# Patient Record
Sex: FEMALE | Race: WHITE | URBAN - METROPOLITAN AREA
[De-identification: names, ages, dates, MRNs, and addresses within clinical notes are randomized per-mention and may not be internally consistent; named-entity substitution may affect disease eponyms.]

---

## 2020-05-22 ENCOUNTER — EMERGENCY (EMERGENCY)
Facility: HOSPITAL | Age: 75
LOS: 1 days | Discharge: HOME | End: 2020-05-22
Attending: INTERNAL MEDICINE | Admitting: INTERNAL MEDICINE
Payer: COMMERCIAL

## 2020-05-22 VITALS
DIASTOLIC BLOOD PRESSURE: 77 MMHG | SYSTOLIC BLOOD PRESSURE: 157 MMHG | HEART RATE: 63 BPM | RESPIRATION RATE: 16 BRPM | OXYGEN SATURATION: 95 %

## 2020-05-22 VITALS
OXYGEN SATURATION: 93 % | SYSTOLIC BLOOD PRESSURE: 98 MMHG | TEMPERATURE: 98 F | HEART RATE: 91 BPM | RESPIRATION RATE: 17 BRPM | DIASTOLIC BLOOD PRESSURE: 72 MMHG

## 2020-05-22 DIAGNOSIS — R41.82 ALTERED MENTAL STATUS, UNSPECIFIED: ICD-10-CM

## 2020-05-22 LAB
ALBUMIN SERPL ELPH-MCNC: 3 G/DL — LOW (ref 3.3–5)
ALP SERPL-CCNC: 75 U/L — SIGNIFICANT CHANGE UP (ref 40–120)
ALT FLD-CCNC: 18 U/L — SIGNIFICANT CHANGE UP (ref 10–45)
ANION GAP SERPL CALC-SCNC: 9 MMOL/L — SIGNIFICANT CHANGE UP (ref 5–17)
APPEARANCE UR: CLEAR — SIGNIFICANT CHANGE UP
AST SERPL-CCNC: 29 U/L — SIGNIFICANT CHANGE UP (ref 10–40)
BACTERIA # UR AUTO: NEGATIVE /HPF — SIGNIFICANT CHANGE UP
BASOPHILS # BLD AUTO: 0.03 K/UL — SIGNIFICANT CHANGE UP (ref 0–0.2)
BASOPHILS NFR BLD AUTO: 0.4 % — SIGNIFICANT CHANGE UP (ref 0–2)
BILIRUB SERPL-MCNC: 0.5 MG/DL — SIGNIFICANT CHANGE UP (ref 0.2–1.2)
BILIRUB UR-MCNC: NEGATIVE — SIGNIFICANT CHANGE UP
BUN SERPL-MCNC: 23 MG/DL — SIGNIFICANT CHANGE UP (ref 7–23)
CALCIUM SERPL-MCNC: 8.9 MG/DL — SIGNIFICANT CHANGE UP (ref 8.4–10.5)
CHLORIDE SERPL-SCNC: 101 MMOL/L — SIGNIFICANT CHANGE UP (ref 96–108)
CO2 SERPL-SCNC: 30 MMOL/L — SIGNIFICANT CHANGE UP (ref 22–31)
COLOR SPEC: YELLOW — SIGNIFICANT CHANGE UP
CREAT SERPL-MCNC: 1.3 MG/DL — SIGNIFICANT CHANGE UP (ref 0.5–1.3)
DIFF PNL FLD: NEGATIVE — SIGNIFICANT CHANGE UP
EOSINOPHIL # BLD AUTO: 0.13 K/UL — SIGNIFICANT CHANGE UP (ref 0–0.5)
EOSINOPHIL NFR BLD AUTO: 1.7 % — SIGNIFICANT CHANGE UP (ref 0–6)
EPI CELLS # UR: SIGNIFICANT CHANGE UP
GLUCOSE BLDC GLUCOMTR-MCNC: 109 MG/DL — HIGH (ref 70–99)
GLUCOSE SERPL-MCNC: 104 MG/DL — HIGH (ref 70–99)
GLUCOSE UR QL: NEGATIVE — SIGNIFICANT CHANGE UP
HCT VFR BLD CALC: 39.5 % — SIGNIFICANT CHANGE UP (ref 34.5–45)
HGB BLD-MCNC: 13.2 G/DL — SIGNIFICANT CHANGE UP (ref 11.5–15.5)
HYALINE CASTS # UR AUTO: ABNORMAL (ref 0–7)
IMM GRANULOCYTES NFR BLD AUTO: 0.7 % — SIGNIFICANT CHANGE UP (ref 0–1.5)
KETONES UR-MCNC: NEGATIVE — SIGNIFICANT CHANGE UP
LEUKOCYTE ESTERASE UR-ACNC: NEGATIVE — SIGNIFICANT CHANGE UP
LYMPHOCYTES # BLD AUTO: 0.89 K/UL — LOW (ref 1–3.3)
LYMPHOCYTES # BLD AUTO: 11.8 % — LOW (ref 13–44)
MCHC RBC-ENTMCNC: 30.8 PG — SIGNIFICANT CHANGE UP (ref 27–34)
MCHC RBC-ENTMCNC: 33.4 GM/DL — SIGNIFICANT CHANGE UP (ref 32–36)
MCV RBC AUTO: 92.3 FL — SIGNIFICANT CHANGE UP (ref 80–100)
MONOCYTES # BLD AUTO: 0.55 K/UL — SIGNIFICANT CHANGE UP (ref 0–0.9)
MONOCYTES NFR BLD AUTO: 7.3 % — SIGNIFICANT CHANGE UP (ref 2–14)
NEUTROPHILS # BLD AUTO: 5.91 K/UL — SIGNIFICANT CHANGE UP (ref 1.8–7.4)
NEUTROPHILS NFR BLD AUTO: 78.1 % — HIGH (ref 43–77)
NITRITE UR-MCNC: NEGATIVE — SIGNIFICANT CHANGE UP
NRBC # BLD: 0 /100 WBCS — SIGNIFICANT CHANGE UP (ref 0–0)
PH UR: 6.5 — SIGNIFICANT CHANGE UP (ref 5–8)
PLATELET # BLD AUTO: 213 K/UL — SIGNIFICANT CHANGE UP (ref 150–400)
POTASSIUM SERPL-MCNC: 3.7 MMOL/L — SIGNIFICANT CHANGE UP (ref 3.5–5.3)
POTASSIUM SERPL-SCNC: 3.7 MMOL/L — SIGNIFICANT CHANGE UP (ref 3.5–5.3)
PROT SERPL-MCNC: 6.9 G/DL — SIGNIFICANT CHANGE UP (ref 6–8.3)
PROT UR-MCNC: 15
RBC # BLD: 4.28 M/UL — SIGNIFICANT CHANGE UP (ref 3.8–5.2)
RBC # FLD: 13 % — SIGNIFICANT CHANGE UP (ref 10.3–14.5)
RBC CASTS # UR COMP ASSIST: NEGATIVE /HPF — SIGNIFICANT CHANGE UP (ref 0–4)
SODIUM SERPL-SCNC: 140 MMOL/L — SIGNIFICANT CHANGE UP (ref 135–145)
SP GR SPEC: 1.01 — SIGNIFICANT CHANGE UP (ref 1.01–1.02)
TROPONIN I SERPL-MCNC: <.017 NG/ML — LOW (ref 0.02–0.06)
UROBILINOGEN FLD QL: NEGATIVE — SIGNIFICANT CHANGE UP
WBC # BLD: 7.56 K/UL — SIGNIFICANT CHANGE UP (ref 3.8–10.5)
WBC # FLD AUTO: 7.56 K/UL — SIGNIFICANT CHANGE UP (ref 3.8–10.5)
WBC UR QL: NEGATIVE /HPF — SIGNIFICANT CHANGE UP (ref 0–5)

## 2020-05-22 PROCEDURE — 36415 COLL VENOUS BLD VENIPUNCTURE: CPT

## 2020-05-22 PROCEDURE — 93005 ELECTROCARDIOGRAM TRACING: CPT

## 2020-05-22 PROCEDURE — 93010 ELECTROCARDIOGRAM REPORT: CPT

## 2020-05-22 PROCEDURE — 70450 CT HEAD/BRAIN W/O DYE: CPT | Mod: 26

## 2020-05-22 PROCEDURE — 73502 X-RAY EXAM HIP UNI 2-3 VIEWS: CPT | Mod: 26,LT

## 2020-05-22 PROCEDURE — 70450 CT HEAD/BRAIN W/O DYE: CPT

## 2020-05-22 PROCEDURE — 85027 COMPLETE CBC AUTOMATED: CPT

## 2020-05-22 PROCEDURE — 81001 URINALYSIS AUTO W/SCOPE: CPT

## 2020-05-22 PROCEDURE — 73502 X-RAY EXAM HIP UNI 2-3 VIEWS: CPT

## 2020-05-22 PROCEDURE — 99284 EMERGENCY DEPT VISIT MOD MDM: CPT | Mod: 25

## 2020-05-22 PROCEDURE — 80053 COMPREHEN METABOLIC PANEL: CPT

## 2020-05-22 PROCEDURE — 71045 X-RAY EXAM CHEST 1 VIEW: CPT | Mod: 26

## 2020-05-22 PROCEDURE — 87086 URINE CULTURE/COLONY COUNT: CPT

## 2020-05-22 PROCEDURE — 51701 INSERT BLADDER CATHETER: CPT

## 2020-05-22 PROCEDURE — 84484 ASSAY OF TROPONIN QUANT: CPT

## 2020-05-22 PROCEDURE — 71045 X-RAY EXAM CHEST 1 VIEW: CPT

## 2020-05-22 PROCEDURE — 99285 EMERGENCY DEPT VISIT HI MDM: CPT

## 2020-05-22 PROCEDURE — 82962 GLUCOSE BLOOD TEST: CPT

## 2020-05-22 RX ORDER — SODIUM CHLORIDE 9 MG/ML
1000 INJECTION INTRAMUSCULAR; INTRAVENOUS; SUBCUTANEOUS ONCE
Refills: 0 | Status: COMPLETED | OUTPATIENT
Start: 2020-05-22 | End: 2020-05-22

## 2020-05-22 RX ADMIN — SODIUM CHLORIDE 2000 MILLILITER(S): 9 INJECTION INTRAMUSCULAR; INTRAVENOUS; SUBCUTANEOUS at 12:30

## 2020-05-22 NOTE — ED PROVIDER NOTE - PATIENT PORTAL LINK FT
You can access the FollowMyHealth Patient Portal offered by [facility] by registering at the following website: [email or web address]. By joining [employer]’s FollowMyHealth portal, you will also be able to view your health information using other applications (apps) compatible with our system.

## 2020-05-22 NOTE — ED PROVIDER NOTE - ATTENDING CONTRIBUTION TO CARE
74 yo F with PMHx HTN, HLD, dementia, hypothyroid presents to ED sent in from assisted living for AMS.  Per EMS pt was found outside smoking and per caretakers at facility pt has not been acting like herself since last night.  Pt's daughter Nicole called and stated that pt had COVID, recovered and received her second negative test yesterday, prompting the facility to move her back to her old room last night, daughter reports pt didn't sleep last night and may have been confused because of the new environment.  Will check labs, UA, urine culture, CXR, CT head and reassess.  Work up negative, sx may be 2/2 to starting bacoflen and change in environment, will d/c back to nursing facility as pt had negative COVID test yesterday  Dr. Freeman:  I have reviewed and discussed with the PA/ resident the case specifics, including the history, physical assessment, evaluation, conclusion, laboratory results, and medical plan. I agree with the contents, and conclusions. I have personally examined, and interviewed the patient.

## 2020-05-22 NOTE — ED PROVIDER NOTE - PMH
Dementia without behavioral disturbance, unspecified dementia type    Hyperlipidemia, unspecified hyperlipidemia type    Hypertension, unspecified type    Hypothyroidism, unspecified type

## 2020-05-22 NOTE — ED PROVIDER NOTE - OBJECTIVE STATEMENT
74 yo F with PMHx HTN, HLD, dementia, hypothyroid presents to ED sent in from assisted living for AMS.  Per EMS pt was found outside smoking and per caretakers at facility pt has not been acting like herself since last night.  Pt's daughter Nicole called and stated that pt had COVID, recovered and received her second negative test yesterday, prompting the facility to move her back to her old room last night, daughter reports pt didn't sleep last night and may have been confused because of the new environment. 74 yo F with PMHx HTN, HLD, dementia, hypothyroid presents to ED sent in from assisted living for AMS.  Per EMS pt was found outside smoking and per caretakers at facility pt has not been acting like herself since last night.  Pt's daughter Nicole called and stated that pt had COVID, recovered and received her second negative test yesterday, prompting the facility to move her back to her old room last night, daughter reports pt didn't sleep last night and may have been confused because of the new environment.  Per notes from facility pt was also started on baclofen yesterday.

## 2020-05-22 NOTE — ED PROVIDER NOTE - NSDCPRINTRESULTS_ED_ALL_ED
[facility]        NAME: Dinah Casper is a 50 y o  female  : 1972    MRN: [id number]  DATE: 2019  TIME: 6:57 PM    Assessment and Plan   Allergic conjunctivitis of left eye [H10 12]  1  Allergic conjunctivitis of left eye  ketotifen (ZADITOR) 0 025 % ophthalmic solution   2  Allergic reaction, initial encounter  predniSONE 10 mg tablet         Patient Instructions     Prednisone and Zaditor drops as prescribed  Claritin once per day and pepsid twice per day  Follow up with eye specialist within 24 hours   [facility] ([phone]  Follow up with PCP in 3-5 days  Proceed to  ER if symptoms worsen  Chief Complaint     Chief Complaint   Patient presents with    Allergic Reaction     touched dog 1 hr ago and eyes are swollen and have blisters on corneas         History of Present Illness       She has undergone allergy testing in the past  She has an epi pen but hasn't used it in years  Denies difficulty breathing or swallowing  PMH: angioedema  Allergic Reaction   This is a new problem  The current episode started today  The problem occurs constantly  The problem is unchanged  The problem is moderate  Associated with: dog  The time of exposure was just prior to onset  Associated symptoms include eye itching and eye redness  Pertinent negatives include no abdominal pain, chest pain, rash, stridor, trouble swallowing or wheezing  Swelling is present on the eyes  Treatments tried: eye drops and claritin  Review of Systems   Review of Systems   Constitutional: Negative for chills and fever  HENT: Negative for trouble swallowing  Eyes: Positive for discharge (lacrimation), redness and itching  Negative for photophobia, pain and visual disturbance  Respiratory: Negative for shortness of breath, wheezing and stridor  Cardiovascular: Negative for chest pain  Gastrointestinal: Negative for abdominal pain     Musculoskeletal: Negative for arthralgias  Skin: Negative for rash  Current Medications       Current Outpatient Medications:     amLODIPine (NORVASC) 10 mg tablet, Take 10 mg by mouth daily, Disp: , Rfl:     atenolol (TENORMIN) 25 mg tablet, TAKE 1 TABLET (25 MG TOTAL) BY MOUTH DAILY   CANCEL CLONIDINE, Disp: , Rfl: 0    buPROPion (WELLBUTRIN XL) 150 mg 24 hr tablet, Take 150 mg by mouth, Disp: , Rfl:     cloNIDine (CATAPRES) 0 2 mg tablet, Take 0 2 mg by mouth daily at bedtime, Disp: , Rfl:     Fesoterodine Fumarate ER 4 MG TB24, Take 1 tablet (4 mg total) by mouth daily at bedtime, Disp: 30 tablet, Rfl: 1    gabapentin (NEURONTIN) 600 MG tablet, TAKE 1 Tablet BY MOUTH TWICE DAILY FOR PAIN AND SLEEP, Disp: , Rfl: 0    hydroxychloroquine (PLAQUENIL) 200 mg tablet, Take 400 mg by mouth daily, Disp: , Rfl: 5    hydrOXYzine HCL (ATARAX) 25 mg tablet, Take 25 mg by mouth every 6 (six) hours as needed for itching, Disp: , Rfl:     lovastatin (MEVACOR) 10 MG tablet, Take 10 mg by mouth, Disp: , Rfl:     meloxicam (MOBIC) 7 5 mg tablet, Take 7 5 mg by mouth, Disp: , Rfl:     metFORMIN (GLUCOPHAGE-XR) 500 mg 24 hr tablet, Take 1 tablet by mouth, Disp: , Rfl:     montelukast (SINGULAIR) 10 mg tablet, Take 10 mg by mouth daily at bedtime, Disp: , Rfl:     omeprazole (PriLOSEC) 10 mg delayed release capsule, Take 20 mg by mouth, Disp: , Rfl:     pramipexole (MIRAPEX) 1 mg tablet, Take 0 25 mg by mouth 3 (three) times a day, Disp: , Rfl:     pramipexole (MIRAPEX) 1 mg tablet, Take 1 mg by mouth, Disp: , Rfl:     SUMAtriptan (IMITREX) 100 mg tablet, Take 100 mg by mouth once as needed for migraine, Disp: , Rfl:     topiramate (TOPAMAX) 50 MG tablet, Take 50 mg by mouth, Disp: , Rfl:     venlafaxine (EFFEXOR-XR) 75 mg 24 hr capsule, Take 75 mg by mouth, Disp: , Rfl:     ketotifen (ZADITOR) 0 025 % ophthalmic solution, Administer 1 drop into the left eye 2 (two) times a day, Disp: 5 mL, Rfl: 0    predniSONE 10 mg tablet, Take 6 pills day one, 5 pills day 2, 4 pills day 3, 3 pills day 4, 2 pills day 5, and 1 pill day 6 , Disp: 21 tablet, Rfl: 0    Current Allergies     Allergies as of 11/14/2019 - Reviewed 11/14/2019   Allergen Reaction Noted    Dog epithelium Swelling 11/14/2019    Ibuprofen Swelling 03/15/2018    Lisinopril Swelling 03/15/2018    Red dye Swelling 03/15/2018    Latex Rash 03/15/2018            The following portions of the patient's history were reviewed and updated as appropriate: allergies, current medications, past family history, past medical history, past social history, past surgical history and problem list      Past Medical History:   Diagnosis Date    Angioedema     Anxiety     Arthritis     Diverticulitis     Hypertension     Migraine     Overactive bladder     Restless leg        Past Surgical History:   Procedure Laterality Date    ADENOIDECTOMY      HYSTERECTOMY      TONSILLECTOMY         Family History   Problem Relation Age of Onset    Diabetes Mother     Hypertension Mother     Heart disease Father     Hypertension Father          Medications have been verified  Objective   /78   Pulse 85   Temp 98 °F (36 7 °C) (Tympanic)   Resp 18   Ht 5' 2" (1 575 m)   Wt 101 kg (223 lb)   SpO2 97%   BMI 40 79 kg/m²        Physical Exam     Physical Exam   Constitutional: She appears well-developed and well-nourished  No distress  HENT:   Mouth/Throat: Oropharynx is clear and moist    Eyes: Pupils are equal, round, and reactive to light  EOM are normal  Right eye exhibits no discharge  Left eye exhibits discharge (lacrimation)  L eye: lateral scleral edema  Cardiovascular: Normal rate, regular rhythm and normal heart sounds  Exam reveals no gallop and no friction rub  No murmur heard  Pulmonary/Chest: Effort normal and breath sounds normal  No respiratory distress  She has no wheezes  She has no rales  She exhibits no tenderness     Lymphadenopathy:     She has no cervical adenopathy  Neurological: She is alert  Skin: Skin is warm  Rash noted  Psychiatric: She has a normal mood and affect   Her behavior is normal  Judgment and thought content normal  Patient requests all Lab and Radiology Results on their Discharge Instructions

## 2020-05-22 NOTE — ED PROVIDER NOTE - CLINICAL SUMMARY MEDICAL DECISION MAKING FREE TEXT BOX
74 yo F with PMHx HTN, HLD, dementia, hypothyroid presents to ED sent in from assisted living for AMS.  Per EMS pt was found outside smoking and per caretakers at facility pt has not been acting like herself since last night.  Pt's daughter Nicole called and stated that pt had COVID, recovered and received her second negative test yesterday, prompting the facility to move her back to her old room last night, daughter reports pt didn't sleep last night and may have been confused because of the new environment.  Will check labs, UA, urine culture, CXR, CT head and reassess 74 yo F with PMHx HTN, HLD, dementia, hypothyroid presents to ED sent in from assisted living for AMS.  Per EMS pt was found outside smoking and per caretakers at facility pt has not been acting like herself since last night.  Pt's daughter Nicole called and stated that pt had COVID, recovered and received her second negative test yesterday, prompting the facility to move her back to her old room last night, daughter reports pt didn't sleep last night and may have been confused because of the new environment.  Will check labs, UA, urine culture, CXR, CT head and reassess.  Work up negative, sx may be 2/2 to starting bacoflen and change in environment, will d/c back to nursing facility as pt had negative COVID test yesterday

## 2020-05-22 NOTE — ED ADULT NURSE NOTE - NSIMPLEMENTINTERV_GEN_ALL_ED
Implemented All Fall Risk Interventions:  [place] to call system. Call bell, personal items and telephone within reach. Instruct patient to call for assistance. Room bathroom lighting operational. Non-slip footwear when patient is off stretcher. Physically safe environment: no spills, clutter or unnecessary equipment. Stretcher in lowest position, wheels locked, appropriate side rails in place. Provide visual cue, wrist band, yellow gown, etc. Monitor gait and stability. Monitor for mental status changes and reorient to person, place, and time. Review medications for side effects contributing to fall risk. Reinforce activity limits and safety measures with patient and family.

## 2020-05-22 NOTE — ED ADULT NURSE NOTE - OBJECTIVE STATEMENT
As per staff she was found outside with altered mental status. Upon arrival, pt awake alert, oriented to person and place, knows , hospital and president. Moving all extremities, no evidence of weakness or altered speech.

## 2020-05-23 LAB
CULTURE RESULTS: NO GROWTH — SIGNIFICANT CHANGE UP
SPECIMEN SOURCE: SIGNIFICANT CHANGE UP

## 2020-06-03 ENCOUNTER — EMERGENCY (EMERGENCY)
Facility: HOSPITAL | Age: 75
LOS: 1 days | Discharge: HOME | End: 2020-06-03
Attending: EMERGENCY MEDICINE | Admitting: EMERGENCY MEDICINE
Payer: MEDICARE

## 2020-06-03 VITALS
RESPIRATION RATE: 16 BRPM | DIASTOLIC BLOOD PRESSURE: 85 MMHG | OXYGEN SATURATION: 98 % | HEART RATE: 76 BPM | SYSTOLIC BLOOD PRESSURE: 156 MMHG

## 2020-06-03 VITALS
OXYGEN SATURATION: 97 % | TEMPERATURE: 98 F | RESPIRATION RATE: 18 BRPM | DIASTOLIC BLOOD PRESSURE: 96 MMHG | WEIGHT: 179.9 LBS | SYSTOLIC BLOOD PRESSURE: 173 MMHG | HEIGHT: 67 IN | HEART RATE: 83 BPM

## 2020-06-03 PROBLEM — F03.90 UNSPECIFIED DEMENTIA WITHOUT BEHAVIORAL DISTURBANCE: Chronic | Status: ACTIVE | Noted: 2020-05-22

## 2020-06-03 LAB
ALBUMIN SERPL ELPH-MCNC: 3.3 G/DL — SIGNIFICANT CHANGE UP (ref 3.3–5)
ALP SERPL-CCNC: 115 U/L — SIGNIFICANT CHANGE UP (ref 40–120)
ALT FLD-CCNC: 25 U/L — SIGNIFICANT CHANGE UP (ref 12–78)
ANION GAP SERPL CALC-SCNC: 9 MMOL/L — SIGNIFICANT CHANGE UP (ref 5–17)
APPEARANCE UR: ABNORMAL
AST SERPL-CCNC: 29 U/L — SIGNIFICANT CHANGE UP (ref 15–37)
BASOPHILS # BLD AUTO: 0.04 K/UL — SIGNIFICANT CHANGE UP (ref 0–0.2)
BASOPHILS NFR BLD AUTO: 0.5 % — SIGNIFICANT CHANGE UP (ref 0–2)
BILIRUB SERPL-MCNC: 0.4 MG/DL — SIGNIFICANT CHANGE UP (ref 0.2–1.2)
BILIRUB UR-MCNC: NEGATIVE — SIGNIFICANT CHANGE UP
BUN SERPL-MCNC: 17 MG/DL — SIGNIFICANT CHANGE UP (ref 7–23)
CALCIUM SERPL-MCNC: 9.2 MG/DL — SIGNIFICANT CHANGE UP (ref 8.5–10.1)
CHLORIDE SERPL-SCNC: 102 MMOL/L — SIGNIFICANT CHANGE UP (ref 96–108)
CO2 SERPL-SCNC: 27 MMOL/L — SIGNIFICANT CHANGE UP (ref 22–31)
COLOR SPEC: YELLOW — SIGNIFICANT CHANGE UP
CREAT SERPL-MCNC: 1.1 MG/DL — SIGNIFICANT CHANGE UP (ref 0.5–1.3)
DIFF PNL FLD: ABNORMAL
EOSINOPHIL # BLD AUTO: 0.08 K/UL — SIGNIFICANT CHANGE UP (ref 0–0.5)
EOSINOPHIL NFR BLD AUTO: 1.1 % — SIGNIFICANT CHANGE UP (ref 0–6)
GLUCOSE SERPL-MCNC: 94 MG/DL — SIGNIFICANT CHANGE UP (ref 70–99)
GLUCOSE UR QL: NEGATIVE — SIGNIFICANT CHANGE UP
HCT VFR BLD CALC: 43 % — SIGNIFICANT CHANGE UP (ref 34.5–45)
HGB BLD-MCNC: 14.3 G/DL — SIGNIFICANT CHANGE UP (ref 11.5–15.5)
IMM GRANULOCYTES NFR BLD AUTO: 0.5 % — SIGNIFICANT CHANGE UP (ref 0–1.5)
KETONES UR-MCNC: ABNORMAL
LACTATE SERPL-SCNC: 1.1 MMOL/L — SIGNIFICANT CHANGE UP (ref 0.7–2)
LEUKOCYTE ESTERASE UR-ACNC: ABNORMAL
LIDOCAIN IGE QN: 75 U/L — SIGNIFICANT CHANGE UP (ref 73–393)
LYMPHOCYTES # BLD AUTO: 0.91 K/UL — LOW (ref 1–3.3)
LYMPHOCYTES # BLD AUTO: 12 % — LOW (ref 13–44)
MCHC RBC-ENTMCNC: 30.4 PG — SIGNIFICANT CHANGE UP (ref 27–34)
MCHC RBC-ENTMCNC: 33.3 GM/DL — SIGNIFICANT CHANGE UP (ref 32–36)
MCV RBC AUTO: 91.3 FL — SIGNIFICANT CHANGE UP (ref 80–100)
MONOCYTES # BLD AUTO: 0.51 K/UL — SIGNIFICANT CHANGE UP (ref 0–0.9)
MONOCYTES NFR BLD AUTO: 6.7 % — SIGNIFICANT CHANGE UP (ref 2–14)
NEUTROPHILS # BLD AUTO: 6 K/UL — SIGNIFICANT CHANGE UP (ref 1.8–7.4)
NEUTROPHILS NFR BLD AUTO: 79.2 % — HIGH (ref 43–77)
NITRITE UR-MCNC: NEGATIVE — SIGNIFICANT CHANGE UP
NRBC # BLD: 0 /100 WBCS — SIGNIFICANT CHANGE UP (ref 0–0)
PH UR: 7 — SIGNIFICANT CHANGE UP (ref 5–8)
PLATELET # BLD AUTO: 296 K/UL — SIGNIFICANT CHANGE UP (ref 150–400)
POTASSIUM SERPL-MCNC: 3.9 MMOL/L — SIGNIFICANT CHANGE UP (ref 3.5–5.3)
POTASSIUM SERPL-SCNC: 3.9 MMOL/L — SIGNIFICANT CHANGE UP (ref 3.5–5.3)
PROT SERPL-MCNC: 7.7 G/DL — SIGNIFICANT CHANGE UP (ref 6–8.3)
PROT UR-MCNC: 15
RBC # BLD: 4.71 M/UL — SIGNIFICANT CHANGE UP (ref 3.8–5.2)
RBC # FLD: 13 % — SIGNIFICANT CHANGE UP (ref 10.3–14.5)
SARS-COV-2 RNA SPEC QL NAA+PROBE: SIGNIFICANT CHANGE UP
SODIUM SERPL-SCNC: 138 MMOL/L — SIGNIFICANT CHANGE UP (ref 135–145)
SP GR SPEC: 1.01 — SIGNIFICANT CHANGE UP (ref 1.01–1.02)
UROBILINOGEN FLD QL: NEGATIVE — SIGNIFICANT CHANGE UP
WBC # BLD: 7.58 K/UL — SIGNIFICANT CHANGE UP (ref 3.8–10.5)
WBC # FLD AUTO: 7.58 K/UL — SIGNIFICANT CHANGE UP (ref 3.8–10.5)

## 2020-06-03 PROCEDURE — 71250 CT THORAX DX C-: CPT | Mod: 26

## 2020-06-03 PROCEDURE — 96361 HYDRATE IV INFUSION ADD-ON: CPT

## 2020-06-03 PROCEDURE — 36415 COLL VENOUS BLD VENIPUNCTURE: CPT

## 2020-06-03 PROCEDURE — 99284 EMERGENCY DEPT VISIT MOD MDM: CPT | Mod: 25

## 2020-06-03 PROCEDURE — 74176 CT ABD & PELVIS W/O CONTRAST: CPT | Mod: 26

## 2020-06-03 PROCEDURE — 80053 COMPREHEN METABOLIC PANEL: CPT

## 2020-06-03 PROCEDURE — 93010 ELECTROCARDIOGRAM REPORT: CPT

## 2020-06-03 PROCEDURE — 74176 CT ABD & PELVIS W/O CONTRAST: CPT

## 2020-06-03 PROCEDURE — 83690 ASSAY OF LIPASE: CPT

## 2020-06-03 PROCEDURE — 93005 ELECTROCARDIOGRAM TRACING: CPT

## 2020-06-03 PROCEDURE — 85027 COMPLETE CBC AUTOMATED: CPT

## 2020-06-03 PROCEDURE — 81001 URINALYSIS AUTO W/SCOPE: CPT

## 2020-06-03 PROCEDURE — 87635 SARS-COV-2 COVID-19 AMP PRB: CPT

## 2020-06-03 PROCEDURE — 96365 THER/PROPH/DIAG IV INF INIT: CPT

## 2020-06-03 PROCEDURE — 71250 CT THORAX DX C-: CPT

## 2020-06-03 PROCEDURE — 83605 ASSAY OF LACTIC ACID: CPT

## 2020-06-03 PROCEDURE — 99284 EMERGENCY DEPT VISIT MOD MDM: CPT

## 2020-06-03 RX ORDER — CEFUROXIME AXETIL 250 MG
1 TABLET ORAL
Qty: 20 | Refills: 0
Start: 2020-06-03 | End: 2020-06-12

## 2020-06-03 RX ORDER — HALOPERIDOL DECANOATE 100 MG/ML
0.5 INJECTION INTRAMUSCULAR ONCE
Refills: 0 | Status: COMPLETED | OUTPATIENT
Start: 2020-06-03 | End: 2020-06-03

## 2020-06-03 RX ORDER — CEFTRIAXONE 500 MG/1
1000 INJECTION, POWDER, FOR SOLUTION INTRAMUSCULAR; INTRAVENOUS ONCE
Refills: 0 | Status: COMPLETED | OUTPATIENT
Start: 2020-06-03 | End: 2020-06-03

## 2020-06-03 RX ORDER — SODIUM CHLORIDE 9 MG/ML
1000 INJECTION INTRAMUSCULAR; INTRAVENOUS; SUBCUTANEOUS ONCE
Refills: 0 | Status: COMPLETED | OUTPATIENT
Start: 2020-06-03 | End: 2020-06-03

## 2020-06-03 RX ORDER — DOCUSATE SODIUM 100 MG
1 CAPSULE ORAL
Qty: 14 | Refills: 0
Start: 2020-06-03 | End: 2020-06-09

## 2020-06-03 RX ADMIN — SODIUM CHLORIDE 1000 MILLILITER(S): 9 INJECTION INTRAMUSCULAR; INTRAVENOUS; SUBCUTANEOUS at 13:50

## 2020-06-03 RX ADMIN — CEFTRIAXONE 100 MILLIGRAM(S): 500 INJECTION, POWDER, FOR SOLUTION INTRAMUSCULAR; INTRAVENOUS at 16:10

## 2020-06-03 RX ADMIN — CEFTRIAXONE 1000 MILLIGRAM(S): 500 INJECTION, POWDER, FOR SOLUTION INTRAMUSCULAR; INTRAVENOUS at 16:40

## 2020-06-03 RX ADMIN — HALOPERIDOL DECANOATE 0.5 MILLIGRAM(S): 100 INJECTION INTRAMUSCULAR at 15:28

## 2020-06-03 RX ADMIN — SODIUM CHLORIDE 1000 MILLILITER(S): 9 INJECTION INTRAMUSCULAR; INTRAVENOUS; SUBCUTANEOUS at 12:50

## 2020-06-03 NOTE — ED PROVIDER NOTE - ATTENDING CONTRIBUTION TO CARE
76 y/o F sent in from [facility] for increased abd pain and confusion.  pt with imaging consistent with gallstones but no cholecystitis.  labs, US, pain control CT head.

## 2020-06-03 NOTE — ED ADULT NURSE NOTE - CHPI ED NUR SYMPTOMS NEG
no fever/no nausea/no vomiting/no diarrhea/no blood in stool/no burning urination/no dysuria/no chills/no hematuria

## 2020-06-03 NOTE — ED PROVIDER NOTE - PROGRESS NOTE DETAILS
patient ambulated in ED, ua + uti, rx ceftin sent to pharmacy spoke with patients nurse at [facility], R[place]ll,  case discussed, results discussed, +uti, rx ceftin sent to pharmacy, age indeterminate fx's thoracic and lumbar spine, fecal matter, recommended colace, rx sent to pharmacy ,  copy of results with discharge papers spoke with patients nurse at [facility], Rachell,  case discussed, results discussed, +uti, rx ceftin sent to pharmacy, age indeterminate fx's thoracic and lumbar spine, constipation, recommended colace, rx sent to pharmacy ,  abdominal aortic anuerysm, copy of results with discharge papers, patient to follow up with PMD at [facility]

## 2020-06-03 NOTE — ED ADULT NURSE NOTE - OBJECTIVE STATEMENT
75 year old female brought in by EMS from [facility]. Patient presents with nonhospital DNR. Patient with advanced dementia, oriented to self only at baseline. Patient unable to provide a history or report specific complaints. As per EMS and staff paperwork, reason for transfer to the hospital is "increased pain and confusion". Out patient imaging including abdominal x-ray, abdominal ultrasound, and L spine x-ray shows slight wedge compression at t7 due to osteoporosis, distended gallbladder with gallstones, no evidence of cholecystitis. Patient denies adbominal pain, nausea/vomiting. Patient denies change to bowel or bladder pattern. Patient denies urinary symptoms. Mild, infrequent, nonproductive cough noted but patient denies chest pain, shortness of breath, palpitations. Patients admit to being an everyday smoker. Patient denies recent illness or fever/chills - none documented in assisted living paperwork. Imaging completed 6/2/2020 but unknown start of symptoms.

## 2020-06-03 NOTE — ED ADULT NURSE NOTE - PMH
Carotid stenosis    Degenerative joint disease    Dementia    Hyperlipidemia    Hypertension    Hypothyroid    Osteoarthritis

## 2020-06-03 NOTE — ED PROVIDER NOTE - CLINICAL SUMMARY MEDICAL DECISION MAKING FREE TEXT BOX
hx dementia, sent to ED from the [facility], hx of dementia, will obtain labs, imaging, ekg

## 2020-06-03 NOTE — ED ADULT NURSE REASSESSMENT NOTE - NS ED NURSE REASSESS COMMENT FT1
Patient ambulatory to the restroom with a steady gait and standby assist. ED ARLETH howe. Urine sample collected and sent to the lab. Pending results and plan of care. Safety maintained. Patient placed in stretcher in hallway to improve safety.

## 2020-06-03 NOTE — ED PROVIDER NOTE - PROVIDER TOKENS
FREE:[LAST:[Sylvia Ford MD],PHONE:[(   )    -],FAX:[(   )    -],FOLLOWUP:[1-3 Days],ESTABLISHEDPATIENT:[T]]

## 2020-06-03 NOTE — ED PROVIDER NOTE - OBJECTIVE STATEMENT
75 y female sent to ED from the [place] , assisted living, as per facility note, patient has increased pain and confusion,  had gallbladder us 75 y female sent to ED from the [place] , [facility] living, as per facility note, patient has increased pain and confusion,  had gallbladder us with xrays, us showed "+ gallstones, no cholecystitis",  xrays "compression fx t7 indeterminate age, possible L2 compression fx, indeterminate age. 75 y female sent to ED from the [place] [facility] living, as per facility note, patient has increased pain and confusion,  had gallbladder us with xrays, us showed "+ gallstones, no cholecystitis",  xrays "compression fx t7 indeterminate age, possible L2 compression fx, indeterminate age.    PMH: Carotid stenosis    Degenerative joint disease    Dementia    Hyperlipidemia    Hypertension    Hypothyroid    Osteoarthritis

## 2020-06-03 NOTE — ED PROVIDER NOTE - PATIENT PORTAL LINK FT
You can access the FollowMyHealth Patient Portal offered by [facility] by registering at the following website: [email or web address]. By joining [employer]’s FollowMyHealth portal, you will also be able to view your health information using other applications (apps) compatible with our system.

## 2020-07-23 PROBLEM — I10 ESSENTIAL (PRIMARY) HYPERTENSION: Chronic | Status: ACTIVE | Noted: 2020-06-03

## 2020-07-23 PROBLEM — E03.9 HYPOTHYROIDISM, UNSPECIFIED: Chronic | Status: ACTIVE | Noted: 2020-06-03

## 2020-07-23 PROBLEM — M19.90 UNSPECIFIED OSTEOARTHRITIS, UNSPECIFIED SITE: Chronic | Status: ACTIVE | Noted: 2020-06-03

## 2020-07-23 PROBLEM — I65.29 OCCLUSION AND STENOSIS OF UNSPECIFIED CAROTID ARTERY: Chronic | Status: ACTIVE | Noted: 2020-06-03

## 2020-07-23 PROBLEM — E78.5 HYPERLIPIDEMIA, UNSPECIFIED: Chronic | Status: ACTIVE | Noted: 2020-06-03

## 2020-08-05 PROBLEM — Z00.00 ENCOUNTER FOR PREVENTIVE HEALTH EXAMINATION: Status: ACTIVE | Noted: 2020-08-05

## 2020-08-07 ENCOUNTER — OUTPATIENT (OUTPATIENT)
Dept: OUTPATIENT SERVICES | Facility: HOSPITAL | Age: 75
LOS: 1 days | End: 2020-08-07
Payer: MEDICARE

## 2020-08-07 ENCOUNTER — APPOINTMENT (OUTPATIENT)
Dept: MRI IMAGING | Facility: CLINIC | Age: 75
End: 2020-08-07
Payer: MEDICARE

## 2020-08-07 DIAGNOSIS — Z00.8 ENCOUNTER FOR OTHER GENERAL EXAMINATION: ICD-10-CM

## 2020-08-07 PROCEDURE — 70551 MRI BRAIN STEM W/O DYE: CPT

## 2020-08-07 PROCEDURE — 70551 MRI BRAIN STEM W/O DYE: CPT | Mod: 26

## 2021-02-06 ENCOUNTER — EMERGENCY (EMERGENCY)
Facility: HOSPITAL | Age: 76
LOS: 0 days | Discharge: HOME | End: 2021-02-06
Attending: EMERGENCY MEDICINE
Payer: MEDICARE

## 2021-02-06 VITALS
DIASTOLIC BLOOD PRESSURE: 82 MMHG | HEIGHT: 67 IN | TEMPERATURE: 98 F | SYSTOLIC BLOOD PRESSURE: 177 MMHG | OXYGEN SATURATION: 96 % | HEART RATE: 58 BPM | RESPIRATION RATE: 18 BRPM | WEIGHT: 139.99 LBS

## 2021-02-06 VITALS
DIASTOLIC BLOOD PRESSURE: 66 MMHG | HEART RATE: 72 BPM | RESPIRATION RATE: 17 BRPM | OXYGEN SATURATION: 99 % | SYSTOLIC BLOOD PRESSURE: 140 MMHG | TEMPERATURE: 98 F

## 2021-02-06 DIAGNOSIS — M25.551 PAIN IN RIGHT HIP: ICD-10-CM

## 2021-02-06 DIAGNOSIS — I10 ESSENTIAL (PRIMARY) HYPERTENSION: ICD-10-CM

## 2021-02-06 DIAGNOSIS — Z79.82 LONG TERM (CURRENT) USE OF ASPIRIN: ICD-10-CM

## 2021-02-06 DIAGNOSIS — I65.29 OCCLUSION AND STENOSIS OF UNSPECIFIED CAROTID ARTERY: ICD-10-CM
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
## 2021-02-06 DIAGNOSIS — F03.90 UNSPECIFIED DEMENTIA WITHOUT BEHAVIORAL DISTURBANCE: ICD-10-CM

## 2021-02-06 DIAGNOSIS — M19.90 UNSPECIFIED OSTEOARTHRITIS, UNSPECIFIED SITE: ICD-10-CM

## 2021-02-06 DIAGNOSIS — S76.011A STRAIN OF MUSCLE, FASCIA AND TENDON OF RIGHT HIP, INITIAL ENCOUNTER: ICD-10-CM

## 2021-02-06 DIAGNOSIS — E03.9 HYPOTHYROIDISM, UNSPECIFIED: ICD-10-CM

## 2021-02-06 DIAGNOSIS — E78.5 HYPERLIPIDEMIA, UNSPECIFIED: ICD-10-CM
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
## 2021-02-06 PROCEDURE — 72192 CT PELVIS W/O DYE: CPT

## 2021-02-06 PROCEDURE — 99284 EMERGENCY DEPT VISIT MOD MDM: CPT | Mod: 25

## 2021-02-06 PROCEDURE — 99283 EMERGENCY DEPT VISIT LOW MDM: CPT

## 2021-02-06 PROCEDURE — 72192 CT PELVIS W/O DYE: CPT | Mod: 26

## 2021-02-06 PROCEDURE — 76376 3D RENDER W/INTRP POSTPROCES: CPT

## 2021-02-06 PROCEDURE — 76376 3D RENDER W/INTRP POSTPROCES: CPT | Mod: 26

## 2021-02-06 NOTE — ED PROVIDER NOTE - PATIENT PORTAL LINK FT
You can access the FollowMyHealth Patient Portal offered by [facility] by registering at the following website: [email or web address]. By joining [employer]’s FollowMyHealth portal, you will also be able to view your health information using other applications (apps) compatible with our system.

## 2021-02-06 NOTE — ED PROVIDER NOTE - PMH
Carotid stenosis    Degenerative joint disease    Dementia    Dementia without behavioral disturbance, unspecified dementia type    Hyperlipidemia    Hyperlipidemia, unspecified hyperlipidemia type    Hypertension    Hypertension, unspecified type    Hypothyroid    Hypothyroidism, unspecified type    Osteoarthritis

## 2021-02-06 NOTE — ED PROVIDER NOTE - OBJECTIVE STATEMENT
75 y/o female with a PMHx of carotid stenosis, degenerative joint disease, dementia, HLD, HTN, hypothyroid, osteoarthritis,  presents to the ED sent from assisted living c/o right hip pain. Upon ED arrival, pt states hip pain is gone. Pt has not other complaints. Pt already asking for transportation back to facility.

## 2021-02-06 NOTE — ED ADULT NURSE NOTE - NSIMPLEMENTINTERV_GEN_ALL_ED
Implemented All Fall with Harm Risk Interventions:  [place] to call system. Call bell, personal items and telephone within reach. Instruct patient to call for assistance. Room bathroom lighting operational. Non-slip footwear when patient is off stretcher. Physically safe environment: no spills, clutter or unnecessary equipment. Stretcher in lowest position, wheels locked, appropriate side rails in place. Provide visual cue, wrist band, yellow gown, etc. Monitor gait and stability. Monitor for mental status changes and reorient to person, place, and time. Review medications for side effects contributing to fall risk. Reinforce activity limits and safety measures with patient and family. Provide visual clues: red socks.

## 2021-02-06 NOTE — ED PROVIDER NOTE - NS_ ATTENDINGSCRIBEDETAILS _ED_A_ED_FT
I, Elian Sprague MD,  performed the initial face to face bedside interview with this patient regarding history of present illness, review of symptoms and relevant past medical, social and family history.  I completed an independent physical examination.    The history, relevant review of systems, past medical and surgical history, medical decision making, and physical examination was documented by the scribe in my presence and I attest to the accuracy of the documentation.

## 2021-02-06 NOTE — ED ADULT NURSE NOTE - OBJECTIVE STATEMENT
Pt BIBA from [facility] hx dementia currently A/Ox1 baseline presents with hip pain.  EMS states was told right hip pain but pt stating "my hips don't hurt".  EMS states facility reporting pt was in bed, screaming in pain over right hip.  No falls reported.  Pt currently with no deformity.  VSS

## 2021-02-06 NOTE — ED ADULT NURSE NOTE - NS ED NURSE DC INFO COMPLEXITY
Simple: Patient demonstrates quick and easy understanding/Complex: Multiple Rx/Tx. Pt has difficulty understanding. Requires additional help/Verbalized Understanding

## 2021-02-06 NOTE — ED PROVIDER NOTE - MUSCULOSKELETAL, MLM
Spine appears normal, range of motion is not limited, no muscle or joint tenderness. No pain when log rolling either leg. No obvious deformities. No TTP neurovascularly intact. No pain to back.

## 2021-02-06 NOTE — ED ADULT TRIAGE NOTE - CHIEF COMPLAINT QUOTE
Pt confused at baseline, as per EMS "she was sent for hip pain, she did not fall, she was found in her bed and was screaming her right hip hurt". staff at assisted living reports no recent falls. ems reports "when we asked if it was her right hip she said yes, we noted swelling to left hip and then patient then stated it was the left hip that was painful as well".

## 2021-04-10 ENCOUNTER — EMERGENCY (EMERGENCY)
Facility: HOSPITAL | Age: 76
LOS: 0 days | Discharge: HOME | End: 2021-04-10
Attending: EMERGENCY MEDICINE
Payer: MEDICARE

## 2021-04-10 VITALS
WEIGHT: 115.08 LBS | HEIGHT: 67 IN | TEMPERATURE: 98 F | RESPIRATION RATE: 16 BRPM | HEART RATE: 62 BPM | OXYGEN SATURATION: 98 % | DIASTOLIC BLOOD PRESSURE: 53 MMHG | SYSTOLIC BLOOD PRESSURE: 153 MMHG

## 2021-04-10 VITALS
DIASTOLIC BLOOD PRESSURE: 61 MMHG | RESPIRATION RATE: 18 BRPM | TEMPERATURE: 99 F | SYSTOLIC BLOOD PRESSURE: 145 MMHG | HEART RATE: 67 BPM | OXYGEN SATURATION: 97 %

## 2021-04-10 DIAGNOSIS — N93.9 ABNORMAL UTERINE AND VAGINAL BLEEDING, UNSPECIFIED: ICD-10-CM

## 2021-04-10 DIAGNOSIS — E03.9 HYPOTHYROIDISM, UNSPECIFIED: ICD-10-CM

## 2021-04-10 DIAGNOSIS — M46.1 SACROILIITIS, NOT ELSEWHERE CLASSIFIED: ICD-10-CM

## 2021-04-10 DIAGNOSIS — F03.90 UNSPECIFIED DEMENTIA WITHOUT BEHAVIORAL DISTURBANCE: ICD-10-CM

## 2021-04-10 DIAGNOSIS — M19.90 UNSPECIFIED OSTEOARTHRITIS, UNSPECIFIED SITE: ICD-10-CM

## 2021-04-10 DIAGNOSIS — N81.4 UTEROVAGINAL PROLAPSE, UNSPECIFIED: ICD-10-CM

## 2021-04-10 DIAGNOSIS — I65.29 OCCLUSION AND STENOSIS OF UNSPECIFIED CAROTID ARTERY: ICD-10-CM

## 2021-04-10 DIAGNOSIS — I10 ESSENTIAL (PRIMARY) HYPERTENSION: ICD-10-CM

## 2021-04-10 DIAGNOSIS — E78.5 HYPERLIPIDEMIA, UNSPECIFIED: ICD-10-CM

## 2021-04-10 DIAGNOSIS — Z79.82 LONG TERM (CURRENT) USE OF ASPIRIN: ICD-10-CM

## 2021-04-10 PROCEDURE — 99283 EMERGENCY DEPT VISIT LOW MDM: CPT

## 2021-04-10 PROCEDURE — 99284 EMERGENCY DEPT VISIT MOD MDM: CPT

## 2021-04-10 NOTE — ED PROVIDER NOTE - GENITOURINARY, MLM
No prolapse of uterus or cervix at this time, +mild bleeding from vaginal introitus with staining of undergarment, no obvious active bleeding

## 2021-04-10 NOTE — ED PROVIDER NOTE - NSFOLLOWUPINSTRUCTIONS_ED_ALL_ED_FT
Follow up with your gynecologist this week  Keep undergarments clean and dry, change frequently  Can use A&D ointment for any irritation.  Any worsening symptoms, can return to ER for further care and evaluation.      Uterine Prolapse    AMBULATORY CARE:    A uterine prolapse is a condition that causes your uterus to slip down into your vagina. Prolapse can happen if the tissues and muscles supporting your uterus become weak or damaged.             Common signs and symptoms:   •Pelvic pressure or heaviness      •A soft bulge or lump in your vagina that may bulge through your vaginal opening      •Trouble urinating or having a bowel movement      •Pain in your lower back, pelvis, or vagina      •Pain during sex      Seek care immediately if:   •You have bleeding from your vagina that does not stop.      •You have a mass coming out of your vagina that you cannot push back in.      •You are unable to urinate or have a bowel movement.      •You have severe abdominal pain.      Call your doctor or gynecologist if:   •You are leaking urine or bowel movement.      •You have a fever.      •You have foul-smelling fluid coming from your vagina.      •You see blood coming from your vagina that is not from your monthly period.      •You have questions or concerns about your condition or care.      Treatment: The goal of treatment is to correct the prolapse and relieve your signs and symptoms. Treatment may include any of the following:   •Estrogen may help strengthen the pelvic muscles and keep the prolapse from getting worse. This may be taken as a pill, applied as a cream, or inserted into your vagina.       •A vaginal device, such as a pessary or sphere, may be used to hold your uterus in place and support your muscles. These devices may help decrease pressure on other pelvic organs, or help you do Kegel exercises. If your gynecologist fits you for a pessary, you will need to remove and clean it regularly. You will be taught when and how to clean the pessary.      •Surgery may be needed to fix the prolapse. You may have surgery to tighten the muscles and tissues that hold your uterus in place. Your healthcare provider may use a mesh patch or a tissue graft to support or hold your uterus in place. Hysterectomy is surgery used to remove your uterus. Surgery to close your vagina may be used to hold your uterus in place.      Manage your symptoms:   •Sit with your legs elevated. This can help relieve pain or discomfort. Put pillows or blankets under your ankles to elevate your entire legs.      •Do Kegel exercises. These exercises strengthen the muscles that hold your uterus in place. They also tighten the muscles you use when you urinate or have a bowel movement. Tighten muscles in your pelvis (muscles you use to stop urinating). Hold the muscles tight for 5 seconds, then relax for 5 seconds. Gradually work up to holding the muscles contracted for 10 seconds. Do at least 3 sets of 10 repetitions a day.      •Do not strain. Do not lift heavy objects, stand for long periods of time, or strain to have a bowel movement. Prevent constipation by drinking plenty of liquids and eating foods high in fiber. Ask how much liquid to drink every day. High-fiber foods include fresh fruits, vegetables, and whole grains.      •Maintain a healthy weight. Ask your healthcare provider for a healthy weight for you. Ask him or her to help you create a weight loss plan if you are overweight. He or she can also help you create an exercise program. Exercise helps your bowels work better and decreases pressure inside your colon.  Walking for Exercise           Follow up with your doctor or gynecologist as directed: You may need to return regularly to have your pessary checked. You may also need to see your gynecologist for possible surgery. Write down your questions so you remember to ask them during your visits.

## 2021-04-10 NOTE — ED ADULT NURSE NOTE - OBJECTIVE STATEMENT
Pt presents to the ED with vaginal bleeding and evaluation for uterine prolapse. Pt denies any pain/fever/chills. Denies history of prolapse.

## 2021-04-10 NOTE — ED PROVIDER NOTE - CLINICAL SUMMARY MEDICAL DECISION MAKING FREE TEXT BOX
+ hx of prolapse with mild vaginal bleeding at this time. Has a GYN which daughter can take her to this week, will d/c.

## 2021-04-10 NOTE — ED PROVIDER NOTE - PMH
Carotid stenosis    Degenerative joint disease    Dementia without behavioral disturbance, unspecified dementia type    Hyperlipidemia    Hypertension    Hypothyroid    Osteoarthritis

## 2021-04-10 NOTE — ED PROVIDER NOTE - OBJECTIVE STATEMENT
75 y/o F with PMHx of dementia, HTN, HLD, carotid stenosis, hypothyroid, DJD, and OA presents to the ED BIBEMS from [facility] for eval of uterine prolapse. Notes some +vaginal bleeding beginning this AM. No pain or fever. NKDA. PCP: Dr. Moses Canchola. Pt is poor historian 2/2 baseline dementia.

## 2021-04-10 NOTE — ED PROVIDER NOTE - PATIENT PORTAL LINK FT
You can access the FollowMyHealth Patient Portal offered by [facility] by registering at the following website: [email or web address]. By joining [employer]’s FollowMyHealth portal, you will also be able to view your health information using other applications (apps) compatible with our system.

## 2021-04-10 NOTE — ED ADULT NURSE NOTE - CHIEF COMPLAINT QUOTE
76y female presents to ED with prolapsed uterus from [facility]. Pt denies any hx of uterine cancer. Pt denies pain. As per staff, prolapse was noticed today.

## 2021-04-10 NOTE — ED PROVIDER NOTE - PROGRESS NOTE DETAILS
discussed case with daughter.  no need for ED workup at this time.  daughter states pt has a GYN that she has recently seen.  she is ok with taking her back to assited living for outpt workup.

## 2021-06-24 ENCOUNTER — EMERGENCY (EMERGENCY)
Facility: HOSPITAL | Age: 76
LOS: 0 days | Discharge: HOME | End: 2021-06-25
Attending: EMERGENCY MEDICINE
Payer: MEDICARE

## 2021-06-24 VITALS
SYSTOLIC BLOOD PRESSURE: 146 MMHG | DIASTOLIC BLOOD PRESSURE: 61 MMHG | TEMPERATURE: 97 F | OXYGEN SATURATION: 94 % | HEIGHT: 67 IN | HEART RATE: 64 BPM | RESPIRATION RATE: 17 BRPM | WEIGHT: 160.06 LBS

## 2021-06-24 DIAGNOSIS — S01.01XA LACERATION WITHOUT FOREIGN BODY OF SCALP, INITIAL ENCOUNTER: ICD-10-CM

## 2021-06-24 DIAGNOSIS — I10 ESSENTIAL (PRIMARY) HYPERTENSION: ICD-10-CM

## 2021-06-24 DIAGNOSIS — M19.90 UNSPECIFIED OSTEOARTHRITIS, UNSPECIFIED SITE: ICD-10-CM

## 2021-06-24 DIAGNOSIS — E03.9 HYPOTHYROIDISM, UNSPECIFIED: ICD-10-CM
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
## 2021-06-24 DIAGNOSIS — F03.90 UNSPECIFIED DEMENTIA WITHOUT BEHAVIORAL DISTURBANCE: ICD-10-CM

## 2021-06-24 DIAGNOSIS — S09.90XA UNSPECIFIED INJURY OF HEAD, INITIAL ENCOUNTER: ICD-10-CM

## 2021-06-24 DIAGNOSIS — E78.5 HYPERLIPIDEMIA, UNSPECIFIED: ICD-10-CM

## 2021-06-24 PROCEDURE — 12001 RPR S/N/AX/GEN/TRNK 2.5CM/<: CPT

## 2021-06-24 PROCEDURE — 99285 EMERGENCY DEPT VISIT HI MDM: CPT | Mod: 25

## 2021-06-24 PROCEDURE — G1004: CPT

## 2021-06-24 PROCEDURE — 72170 X-RAY EXAM OF PELVIS: CPT | Mod: 26

## 2021-06-24 PROCEDURE — 99284 EMERGENCY DEPT VISIT MOD MDM: CPT | Mod: 25

## 2021-06-24 PROCEDURE — 70450 CT HEAD/BRAIN W/O DYE: CPT | Mod: 26,ME

## 2021-06-24 PROCEDURE — 12002 RPR S/N/AX/GEN/TRNK2.6-7.5CM: CPT

## 2021-06-24 PROCEDURE — 72125 CT NECK SPINE W/O DYE: CPT

## 2021-06-24 PROCEDURE — 72125 CT NECK SPINE W/O DYE: CPT | Mod: 26,ME

## 2021-06-24 PROCEDURE — 70450 CT HEAD/BRAIN W/O DYE: CPT

## 2021-06-24 PROCEDURE — 72170 X-RAY EXAM OF PELVIS: CPT

## 2021-06-24 NOTE — ED PROVIDER NOTE - SKIN, MLM
Skin normal color for race, warm, dry and intact. No evidence of rash. scalp laceration, no other obvious external signs of trauma.

## 2021-06-24 NOTE — ED PROVIDER NOTE - NSFOLLOWUPINSTRUCTIONS_ED_ALL_ED_FT
Tylenol 325 mg 2 tabs every 6 hours as needed for headache, aches & pains.  Hold aspirin & Plavix X 2 days then resume.  Fall precautions.  Apply Bacitracin ointment daily to stapled laceration.  Staples removal in 7 days: may use staple remover provided with pt.      Closed Head Injury    A closed head injury is an injury to your head that may or may not involve a traumatic brain injury (TBI). Symptoms of TBI can be short or long lasting and include headache, dizziness, interference with memory or speech, fatigue, confusion, changes in sleep, mood changes, nausea, depression/anxiety, and dulling of senses. Make sure to obtain proper rest which includes getting plenty of sleep, avoiding excessive visual stimulation, and avoiding activities that may cause physical or mental stress. Avoid any situation where there is potential for another head injury, including sports.    SEEK IMMEDIATE MEDICAL CARE IF YOU HAVE ANY OF THE FOLLOWING SYMPTOMS: unusual drowsiness, vomiting, severe dizziness, seizures, lightheadedness, muscular weakness, different pupil sizes, visual changes, or clear or bloody discharge from your ears or nose.          Staple Care    WHAT YOU NEED TO KNOW:    Staples are often used to close a wound. Your staples may be placed for 3 to 14 days, depending on the location of your wound.    DISCHARGE INSTRUCTIONS:    Care for your wound:   •Clean: ?You may be able to shower in 24 hours. Do not soak your wound under water.      ?Gently wash your wound with soap and warm water daily. Lightly pat it dry. Do not cover your wound unless your healthcare provider tells you to.       ?You may also need to clean your wound with a mixture of hydrogen peroxide and water. Ask how to do this.      ?Do not apply ointment or cream to the wound unless your healthcare provider tells you to.      •Elevate: ?Rest any arm or leg that has a wound on pillows above the level of your heart. Do this as often as possible for 2 days. This will help decrease swelling and pain, and help you heal faster.          •Minimize scarring: ?Avoid sunshine on your wound to reduce scarring.             Follow up with your healthcare provider as directed: You may need to return for a wound checkup 3 days after your staples are placed. Ask when you should return to get your staples removed.    Staple removal:   •A medical staple remover will be used to take out your staples. Your healthcare provider will slide the tool under each staple, squeeze the handle, and gently pull the staple out.       •Medical tape will be placed on your wound once your staples are removed. This will help keep your wound closed. The medical tape will fall off on its own after several days.       Contact your healthcare provider if:   •You have redness, pain, swelling, and pus draining from your wound.      •Your pain medicine does not relieve your pain.      •You have a fever of 101°F (38.5°C) or higher.      •You have an odor coming from your wound.      •You have questions or concerns about your condition or care.      Return to the emergency department if:   •Your wound reopens.      •You have red streaks in your skin that spread out from your wound.      •You have severe pain or vomiting.          Fall Prevention in the Home, Adult  Falls can cause injuries. They can happen to people of all ages. There are many things you can do to make your home safe and to help prevent falls. Ask for help when making these changes, if needed.  What actions can I take to prevent falls?  General Instructions     Use good lighting in all rooms. Replace any light bulbs that burn out.Turn on the lights when you go into a dark area. Use night-lights.Keep items that you use often in easy-to-reach places. Lower the shelves around your home if necessary.Set up your furniture so you have a clear path. Avoid moving your furniture around.Do not have throw rugs and other things on the floor that can make you trip.Avoid walking on wet floors.If any of your floors are uneven, fix them.Add color or contrast paint or tape to clearly estephania and help you see:  Any grab bars or handrails.First and last steps of stairways.Where the edge of each step is.If you use a stepladder:  Make sure that it is fully opened. Do not climb a closed stepladder.Make sure that both sides of the stepladder are locked into place.Ask someone to hold the stepladder for you while you use it.If there are any pets around you, be aware of where they are.What can I do in the bathroom?         Keep the floor dry. Clean up any water that spills onto the floor as soon as it happens.Remove soap buildup in the tub or shower regularly.Use non-skid mats or decals on the floor of the tub or shower.Attach bath mats securely with double-sided, non-slip rug tape.If you need to sit down in the shower, use a plastic, non-slip stool.Install grab bars by the toilet and in the tub and shower. Do not use towel bars as grab bars.What can I do in the bedroom?     Make sure that you have a light by your bed that is easy to reach.Do not use any sheets or blankets that are too big for your bed. They should not hang down onto the floor.Have a firm chair that has side arms. You can use this for support while you get dressed.What can I do in the kitchen?     Clean up any spills right away.If you need to reach something above you, use a strong step stool that has a grab bar.Keep electrical cords out of the way.Do not use floor polish or wax that makes floors slippery. If you must use wax, use non-skid floor wax.What can I do with my stairs?     Do not leave any items on the stairs.Make sure that you have a light switch at the top of the stairs and the bottom of the stairs. If you do not have them, ask someone to add them for you.Make sure that there are handrails on both sides of the stairs, and use them. Fix handrails that are broken or loose. Make sure that handrails are as long as the stairways.Install non-slip stair treads on all stairs in your home.Avoid having throw rugs at the top or bottom of the stairs. If you do have throw rugs, attach them to the floor with carpet tape.Choose a carpet that does not hide the edge of the steps on the stairway.Check any carpeting to make sure that it is firmly attached to the stairs. Fix any carpet that is loose or worn.What can I do on the outside of my home?     Use bright outdoor lighting.Regularly fix the edges of walkways and driveways and fix any cracks.Remove anything that might make you trip as you walk through a door, such as a raised step or threshold.Trim any bushes or trees on the path to your home.Regularly check to see if handrails are loose or broken. Make sure that both sides of any steps have handrails.Install guardrails along the edges of any raised decks and porches.Clear walking paths of anything that might make someone trip, such as tools or rocks.Have any leaves, snow, or ice cleared regularly.Use sand or salt on walking paths during winter.Clean up any spills in your garage right away. This includes grease or oil spills.What other actions can I take?     Wear shoes that:  Have a low heel. Do not wear high heels.Have rubber bottoms.Are comfortable and fit you well.Are closed at the toe. Do not wear open-toe sandals.Use tools that help you move around (mobility aids) if they are needed. These include:  Canes.Walkers.Scooters.Crutches.Review your medicines with your doctor. Some medicines can make you feel dizzy. This can increase your chance of falling.Ask your doctor what other things you can do to help prevent falls.  Where to find more information  Centers for Disease Control and Prevention, [place]: https://cdc.govNational [facility]: https://tx8ajdt.joanne.nih.govContact a doctor if:  You are afraid of falling at home.You feel weak, drowsy, or dizzy at home.You fall at home.Summary  There are many simple things that you can do to make your home safe and to help prevent falls.Ways to make your home safe include removing tripping hazards and installing grab bars in the bathroom.Ask for help when making these changes in your home.This information is not intended to replace advice given to you by your health care provider. Make sure you discuss any questions you have with your health care provider.        Contusion    A contusion is a deep bruise. Contusions are the result of a blunt injury to tissues and muscle fibers under the skin. The skin overlying the contusion may turn blue, purple, or yellow. Symptoms also include pain and swelling in the injured area.    SEEK IMMEDIATE MEDICAL CARE IF YOU HAVE ANY OF THE FOLLOWING SYMPTOMS: severe pain, numbness, tingling, pain, weakness, or skin color/temperature change in any part of your body distal to the injury.

## 2021-06-24 NOTE — ED PROVIDER NOTE - MUSCULOSKELETAL, MLM
Spine appears normal, range of motion is not limited, no muscle or joint tenderness. Neck supple w/o pain, nontender. Back/pelvis/chest wall nontender and stable. MAEx4, no gross eformity, swelling tenderness, R knee no effusion, no focal tenderness + stable.

## 2021-06-24 NOTE — ED PROVIDER NOTE - SECONDARY DIAGNOSIS.
Dementia without behavioral disturbance, unspecified dementia type Scalp laceration, initial encounter

## 2021-06-24 NOTE — ED ADULT TRIAGE NOTE - CHIEF COMPLAINT QUOTE
Patient with baseline dementia had a unwitnessed fall in the bathroom hit back of head.  +lac.  Acting at baseline.  On daily ASA. Render The Number Of Extractions: Yes Anesthesia Volume In Cc: 0 Post-Care Instructions: I reviewed with the patient in detail post-care instructions. Patient is to wear sunprotection, and avoid picking at any of the treated lesions. Pt may apply Vaseline to crusted or scabbing areas. Consent: The patient's consent was obtained including but not limited to risks of crusting, scabbing, blistering, scarring, darker or lighter pigmentary change, recurrence, incomplete removal and infection. Detail Level: Detailed

## 2021-06-24 NOTE — ED PROVIDER NOTE - ENMT, MLM
Airway patent, Nasal mucosa clear. Mouth with normal mucosa. Throat has no vesicles, no oropharyngeal exudates and uvula is midline. Oropharynx clear, MM minimally dry. Occipital scalp laceration w/ dried blood. Negative battles. Multiple teeth absent.

## 2021-06-24 NOTE — ED ADULT NURSE NOTE - OBJECTIVE STATEMENT
Patient with baseline dementia had a unwitnessed fall in the bathroom hit back of head.  +lac.  Acting at baseline.  On daily ASA.

## 2021-06-24 NOTE — ED PROVIDER NOTE - CLINICAL SUMMARY MEDICAL DECISION MAKING FREE TEXT BOX
75 y/o female with a PMHx of dementia, HTN, HLD, hypothyroid, carotid stenosis, DJD, on baby ASA presents to the ED BIBA from [facility] s/p unwitnessed fall in the bathroom +head strike w/ scalp laceration. Pt is acting at baseline.  + Scalp lac.  Plan: neuro Alert initiated: CT head/c-spine, pelvis XR, lac repair w/ staples, ambulation trial if radioimaging negative.  Anticipate D/C back to [facility] if studies negative for acute trauma.

## 2021-06-24 NOTE — ED PROVIDER NOTE - NEUROLOGICAL, MLM
Alert and oriented to self, no focal deficits, no motor or sensory deficits, CN 2-12 intact, nml speech consistent w/ dementia.

## 2021-06-24 NOTE — ED PROCEDURE NOTE - NS_EDPROVIDERDISPOUSERTYPE_ED_A_ED
Per patient he was standing in the kitchen last night and he began to experience pain. He states that he thinks something might be going on with the screw and he would like to see Dr. Joshi today for an xray. Informed him writer discussed his concern with Dr. Joshi. He recommended patient stay off his foot and to have a CT scan and follow up. Pt provided with phone number to central scheduling, he will call to schedule CT and call our office to schedule follow up appointment.    Attending Attestation (For Attendings USE Only)...

## 2021-06-24 NOTE — ED PROVIDER NOTE - PATIENT PORTAL LINK FT
You can access the FollowMyHealth Patient Portal offered by [facility] by registering at the following website: [email or web address]. By joining [employer]’s FollowMyHealth portal, you will also be able to view your health information using other applications (apps) compatible with our system.

## 2021-06-24 NOTE — ED PROVIDER NOTE - PMH
Encounter addended by: Natasha Alcala [facility] on: 9/9/2020 2:08 PM   Actions taken: Order list changed Carotid stenosis    Degenerative joint disease    Dementia without behavioral disturbance, unspecified dementia type    Hyperlipidemia    Hypertension    Hypothyroid    Osteoarthritis

## 2021-06-24 NOTE — ED PROVIDER NOTE - PROGRESS NOTE DETAILS
JUAN London MD:  Scalp lac repair by staples completed.  CTs & pelvis XR negative for acute pathology.  Informed by ED RN that pt passed ambulation trial.  Pt stable for D/C back to A/L facility.

## 2021-06-24 NOTE — ED ADULT NURSE NOTE - NSIMPLEMENTINTERV_GEN_ALL_ED
Implemented All Fall with Harm Risk Interventions:  [place] to call system. Call bell, personal items and telephone within reach. Instruct patient to call for assistance. Room bathroom lighting operational. Non-slip footwear when patient is off stretcher. Physically safe environment: no spills, clutter or unnecessary equipment. Stretcher in lowest position, wheels locked, appropriate side rails in place. Provide visual cue, wrist band, yellow gown, etc. Monitor gait and stability. Monitor for mental status changes and reorient to person, place, and time. Review medications for side effects contributing to fall risk. Reinforce activity limits and safety measures with patient and family. Provide visual clues: red socks.

## 2021-06-24 NOTE — ED PROVIDER NOTE - OBJECTIVE STATEMENT
75 y/o female with a PMHx of dementia, HTN, HLD, hypothyroid, carotid stenosis, DJD, on baby ASA presents to the ED BIBA from [facility] s/p unwitnessed fall in the bathroom +head strike w/ scalp laceration. Pt is acting at baseline. complete hx not obtainable due to hx dementia, pt does not know why she is here. Pt w/o any specific complaints. Sent by Dr. Canchola. Forms form facility state pt is DNR, but no MOLST form present.

## 2021-06-24 NOTE — ED PROVIDER NOTE - CARE PLAN
Principal Discharge DX:	Closed head injury, initial encounter  Secondary Diagnosis:	Scalp laceration, initial encounter  Secondary Diagnosis:	Dementia without behavioral disturbance, unspecified dementia type

## 2021-06-25 VITALS
RESPIRATION RATE: 16 BRPM | DIASTOLIC BLOOD PRESSURE: 69 MMHG | TEMPERATURE: 97 F | HEART RATE: 60 BPM | OXYGEN SATURATION: 97 % | SYSTOLIC BLOOD PRESSURE: 135 MMHG

## 2021-10-27 NOTE — ED PROVIDER NOTE - CPE EDP [facility] NORM
Internal Medicine Hospitalist             Daily Progress  Note   Subjective:     Chief Complaint   Patient presents with    Leg Swelling     bilateral    Gait Problem     unable to ambulate     Mr. Bishop Bynum feels only mild improvement    Objective:    /76   Pulse 76   Temp 98.1 °F (36.7 °C) (Oral)   Resp 22   Ht 6' 1\" (1.854 m)   Wt (!) 360 lb 7.2 oz (163.5 kg)   SpO2 96%   BMI 47.56 kg/m²      Intake/Output Summary (Last 24 hours) at 10/27/2021 1329  Last data filed at 10/27/2021 1216  Gross per 24 hour   Intake --   Output 2677 ml   Net -2677 ml      Physical Exam:  Heart: , normal S1 and S2 in all 4 auscultatory areas. No rubs  Murmurs or gallops heard. Lungs: Mostly clear to auscultation, decreased breath sounds at bases. No wheezes appreciated no crackles heard. Abdomen: Soft,  distended. Bowel sounds not appreciated. No obvious liver or spleen enlargement. Non tender, no rebound noted. Difficult exam due to morbid obesity  Extremities: Non tender, nonpitting swelling noted, move all 4. Lateral lymphedema. Has erythema. CNS: Grossly intact.      Labs:  CBC with Differential:    Lab Results   Component Value Date    WBC 9.4 10/27/2021    RBC 4.43 10/27/2021    HGB 12.6 10/27/2021    HCT 39.3 10/27/2021     10/27/2021    MCV 88.7 10/27/2021    MCH 28.4 10/27/2021    MCHC 32.1 10/27/2021    RDW 15.6 10/27/2021    SEGSPCT 77.0 10/27/2021    LYMPHOPCT 11.6 10/27/2021    MONOPCT 9.8 10/27/2021    BASOPCT 0.3 10/27/2021    MONOSABS 0.9 10/27/2021    LYMPHSABS 1.1 10/27/2021    EOSABS 0.1 10/27/2021    BASOSABS 0.0 10/27/2021    DIFFTYPE AUTOMATED DIFFERENTIAL 10/27/2021     CMP:    Lab Results   Component Value Date     10/27/2021    K 3.6 10/27/2021     10/27/2021    CO2 24 10/27/2021    BUN 29 10/27/2021    CREATININE 1.1 10/27/2021    GFRAA >60 10/27/2021    AGRATIO 1.3 11/25/2020    LABGLOM >60 10/27/2021    GLUCOSE 248 10/27/2021    PROT 5.9 cardiac cath 10/08/2018    New onset atrial fibrillation ([facility] [address] 09/25/2018    Type 2 diabetes mellitus with hyperglycemia, with long-term current use of insulin ([facility] [address] 09/25/2018    Class 3 severe obesity due to excess calories with body mass index (BMI) of 45.0 to 49.9 in adult [facility]) 09/25/2018    MARINO on CPAP 10/14/2013       Plan:     Problems being addressed this admission:   Acute on chronic HFpEF  Atrial fibrillation on DOAC  Chronic lymphedema  MARINO  DM 2  Morbid obesity    Acute on chronic heart failure  Patient has been seen by cardiology to continue diuretics for now. Switch to Lasix drip    Atrial fibrillation: Xarelto 20 mg once a day and metoprolol    chronic lymphedema unchanged from before. Has had ultrasound done both legs no evidence of DVT noted. Started on antibiotics by pulmonology. obstructive sleep apnea. We will have him on AutoPap at night    Diabetes mellitus type 2  -Blood glucose elevated today. Does not appear that he got his Lantus  -Endocrinology on board. Consultants:  Cardiology    General Orders:  Repeat basic labs again in am.  I have explained to the patient and discussed with him/her the treatment plan. The above chart was generated partly using Dragon dictation system, it may contain dictation errors given the limitations of this technology.      Vanessa Augustin MD 1:29 PM 10/27/21 normal...

## 2022-04-09 ENCOUNTER — INPATIENT (INPATIENT)
Facility: HOSPITAL | Age: 77
LOS: 10 days | Discharge: OTHER INSTITUTION | DRG: 480 | End: 2022-04-20
Attending: HOSPITALIST | Admitting: HOSPITALIST
Payer: MEDICARE

## 2022-04-09 VITALS
TEMPERATURE: 98 F | OXYGEN SATURATION: 95 % | SYSTOLIC BLOOD PRESSURE: 189 MMHG | RESPIRATION RATE: 20 BRPM | DIASTOLIC BLOOD PRESSURE: 77 MMHG | HEART RATE: 59 BPM | HEIGHT: 67 IN | WEIGHT: 160.28 LBS

## 2022-04-09 LAB
ALBUMIN SERPL ELPH-MCNC: 3.3 G/DL — SIGNIFICANT CHANGE UP (ref 3.3–5)
ALP SERPL-CCNC: 86 U/L — SIGNIFICANT CHANGE UP (ref 30–120)
ALT FLD-CCNC: 42 U/L DA — SIGNIFICANT CHANGE UP (ref 10–60)
ANION GAP SERPL CALC-SCNC: 6 MMOL/L — SIGNIFICANT CHANGE UP (ref 5–17)
APTT BLD: 37 SEC — HIGH (ref 27.5–35.5)
AST SERPL-CCNC: 42 U/L — HIGH (ref 10–40)
BASOPHILS # BLD AUTO: 0.04 K/UL — SIGNIFICANT CHANGE UP (ref 0–0.2)
BASOPHILS NFR BLD AUTO: 0.9 % — SIGNIFICANT CHANGE UP (ref 0–2)
BILIRUB SERPL-MCNC: 0.5 MG/DL — SIGNIFICANT CHANGE UP (ref 0.2–1.2)
BUN SERPL-MCNC: 27 MG/DL — HIGH (ref 7–23)
CALCIUM SERPL-MCNC: 8.2 MG/DL — LOW (ref 8.4–10.5)
CHLORIDE SERPL-SCNC: 100 MMOL/L — SIGNIFICANT CHANGE UP (ref 96–108)
CO2 SERPL-SCNC: 32 MMOL/L — HIGH (ref 22–31)
CREAT SERPL-MCNC: 0.97 MG/DL — SIGNIFICANT CHANGE UP (ref 0.5–1.3)
EGFR: 59 ML/MIN/1.73M2 — LOW
EOSINOPHIL # BLD AUTO: 0.17 K/UL — SIGNIFICANT CHANGE UP (ref 0–0.5)
EOSINOPHIL NFR BLD AUTO: 3.6 % — SIGNIFICANT CHANGE UP (ref 0–6)
GLUCOSE SERPL-MCNC: 75 MG/DL — SIGNIFICANT CHANGE UP (ref 70–99)
HCT VFR BLD CALC: 37 % — SIGNIFICANT CHANGE UP (ref 34.5–45)
HGB BLD-MCNC: 12.1 G/DL — SIGNIFICANT CHANGE UP (ref 11.5–15.5)
IMM GRANULOCYTES NFR BLD AUTO: 1.3 % — SIGNIFICANT CHANGE UP (ref 0–1.5)
INR BLD: 1.07 RATIO — SIGNIFICANT CHANGE UP (ref 0.88–1.16)
LYMPHOCYTES # BLD AUTO: 1.35 K/UL — SIGNIFICANT CHANGE UP (ref 1–3.3)
LYMPHOCYTES # BLD AUTO: 28.8 % — SIGNIFICANT CHANGE UP (ref 13–44)
MCHC RBC-ENTMCNC: 31.7 PG — SIGNIFICANT CHANGE UP (ref 27–34)
MCHC RBC-ENTMCNC: 32.7 GM/DL — SIGNIFICANT CHANGE UP (ref 32–36)
MCV RBC AUTO: 96.9 FL — SIGNIFICANT CHANGE UP (ref 80–100)
MONOCYTES # BLD AUTO: 0.41 K/UL — SIGNIFICANT CHANGE UP (ref 0–0.9)
MONOCYTES NFR BLD AUTO: 8.7 % — SIGNIFICANT CHANGE UP (ref 2–14)
NEUTROPHILS # BLD AUTO: 2.66 K/UL — SIGNIFICANT CHANGE UP (ref 1.8–7.4)
NEUTROPHILS NFR BLD AUTO: 56.7 % — SIGNIFICANT CHANGE UP (ref 43–77)
NRBC # BLD: 0 /100 WBCS — SIGNIFICANT CHANGE UP (ref 0–0)
PLATELET # BLD AUTO: 174 K/UL — SIGNIFICANT CHANGE UP (ref 150–400)
POTASSIUM SERPL-MCNC: 3.9 MMOL/L — SIGNIFICANT CHANGE UP (ref 3.5–5.3)
POTASSIUM SERPL-SCNC: 3.9 MMOL/L — SIGNIFICANT CHANGE UP (ref 3.5–5.3)
PROT SERPL-MCNC: 7.4 G/DL — SIGNIFICANT CHANGE UP (ref 6–8.3)
PROTHROM AB SERPL-ACNC: 12.3 SEC — SIGNIFICANT CHANGE UP (ref 10.5–13.4)
RBC # BLD: 3.82 M/UL — SIGNIFICANT CHANGE UP (ref 3.8–5.2)
RBC # FLD: 13.9 % — SIGNIFICANT CHANGE UP (ref 10.3–14.5)
SARS-COV-2 RNA SPEC QL NAA+PROBE: SIGNIFICANT CHANGE UP
SODIUM SERPL-SCNC: 138 MMOL/L — SIGNIFICANT CHANGE UP (ref 135–145)
TROPONIN I, HIGH SENSITIVITY RESULT: 10 NG/L — SIGNIFICANT CHANGE UP
WBC # BLD: 4.69 K/UL — SIGNIFICANT CHANGE UP (ref 3.8–10.5)
WBC # FLD AUTO: 4.69 K/UL — SIGNIFICANT CHANGE UP (ref 3.8–10.5)

## 2022-04-09 PROCEDURE — 73502 X-RAY EXAM HIP UNI 2-3 VIEWS: CPT | Mod: 26,RT

## 2022-04-09 PROCEDURE — 72125 CT NECK SPINE W/O DYE: CPT | Mod: 26,MG

## 2022-04-09 PROCEDURE — 70450 CT HEAD/BRAIN W/O DYE: CPT | Mod: 26,MG

## 2022-04-09 PROCEDURE — G1004: CPT

## 2022-04-09 PROCEDURE — 71045 X-RAY EXAM CHEST 1 VIEW: CPT | Mod: 26

## 2022-04-09 PROCEDURE — 73552 X-RAY EXAM OF FEMUR 2/>: CPT | Mod: 26,RT

## 2022-04-09 PROCEDURE — 99285 EMERGENCY DEPT VISIT HI MDM: CPT

## 2022-04-09 PROCEDURE — 93010 ELECTROCARDIOGRAM REPORT: CPT

## 2022-04-09 RX ORDER — MEMANTINE HYDROCHLORIDE 10 MG/1
1 TABLET ORAL
Qty: 0 | Refills: 0 | DISCHARGE

## 2022-04-09 RX ORDER — BACLOFEN 100 %
0 POWDER (GRAM) MISCELLANEOUS
Qty: 0 | Refills: 0 | DISCHARGE

## 2022-04-09 RX ORDER — BUPROPION HYDROCHLORIDE 150 MG/1
1 TABLET, EXTENDED RELEASE ORAL
Qty: 0 | Refills: 0 | DISCHARGE

## 2022-04-09 RX ORDER — LOSARTAN/HYDROCHLOROTHIAZIDE 100MG-25MG
1 TABLET ORAL
Qty: 0 | Refills: 0 | DISCHARGE

## 2022-04-09 RX ORDER — ATORVASTATIN CALCIUM 80 MG/1
1 TABLET, FILM COATED ORAL
Qty: 0 | Refills: 0 | DISCHARGE

## 2022-04-09 RX ORDER — ACETAMINOPHEN 500 MG
2 TABLET ORAL
Qty: 0 | Refills: 0 | DISCHARGE

## 2022-04-09 RX ORDER — OMEGA-3 ACID ETHYL ESTERS 1 G
1 CAPSULE ORAL
Qty: 0 | Refills: 0 | DISCHARGE

## 2022-04-09 RX ORDER — MORPHINE SULFATE 50 MG/1
2 CAPSULE, EXTENDED RELEASE ORAL ONCE
Refills: 0 | Status: DISCONTINUED | OUTPATIENT
Start: 2022-04-09 | End: 2022-04-10

## 2022-04-09 RX ORDER — ZINC SULFATE TAB 220 MG (50 MG ZINC EQUIVALENT) 220 (50 ZN) MG
0 TAB ORAL
Qty: 0 | Refills: 0 | DISCHARGE

## 2022-04-09 RX ORDER — OMEPRAZOLE 10 MG/1
1 CAPSULE, DELAYED RELEASE ORAL
Qty: 0 | Refills: 0 | DISCHARGE

## 2022-04-09 RX ORDER — LEVOTHYROXINE SODIUM 125 MCG
1 TABLET ORAL
Qty: 0 | Refills: 0 | DISCHARGE

## 2022-04-09 RX ORDER — AMLODIPINE BESYLATE 2.5 MG/1
1 TABLET ORAL
Qty: 0 | Refills: 0 | DISCHARGE

## 2022-04-09 RX ORDER — LOSARTAN/HYDROCHLOROTHIAZIDE 100MG-25MG
0 TABLET ORAL
Qty: 0 | Refills: 0 | DISCHARGE

## 2022-04-09 RX ORDER — FOLIC ACID 0.8 MG
1 TABLET ORAL
Qty: 0 | Refills: 0 | DISCHARGE

## 2022-04-09 RX ORDER — ASPIRIN/CALCIUM CARB/MAGNESIUM 324 MG
1 TABLET ORAL
Qty: 0 | Refills: 0 | DISCHARGE

## 2022-04-09 NOTE — ED ADULT NURSE NOTE - NURSING ED SKIN COLOR
Scribe Attestation (For Scribes USE Only)... Scribe Attestation (For Scribes USE Only).../Attending Attestation (For Attendings USE Only)... normal for race

## 2022-04-09 NOTE — ED PROVIDER NOTE - OBJECTIVE STATEMENT
77 F hx dementia brought from [facility] due to fall. Unwitnessed by heard by staff who responded immediately, pt was awake. On ASA.

## 2022-04-09 NOTE — ED PROVIDER NOTE - NSICDXPASTMEDICALHX_GEN_ALL_CORE_FT
PAST MEDICAL HISTORY:  Carotid stenosis     Degenerative joint disease     Dementia without behavioral disturbance, unspecified dementia type     Hyperlipidemia     Hypertension     Hypothyroid     Osteoarthritis

## 2022-04-09 NOTE — ED PROVIDER NOTE - CLINICAL SUMMARY MEDICAL DECISION MAKING FREE TEXT BOX
Patient s/p fall with clinical exam c/w hip fx. Will get labs and xray. Patient will likely require admission for treatment of fracture

## 2022-04-09 NOTE — ED ADULT NURSE NOTE - OBJECTIVE STATEMENT
Pt brought to the ED by ambulance s/p unwitnessed fall from standing height according to EMS. Pt was found immediately after hearing the fall and denies pt had LOC. Pt noticed to have shortened and rotated right leg upon arrival, pain with palpation to the right leg. No other injury noted or palpated. Pt is awake and conversant, confused to time and situation.

## 2022-04-09 NOTE — ED PROVIDER NOTE - ATTENDING CONTRIBUTION TO CARE
Wil Vance MD: I have personally performed a face to face diagnostic evaluation on this patient.  I have reviewed the PA note and agree with the history, exam, and plan of care, except as noted.  History and Exam by me shows same findings as documented

## 2022-04-09 NOTE — ED PROVIDER NOTE - NEUROLOGICAL, MLM
alert, oriented to self, knows she is in hospital, unable to give further information, follows commands

## 2022-04-09 NOTE — ED PROVIDER NOTE - NS ED ATTENDING STATEMENT MOD
This was a shared visit with the MIESHA. I reviewed and verified the documentation and independently performed the documented:

## 2022-04-10 ENCOUNTER — TRANSCRIPTION ENCOUNTER (OUTPATIENT)
Age: 77
End: 2022-04-10

## 2022-04-10 DIAGNOSIS — S72.001A FRACTURE OF UNSPECIFIED PART OF NECK OF RIGHT FEMUR, INITIAL ENCOUNTER FOR CLOSED FRACTURE: ICD-10-CM

## 2022-04-10 LAB
ALBUMIN SERPL ELPH-MCNC: 3.4 G/DL — SIGNIFICANT CHANGE UP (ref 3.3–5)
ALP SERPL-CCNC: 76 U/L — SIGNIFICANT CHANGE UP (ref 30–120)
ALT FLD-CCNC: 33 U/L DA — SIGNIFICANT CHANGE UP (ref 10–60)
ANION GAP SERPL CALC-SCNC: 10 MMOL/L — SIGNIFICANT CHANGE UP (ref 5–17)
ANION GAP SERPL CALC-SCNC: 8 MMOL/L — SIGNIFICANT CHANGE UP (ref 5–17)
APPEARANCE UR: CLEAR — SIGNIFICANT CHANGE UP
AST SERPL-CCNC: 35 U/L — SIGNIFICANT CHANGE UP (ref 10–40)
BACTERIA # UR AUTO: ABNORMAL
BASE EXCESS BLDV CALC-SCNC: 13.5 MMOL/L — HIGH (ref -2–3)
BASOPHILS # BLD AUTO: 0 K/UL — SIGNIFICANT CHANGE UP (ref 0–0.2)
BASOPHILS NFR BLD AUTO: 0 % — SIGNIFICANT CHANGE UP (ref 0–2)
BILIRUB SERPL-MCNC: 0.6 MG/DL — SIGNIFICANT CHANGE UP (ref 0.2–1.2)
BILIRUB UR-MCNC: NEGATIVE — SIGNIFICANT CHANGE UP
BLD GP AB SCN SERPL QL: SIGNIFICANT CHANGE UP
BUN SERPL-MCNC: 18 MG/DL — SIGNIFICANT CHANGE UP (ref 7–23)
BUN SERPL-MCNC: 22 MG/DL — SIGNIFICANT CHANGE UP (ref 7–23)
CALCIUM SERPL-MCNC: 8.3 MG/DL — LOW (ref 8.4–10.5)
CALCIUM SERPL-MCNC: 8.3 MG/DL — LOW (ref 8.4–10.5)
CHLORIDE SERPL-SCNC: 97 MMOL/L — SIGNIFICANT CHANGE UP (ref 96–108)
CHLORIDE SERPL-SCNC: 99 MMOL/L — SIGNIFICANT CHANGE UP (ref 96–108)
CO2 SERPL-SCNC: 29 MMOL/L — SIGNIFICANT CHANGE UP (ref 22–31)
CO2 SERPL-SCNC: 29 MMOL/L — SIGNIFICANT CHANGE UP (ref 22–31)
COLOR SPEC: YELLOW — SIGNIFICANT CHANGE UP
CREAT SERPL-MCNC: 0.92 MG/DL — SIGNIFICANT CHANGE UP (ref 0.5–1.3)
CREAT SERPL-MCNC: 0.92 MG/DL — SIGNIFICANT CHANGE UP (ref 0.5–1.3)
D DIMER BLD IA.RAPID-MCNC: SIGNIFICANT CHANGE UP NG/ML DDU
DIFF PNL FLD: ABNORMAL
EGFR: 63 ML/MIN/1.73M2 — SIGNIFICANT CHANGE UP
EGFR: 63 ML/MIN/1.73M2 — SIGNIFICANT CHANGE UP
EOSINOPHIL # BLD AUTO: 0 K/UL — SIGNIFICANT CHANGE UP (ref 0–0.5)
EOSINOPHIL NFR BLD AUTO: 0 % — SIGNIFICANT CHANGE UP (ref 0–6)
EPI CELLS # UR: ABNORMAL
GAS PNL BLDV: SIGNIFICANT CHANGE UP
GLUCOSE SERPL-MCNC: 114 MG/DL — HIGH (ref 70–99)
GLUCOSE SERPL-MCNC: 118 MG/DL — HIGH (ref 70–99)
GLUCOSE UR QL: NEGATIVE MG/DL — SIGNIFICANT CHANGE UP
HCO3 BLDV-SCNC: 37 MMOL/L — HIGH (ref 22–29)
HCT VFR BLD CALC: 35.7 % — SIGNIFICANT CHANGE UP (ref 34.5–45)
HGB BLD-MCNC: 11.9 G/DL — SIGNIFICANT CHANGE UP (ref 11.5–15.5)
KETONES UR-MCNC: NEGATIVE — SIGNIFICANT CHANGE UP
LEUKOCYTE ESTERASE UR-ACNC: ABNORMAL
LYMPHOCYTES # BLD AUTO: 0.39 K/UL — LOW (ref 1–3.3)
LYMPHOCYTES # BLD AUTO: 4 % — LOW (ref 13–44)
MAGNESIUM SERPL-MCNC: 2 MG/DL — SIGNIFICANT CHANGE UP (ref 1.6–2.6)
MCHC RBC-ENTMCNC: 31.5 PG — SIGNIFICANT CHANGE UP (ref 27–34)
MCHC RBC-ENTMCNC: 33.3 GM/DL — SIGNIFICANT CHANGE UP (ref 32–36)
MCV RBC AUTO: 94.4 FL — SIGNIFICANT CHANGE UP (ref 80–100)
MONOCYTES # BLD AUTO: 0.58 K/UL — SIGNIFICANT CHANGE UP (ref 0–0.9)
MONOCYTES NFR BLD AUTO: 6 % — SIGNIFICANT CHANGE UP (ref 2–14)
NEUTROPHILS # BLD AUTO: 8.7 K/UL — HIGH (ref 1.8–7.4)
NEUTROPHILS NFR BLD AUTO: 84 % — HIGH (ref 43–77)
NITRITE UR-MCNC: NEGATIVE — SIGNIFICANT CHANGE UP
NRBC # BLD: SIGNIFICANT CHANGE UP /100 WBCS (ref 0–0)
PCO2 BLDV: 47 MMHG — HIGH (ref 39–42)
PH BLDV: 7.5 — HIGH (ref 7.32–7.43)
PH UR: 8 — SIGNIFICANT CHANGE UP (ref 5–8)
PHOSPHATE SERPL-MCNC: 3 MG/DL — SIGNIFICANT CHANGE UP (ref 2.5–4.5)
PLATELET # BLD AUTO: 185 K/UL — SIGNIFICANT CHANGE UP (ref 150–400)
PO2 BLDV: 77 MMHG — HIGH (ref 25–45)
POTASSIUM SERPL-MCNC: 3.2 MMOL/L — LOW (ref 3.5–5.3)
POTASSIUM SERPL-MCNC: 4 MMOL/L — SIGNIFICANT CHANGE UP (ref 3.5–5.3)
POTASSIUM SERPL-SCNC: 3.2 MMOL/L — LOW (ref 3.5–5.3)
POTASSIUM SERPL-SCNC: 4 MMOL/L — SIGNIFICANT CHANGE UP (ref 3.5–5.3)
PROT SERPL-MCNC: 7.2 G/DL — SIGNIFICANT CHANGE UP (ref 6–8.3)
PROT UR-MCNC: 30 MG/DL
RBC # BLD: 3.78 M/UL — LOW (ref 3.8–5.2)
RBC # FLD: 13.7 % — SIGNIFICANT CHANGE UP (ref 10.3–14.5)
RBC CASTS # UR COMP ASSIST: SIGNIFICANT CHANGE UP /HPF (ref 0–4)
SAO2 % BLDV: 97 % — HIGH (ref 67–88)
SODIUM SERPL-SCNC: 134 MMOL/L — LOW (ref 135–145)
SODIUM SERPL-SCNC: 138 MMOL/L — SIGNIFICANT CHANGE UP (ref 135–145)
SP GR SPEC: 1.01 — SIGNIFICANT CHANGE UP (ref 1.01–1.02)
TSH SERPL-MCNC: 71 UIU/ML — HIGH (ref 0.27–4.2)
UROBILINOGEN FLD QL: NEGATIVE MG/DL — SIGNIFICANT CHANGE UP
WBC # BLD: 9.67 K/UL — SIGNIFICANT CHANGE UP (ref 3.8–10.5)
WBC # FLD AUTO: 9.67 K/UL — SIGNIFICANT CHANGE UP (ref 3.8–10.5)
WBC UR QL: SIGNIFICANT CHANGE UP

## 2022-04-10 PROCEDURE — 93306 TTE W/DOPPLER COMPLETE: CPT | Mod: 26

## 2022-04-10 PROCEDURE — 99223 1ST HOSP IP/OBS HIGH 75: CPT

## 2022-04-10 PROCEDURE — 12345: CPT | Mod: NC

## 2022-04-10 PROCEDURE — 71275 CT ANGIOGRAPHY CHEST: CPT | Mod: 26

## 2022-04-10 RX ORDER — FOLIC ACID 0.8 MG
1 TABLET ORAL DAILY
Refills: 0 | Status: DISCONTINUED | OUTPATIENT
Start: 2022-04-11 | End: 2022-04-20

## 2022-04-10 RX ORDER — IPRATROPIUM/ALBUTEROL SULFATE 18-103MCG
3 AEROSOL WITH ADAPTER (GRAM) INHALATION EVERY 12 HOURS
Refills: 0 | Status: DISCONTINUED | OUTPATIENT
Start: 2022-04-10 | End: 2022-04-20

## 2022-04-10 RX ORDER — SODIUM CHLORIDE 9 MG/ML
1000 INJECTION, SOLUTION INTRAVENOUS
Refills: 0 | Status: DISCONTINUED | OUTPATIENT
Start: 2022-04-10 | End: 2022-04-11

## 2022-04-10 RX ORDER — MORPHINE SULFATE 50 MG/1
1 CAPSULE, EXTENDED RELEASE ORAL EVERY 6 HOURS
Refills: 0 | Status: DISCONTINUED | OUTPATIENT
Start: 2022-04-10 | End: 2022-04-12

## 2022-04-10 RX ORDER — ATORVASTATIN CALCIUM 80 MG/1
20 TABLET, FILM COATED ORAL AT BEDTIME
Refills: 0 | Status: DISCONTINUED | OUTPATIENT
Start: 2022-04-11 | End: 2022-04-11

## 2022-04-10 RX ORDER — MIRTAZAPINE 45 MG/1
15 TABLET, ORALLY DISINTEGRATING ORAL AT BEDTIME
Refills: 0 | Status: DISCONTINUED | OUTPATIENT
Start: 2022-04-11 | End: 2022-04-11

## 2022-04-10 RX ORDER — ACETAMINOPHEN 500 MG
1000 TABLET ORAL ONCE
Refills: 0 | Status: COMPLETED | OUTPATIENT
Start: 2022-04-10 | End: 2022-04-10

## 2022-04-10 RX ORDER — LEVOTHYROXINE SODIUM 125 MCG
31.25 TABLET ORAL ONCE
Refills: 0 | Status: COMPLETED | OUTPATIENT
Start: 2022-04-10 | End: 2022-04-10

## 2022-04-10 RX ORDER — ALBUTEROL 90 UG/1
2.5 AEROSOL, METERED ORAL
Refills: 0 | Status: DISCONTINUED | OUTPATIENT
Start: 2022-04-10 | End: 2022-04-20

## 2022-04-10 RX ORDER — CHOLECALCIFEROL (VITAMIN D3) 125 MCG
1000 CAPSULE ORAL
Refills: 0 | Status: DISCONTINUED | OUTPATIENT
Start: 2022-04-11 | End: 2022-04-20

## 2022-04-10 RX ORDER — MEMANTINE HYDROCHLORIDE 10 MG/1
10 TABLET ORAL
Refills: 0 | Status: DISCONTINUED | OUTPATIENT
Start: 2022-04-11 | End: 2022-04-20

## 2022-04-10 RX ORDER — PREGABALIN 225 MG/1
500 CAPSULE ORAL DAILY
Refills: 0 | Status: DISCONTINUED | OUTPATIENT
Start: 2022-04-11 | End: 2022-04-20

## 2022-04-10 RX ORDER — SODIUM CHLORIDE 9 MG/ML
1000 INJECTION, SOLUTION INTRAVENOUS
Refills: 0 | Status: DISCONTINUED | OUTPATIENT
Start: 2022-04-10 | End: 2022-04-10

## 2022-04-10 RX ORDER — ONDANSETRON 8 MG/1
4 TABLET, FILM COATED ORAL EVERY 8 HOURS
Refills: 0 | Status: DISCONTINUED | OUTPATIENT
Start: 2022-04-10 | End: 2022-04-11

## 2022-04-10 RX ORDER — LEVOTHYROXINE SODIUM 125 MCG
94 TABLET ORAL
Refills: 0 | Status: DISCONTINUED | OUTPATIENT
Start: 2022-04-10 | End: 2022-04-12

## 2022-04-10 RX ORDER — LANOLIN ALCOHOL/MO/W.PET/CERES
3 CREAM (GRAM) TOPICAL AT BEDTIME
Refills: 0 | Status: DISCONTINUED | OUTPATIENT
Start: 2022-04-10 | End: 2022-04-11

## 2022-04-10 RX ORDER — HEPARIN SODIUM 5000 [USP'U]/ML
5000 INJECTION INTRAVENOUS; SUBCUTANEOUS ONCE
Refills: 0 | Status: COMPLETED | OUTPATIENT
Start: 2022-04-10 | End: 2022-04-10

## 2022-04-10 RX ORDER — MAGNESIUM HYDROXIDE 400 MG/1
30 TABLET, CHEWABLE ORAL DAILY
Refills: 0 | Status: DISCONTINUED | OUTPATIENT
Start: 2022-04-11 | End: 2022-04-20

## 2022-04-10 RX ORDER — ACETAMINOPHEN 500 MG
650 TABLET ORAL EVERY 6 HOURS
Refills: 0 | Status: DISCONTINUED | OUTPATIENT
Start: 2022-04-10 | End: 2022-04-12

## 2022-04-10 RX ORDER — MORPHINE SULFATE 50 MG/1
2 CAPSULE, EXTENDED RELEASE ORAL EVERY 6 HOURS
Refills: 0 | Status: DISCONTINUED | OUTPATIENT
Start: 2022-04-10 | End: 2022-04-12

## 2022-04-10 RX ORDER — LEVOTHYROXINE SODIUM 125 MCG
62.5 TABLET ORAL
Refills: 0 | Status: DISCONTINUED | OUTPATIENT
Start: 2022-04-10 | End: 2022-04-10

## 2022-04-10 RX ORDER — POTASSIUM CHLORIDE 20 MEQ
10 PACKET (EA) ORAL
Refills: 0 | Status: COMPLETED | OUTPATIENT
Start: 2022-04-10 | End: 2022-04-10

## 2022-04-10 RX ORDER — HEPARIN SODIUM 5000 [USP'U]/ML
5000 INJECTION INTRAVENOUS; SUBCUTANEOUS EVERY 8 HOURS
Refills: 0 | Status: COMPLETED | OUTPATIENT
Start: 2022-04-10 | End: 2022-04-10

## 2022-04-10 RX ORDER — MORPHINE SULFATE 50 MG/1
2 CAPSULE, EXTENDED RELEASE ORAL ONCE
Refills: 0 | Status: DISCONTINUED | OUTPATIENT
Start: 2022-04-10 | End: 2022-04-10

## 2022-04-10 RX ADMIN — HEPARIN SODIUM 5000 UNIT(S): 5000 INJECTION INTRAVENOUS; SUBCUTANEOUS at 15:14

## 2022-04-10 RX ADMIN — Medication 1000 MILLIGRAM(S): at 12:54

## 2022-04-10 RX ADMIN — Medication 100 MILLIEQUIVALENT(S): at 11:25

## 2022-04-10 RX ADMIN — Medication 100 MILLIEQUIVALENT(S): at 12:54

## 2022-04-10 RX ADMIN — Medication 40 MILLIGRAM(S): at 15:08

## 2022-04-10 RX ADMIN — MORPHINE SULFATE 2 MILLIGRAM(S): 50 CAPSULE, EXTENDED RELEASE ORAL at 15:44

## 2022-04-10 RX ADMIN — SODIUM CHLORIDE 75 MILLILITER(S): 9 INJECTION, SOLUTION INTRAVENOUS at 01:20

## 2022-04-10 RX ADMIN — MORPHINE SULFATE 2 MILLIGRAM(S): 50 CAPSULE, EXTENDED RELEASE ORAL at 21:58

## 2022-04-10 RX ADMIN — MORPHINE SULFATE 2 MILLIGRAM(S): 50 CAPSULE, EXTENDED RELEASE ORAL at 01:37

## 2022-04-10 RX ADMIN — MORPHINE SULFATE 2 MILLIGRAM(S): 50 CAPSULE, EXTENDED RELEASE ORAL at 21:14

## 2022-04-10 RX ADMIN — SODIUM CHLORIDE 75 MILLILITER(S): 9 INJECTION, SOLUTION INTRAVENOUS at 00:05

## 2022-04-10 RX ADMIN — HEPARIN SODIUM 5000 UNIT(S): 5000 INJECTION INTRAVENOUS; SUBCUTANEOUS at 21:33

## 2022-04-10 RX ADMIN — Medication 62.5 MICROGRAM(S): at 11:23

## 2022-04-10 RX ADMIN — SODIUM CHLORIDE 60 MILLILITER(S): 9 INJECTION, SOLUTION INTRAVENOUS at 15:15

## 2022-04-10 RX ADMIN — Medication 400 MILLIGRAM(S): at 12:28

## 2022-04-10 RX ADMIN — Medication 3 MILLIGRAM(S): at 01:34

## 2022-04-10 RX ADMIN — MORPHINE SULFATE 2 MILLIGRAM(S): 50 CAPSULE, EXTENDED RELEASE ORAL at 00:05

## 2022-04-10 RX ADMIN — Medication 31.25 MICROGRAM(S): at 17:32

## 2022-04-10 RX ADMIN — MORPHINE SULFATE 2 MILLIGRAM(S): 50 CAPSULE, EXTENDED RELEASE ORAL at 00:20

## 2022-04-10 RX ADMIN — HEPARIN SODIUM 5000 UNIT(S): 5000 INJECTION INTRAVENOUS; SUBCUTANEOUS at 01:20

## 2022-04-10 RX ADMIN — MORPHINE SULFATE 2 MILLIGRAM(S): 50 CAPSULE, EXTENDED RELEASE ORAL at 15:14

## 2022-04-10 RX ADMIN — Medication 40 MILLIGRAM(S): at 21:32

## 2022-04-10 RX ADMIN — Medication 100 MILLIEQUIVALENT(S): at 10:12

## 2022-04-10 RX ADMIN — Medication 3 MILLILITER(S): at 21:24

## 2022-04-10 RX ADMIN — MORPHINE SULFATE 2 MILLIGRAM(S): 50 CAPSULE, EXTENDED RELEASE ORAL at 01:20

## 2022-04-10 NOTE — CONSULT NOTE ADULT - ASSESSMENT
78y/o seen at [facility] telemetry. From the [facility]  History from chart  History dementia, HTN, high cholesterol, thyroid disease, carotid stenosis, OA    Admitted for unwitnessed fall and fractured right femur  Patient found to be hypothermic (presently on hyperthemia blanket)  Potassium-3.2  Troponin-10  Mag-2.0  No documented, significant cardiac arrhythmia    Impression  Right femoral fracture after unwitnessed fall    Plan:  - K-supps and follow labs  - Thyroid function tests  - Echocardiogram ordered  - D-Dimer ordered  - Head CAT scan-normal  - Continue Amlodipine-5mg OD and titrate as needed                  Hyzaar-100/25mg OD                  Atorvastatin-20mg HS  - ASA on hold  - Lovenox and stop at least 6 hours before any surgery 78y/o seen at [facility] telemetry. From the [facility]  History from chart  History dementia, HTN, high cholesterol, thyroid disease, carotid stenosis, OA    Admitted for unwitnessed fall and fractured right femur  Patient found to be hypothermic (presently on hyperthemia blanket)  Potassium-3.2  Troponin-10  Mag-2.0  No documented, significant cardiac arrhythmia    Impression  Right femoral fracture after unwitnessed fall    Plan:  - K-supps and follow labs  - Thyroid function tests  - Echocardiogram ordered  - D-Dimer ordered  - Head CAT scan-normal  - Continue Amlodipine-5mg OD and titrate as needed                  Hyzaar-100/25mg OD                  Atorvastatin-20mg HS  - ASA on hold  - Lovenox and stop at least 6 hours before any surgery    Addendum:  4/10/22 10:07 am Unofficially LV EF at least 50%, no severe valve issues. Full report pending

## 2022-04-10 NOTE — CONSULT NOTE ADULT - SUBJECTIVE AND OBJECTIVE BOX
HPI:  78YO F resident of  [facility], DJD, HTN/HLD, carotid stenosis, hypothyroidism who presented to the hospital sp fall and right hip pain. CT hip showed a right displaced femoral neck fracture.  DDimer elevated. WBC wnl with bandemia. Pt was hypothermic. Pt unable to provide history. No n/v/d CP SOB.    Infectious Disease consult was called to evaluate pt.      Past Medical & Surgical Hx:  PAST MEDICAL & SURGICAL HISTORY:  Dementia without behavioral disturbance, unspecified dementia type  Hypothyroid  Osteoarthritis  Carotid stenosis  Degenerative joint disease  Hypertension  Hyperlipidemia      Social History--  EtOH: denies  Tobacco: denies   Drug Use: denies    FAMILY HISTORY:  Noncontributory    Allergies  No Known Allergies    Intolerances  NONE      Home Medications:  amLODIPine 5 mg oral tablet: tab(s) orally once a day (2022 21:57)  aspirin 81 mg oral delayed release tablet: 1 tab(s) orally once a day (:57)  atorvastatin 20 mg oral tablet: 1 tab(s) orally once a day (:57)  folic acid 1 mg oral tablet: 1 tab(s) orally once a day (2022 21:57)  hydrochlorothiazide-losartan: 100-2 (10 Apr 2022 00:01)  levothyroxine 125 mcg (0.125 mg) oral tablet: 1 tab(s) orally once a day (10 Apr 2022 00:01)  Melatonin 3 mg oral tablet: orally once a day (at bedtime) 2 TABS (10 Apr 2022 00:01)  memantine 28 mg oral capsule, extended release: 1 cap(s) orally once a day (:57)  mirtazapine 15 mg oral tablet: 1 tab(s) orally once a day (at bedtime) (10 Apr 2022 00:01)  Multiple Vitamins oral tablet: 1 tab(s) orally once a day (:57)  Omega-3 1000 mg oral capsule: 2 cap(s) orally once a day (:57)  Vitamin B-12: 500mcg (10 Apr 2022 00:01)  Vitamin D3 1000 intl units (25 mcg) oral tablet: orally 2 times a day (:57)      Current Inpatient Medications :    ANTIBIOTICS:       OTHER RELEVANT MEDICATIONS :  acetaminophen     Tablet .. 650 milliGRAM(s) Oral every 6 hours PRN  ALBUTerol    0.083% 2.5 milliGRAM(s) Nebulizer every 2 hours PRN  albuterol/ipratropium for Nebulization 3 milliLiter(s) Nebulizer every 12 hours  aluminum hydroxide/magnesium hydroxide/simethicone Suspension 30 milliLiter(s) Oral every 4 hours PRN  heparin   Injectable 5000 Unit(s) SubCutaneous every 8 hours  lactated ringers. 1000 milliLiter(s) IV Continuous <Continuous>  levothyroxine Injectable 94 MICROGram(s) IV Push <User Schedule>  levothyroxine Injectable 31.25 MICROGram(s) IV Push once  melatonin 3 milliGRAM(s) Oral at bedtime PRN  methylPREDNISolone sodium succinate Injectable 40 milliGRAM(s) IV Push every 8 hours  morphine  - Injectable 1 milliGRAM(s) IV Push every 6 hours PRN  morphine  - Injectable 2 milliGRAM(s) IV Push every 6 hours PRN  ondansetron Injectable 4 milliGRAM(s) IV Push every 8 hours PRN      ROS:  Unable to obtain due to : Dementia      I&O's Detail    2022 07:01  -  10 Apr 2022 07:00  --------------------------------------------------------  IN:    Lactated Ringers: 525 mL  Total IN: 525 mL    OUT:    Blood Loss (mL): 300 mL    Voided (mL): 600 mL  Total OUT: 900 mL    Total NET: -375 mL      10 Apr 2022 07:01  -  10 Apr 2022 17:10  --------------------------------------------------------  IN:  Total IN: 0 mL    OUT:    Voided (mL): 900 mL  Total OUT: 900 mL    Total NET: -900 mL          Physical Exam:  Vital Signs Last 24 Hrs  T(C): 37.3 (10 Apr 2022 14:11), Max: 37.3 (10 Apr 2022 14:11)  T(F): 99.2 (10 Apr 2022 14:11), Max: 99.2 (10 Apr 2022 14:11)  HR: 85 (10 Apr 2022 14:11) (54 - 90)  BP: 155/74 (10 Apr 2022 14:11) (155/74 - 189/77)  BP(mean): 116 (10 Apr 2022 05:43) (116 - 116)  RR: 18 (10 Apr 2022 14:11) (16 - 24)  SpO2: 98% (10 Apr 2022 14:11) (85% - 98%)  Height (cm): 170.2 (04-10 @ 00:54)  Weight (kg): 73 (04-10 @ 00:54)  BMI (kg/m2): 25.2 (04-10 @ 00:54)  BSA (m2): 1.84 (04-10 @ 00:54)    General:  no acute distress  Neck: supple, trachea midline  Lungs: Decreased, no wheeze/rhonchi  Cardiovascular: regular rate and rhythm, S1 S2  Abdomen: soft, nontender, ND, bowel sounds normal  Neurological: confused  Skin: no rash  Extremities: no edema    Labs:                         11.9   9.67  )-----------( 185      ( 10 Apr 2022 07:25 )             35.7   04-10    134<L>  |  97  |  18  ----------------------------<  114<H>  4.0   |  29  |  0.92    Ca    8.3<L>      10 Apr 2022 14:27  Phos  3.0     04-10  Mg     2.0     04-10    TPro  7.2  /  Alb  3.4  /  TBili  0.6  /  DBili  x   /  AST  35  /  ALT  33  /  AlkPhos  76  04-10      Urinalysis Basic - ( 10 Apr 2022 12:15 )    Color: Yellow / Appearance: Clear / S.010 / pH: x  Gluc: x / Ketone: Negative  / Bili: Negative / Urobili: Negative mg/dL   Blood: x / Protein: 30 mg/dL / Nitrite: Negative   Leuk Esterase: Trace / RBC: 0-2 /HPF / WBC 3-5   Sq Epi: x / Non Sq Epi: Moderate / Bacteria: Few      RECENT CULTURES:          RADIOLOGY & ADDITIONAL STUDIES:    ACC: [id number] EXAM:  CT ANGIO CHEST PULM ART WAWIC                          *** ADDENDUM***    Addendum:    The sternum is angled anteriorly inferiorly, secondary to prior sternal   fracture. This, however, is new since 6/3/2020.    --- End of Report ---    *** END OF ADDENDUM***      PROCEDURE DATE:  04/10/2022          INTERPRETATION:  CTA CHEST    INDICATION: Elevated d-dimer.. Evaluate for pulmonary embolus.    TECHNIQUE: Enhanced helical images were obtained of the chest. Coronal   and sagittal images were reconstructed.  Images were obtained after the   uneventful administration of 90 cc of nonionic intravenous contrast   (Omnipaque 350). 10 cc of nonionic intravenous contrast (Omnipaque 350)   was discarded. Maximum intensity projection images were generated.    COMPARISON: Radiograph chest 2022. CT chest 6/3/2020.    FINDINGS:    Pulmonary Artery:  There is no main, central, lobar, or proximal   segmental pulmonary embolus. The mid to distal segmental and subsegmental   divisions are not well evaluated secondary to respiratory motion artifact.    Tubes/Lines: None.    Lungs, airways and pleura: Emphysema. Right upper lobe, left upper lobe   and bilateral lower lobe linear opacities are likely due to atelectasis.   No pleural effusion. No pneumothorax. The airways are unremarkable   allowing for respiratory motion.    Mediastinum: The thyroid gland is not well evaluated. There are no   enlarged chest lymph nodes. Hiatal hernia.    The atria are likely enlarged. Renal heart is normal in size. Mitral   annular calcification. Coronary artery calcifications. No pericardial   effusion.    The ascending aorta is tortuous and ectatic. The descending thoracic   aorta measures 3.4 cm at the main pulmonary artery. And 3.8 x3.5 cm,   just proximal to the diaphragmatic hiatus. Atheromatous disease of the   aorta.    Upper Abdomen: Cholelithiasis. The 1.4 x 1.6 cm right adrenal nodule and   a 2.7 x 2.0 cm left adrenal nodule that likely represent adenomas, are   unchanged.    Bones And Soft Tissues: Degenerative change of the spine. Compression   deformities of the T4-T6, T8-T9, and L1-L2 vertebral bodies.  The soft   tissues are unremarkable.      IMPRESSION:    1.  No pulmonary embolus, however, the mid to distal segmental and   subsegmental divisions are not well evaluated secondary to respiratory   motion.  2.  Compression deformities of the thoracic and lumbar spine.        Assessment :   78YO F resident of  [facility] [place], DJD, HTN/HLD, carotid stenosis, hypothyroidism admitted sp fall and right hip pain found to have right displaced femoral neck fracture.    WBC wnl with bandemia. Pt was hypothermic. UA neg CXR neg   DDimer elevated. CTA chest neg for PE and pneumonia  Doubt infectious process  TSH elevated    Plan :   Defer antibiotics  Trend temps and cbc  Fu cultures  Asp precautions    D/w Dr Stone      Continue with present regiment .  Approptiate use of antibiotics and adverse effects reviewed.    > 45 minutes spent in direct patient care reviewing  the notes, lab data/ imaging , discussion with multidisciplinary team. All questions were addressed and answered to the best of my capacity .    Thank you for allowing me to participate in the care of your patient .      Radha Pryor MD  Infectious Disease  [phone]

## 2022-04-10 NOTE — CHART NOTE - NSCHARTNOTEFT_GEN_A_CORE
CT Angio:    1.  No pulmonary embolus, however, the mid to distal segmental and   subsegmental divisions are not well evaluated secondary to respiratory   motion.  2.  Compression deformities of the thoracic and lumbar spine.    The sternum is angled anteriorly inferiorly, secondary to prior sternal   fracture. This, however, is new since 6/3/2020.    Also noted to have TSH of 71. Discussed with ortho, anesthesia, pulm and pharmacy. Will adjust synthroid to 75% home dose IV for now as may also be from non-compliance. Consulted endocrine Dr. Perlman.   Continue to monitor on tele overnight. Will reassess for potential surgery tomorrow  Ordered DVT ppx Heparin and diet and will hold past midnight

## 2022-04-10 NOTE — CONSULT NOTE ADULT - ASSESSMENT
77F with dementia, DJD, HTN/HLD, carotid stenosis, hypothyroidism who presents from [facility] after a fall.  77F with dementia, DJD, HTN/HLD, carotid stenosis, hypothyroidism who presents from [facility] after a fall.     fall  hip fx  OP  OA  dementia  Hiatal hernia  Emphysema - ex smoker - known COPD -   HTN  HLD  Thyroid disease    fall prec - assist with needs - ADL -   Ortho eval in progress - marisol op eval and optimization in progress - plan for ORIF  Hypoxemia - eval in progress - will check D dimer now - if D dimer is elevated and out of range - consideration for CTA chest  Emphysema - COPD - DUONEB BID - Albuterol PRN - sob and or wheezing, Solumedrol IV - 3 doses - VBG -   Card eval in progress - planned for TTE  monitor VS and HD and Sat  o2 supplementation as needed - keep sat > 88 pct  old records reviewed  monitor for needs  eventual GOC discussion

## 2022-04-10 NOTE — H&P ADULT - ASSESSMENT
A/P:    77F with dementia, DJD, HTN/HLD, carotid stenosis, hypothyroidism who presents from [facility] after a fall.  Found to have right femoral neck fracture.      Right femoral neck fracture  - admitted to medicine  - regarding pre-operative examination -> would prefer to have a TTE prior to surgery given murmur on exam.  Pending no concerning abnormalities, patient would be a intermediate risk patient for an intermediate surgery. Can proceed to surgery pending TTE results  - pain control as needed  - NPO p MN  - IV hydration    Desats - may be 2/2 splinting  - continue to monitor     Hypothyroidism  - cont with synthroid (125mcg when able to take PO or 62.5 mcg for IV)    HTN/HLD  - hold losartan for now  - hold amlodipine as well  - hold ASA  - hold atorvastatin    Dementia  - does not have namenda XR -> can switch to namenda 10mg BID -> will hold AM dose and start tomorrow night  - can give mirtazipine tonight    Preventive measures  - heparin x1 for tonight  - SCD only     A/P:    77F with dementia, DJD, HTN/HLD, carotid stenosis, hypothyroidism who presents from [facility] after a fall.  Found to have right femoral neck fracture.      Right femoral neck fracture  - admitted to medicine  - ortho aware  - regarding pre-operative examination -> would prefer to have a TTE and cardiology consult prior to surgery given murmur and bradycardia on exam.  Patient does admit to ambulating without any assistance and is able to go up and down stairs but cannot recall if she does any strenuous activities.   - pain control as needed  - NPO p MN  - IV hydration    Desats - occurred after she came back from CT  - unclear cause, but will continue to monitor    Bradycardia    - monitor on tele  - repeat EKG when further bradycardic to assess for any worsening heart blocks    Hypothyroidism  - cont with synthroid (125mcg when able to take PO or 62.5 mcg for IV)    HTN/HLD  - hold losartan for now  - hold amlodipine as well  - hold ASA  - hold atorvastatin    Dementia  - does not have namenda XR -> can switch to namenda 10mg BID -> will hold AM dose and start tomorrow night  - can give mirtazipine tonight    Preventive measures  - heparin x1 for tonight  - SCD only

## 2022-04-10 NOTE — H&P ADULT - NSHPPHYSICALEXAM_GEN_ALL_CORE
PHYSICAL EXAM:  Vital Signs Last 24 Hrs  T(C): 36.9 (09 Apr 2022 21:33), Max: 36.9 (09 Apr 2022 21:33)  T(F): 98.4 (09 Apr 2022 21:33), Max: 98.4 (09 Apr 2022 21:33)  HR: 58 (09 Apr 2022 23:13) (57 - 59)  BP: 162/79 (09 Apr 2022 22:55) (162/79 - 189/77)  BP(mean): --  RR: 18 (09 Apr 2022 23:13) (16 - 20)  SpO2: 94% (09 Apr 2022 23:13) (85% - 95%)    GENERAL:     elderly appearing female in NAD  HEAD:     atraumatic  EYES:     EOMI, conjunctiva and sclera clear  ENMT:     poor dentition  RESPIRATORY:     clear to auscultation bilaterally, no rales or rhonchi or wheezing or rubs  CARDIOVASCULAR:     bradycardic, soft ii/vi holosystolic murmur LUSB  GASTROINTESTINAL:     soft, nontender, nondistended, no hepatosplenomegaly palpated, bowel sounds present  EXTREMITIES:     no clubbing or cyanosis or edema  MUSCULOSKELETAL:     right leg shortened and externally rotated  NERVOUS SYSTEM:    able to wiggle toes, no sensory deficits noted  PSYCH:     awake and conversant, AOx2 (name and place only)

## 2022-04-10 NOTE — PROGRESS NOTE ADULT - SUBJECTIVE AND OBJECTIVE BOX
Post Op     OPAL ARBOLEDA      77y        Female                                                                                                                 T(C): 36.9 (04-10-22 @ 09:14), Max: 36.9 (04-09-22 @ 21:33)  HR: 90 (04-10-22 @ 09:14) (54 - 90)  BP: 166/81 (04-10-22 @ 09:14) (162/79 - 189/77)  RR: 20 (04-10-22 @ 09:14) (16 - 24)  SpO2: 97% (04-10-22 @ 09:14) (85% - 97%)  Wt(kg): --    S/P   fracture right hip    Patient denies shortness of breath, chest pain, dyspnea, No complaints  Pain is 3 /10    Physical Exam    Extremity: Bilaterally:  No holmon                                           No Cord                                          PAS on                                          Neurovascular intact                                          Motor intact EHL/FHL rt leg internal rotated and shortened                                          Sensation intact                                          Pulses intact DP/PT                                         Calves Soft                                                                 Capillary refill with 5 seconds                          11.9   9.67  )-----------( 185      ( 10 Apr 2022 07:25 )             35.7       04-10    138  |  99  |  22  ----------------------------<  118<H>  3.2<L>   |  29  |  0.92    Ca    8.3<L>      10 Apr 2022 07:25  Phos  3.0     04-10  Mg     2.0     04-10    TPro  7.2  /  Alb  3.4  /  TBili  0.6  /  DBili  x   /  AST  35  /  ALT  33  /  AlkPhos  76  04-10      A/P  -- S/P right  hio fracture     -  Medicine To Follow   - DVT prophylaxis PAS  - PT & OT   - Analagesia  - Incentive Spirometry  - Discharge Planning  - Safety Precautions  -  CBC , BMP daily    pending medical and cardiology clearance  discussed with Dr. Schumacher and Ganesh Alonso

## 2022-04-10 NOTE — CONSULT NOTE ADULT - SUBJECTIVE AND OBJECTIVE BOX
Date/Time Patient Seen:  		  Referring MD:   Data Reviewed	       Patient is a 77y old  Female who presents with a chief complaint of fall -> right femoral fracture (10 Apr 2022 00:07)      Subjective/HPI  vs noted  labs reviewed  ct imaging reviewed  H and P reviewed  old records reviewed  s/p fall  hip fx  marisol op eval and optimization  noted to have hypoxemia      77F with dementia, DJD, HTN/HLD, carotid stenosis, hypothyroidism who presents from [facility] after a fall.  Per report, staff heard her fall but the patient was awake when they arrived in her room.  Patient was brought here for further evaluation.  In the ED, patient cannot recall events and does not know why she is here.  She does admit to some pain the right hip.  She did say that she has been feeling well prior to the injury.  The CT of her hip showed a right displaced femoral neck fracture.  Dr. Barajas (ortho) was made aware.  Patient currently has no pain while lying in the stretcher but does have pain when moved.  Of note, patient had episodes of desats that required some O2 supplementation to keep sats >90%.  Patient also noted to be bradycardic as well.         PAST MEDICAL & SURGICAL HISTORY:  Hypertension, unspecified type    Hyperlipidemia, unspecified hyperlipidemia type    Dementia without behavioral disturbance, unspecified dementia type    Hypothyroidism, unspecified type    Hypothyroid    Osteoarthritis    Carotid stenosis    Degenerative joint disease    Hypertension    Hyperlipidemia    Dementia    No significant past surgical history    PAST SURGICAL HISTORY:  No significant past surgical history.     Social History:  Social History (marital status, living situation, occupation, tobacco use, alcohol and drug use, and sexual history): - unable to obtain  - patient lives at [facility]     Tobacco Screening:  · Core Measure Site	No  · Has the patient used tobacco in the past 30 days?	Unable to assess due to patient's cognitive impairment    Risk Assessment:    Present on Admission:  Deep Venous Thrombosis	no  Pulmonary Embolus	no     Heart Failure:  Does this patient have a history of or has been diagnosed with heart failure? unknown.        Medication list         MEDICATIONS  (STANDING):  lactated ringers. 1000 milliLiter(s) (75 mL/Hr) IV Continuous <Continuous>  levothyroxine Injectable 62.5 MICROGram(s) IV Push <User Schedule>    MEDICATIONS  (PRN):  acetaminophen     Tablet .. 650 milliGRAM(s) Oral every 6 hours PRN Temp greater or equal to 38C (100.4F), Mild Pain (1 - 3)  aluminum hydroxide/magnesium hydroxide/simethicone Suspension 30 milliLiter(s) Oral every 4 hours PRN Dyspepsia  melatonin 3 milliGRAM(s) Oral at bedtime PRN Insomnia  ondansetron Injectable 4 milliGRAM(s) IV Push every 8 hours PRN Nausea and/or Vomiting         Vitals log        ICU Vital Signs Last 24 Hrs  T(C): 36.7 (10 Apr 2022 05:43), Max: 36.9 (09 Apr 2022 21:33)  T(F): 98 (10 Apr 2022 05:43), Max: 98.4 (09 Apr 2022 21:33)  HR: 87 (10 Apr 2022 05:43) (54 - 87)  BP: 167/90 (10 Apr 2022 05:43) (162/79 - 189/77)  BP(mean): 116 (10 Apr 2022 05:43) (116 - 116)  ABP: --  ABP(mean): --  RR: 22 (10 Apr 2022 05:43) (16 - 24)  SpO2: 90% (10 Apr 2022 05:43) (85% - 95%)           Input and Output:  I&O's Detail    09 Apr 2022 07:01  -  10 Apr 2022 07:00  --------------------------------------------------------  IN:    Lactated Ringers: 450 mL  Total IN: 450 mL    OUT:    Blood Loss (mL): 300 mL  Total OUT: 300 mL    Total NET: 150 mL          Lab Data                        12.1   4.69  )-----------( 174      ( 09 Apr 2022 22:04 )             37.0     04-09    138  |  100  |  27<H>  ----------------------------<  75  3.9   |  32<H>  |  0.97    Ca    8.2<L>      09 Apr 2022 22:04    TPro  7.4  /  Alb  3.3  /  TBili  0.5  /  DBili  x   /  AST  42<H>  /  ALT  42  /  AlkPhos  86  04-09            Review of Systems	  fall  pain  poor recall      Objective     Physical Examination        Pertinent Lab findings & Imaging      Orr:  NO   Adequate UO     I&O's Detail    09 Apr 2022 07:01  -  10 Apr 2022 07:00  --------------------------------------------------------  IN:    Lactated Ringers: 450 mL  Total IN: 450 mL    OUT:    Blood Loss (mL): 300 mL  Total OUT: 300 mL    Total NET: 150 mL               Discussed with:     Cultures:	        Radiology      ACC: [id number] EXAM:  CT CERVICAL SPINE                        ACC: [id number] EXAM:  CT BRAIN                          PROCEDURE DATE:  04/09/2022          INTERPRETATION:  CT HEAD WITHOUT CONTRAST  CT CERVICAL SPINE WITHOUT CONTRAST    INDICATION: 77 years old. Female. unwitnessed fall, hx dementia, on asa.    COMPARISON: CT head and CT cervical spine 6/24/2021.    TECHNIQUE:  1. Noncontrast axial CT head was obtained from the skull base to vertex.   Multiplanar reformations were made.  2. CT scan of the cervical spine was performed without intravenous   contrast. Multiplanar reformations were made.    FINDINGS:    CT HEAD:  No acute intracranial hemorrhage, mass effect or midline shift.  No CT evidence of acute large territory vascular infarct.  The ventricles and cortical sulci are prominent reflecting parenchymal   volume loss.  Patchy hypodensities in the periventricular and subcortical white matter   are nonspecific, but likely sequela of small vessel ischemic disease.  Intracranial atherosclerotic calcifications are present.    Moderate mucosal thickening of the left sphenoid sinus. Mild mucosal   thickening of the visualized left maxillary sinus. The mastoid air cells   are well aerated.  No displaced calvarial fracture.    CT CERVICAL SPINE:  No prevertebral soft tissue swelling.  Grade 1 anterolisthesis C4-C5 and C7-T1.  Vertebral body heights are maintained.  No evidence of cervical spine fracture.    Multilevel degenerative changes resulting in varying degrees of bony   encroachment on the cervical spinal canal and neural foramina, worst at   C5-C6 and C6-C7.    IMPRESSION:  CT head:  No acute intracranial hemorrhage or mass effect.    CT cervical spine:  No CT evidence of cervical spine fracture.    --- End of Report ---            MICHEL LEAVITT MD; Attending Radiologist  This document has been electronically signed. Apr 9 2022 11:47PM        EXAM:  CT CHEST                          EXAM:  CT ABDOMEN AND PELVIS                            PROCEDURE DATE:  06/03/2020          INTERPRETATION:  CLINICAL INDICATION: 75 years  Female with abdominal pain.     COMPARISON: None.    PROCEDURE:   CT of the Chest, Abdomen and Pelvis was performed without intravenous contrast.   Intravenous contrast: None.  Oral contrast: None.  Sagittal and coronal reformats were performed.    LIMITATIONS: Evaluation of the solid organs and GI tract is limited without oral and IV contrast.    FINDINGS:  CHEST:   LUNGS AND LARGE AIRWAYS: Patent central airways. Mild bilateral upper lobe centrilobular emphysema. Right upper lobe linear scarring on (2:22). No focal consolidation.  PLEURA: No pleural effusion.  VESSELS: Atherosclerotic aorta. No thoracic aortic aneurysm.  HEART: Heart size is normal. No pericardial effusion.  MEDIASTINUM AND CHEPE: No lymphadenopathy. Small sliding hiatus hernia with distal esophageal intraluminal polypoid focus (2:62).  CHEST WALL AND LOWER NECK: Within normal limits.    ABDOMEN AND PELVIS:  LIVER: Within normal limits.  BILE DUCTS: Normal caliber.  GALLBLADDER: 2 cm gallstone.  SPLEEN: Within normal limits.  PANCREAS: Within normal limits.  ADRENALS: 2 cm low-density right and left adrenal nodules.  KIDNEYS/URETERS: Within normal limits.    BLADDER: Within normal limits.  REPRODUCTIVE ORGANS: Retroflexed uterus.    BOWEL: No bowel obstruction. Moderate retained fecal matter throughout the colon. The stomach is decompressed and cannot be assessed. Appendix is normal.  PERITONEUM: No ascites.  VESSELS: Atherosclerotic aorta. 3 cm infrarenal abdominal aortic aneurysm. 1.8 cm left common iliac artery aneurysm. 2.2 cm right common iliac artery aneurysm.  RETROPERITONEUM/LYMPH NODES: No lymphadenopathy.    ABDOMINAL WALL: Within normal limits.  BONES: Moderate T5, T6, T7 and L3 compression fractures, age-indeterminate. Lumbarization of the first sacral segment with mild S1 compression deformity. No retropulsion into the spinal canal. Diffuse osteoporosis.    IMPRESSION:     T5, T6, T7 and L3 moderate age-indeterminate compression fractures. No retropulsion. Osteoporosis.    Moderate constipation.    3 cm infrarenal abdominal aortic aneurysm. 1.8 cm left common iliac artery aneurysm. 2.2 cm right common iliac artery aneurysm.    Small hiatus hernia with polypoid focus in the distal esophagus. Correlate with endoscopy.    Emphysema. Mild right upper lobe scarring.    Bilateral adrenal nodules likely adenomata.                    RADHA CARBAJAL M.D., ATTENDING RADIOLOGIST  This document has been electronically signed. Daniel  3 2020  2:06PM                         Date/Time Patient Seen:  		  Referring MD:   Data Reviewed	       Patient is a 77y old  Female who presents with a chief complaint of fall -> right femoral fracture (10 Apr 2022 00:07)      Subjective/HPI  vs noted  labs reviewed  ct imaging reviewed  H and P reviewed  old records reviewed  s/p fall  hip fx  marisol op eval and optimization  noted to have hypoxemia      77F with dementia, DJD, HTN/HLD, carotid stenosis, hypothyroidism who presents from [facility] after a fall.  Per report, staff heard her fall but the patient was awake when they arrived in her room.  Patient was brought here for further evaluation.  In the ED, patient cannot recall events and does not know why she is here.  She does admit to some pain the right hip.  She did say that she has been feeling well prior to the injury.  The CT of her hip showed a right displaced femoral neck fracture.  Dr. Barajas (ortho) was made aware.  Patient currently has no pain while lying in the stretcher but does have pain when moved.  Of note, patient had episodes of desats that required some O2 supplementation to keep sats >90%.  Patient also noted to be bradycardic as well.         PAST MEDICAL & SURGICAL HISTORY:  Hypertension, unspecified type    Hyperlipidemia, unspecified hyperlipidemia type    Dementia without behavioral disturbance, unspecified dementia type    Hypothyroidism, unspecified type    Hypothyroid    Osteoarthritis    Carotid stenosis    Degenerative joint disease    Hypertension    Hyperlipidemia    Dementia    No significant past surgical history    PAST SURGICAL HISTORY:  No significant past surgical history.     Social History:  Social History (marital status, living situation, occupation, tobacco use, alcohol and drug use, and sexual history): - unable to obtain  - patient lives at [facility]     Tobacco Screening:  · Core Measure Site	No  · Has the patient used tobacco in the past 30 days?	Unable to assess due to patient's cognitive impairment    Risk Assessment:    Present on Admission:  Deep Venous Thrombosis	no  Pulmonary Embolus	no     Heart Failure:  Does this patient have a history of or has been diagnosed with heart failure? unknown.        Medication list         MEDICATIONS  (STANDING):  lactated ringers. 1000 milliLiter(s) (75 mL/Hr) IV Continuous <Continuous>  levothyroxine Injectable 62.5 MICROGram(s) IV Push <User Schedule>    MEDICATIONS  (PRN):  acetaminophen     Tablet .. 650 milliGRAM(s) Oral every 6 hours PRN Temp greater or equal to 38C (100.4F), Mild Pain (1 - 3)  aluminum hydroxide/magnesium hydroxide/simethicone Suspension 30 milliLiter(s) Oral every 4 hours PRN Dyspepsia  melatonin 3 milliGRAM(s) Oral at bedtime PRN Insomnia  ondansetron Injectable 4 milliGRAM(s) IV Push every 8 hours PRN Nausea and/or Vomiting         Vitals log        ICU Vital Signs Last 24 Hrs  T(C): 36.7 (10 Apr 2022 05:43), Max: 36.9 (09 Apr 2022 21:33)  T(F): 98 (10 Apr 2022 05:43), Max: 98.4 (09 Apr 2022 21:33)  HR: 87 (10 Apr 2022 05:43) (54 - 87)  BP: 167/90 (10 Apr 2022 05:43) (162/79 - 189/77)  BP(mean): 116 (10 Apr 2022 05:43) (116 - 116)  ABP: --  ABP(mean): --  RR: 22 (10 Apr 2022 05:43) (16 - 24)  SpO2: 90% (10 Apr 2022 05:43) (85% - 95%)           Input and Output:  I&O's Detail    09 Apr 2022 07:01  -  10 Apr 2022 07:00  --------------------------------------------------------  IN:    Lactated Ringers: 450 mL  Total IN: 450 mL    OUT:    Blood Loss (mL): 300 mL  Total OUT: 300 mL    Total NET: 150 mL          Lab Data                        12.1   4.69  )-----------( 174      ( 09 Apr 2022 22:04 )             37.0     04-09    138  |  100  |  27<H>  ----------------------------<  75  3.9   |  32<H>  |  0.97    Ca    8.2<L>      09 Apr 2022 22:04    TPro  7.4  /  Alb  3.3  /  TBili  0.5  /  DBili  x   /  AST  42<H>  /  ALT  42  /  AlkPhos  86  04-09            Review of Systems	  fall  pain  poor recall      Objective     Physical Examination    poor dentition  heart s1s2  lung dec BS  abd soft  head nc  verbal  confused      Pertinent Lab findings & Imaging      Orr:  NO   Adequate UO     I&O's Detail    09 Apr 2022 07:01  -  10 Apr 2022 07:00  --------------------------------------------------------  IN:    Lactated Ringers: 450 mL  Total IN: 450 mL    OUT:    Blood Loss (mL): 300 mL  Total OUT: 300 mL    Total NET: 150 mL               Discussed with:     Cultures:	        Radiology      ACC: [id number] EXAM:  CT CERVICAL SPINE                        ACC: [id number] EXAM:  CT BRAIN                          PROCEDURE DATE:  04/09/2022          INTERPRETATION:  CT HEAD WITHOUT CONTRAST  CT CERVICAL SPINE WITHOUT CONTRAST    INDICATION: 77 years old. Female. unwitnessed fall, hx dementia, on asa.    COMPARISON: CT head and CT cervical spine 6/24/2021.    TECHNIQUE:  1. Noncontrast axial CT head was obtained from the skull base to vertex.   Multiplanar reformations were made.  2. CT scan of the cervical spine was performed without intravenous   contrast. Multiplanar reformations were made.    FINDINGS:    CT HEAD:  No acute intracranial hemorrhage, mass effect or midline shift.  No CT evidence of acute large territory vascular infarct.  The ventricles and cortical sulci are prominent reflecting parenchymal   volume loss.  Patchy hypodensities in the periventricular and subcortical white matter   are nonspecific, but likely sequela of small vessel ischemic disease.  Intracranial atherosclerotic calcifications are present.    Moderate mucosal thickening of the left sphenoid sinus. Mild mucosal   thickening of the visualized left maxillary sinus. The mastoid air cells   are well aerated.  No displaced calvarial fracture.    CT CERVICAL SPINE:  No prevertebral soft tissue swelling.  Grade 1 anterolisthesis C4-C5 and C7-T1.  Vertebral body heights are maintained.  No evidence of cervical spine fracture.    Multilevel degenerative changes resulting in varying degrees of bony   encroachment on the cervical spinal canal and neural foramina, worst at   C5-C6 and C6-C7.    IMPRESSION:  CT head:  No acute intracranial hemorrhage or mass effect.    CT cervical spine:  No CT evidence of cervical spine fracture.    --- End of Report ---            MICHEL LEAVITT MD; Attending Radiologist  This document has been electronically signed. Apr 9 2022 11:47PM        EXAM:  CT CHEST                          EXAM:  CT ABDOMEN AND PELVIS                            PROCEDURE DATE:  06/03/2020          INTERPRETATION:  CLINICAL INDICATION: 75 years  Female with abdominal pain.     COMPARISON: None.    PROCEDURE:   CT of the Chest, Abdomen and Pelvis was performed without intravenous contrast.   Intravenous contrast: None.  Oral contrast: None.  Sagittal and coronal reformats were performed.    LIMITATIONS: Evaluation of the solid organs and GI tract is limited without oral and IV contrast.    FINDINGS:  CHEST:   LUNGS AND LARGE AIRWAYS: Patent central airways. Mild bilateral upper lobe centrilobular emphysema. Right upper lobe linear scarring on (2:22). No focal consolidation.  PLEURA: No pleural effusion.  VESSELS: Atherosclerotic aorta. No thoracic aortic aneurysm.  HEART: Heart size is normal. No pericardial effusion.  MEDIASTINUM AND CHEPE: No lymphadenopathy. Small sliding hiatus hernia with distal esophageal intraluminal polypoid focus (2:62).  CHEST WALL AND LOWER NECK: Within normal limits.    ABDOMEN AND PELVIS:  LIVER: Within normal limits.  BILE DUCTS: Normal caliber.  GALLBLADDER: 2 cm gallstone.  SPLEEN: Within normal limits.  PANCREAS: Within normal limits.  ADRENALS: 2 cm low-density right and left adrenal nodules.  KIDNEYS/URETERS: Within normal limits.    BLADDER: Within normal limits.  REPRODUCTIVE ORGANS: Retroflexed uterus.    BOWEL: No bowel obstruction. Moderate retained fecal matter throughout the colon. The stomach is decompressed and cannot be assessed. Appendix is normal.  PERITONEUM: No ascites.  VESSELS: Atherosclerotic aorta. 3 cm infrarenal abdominal aortic aneurysm. 1.8 cm left common iliac artery aneurysm. 2.2 cm right common iliac artery aneurysm.  RETROPERITONEUM/LYMPH NODES: No lymphadenopathy.    ABDOMINAL WALL: Within normal limits.  BONES: Moderate T5, T6, T7 and L3 compression fractures, age-indeterminate. Lumbarization of the first sacral segment with mild S1 compression deformity. No retropulsion into the spinal canal. Diffuse osteoporosis.    IMPRESSION:     T5, T6, T7 and L3 moderate age-indeterminate compression fractures. No retropulsion. Osteoporosis.    Moderate constipation.    3 cm infrarenal abdominal aortic aneurysm. 1.8 cm left common iliac artery aneurysm. 2.2 cm right common iliac artery aneurysm.    Small hiatus hernia with polypoid focus in the distal esophagus. Correlate with endoscopy.    Emphysema. Mild right upper lobe scarring.    Bilateral adrenal nodules likely adenomata.                    RADHA CARBAJAL M.D., ATTENDING RADIOLOGIST  This document has been electronically signed. Daniel  3 2020  2:06PM

## 2022-04-10 NOTE — CHART NOTE - NSCHARTNOTEFT_GEN_A_CORE
full consult to follow  x-rays viewed remotely.  R hip basicervical fx  Recommend ORIF  medical admission note appreciated, cardiology eval and TTE prior to surg.

## 2022-04-10 NOTE — PATIENT PROFILE ADULT - OVER THE PAST TWO WEEKS, HAVE YOU FELT LITTLE INTEREST OR PLEASURE IN DOING THINGS?
Health Maintenance Due   Topic Date Due   • Shingles Vaccine (2 of 3) 12/18/2007   • Medicare Wellness 65+  10/16/2019       Patient is due for topics as listed above but is not proceeding with Immunization(s) Shingles and MWV (Medicare Wellness Visit) at this time.  Appt scheduled to perform MWV (Medicare Wellness Visit)      Unaddressed Risk Adjusted HCC Categories and Diagnoses  HCC 12 - Breast, Prostate, Other Cancers & Tumors   Unaddressed Dx:Prostate Cancer (Cms/Hcc)  HCC 18 - Diabetes with Chronic Complications   Unaddressed Dx:Other Specified Diabetes Mellitus With Other Circulatory Complications (Cms/Hcc)  HCC 85 - Congestive Heart Failure   Unaddressed Dx:Other Secondary Pulmonary Hypertension (Cms/[facility])   - Chronic Obstructive Pulmonary Disease   Unaddressed Dx:Copd, Moderate (Cms/Hcc)       unable to determine, patient confuse

## 2022-04-10 NOTE — PROGRESS NOTE ADULT - ASSESSMENT
A/P:    77F with dementia, DJD, HTN/HLD, carotid stenosis, hypothyroidism who presents from [facility] after a fall.  Found to have right femoral neck fracture.      Right femoral neck fracture  - regarding pre-operative examination -> would prefer to have a TTE and cardiology consult prior to surgery given murmur and bradycardia on exam.  Patient does admit to ambulating without any assistance and is able to go up and down stairs but cannot recall if she does any strenuous activities.   - pain control as needed  - NPO p MN  - IV hydration while NPO  - Had extensive discussion with cardio and ortho, will need to have TTE and D-Dimer prior to procedure. Tentatively planed for surgery this afternoon pending workup.     Desats - occurred after she came back from CT  - unclear cause, but will continue to monitor  - Pulm input appreciated, follow up D-dimer. May need CTA pending results.    Bradycardia    - monitor on tele  - repeat EKG when further bradycardic to assess for any worsening heart blocks  - cardio following     Hypothyroidism  - cont with synthroid (125mcg when able to take PO or 62.5 mcg for IV)    HTN/HLD  - hold losartan for now  - hold amlodipine as well  - hold ASA  - hold atorvastatin    Dementia  - does not have namenda XR -> can switch to Namenda 10mg BID  - can give mirtazapine    Preventive measures  - heparin if not for surgery today  - SCD only in interim      A/P:    77F with dementia, DJD, HTN/HLD, carotid stenosis, hypothyroidism who presents from [facility] after a fall.  Found to have right femoral neck fracture.      Right femoral neck fracture  - regarding pre-operative examination -> would prefer to have a TTE and cardiology consult prior to surgery given murmur and bradycardia on exam.  Patient does admit to ambulating without any assistance and is able to go up and down stairs but cannot recall if she does any strenuous activities.   - pain control as needed  - NPO p MN  - IV hydration while NPO  - Had extensive discussion with cardio and ortho, will need to have TTE and D-Dimer prior to procedure. Tentatively planed for surgery this afternoon pending workup.     Desats - occurred after she came back from CT  - unclear cause, but will continue to monitor  - Pulm input appreciated, follow up D-dimer. May need CTA pending results.    Hypothermia  - Unclear etiology   - Surveillance blood and urine cultures ordered  - F/u TSH  - ID input appreciated  Bradycardia    - monitor on tele  - repeat EKG when further bradycardic to assess for any worsening heart blocks  - cardio following     Hypothyroidism  - cont with synthroid (125mcg when able to take PO or 62.5 mcg for IV)    HTN/HLD  - hold losartan for now  - hold amlodipine as well  - hold ASA  - hold atorvastatin    Dementia  - does not have namenda XR -> can switch to Namenda 10mg BID  - can give mirtazapine    Preventive measures  - heparin if not for surgery today  - SCD only in interim

## 2022-04-10 NOTE — H&P ADULT - HISTORY OF PRESENT ILLNESS
***Patient with dementia and cannot recall recent events.  Therefore collateral information obtained via charts and notes.***    77F with dementia, DJD, HTN/HLD, carotid stenosis, hypothyroidism who presents from [facility] after a fall.  Per report, staff heard her fall but the patient was awake when they arrived in her room.  Patient was brought here for further evaluation.  In the ED, patient cannot recall events and does not know why she is here.  She does admit to some pain the right hip.  She did say that she has been feeling well prior to the injury.  The CT of her hip showed a right displaced femoral neck fracture.  Dr. Barajas (ortho) was made aware.  Patient currently has no pain while lying in the stretcher but does have pain when moved.  Of note, patient had episodes of desats that required some O2 supplementation to keep sats >90%.  Patient also noted to be bradycardic as well.

## 2022-04-10 NOTE — PROGRESS NOTE ADULT - SUBJECTIVE AND OBJECTIVE BOX
Patient is a 77y old  Female who presents with a chief complaint of fall -> right femoral fracture (10 Apr 2022 07:18)      INTERVAL HPI/OVERNIGHT EVENTS: Patient seen and examined at bedside. No overnight events.      MEDICATIONS  (STANDING):  albuterol/ipratropium for Nebulization 3 milliLiter(s) Nebulizer every 12 hours  lactated ringers. 1000 milliLiter(s) (75 mL/Hr) IV Continuous <Continuous>  levothyroxine Injectable 62.5 MICROGram(s) IV Push <User Schedule>  methylPREDNISolone sodium succinate Injectable 40 milliGRAM(s) IV Push every 8 hours  potassium chloride  10 mEq/100 mL IVPB 10 milliEquivalent(s) IV Intermittent every 1 hour    MEDICATIONS  (PRN):  acetaminophen     Tablet .. 650 milliGRAM(s) Oral every 6 hours PRN Temp greater or equal to 38C (100.4F), Mild Pain (1 - 3)  ALBUTerol    0.083% 2.5 milliGRAM(s) Nebulizer every 2 hours PRN Shortness of Breath and/or Wheezing  aluminum hydroxide/magnesium hydroxide/simethicone Suspension 30 milliLiter(s) Oral every 4 hours PRN Dyspepsia  melatonin 3 milliGRAM(s) Oral at bedtime PRN Insomnia  ondansetron Injectable 4 milliGRAM(s) IV Push every 8 hours PRN Nausea and/or Vomiting      Allergies    No Known Allergies    Intolerances        REVIEW OF SYSTEMS:  Unable to obtain meaningful ROS due to mental status      Vital Signs Last 24 Hrs  T(C): 36.7 (10 Apr 2022 05:43), Max: 36.9 (09 Apr 2022 21:33)  T(F): 98 (10 Apr 2022 05:43), Max: 98.4 (09 Apr 2022 21:33)  HR: 87 (10 Apr 2022 05:43) (54 - 87)  BP: 167/90 (10 Apr 2022 05:43) (162/79 - 189/77)  BP(mean): 116 (10 Apr 2022 05:43) (116 - 116)  RR: 22 (10 Apr 2022 05:43) (16 - 24)  SpO2: 90% (10 Apr 2022 05:43) (85% - 95%)    PHYSICAL EXAM  GENERAL: elderly appearing female in NAD  HEAD: atraumatic, normocephalic   EYES: EOMI, conjunctiva and sclera clear  ENMT: poor dentition  RESPIRATORY:  diminished bs at bases, poor inspiratory effort   CARDIOVASCULAR:  bradycardic, soft ii/vi holosystolic murmur LUSB  GASTROINTESTINAL:  soft, nontender, nondistended, bowel sounds present  EXTREMITIES: no clubbing or cyanosis or edema  MUSCULOSKELETAL:  right leg shortened and externally rotated, ROM limited due to pain  NERVOUS SYSTEM:  sensation intact   PSYCH:  awake and conversant, AOx2 (name and place only)    LABS:                        11.9   9.67  )-----------( 185      ( 10 Apr 2022 07:25 )             35.7     CBC Full  -  ( 10 Apr 2022 07:25 )  WBC Count : 9.67 K/uL  Hemoglobin : 11.9 g/dL  Hematocrit : 35.7 %  Platelet Count - Automated : 185 K/uL  Mean Cell Volume : 94.4 fl  Mean Cell Hemoglobin : 31.5 pg  Mean Cell Hemoglobin Concentration : 33.3 gm/dL  Auto Neutrophil # : 8.70 K/uL  Auto Lymphocyte # : 0.39 K/uL  Auto Monocyte # : 0.58 K/uL  Auto Eosinophil # : 0.00 K/uL  Auto Basophil # : 0.00 K/uL  Auto Neutrophil % : 84.0 %  Auto Lymphocyte % : 4.0 %  Auto Monocyte % : 6.0 %  Auto Eosinophil % : 0.0 %  Auto Basophil % : 0.0 %    10 Apr 2022 07:25    138    |  99     |  22     ----------------------------<  118    3.2     |  29     |  0.92     Ca    8.3        10 Apr 2022 07:25    TPro  7.2    /  Alb  3.4    /  TBili  0.6    /  DBili  x      /  AST  35     /  ALT  33     /  AlkPhos  76     10 Apr 2022 07:25    PT/INR - ( 09 Apr 2022 22:04 )   PT: 12.3 sec;   INR: 1.07 ratio         PTT - ( 09 Apr 2022 22:04 )  PTT:37.0 sec    CAPILLARY BLOOD GLUCOSE              RADIOLOGY & ADDITIONAL TESTS: < from: CT Head No Cont (04.09.22 @ 22:49) >  ACC: [id number] EXAM:  CT CERVICAL SPINE                        ACC: [id number] EXAM:  CT BRAIN                          PROCEDURE DATE:  04/09/2022          INTERPRETATION:  CT HEAD WITHOUT CONTRAST  CT CERVICAL SPINE WITHOUT CONTRAST    INDICATION: 77 years old. Female. unwitnessed fall, hx dementia, on asa.    COMPARISON: CT head and CT cervical spine 6/24/2021.    TECHNIQUE:  1. Noncontrast axial CT head was obtained from the skull base to vertex.   Multiplanar reformations were made.  2. CT scan of the cervical spine was performed without intravenous   contrast. Multiplanar reformations were made.    FINDINGS:    CT HEAD:  No acute intracranial hemorrhage, mass effect or midline shift.  No CT evidence of acute large territory vascular infarct.  The ventricles and cortical sulci are prominent reflecting parenchymal   volume loss.  Patchy hypodensities in the periventricular and subcortical white matter   are nonspecific, but likely sequela of small vessel ischemic disease.  Intracranialatherosclerotic calcifications are present.    Moderate mucosal thickening of the left sphenoid sinus. Mild mucosal   thickening of the visualized left maxillary sinus. The mastoid air cells   are well aerated.  No displaced calvarial fracture.    CTCERVICAL SPINE:  No prevertebral soft tissue swelling.  Grade 1 anterolisthesis C4-C5 and C7-T1.  Vertebral body heights are maintained.  No evidence of cervical spine fracture.    Multilevel degenerative changes resulting in varying degrees of bony   encroachment on the cervical spinal canal and neural foramina, worst at   C5-C6 and C6-C7.    IMPRESSION:  CT head:  No acute intracranial hemorrhage or mass effect.    CT cervical spine:  No CT evidence of cervical spine fracture.    --- End of Report ---            MICHEL LEAVITT MD; Attending Radiologist  This document has been electronically signed. Apr 9 2022 11:47PM    < end of copied text >      Consultant(s) Notes Reviewed:  [x] YES  [ ] NO    Care Discussed with [x] Consultants  [x] Patient  [ ] Family  [ ]      [ x] Other; RN  DVT ppx

## 2022-04-10 NOTE — PATIENT PROFILE ADULT - FALL HARM RISK - HARM RISK INTERVENTIONS
Assistance with ambulation/Assistance OOB with selected safe patient handling equipment/Communicate Risk of Fall with Harm to all staff/Discuss with provider need for PT consult/Monitor for mental status changes/Monitor gait and stability/Move patient closer to nurses' station/Reinforce activity limits and safety measures with patient and family/Reorient to person, place and time as needed/Tailored Fall Risk Interventions/Toileting schedule using arm’s reach rule for commode and bathroom/Use of alarms - bed, chair and/or voice tab/Visual Cue: Yellow wristband and red socks/Bed in lowest position, wheels locked, appropriate side rails in place/Call bell, personal items and telephone in reach/Instruct patient to call for assistance before getting out of bed or chair/Non-slip footwear when patient is out of bed/[place] to call system/Physically safe environment - no spills, clutter or unnecessary equipment/Purposeful Proactive Rounding/Room/bathroom lighting operational, light cord in reach

## 2022-04-10 NOTE — CONSULT NOTE ADULT - SUBJECTIVE AND OBJECTIVE BOX
CARDIOLOGY CONSULT NOTE    Patient is a 77y Female with a known history of :    HPI:  ***Patient with dementia and cannot recall recent events.  Therefore collateral information obtained via charts and notes.***    77F with dementia, DJD, HTN/HLD, carotid stenosis, hypothyroidism who presents from [facility] after a fall.  Per report, staff heard her fall but the patient was awake when they arrived in her room.  Patient was brought here for further evaluation.  In the ED, patient cannot recall events and does not know why she is here.  She does admit to some pain the right hip.  She did say that she has been feeling well prior to the injury.  The CT of her hip showed a right displaced femoral neck fracture.  Dr. Barajas (ortho) was made aware.  Patient currently has no pain while lying in the stretcher but does have pain when moved.  Of note, patient had episodes of desats that required some O2 supplementation to keep sats >90%.  Patient also noted to be bradycardic as well.       (10 Apr 2022 00:07)      REVIEW OF SYSTEMS:    CONSTITUTIONAL: No fever, weight loss, or fatigue  EYES: No eye pain, visual disturbances, or discharge  ENMT:  No difficulty hearing, tinnitus, vertigo; No sinus or throat pain  NECK: No pain or stiffness  BREASTS: No pain, masses, or nipple discharge  RESPIRATORY: No cough, wheezing, chills or hemoptysis; No shortness of breath  CARDIOVASCULAR: No chest pain, palpitations, dizziness, or leg swelling  GASTROINTESTINAL: No abdominal or epigastric pain. No nausea, vomiting, or hematemesis; No diarrhea or constipation. No melena or hematochezia.  GENITOURINARY: No dysuria, frequency, hematuria, or incontinence  NEUROLOGICAL: No headaches, memory loss, loss of strength, numbness, or tremors  SKIN: No itching, burning, rashes, or lesions   LYMPH NODES: No enlarged glands  ENDOCRINE: No heat or cold intolerance; No hair loss  MUSCULOSKELETAL: No joint pain or swelling; No muscle, back, or extremity pain  PSYCHIATRIC: No depression, anxiety, mood swings, or difficulty sleeping  HEME/LYMPH: No easy bruising, or bleeding gums  ALLERGY AND IMMUNOLOGIC: No hives or eczema    MEDICATIONS  (STANDING):  albuterol/ipratropium for Nebulization 3 milliLiter(s) Nebulizer every 12 hours  lactated ringers. 1000 milliLiter(s) (75 mL/Hr) IV Continuous <Continuous>  levothyroxine Injectable 62.5 MICROGram(s) IV Push <User Schedule>  methylPREDNISolone sodium succinate Injectable 40 milliGRAM(s) IV Push every 8 hours  potassium chloride  10 mEq/100 mL IVPB 10 milliEquivalent(s) IV Intermittent every 1 hour    MEDICATIONS  (PRN):  acetaminophen     Tablet .. 650 milliGRAM(s) Oral every 6 hours PRN Temp greater or equal to 38C (100.4F), Mild Pain (1 - 3)  ALBUTerol    0.083% 2.5 milliGRAM(s) Nebulizer every 2 hours PRN Shortness of Breath and/or Wheezing  aluminum hydroxide/magnesium hydroxide/simethicone Suspension 30 milliLiter(s) Oral every 4 hours PRN Dyspepsia  melatonin 3 milliGRAM(s) Oral at bedtime PRN Insomnia  ondansetron Injectable 4 milliGRAM(s) IV Push every 8 hours PRN Nausea and/or Vomiting      ALLERGIES: No Known Allergies      FAMILY HISTORY:      Social History:  Alochol:   Smoking:   Drug Use:   Marital Status:     I&O's Detail    09 Apr 2022 07:01  -  10 Apr 2022 07:00  --------------------------------------------------------  IN:    Lactated Ringers: 525 mL  Total IN: 525 mL    OUT:    Blood Loss (mL): 300 mL    Voided (mL): 600 mL  Total OUT: 900 mL    Total NET: -375 mL          PHYSICAL EXAMINATION:  -----------------------------  T(C): 36.7 (04-10-22 @ 05:43), Max: 36.9 (04-09-22 @ 21:33)  HR: 87 (04-10-22 @ 05:43) (54 - 87)  BP: 167/90 (04-10-22 @ 05:43) (162/79 - 189/77)  RR: 22 (04-10-22 @ 05:43) (16 - 24)  SpO2: 90% (04-10-22 @ 05:43) (85% - 95%)  Wt(kg): --    04-09 @ 07:01  -  04-10 @ 07:00  --------------------------------------------------------  IN:    Lactated Ringers: 525 mL  Total IN: 525 mL    OUT:    Blood Loss (mL): 300 mL    Voided (mL): 600 mL  Total OUT: 900 mL    Total NET: -375 mL        Height (cm): 170.2 (04-10 @ 00:54)  Weight (kg): 73 (04-10 @ 00:54)  BMI (kg/m2): 25.2 (04-10 @ 00:54)  BSA (m2): 1.84 (04-10 @ 00:54)    Constitutional: well developed, normal appearance, well groomed, well nourished, no deformities and no acute distress.   Eyes: the conjunctiva exhibited no abnormalities and the eyelids demonstrated no xanthelasmas.   HEENT: normal oral mucosa, no oral pallor and no oral cyanosis.   Neck: normal jugular venous A waves present, normal jugular venous V waves present and no jugular venous garcia A waves.   Pulmonary: no respiratory distress, normal respiratory rhythm and effort, no accessory muscle use and lungs were clear to auscultation bilaterally. Anteriorly  Cardiovascular: heart rate and rhythm were normal, normal S1 and S2 and no murmur, gallop, rub, heave or thrill are present.   Musculoskeletal: the gait could not be assessed.   Extremities: no clubbing of the fingernails, no localized cyanosis, no petechial hemorrhages and no ischemic changes.   Skin: normal skin color and pigmentation, no rash, no venous stasis, no skin lesions, no skin ulcer and no xanthoma was observed.   Psychiatric: oriented to person, place, and time, the affect was normal, the mood was normal and not feeling anxious.     LABS:   --------  04-10    138  |  99  |  22  ----------------------------<  118<H>  3.2<L>   |  29  |  0.92    Ca    8.3<L>      10 Apr 2022 07:25    TPro  7.2  /  Alb  3.4  /  TBili  0.6  /  DBili  x   /  AST  35  /  ALT  33  /  AlkPhos  76  04-10                         11.9   9.67  )-----------( 185      ( 10 Apr 2022 07:25 )             35.7     PT/INR - ( 09 Apr 2022 22:04 )   PT: 12.3 sec;   INR: 1.07 ratio         PTT - ( 09 Apr 2022 22:04 )  PTT:37.0 sec              RADIOLOGY:  -----------------        ECG: Sinus bradycardia-55/minute, first degree A-V block, no acute changes

## 2022-04-10 NOTE — H&P ADULT - NSHPLABSRESULTS_GEN_ALL_CORE
LABS:                        12.1   4.69  )-----------( 174      ( 09 Apr 2022 22:04 )             37.0     138    |  100    |  27<H>  ----------------------------<  75       09 Apr 2022 22:04  3.9     |  32<H>  |  0.97     Ca 8.2<L>         09 Apr 2022 22:04    TPro  7.4    /  Alb  3.3    /  TBili  0.5    /  DBili  x      /  AST  42<H>  /  ALT  42     /  AlkPhos  86     09 Apr 2022 22:04    PT/INR - ( 09 Apr 2022 22:04 )   PT: 12.3 ;   INR: 1.07          PTT - ( 09 Apr 2022 22:04 )  PTT:37.0<H>    Troponin trend:  Troponin I, High Sensitivity Result: 10.0 ng/L (04-09-22 @ 22:04)    EKG:  sinus bradycardia (HR 55) with 1st degree AV block (220ms), diffuse flattened TWs  Radiology:  CXR - grossly clear (prelim read by me)  Hip xray - displaced right femoral neck fracture    < from: CT Cervical Spine No Cont (04.09.22 @ 22:50) >      IMPRESSION:  CT head:  No acute intracranial hemorrhage or mass effect.    CT cervical spine:  No CT evidence of cervical spine fracture.    < end of copied text >

## 2022-04-11 ENCOUNTER — TRANSCRIPTION ENCOUNTER (OUTPATIENT)
Age: 77
End: 2022-04-11

## 2022-04-11 DIAGNOSIS — E03.9 HYPOTHYROIDISM, UNSPECIFIED: ICD-10-CM

## 2022-04-11 LAB
ALBUMIN SERPL ELPH-MCNC: 3 G/DL — LOW (ref 3.3–5)
ALP SERPL-CCNC: 71 U/L — SIGNIFICANT CHANGE UP (ref 30–120)
ALT FLD-CCNC: 32 U/L DA — SIGNIFICANT CHANGE UP (ref 10–60)
ANION GAP SERPL CALC-SCNC: 7 MMOL/L — SIGNIFICANT CHANGE UP (ref 5–17)
AST SERPL-CCNC: 25 U/L — SIGNIFICANT CHANGE UP (ref 10–40)
BASOPHILS # BLD AUTO: 0.02 K/UL — SIGNIFICANT CHANGE UP (ref 0–0.2)
BASOPHILS NFR BLD AUTO: 0.2 % — SIGNIFICANT CHANGE UP (ref 0–2)
BILIRUB SERPL-MCNC: 0.5 MG/DL — SIGNIFICANT CHANGE UP (ref 0.2–1.2)
BUN SERPL-MCNC: 20 MG/DL — SIGNIFICANT CHANGE UP (ref 7–23)
CALCIUM SERPL-MCNC: 8.4 MG/DL — SIGNIFICANT CHANGE UP (ref 8.4–10.5)
CHLORIDE SERPL-SCNC: 99 MMOL/L — SIGNIFICANT CHANGE UP (ref 96–108)
CO2 SERPL-SCNC: 31 MMOL/L — SIGNIFICANT CHANGE UP (ref 22–31)
CREAT SERPL-MCNC: 1.01 MG/DL — SIGNIFICANT CHANGE UP (ref 0.5–1.3)
CULTURE RESULTS: NO GROWTH — SIGNIFICANT CHANGE UP
EGFR: 57 ML/MIN/1.73M2 — LOW
EOSINOPHIL # BLD AUTO: 0 K/UL — SIGNIFICANT CHANGE UP (ref 0–0.5)
EOSINOPHIL NFR BLD AUTO: 0 % — SIGNIFICANT CHANGE UP (ref 0–6)
GLUCOSE SERPL-MCNC: 108 MG/DL — HIGH (ref 70–99)
HCT VFR BLD CALC: 33.9 % — LOW (ref 34.5–45)
HGB BLD-MCNC: 11.3 G/DL — LOW (ref 11.5–15.5)
IMM GRANULOCYTES NFR BLD AUTO: 0.6 % — SIGNIFICANT CHANGE UP (ref 0–1.5)
LYMPHOCYTES # BLD AUTO: 0.48 K/UL — LOW (ref 1–3.3)
LYMPHOCYTES # BLD AUTO: 4.5 % — LOW (ref 13–44)
MAGNESIUM SERPL-MCNC: 2 MG/DL — SIGNIFICANT CHANGE UP (ref 1.6–2.6)
MCHC RBC-ENTMCNC: 31.9 PG — SIGNIFICANT CHANGE UP (ref 27–34)
MCHC RBC-ENTMCNC: 33.3 GM/DL — SIGNIFICANT CHANGE UP (ref 32–36)
MCV RBC AUTO: 95.8 FL — SIGNIFICANT CHANGE UP (ref 80–100)
MONOCYTES # BLD AUTO: 0.27 K/UL — SIGNIFICANT CHANGE UP (ref 0–0.9)
MONOCYTES NFR BLD AUTO: 2.5 % — SIGNIFICANT CHANGE UP (ref 2–14)
NEUTROPHILS # BLD AUTO: 9.77 K/UL — HIGH (ref 1.8–7.4)
NEUTROPHILS NFR BLD AUTO: 92.2 % — HIGH (ref 43–77)
NRBC # BLD: 0 /100 WBCS — SIGNIFICANT CHANGE UP (ref 0–0)
PHOSPHATE SERPL-MCNC: 3.9 MG/DL — SIGNIFICANT CHANGE UP (ref 2.5–4.5)
PLATELET # BLD AUTO: 183 K/UL — SIGNIFICANT CHANGE UP (ref 150–400)
POTASSIUM SERPL-MCNC: 4.2 MMOL/L — SIGNIFICANT CHANGE UP (ref 3.5–5.3)
POTASSIUM SERPL-SCNC: 4.2 MMOL/L — SIGNIFICANT CHANGE UP (ref 3.5–5.3)
PROT SERPL-MCNC: 6.6 G/DL — SIGNIFICANT CHANGE UP (ref 6–8.3)
RBC # BLD: 3.54 M/UL — LOW (ref 3.8–5.2)
RBC # FLD: 13.6 % — SIGNIFICANT CHANGE UP (ref 10.3–14.5)
SODIUM SERPL-SCNC: 137 MMOL/L — SIGNIFICANT CHANGE UP (ref 135–145)
SPECIMEN SOURCE: SIGNIFICANT CHANGE UP
T3FREE SERPL-MCNC: 1.1 PG/ML — LOW (ref 1.8–4.6)
T4 FREE SERPL-MCNC: 0.8 NG/DL — LOW (ref 0.9–1.8)
WBC # BLD: 10.6 K/UL — HIGH (ref 3.8–10.5)
WBC # FLD AUTO: 10.6 K/UL — HIGH (ref 3.8–10.5)

## 2022-04-11 PROCEDURE — 99221 1ST HOSP IP/OBS SF/LOW 40: CPT

## 2022-04-11 PROCEDURE — 93970 EXTREMITY STUDY: CPT | Mod: 26

## 2022-04-11 PROCEDURE — 99233 SBSQ HOSP IP/OBS HIGH 50: CPT

## 2022-04-11 DEVICE — SCREW LOK FT T2 5X40MM VIT: Type: IMPLANTABLE DEVICE | Status: FUNCTIONAL

## 2022-04-11 DEVICE — NAIL FEM GAMMA3 LNG 125 DEG 11X180MM: Type: IMPLANTABLE DEVICE | Status: FUNCTIONAL

## 2022-04-11 DEVICE — KWIRE 3.2X450MM: Type: IMPLANTABLE DEVICE | Status: FUNCTIONAL

## 2022-04-11 DEVICE — SCREW LAG TI GAMMA3 10.5X90MM: Type: IMPLANTABLE DEVICE | Status: FUNCTIONAL

## 2022-04-11 RX ORDER — ONDANSETRON 8 MG/1
4 TABLET, FILM COATED ORAL ONCE
Refills: 0 | Status: DISCONTINUED | OUTPATIENT
Start: 2022-04-11 | End: 2022-04-11

## 2022-04-11 RX ORDER — AMLODIPINE BESYLATE 2.5 MG/1
5 TABLET ORAL DAILY
Refills: 0 | Status: DISCONTINUED | OUTPATIENT
Start: 2022-04-13 | End: 2022-04-13

## 2022-04-11 RX ORDER — ATORVASTATIN CALCIUM 80 MG/1
20 TABLET, FILM COATED ORAL AT BEDTIME
Refills: 0 | Status: DISCONTINUED | OUTPATIENT
Start: 2022-04-11 | End: 2022-04-20

## 2022-04-11 RX ORDER — SODIUM CHLORIDE 9 MG/ML
1000 INJECTION, SOLUTION INTRAVENOUS
Refills: 0 | Status: DISCONTINUED | OUTPATIENT
Start: 2022-04-11 | End: 2022-04-12

## 2022-04-11 RX ORDER — OLANZAPINE 15 MG/1
2.5 TABLET, FILM COATED ORAL EVERY 12 HOURS
Refills: 0 | Status: DISCONTINUED | OUTPATIENT
Start: 2022-04-11 | End: 2022-04-12

## 2022-04-11 RX ORDER — LANOLIN ALCOHOL/MO/W.PET/CERES
3 CREAM (GRAM) TOPICAL AT BEDTIME
Refills: 0 | Status: DISCONTINUED | OUTPATIENT
Start: 2022-04-11 | End: 2022-04-20

## 2022-04-11 RX ORDER — CEFAZOLIN SODIUM 1 G
2000 VIAL (EA) INJECTION ONCE
Refills: 0 | Status: COMPLETED | OUTPATIENT
Start: 2022-04-11 | End: 2022-04-11

## 2022-04-11 RX ORDER — CHLORHEXIDINE GLUCONATE 213 G/1000ML
1 SOLUTION TOPICAL ONCE
Refills: 0 | Status: COMPLETED | OUTPATIENT
Start: 2022-04-11 | End: 2022-04-11

## 2022-04-11 RX ORDER — ASPIRIN/CALCIUM CARB/MAGNESIUM 324 MG
81 TABLET ORAL DAILY
Refills: 0 | Status: DISCONTINUED | OUTPATIENT
Start: 2022-04-12 | End: 2022-04-13

## 2022-04-11 RX ORDER — MIRTAZAPINE 45 MG/1
15 TABLET, ORALLY DISINTEGRATING ORAL AT BEDTIME
Refills: 0 | Status: DISCONTINUED | OUTPATIENT
Start: 2022-04-11 | End: 2022-04-20

## 2022-04-11 RX ORDER — ENOXAPARIN SODIUM 100 MG/ML
40 INJECTION SUBCUTANEOUS EVERY 24 HOURS
Refills: 0 | Status: DISCONTINUED | OUTPATIENT
Start: 2022-04-12 | End: 2022-04-12

## 2022-04-11 RX ORDER — HYDROMORPHONE HYDROCHLORIDE 2 MG/ML
0.25 INJECTION INTRAMUSCULAR; INTRAVENOUS; SUBCUTANEOUS
Refills: 0 | Status: DISCONTINUED | OUTPATIENT
Start: 2022-04-11 | End: 2022-04-11

## 2022-04-11 RX ORDER — SODIUM CHLORIDE 9 MG/ML
1000 INJECTION, SOLUTION INTRAVENOUS
Refills: 0 | Status: DISCONTINUED | OUTPATIENT
Start: 2022-04-11 | End: 2022-04-11

## 2022-04-11 RX ADMIN — OLANZAPINE 2.5 MILLIGRAM(S): 15 TABLET, FILM COATED ORAL at 12:43

## 2022-04-11 RX ADMIN — SODIUM CHLORIDE 60 MILLILITER(S): 9 INJECTION, SOLUTION INTRAVENOUS at 05:31

## 2022-04-11 RX ADMIN — ATORVASTATIN CALCIUM 20 MILLIGRAM(S): 80 TABLET, FILM COATED ORAL at 21:35

## 2022-04-11 RX ADMIN — MIRTAZAPINE 15 MILLIGRAM(S): 45 TABLET, ORALLY DISINTEGRATING ORAL at 21:35

## 2022-04-11 RX ADMIN — Medication 3 MILLILITER(S): at 07:06

## 2022-04-11 RX ADMIN — Medication 40 MILLIGRAM(S): at 05:31

## 2022-04-11 RX ADMIN — HYDROMORPHONE HYDROCHLORIDE 0.25 MILLIGRAM(S): 2 INJECTION INTRAMUSCULAR; INTRAVENOUS; SUBCUTANEOUS at 19:28

## 2022-04-11 RX ADMIN — HYDROMORPHONE HYDROCHLORIDE 0.25 MILLIGRAM(S): 2 INJECTION INTRAMUSCULAR; INTRAVENOUS; SUBCUTANEOUS at 19:50

## 2022-04-11 RX ADMIN — Medication 3 MILLIGRAM(S): at 21:35

## 2022-04-11 RX ADMIN — MORPHINE SULFATE 2 MILLIGRAM(S): 50 CAPSULE, EXTENDED RELEASE ORAL at 10:20

## 2022-04-11 RX ADMIN — Medication 94 MICROGRAM(S): at 10:00

## 2022-04-11 RX ADMIN — CHLORHEXIDINE GLUCONATE 1 APPLICATION(S): 213 SOLUTION TOPICAL at 12:20

## 2022-04-11 RX ADMIN — SODIUM CHLORIDE 75 MILLILITER(S): 9 INJECTION, SOLUTION INTRAVENOUS at 20:20

## 2022-04-11 RX ADMIN — MORPHINE SULFATE 2 MILLIGRAM(S): 50 CAPSULE, EXTENDED RELEASE ORAL at 10:35

## 2022-04-11 NOTE — CONSULT NOTE ADULT - SUBJECTIVE AND OBJECTIVE BOX
Patient is a 77y old  Female who presents with a chief complaint of fall -> right femoral fracture (11 Apr 2022 09:09)      Reason For Consult: hypothyroidism    HPI:  ***Patient with dementia and cannot recall recent events.  Therefore collateral information obtained via charts and notes.***    77F with dementia, DJD, HTN/HLD, carotid stenosis, hypothyroidism who presents from [facility] after a fall.  Per report, staff heard her fall but the patient was awake when they arrived in her room.  Patient was brought here for further evaluation.  In the ED, patient cannot recall events and does not know why she is here.  She does admit to some pain the right hip.  She did say that she has been feeling well prior to the injury.  The CT of her hip showed a right displaced femoral neck fracture.  Dr. Barajas (ortho) was made aware.  Patient currently has no pain while lying in the stretcher but does have pain when moved.  Of note, patient had episodes of desats that required some O2 supplementation to keep sats >90%.  Patient also noted to be bradycardic as well.       (10 Apr 2022 00:07)      PAST MEDICAL & SURGICAL HISTORY:  Dementia without behavioral disturbance, unspecified dementia type    Hypothyroid    Osteoarthritis    Carotid stenosis    Degenerative joint disease    Hypertension    Hyperlipidemia    No significant past surgical history        FAMILY HISTORY:        Social History:    MEDICATIONS  (STANDING):  albuterol/ipratropium for Nebulization 3 milliLiter(s) Nebulizer every 12 hours  atorvastatin 20 milliGRAM(s) Oral at bedtime  chlorhexidine 2% Cloths 1 Application(s) Topical once  cholecalciferol 1000 Unit(s) Oral two times a day  cyanocobalamin 500 MICROGram(s) Oral daily  folic acid 1 milliGRAM(s) Oral daily  lactated ringers. 1000 milliLiter(s) (60 mL/Hr) IV Continuous <Continuous>  levothyroxine Injectable 94 MICROGram(s) IV Push <User Schedule>  memantine 10 milliGRAM(s) Oral two times a day  mirtazapine 15 milliGRAM(s) Oral at bedtime  multivitamin 1 Tablet(s) Oral daily    MEDICATIONS  (PRN):  acetaminophen     Tablet .. 650 milliGRAM(s) Oral every 6 hours PRN Temp greater or equal to 38C (100.4F), Mild Pain (1 - 3)  ALBUTerol    0.083% 2.5 milliGRAM(s) Nebulizer every 2 hours PRN Shortness of Breath and/or Wheezing  aluminum hydroxide/magnesium hydroxide/simethicone Suspension 30 milliLiter(s) Oral every 4 hours PRN Dyspepsia  melatonin 3 milliGRAM(s) Oral at bedtime PRN Insomnia  morphine  - Injectable 1 milliGRAM(s) IV Push every 6 hours PRN Moderate Pain (4 - 6)  morphine  - Injectable 2 milliGRAM(s) IV Push every 6 hours PRN Severe Pain (7 - 10)  ondansetron Injectable 4 milliGRAM(s) IV Push every 8 hours PRN Nausea and/or Vomiting        T(C): 36.7 (04-11-22 @ 05:00), Max: 37.3 (04-10-22 @ 14:11)  HR: 60 (04-11-22 @ 07:08) (56 - 85)  BP: 155/81 (04-11-22 @ 05:00) (115/60 - 165/95)  RR: 18 (04-11-22 @ 05:00) (18 - 20)  SpO2: 96% (04-11-22 @ 07:08) (94% - 98%)  Wt(kg): --    PHYSICAL EXAM:  GENERAL: NAD, well-groomed, well-developed  HEAD:  Atraumatic, Normocephalic  NECK: Supple, No JVD, Normal thyroid  CHEST/LUNG: Clear to percussion bilaterally; No rales, rhonchi, wheezing, or rubs  HEART: Regular rate and rhythm; No murmurs, rubs, or gallops  ABDOMEN: Soft, Nontender, Nondistended; Bowel sounds present  EXTREMITIES:  2+ Peripheral Pulses, No clubbing, cyanosis, or edema  SKIN: No rashes or lesions    CAPILLARY BLOOD GLUCOSE                                11.3   10.60 )-----------( 183      ( 11 Apr 2022 07:32 )             33.9       CMP:  04-11 @ 07:32  SGPT 32  Albumin 3.0   Alk Phos 71   Anion Gap 7   SGOT 25   Total Bili 0.5   BUN 20   Calcium Total 8.4   CO2 31   Chloride 99   Creatinine 1.01   eGFR if AA --   eGFR if non AA --   Glucose 108   Potassium 4.2   Protein 6.6   Sodium 137      Thyroid Function Tests:  04-10 @ 11:56 TSH 71.00 FreeT4 -- T3 -- Anti TPO -- Anti Thyroglobulin Ab -- TSI --      Diabetes Tests:       Radiology:

## 2022-04-11 NOTE — PROGRESS NOTE ADULT - SUBJECTIVE AND OBJECTIVE BOX
Chief Complaint: Fall    Interval Events: No events overnight.    Review of Systems:  General: No fevers, chills, weight gain  Skin: No rashes, color changes  Cardiovascular: No chest pain, orthopnea  Respiratory: No shortness of breath, cough  Gastrointestinal: No nausea, abdominal pain  Genitourinary: No incontinence, pain with urination  Musculoskeletal: No pain, swelling, decreased range of motion  Neurological: No headache, weakness  Psychiatric: No depression, anxiety  Endocrine: No weight gain, increased thirst  All other systems are comprehensively negative.    Physical Exam:  Vitals:        Vital Signs Last 24 Hrs  T(C): 36.7 (11 Apr 2022 05:00), Max: 37.3 (10 Apr 2022 14:11)  T(F): 98 (11 Apr 2022 05:00), Max: 99.2 (10 Apr 2022 14:11)  HR: 60 (11 Apr 2022 07:08) (56 - 85)  BP: 155/81 (11 Apr 2022 05:00) (115/60 - 165/95)  BP(mean): --  RR: 18 (11 Apr 2022 05:00) (18 - 20)  SpO2: 96% (11 Apr 2022 07:08) (94% - 98%)  General: NAD  HEENT: MMM  Neck: No JVD, no carotid bruit  Lungs: CTAB  CV: RRR, nl S1/S2, 2/6 systolic murmur  Abdomen: S/NT/ND, +BS  Extremities: No LE edema, no cyanosis  Neuro: AAOx1  Skin: No rash    Labs:                        11.3   10.60 )-----------( 183      ( 11 Apr 2022 07:32 )             33.9     04-11    137  |  99  |  20  ----------------------------<  108<H>  4.2   |  31  |  1.01    Ca    8.4      11 Apr 2022 07:32  Phos  3.9     04-11  Mg     2.0     04-11    TPro  6.6  /  Alb  3.0<L>  /  TBili  0.5  /  DBili  x   /  AST  25  /  ALT  32  /  AlkPhos  71  04-11        PT/INR - ( 09 Apr 2022 22:04 )   PT: 12.3 sec;   INR: 1.07 ratio         PTT - ( 09 Apr 2022 22:04 )  PTT:37.0 sec    Telemetry: Sinus rhythm

## 2022-04-11 NOTE — BH CONSULTATION LIAISON ASSESSMENT NOTE - NSBHCHARTREVIEWLAB_PSY_A_CORE FT
11.3   10.60 )-----------( 183      ( 11 Apr 2022 07:32 )             33.9   04-11    137  |  99  |  20  ----------------------------<  108<H>  4.2   |  31  |  1.01    Ca    8.4      11 Apr 2022 07:32  Phos  3.9     04-11  Mg     2.0     04-11    TPro  6.6  /  Alb  3.0<L>  /  TBili  0.5  /  DBili  x   /  AST  25  /  ALT  32  /  AlkPhos  71  04-11

## 2022-04-11 NOTE — PROGRESS NOTE ADULT - ASSESSMENT
The patient is a 77 year old female with a history of HTN, HL, hypothyroidism, carotid stenosis, dementia who presents with a fall, found to have a hip fracture.    Plan:  - Echo with normal LV systolic function, no significant valve issues  - Continue amlodipine 5 mg daily  - Resume losartan post-operatively  - Continue atorvastatin 20 mg daily  - Resume aspirin post-operatively  - Hypothermia - possibly due to significant hypothyroidism  - The patient is optimized from a cardiac perspective to proceed for the planned surgery

## 2022-04-11 NOTE — PROGRESS NOTE ADULT - SUBJECTIVE AND OBJECTIVE BOX
Date/Time Patient Seen:  		  Referring MD:   Data Reviewed	       Patient is a 77y old  Female who presents with a chief complaint of fall -> right femoral fracture (10 Apr 2022 12:09)      Subjective/HPI     PAST MEDICAL & SURGICAL HISTORY:  Hypertension, unspecified type    Hyperlipidemia, unspecified hyperlipidemia type    Dementia without behavioral disturbance, unspecified dementia type    Hypothyroidism, unspecified type    Hypothyroid    Osteoarthritis    Carotid stenosis    Degenerative joint disease    Hypertension    Hyperlipidemia    Dementia    No significant past surgical history          Medication list         MEDICATIONS  (STANDING):  albuterol/ipratropium for Nebulization 3 milliLiter(s) Nebulizer every 12 hours  atorvastatin 20 milliGRAM(s) Oral at bedtime  cholecalciferol 1000 Unit(s) Oral two times a day  cyanocobalamin 500 MICROGram(s) Oral daily  folic acid 1 milliGRAM(s) Oral daily  lactated ringers. 1000 milliLiter(s) (60 mL/Hr) IV Continuous <Continuous>  levothyroxine Injectable 94 MICROGram(s) IV Push <User Schedule>  memantine 10 milliGRAM(s) Oral two times a day  mirtazapine 15 milliGRAM(s) Oral at bedtime  multivitamin 1 Tablet(s) Oral daily    MEDICATIONS  (PRN):  acetaminophen     Tablet .. 650 milliGRAM(s) Oral every 6 hours PRN Temp greater or equal to 38C (100.4F), Mild Pain (1 - 3)  ALBUTerol    0.083% 2.5 milliGRAM(s) Nebulizer every 2 hours PRN Shortness of Breath and/or Wheezing  aluminum hydroxide/magnesium hydroxide/simethicone Suspension 30 milliLiter(s) Oral every 4 hours PRN Dyspepsia  melatonin 3 milliGRAM(s) Oral at bedtime PRN Insomnia  morphine  - Injectable 1 milliGRAM(s) IV Push every 6 hours PRN Moderate Pain (4 - 6)  morphine  - Injectable 2 milliGRAM(s) IV Push every 6 hours PRN Severe Pain (7 - 10)  ondansetron Injectable 4 milliGRAM(s) IV Push every 8 hours PRN Nausea and/or Vomiting         Vitals log        ICU Vital Signs Last 24 Hrs  T(C): 36.7 (11 Apr 2022 05:00), Max: 37.3 (10 Apr 2022 14:11)  T(F): 98 (11 Apr 2022 05:00), Max: 99.2 (10 Apr 2022 14:11)  HR: 58 (11 Apr 2022 05:00) (56 - 90)  BP: 155/81 (11 Apr 2022 05:00) (115/60 - 166/81)  BP(mean): --  ABP: --  ABP(mean): --  RR: 18 (11 Apr 2022 05:00) (18 - 20)  SpO2: 97% (11 Apr 2022 05:00) (94% - 98%)           Input and Output:  I&O's Detail    09 Apr 2022 07:01  -  10 Apr 2022 07:00  --------------------------------------------------------  IN:    Lactated Ringers: 525 mL  Total IN: 525 mL    OUT:    Blood Loss (mL): 300 mL    Voided (mL): 600 mL  Total OUT: 900 mL    Total NET: -375 mL      10 Apr 2022 07:01  -  11 Apr 2022 06:03  --------------------------------------------------------  IN:  Total IN: 0 mL    OUT:    Voided (mL): 900 mL  Total OUT: 900 mL    Total NET: -900 mL          Lab Data                        11.9   9.67  )-----------( 185      ( 10 Apr 2022 07:25 )             35.7     04-10    134<L>  |  97  |  18  ----------------------------<  114<H>  4.0   |  29  |  0.92    Ca    8.3<L>      10 Apr 2022 14:27  Phos  3.0     04-10  Mg     2.0     04-10    TPro  7.2  /  Alb  3.4  /  TBili  0.6  /  DBili  x   /  AST  35  /  ALT  33  /  AlkPhos  76  04-10            Review of Systems	      Objective     Physical Examination    heart s1s2  lung dec BS  abd soft  head nc      Pertinent Lab findings & Imaging      Dominga:  NO   Adequate UO     I&O's Detail    09 Apr 2022 07:01  -  10 Apr 2022 07:00  --------------------------------------------------------  IN:    Lactated Ringers: 525 mL  Total IN: 525 mL    OUT:    Blood Loss (mL): 300 mL    Voided (mL): 600 mL  Total OUT: 900 mL    Total NET: -375 mL      10 Apr 2022 07:01  -  11 Apr 2022 06:03  --------------------------------------------------------  IN:  Total IN: 0 mL    OUT:    Voided (mL): 900 mL  Total OUT: 900 mL    Total NET: -900 mL               Discussed with:     Cultures:	        Radiology

## 2022-04-11 NOTE — PROGRESS NOTE ADULT - SUBJECTIVE AND OBJECTIVE BOX
Patient is a 77y old  Female who presents with a chief complaint of fall -> right femoral fracture (2022 09:41)      INTERVAL HPI/OVERNIGHT EVENTS: Patient seen and examined at bedside. No overnight events. Agitated this morning and confused.       MEDICATIONS  (STANDING):  albuterol/ipratropium for Nebulization 3 milliLiter(s) Nebulizer every 12 hours  atorvastatin 20 milliGRAM(s) Oral at bedtime  chlorhexidine 2% Cloths 1 Application(s) Topical once  cholecalciferol 1000 Unit(s) Oral two times a day  cyanocobalamin 500 MICROGram(s) Oral daily  folic acid 1 milliGRAM(s) Oral daily  lactated ringers. 1000 milliLiter(s) (60 mL/Hr) IV Continuous <Continuous>  levothyroxine Injectable 94 MICROGram(s) IV Push <User Schedule>  memantine 10 milliGRAM(s) Oral two times a day  mirtazapine 15 milliGRAM(s) Oral at bedtime  multivitamin 1 Tablet(s) Oral daily    MEDICATIONS  (PRN):  acetaminophen     Tablet .. 650 milliGRAM(s) Oral every 6 hours PRN Temp greater or equal to 38C (100.4F), Mild Pain (1 - 3)  ALBUTerol    0.083% 2.5 milliGRAM(s) Nebulizer every 2 hours PRN Shortness of Breath and/or Wheezing  aluminum hydroxide/magnesium hydroxide/simethicone Suspension 30 milliLiter(s) Oral every 4 hours PRN Dyspepsia  melatonin 3 milliGRAM(s) Oral at bedtime PRN Insomnia  morphine  - Injectable 1 milliGRAM(s) IV Push every 6 hours PRN Moderate Pain (4 - 6)  morphine  - Injectable 2 milliGRAM(s) IV Push every 6 hours PRN Severe Pain (7 - 10)  OLANZapine Injectable 2.5 milliGRAM(s) IntraMuscular every 12 hours PRN Agitation  ondansetron Injectable 4 milliGRAM(s) IV Push every 8 hours PRN Nausea and/or Vomiting      Allergies    No Known Allergies    Intolerances        REVIEW OF SYSTEMS:  Unable to obtain meaningful ROS due to mental status      Vital Signs Last 24 Hrs  T(C): 36.8 (2022 10:50), Max: 37.3 (10 Apr 2022 14:11)  T(F): 98.3 (2022 10:50), Max: 99.2 (10 Apr 2022 14:11)  HR: 79 (2022 10:50) (56 - 85)  BP: 150/72 (2022 10:50) (115/60 - 165/95)  BP(mean): --  RR: 18 (2022 10:50) (18 - 20)  SpO2: 97% (2022 10:50) (94% - 98%)    PHYSICAL EXAM  GENERAL: elderly appearing female in NAD  HEAD: atraumatic, normocephalic   EYES: EOMI, conjunctiva and sclera clear  ENMT: poor dentition  RESPIRATORY:  diminished bs at bases, poor inspiratory effort   CARDIOVASCULAR:  soft ii/vi holosystolic murmur LUSB  GASTROINTESTINAL:  soft, nontender, nondistended, bowel sounds present  EXTREMITIES: no clubbing or cyanosis or edema  MUSCULOSKELETAL:  right leg shortened and externally rotated, ROM limited due to pain  NERVOUS SYSTEM:  sensation intact   PSYCH:  confused and agitated       LABS:                        11.3   10.60 )-----------( 183      ( 2022 07:32 )             33.9     CBC Full  -  ( 2022 07:32 )  WBC Count : 10.60 K/uL  Hemoglobin : 11.3 g/dL  Hematocrit : 33.9 %  Platelet Count - Automated : 183 K/uL  Mean Cell Volume : 95.8 fl  Mean Cell Hemoglobin : 31.9 pg  Mean Cell Hemoglobin Concentration : 33.3 gm/dL  Auto Neutrophil # : 9.77 K/uL  Auto Lymphocyte # : 0.48 K/uL  Auto Monocyte # : 0.27 K/uL  Auto Eosinophil # : 0.00 K/uL  Auto Basophil # : 0.02 K/uL  Auto Neutrophil % : 92.2 %  Auto Lymphocyte % : 4.5 %  Auto Monocyte % : 2.5 %  Auto Eosinophil % : 0.0 %  Auto Basophil % : 0.2 %    2022 07:32    137    |  99     |  20     ----------------------------<  108    4.2     |  31     |  1.01     Ca    8.4        2022 07:32  Phos  3.9       2022 07:32  Mg     2.0       2022 07:32    TPro  6.6    /  Alb  3.0    /  TBili  0.5    /  DBili  x      /  AST  25     /  ALT  32     /  AlkPhos  71     2022 07:32    PT/INR - ( 2022 22:04 )   PT: 12.3 sec;   INR: 1.07 ratio         PTT - ( 2022 22:04 )  PTT:37.0 sec  Urinalysis Basic - ( 10 Apr 2022 12:15 )    Color: Yellow / Appearance: Clear / S.010 / pH: x  Gluc: x / Ketone: Negative  / Bili: Negative / Urobili: Negative mg/dL   Blood: x / Protein: 30 mg/dL / Nitrite: Negative   Leuk Esterase: Trace / RBC: 0-2 /HPF / WBC 3-5   Sq Epi: x / Non Sq Epi: Moderate / Bacteria: Few      CAPILLARY BLOOD GLUCOSE              RADIOLOGY & ADDITIONAL TESTS: < from: CT Angio Chest PE Protocol w/ IV Cont (04.10.22 @ 12:12) >    ACC: [id number] EXAM:  CT ANGIO CHEST PULM ART [facility]                          *** ADDENDUM***    Addendum:    The sternum is angled anteriorly inferiorly, secondary to prior sternal   fracture. This, however, is new since 6/3/2020.    --- End of Report ---    *** END OF ADDENDUM***      PROCEDURE DATE:  04/10/2022          INTERPRETATION:  CTA CHEST    INDICATION: Elevated d-dimer.. Evaluate for pulmonary embolus.    TECHNIQUE: Enhanced helical images were obtained of the chest. Coronal   and sagittal images were reconstructed.  Images were obtained after the   uneventful administration of 90 cc of nonionic intravenous contrast   (Omnipaque 350). 10 cc of nonionic intravenous contrast (Omnipaque 350)   was discarded. Maximum intensity projection images were generated.    COMPARISON: Radiograph chest 2022. CT chest 6/3/2020.    FINDINGS:    Pulmonary Artery:  There is no main, central, lobar, or proximal   segmental pulmonary embolus. The mid to distal segmental and subsegmental   divisions are not well evaluated secondary to respiratory motion artifact.    Tubes/Lines: None.    Lungs, airways and pleura: Emphysema. Right upper lobe, left upper lobe   and bilateral lower lobe linear opacities are likely due to atelectasis.   No pleural effusion. No pneumothorax. The airways are unremarkable   allowing for respiratory motion.    Mediastinum: The thyroid gland is not well evaluated. There are no   enlarged chest lymph nodes. Hiatal hernia.    The atria are likely enlarged. Renal heart is normal in size. Mitral   annular calcification. Coronary artery calcifications. No pericardial   effusion.    The ascending aorta is tortuous and ectatic. The descending thoracic   aorta measures 3.4 cm at the main pulmonary artery. And 3.8 x3.5 cm,   just proximal to the diaphragmatic hiatus. Atheromatous disease of the   aorta.    Upper Abdomen: Cholelithiasis. The 1.4 x 1.6 cm right adrenal nodule and   a 2.7 x 2.0 cm left adrenal nodule that likely represent adenomas, are   unchanged.    Bones And Soft Tissues: Degenerative change of the spine. Compression   deformities of the T4-T6, T8-T9, and L1-L2 vertebral bodies.  The soft   tissues are unremarkable.      IMPRESSION:    1.  No pulmonary embolus, however, the mid to distal segmental and   subsegmental divisions are not well evaluated secondary to respiratory   motion.  2.  Compression deformities of the thoracic and lumbar spine.    --- End of Report ---    ***Please see the addendum at the top of this report. It may contain   additional important information or changes.****        MELYSSA PORTER MD; Attending Radiologist  This document has been electronically signed. Apr 10 2022  1:14PM  Addend:MELYSSA PORTER MD; Attending Radiologist  This addendum was electronically signed on: Apr 10 2022  1:28PM.    < end of copied text >      Consultant(s) Notes Reviewed:  [x] YES  [ ] NO    Care Discussed with [x] Consultants  [x] Patient  [ ] Family  [ ]      [ x] Other; RN  DVT ppx

## 2022-04-11 NOTE — PRE-OP CHECKLIST - SELECT TESTS ORDERED
BMP/CBC/PT/PTT/INR/Type and Screen/Urinalysis/COVID-19
BMP/CBC/CMP/PT/PTT/INR/Type and Cross/COVID-19

## 2022-04-11 NOTE — BH CONSULTATION LIAISON ASSESSMENT NOTE - CURRENT MEDICATION
MEDICATIONS  (STANDING):  albuterol/ipratropium for Nebulization 3 milliLiter(s) Nebulizer every 12 hours  atorvastatin 20 milliGRAM(s) Oral at bedtime  chlorhexidine 2% Cloths 1 Application(s) Topical once  cholecalciferol 1000 Unit(s) Oral two times a day  cyanocobalamin 500 MICROGram(s) Oral daily  folic acid 1 milliGRAM(s) Oral daily  lactated ringers. 1000 milliLiter(s) (60 mL/Hr) IV Continuous <Continuous>  levothyroxine Injectable 94 MICROGram(s) IV Push <User Schedule>  memantine 10 milliGRAM(s) Oral two times a day  mirtazapine 15 milliGRAM(s) Oral at bedtime  multivitamin 1 Tablet(s) Oral daily    MEDICATIONS  (PRN):  acetaminophen     Tablet .. 650 milliGRAM(s) Oral every 6 hours PRN Temp greater or equal to 38C (100.4F), Mild Pain (1 - 3)  ALBUTerol    0.083% 2.5 milliGRAM(s) Nebulizer every 2 hours PRN Shortness of Breath and/or Wheezing  aluminum hydroxide/magnesium hydroxide/simethicone Suspension 30 milliLiter(s) Oral every 4 hours PRN Dyspepsia  melatonin 3 milliGRAM(s) Oral at bedtime PRN Insomnia  morphine  - Injectable 1 milliGRAM(s) IV Push every 6 hours PRN Moderate Pain (4 - 6)  morphine  - Injectable 2 milliGRAM(s) IV Push every 6 hours PRN Severe Pain (7 - 10)  OLANZapine Injectable 2.5 milliGRAM(s) IntraMuscular every 12 hours PRN Agitation

## 2022-04-11 NOTE — BH CONSULTATION LIAISON ASSESSMENT NOTE - HPI (INCLUDE ILLNESS QUALITY, SEVERITY, DURATION, TIMING, CONTEXT, MODIFYING FACTORS, ASSOCIATED SIGNS AND SYMPTOMS)
Patient seen, evaluated and chart reviewed. Patient is a 77 WWF, with no prior psychiatric hospitalizations, with history of dementia, DJD, HTN/HLD, carotid stenosis, hypothyroidism who presents from [facility] after a fall.  Per report, staff heard her fall but the patient was awake when they arrived in her room.  Patient was brought here for further evaluation.  In the ED, patient cannot recall events and does not know why she is here.  She does admit to some pain the right hip.  She did say that she has been feeling well prior to the injury.  The CT of her hip showed a right displaced femoral neck fracture.  Dr. Barajas (ortho) was made aware.  Patient currently has no pain while lying in the stretcher but does have pain when moved.  Of note, patient had episodes of desats that required some O2 supplementation to keep sats >90%.  Patient also noted to be bradycardic as well. Patient is currently confused and agitated.

## 2022-04-11 NOTE — PROGRESS NOTE ADULT - SUBJECTIVE AND OBJECTIVE BOX
Post Op     OPAL ARBOLEDA      77y        Female                                                                                                                 T(C): 36.7 (04-11-22 @ 05:00), Max: 37.3 (04-10-22 @ 14:11)  HR: 60 (04-11-22 @ 07:08) (56 - 90)  BP: 155/81 (04-11-22 @ 05:00) (115/60 - 166/81)  RR: 18 (04-11-22 @ 05:00) (18 - 20)  SpO2: 96% (04-11-22 @ 07:08) (94% - 98%)  Wt(kg): --    S/P   right hip fracture pre op open reduction internal fixation right hip    Patient denies shortness of breath, chest pain, dyspnea, No complaints  Pain is 3 /10    Physical Exam    Extremity: Bilaterally:  No holmon                                           No Cord                                          PAS on                                          Neurovascular intact                                          Motor intact EHL/FHL                                          Sensation intact                                          Pulses intact DP/PT                                         Calves Soft                                         Dressing Clean / Dry / Intact                                         Capillary refill with 5 seconds                          11.3   10.60 )-----------( 183      ( 11 Apr 2022 07:32 )             33.9       04-11    137  |  99  |  20  ----------------------------<  108<H>  4.2   |  31  |  1.01    Ca    8.4      11 Apr 2022 07:32  Phos  3.9     04-11  Mg     2.0     04-11    TPro  6.6  /  Alb  3.0<L>  /  TBili  0.5  /  DBili  x   /  AST  25  /  ALT  32  /  AlkPhos  71  04-11      A/P  -- S/P open reduction internal fixation pre op right hip    -  Medicine To Follow awaiting medical and cardiac clearance , ID note reviewed  - DVT prophylaxis PAS  - PT & OT   - Analagesia  - Incentive Spirometry  - Discharge Planning  - Safety Precautions  -  CBC , BMP daily

## 2022-04-11 NOTE — PROGRESS NOTE ADULT - SUBJECTIVE AND OBJECTIVE BOX
OPAL ARBOLEDA is a 77yFemale , patient examined and chart reviewed.     INTERVAL HPI/ OVERNIGHT EVENTS:   Confused. Afebrile.  Hyperthermia resolved.    PAST MEDICAL & SURGICAL HISTORY:  Dementia without behavioral disturbance, unspecified dementia type  Hypothyroid  Osteoarthritis  Carotid stenosis  Degenerative joint disease  Hypertension  Hyperlipidemia        For details regarding the patient's social history, family history, and other miscellaneous elements, please refer the initial infectious diseases consultation and/or the admitting history and physical examination for this admission.    ROS:  Unable to obtain due to : Dementia      Current inpatient medications :    ANTIBIOTICS/RELEVANT:      acetaminophen     Tablet .. 650 milliGRAM(s) Oral every 6 hours PRN  ALBUTerol    0.083% 2.5 milliGRAM(s) Nebulizer every 2 hours PRN  albuterol/ipratropium for Nebulization 3 milliLiter(s) Nebulizer every 12 hours  aluminum hydroxide/magnesium hydroxide/simethicone Suspension 30 milliLiter(s) Oral every 4 hours PRN  atorvastatin 20 milliGRAM(s) Oral at bedtime  chlorhexidine 2% Cloths 1 Application(s) Topical once  cholecalciferol 1000 Unit(s) Oral two times a day  cyanocobalamin 500 MICROGram(s) Oral daily  folic acid 1 milliGRAM(s) Oral daily  lactated ringers. 1000 milliLiter(s) IV Continuous <Continuous>  levothyroxine Injectable 94 MICROGram(s) IV Push <User Schedule>  melatonin 3 milliGRAM(s) Oral at bedtime PRN  memantine 10 milliGRAM(s) Oral two times a day  mirtazapine 15 milliGRAM(s) Oral at bedtime  morphine  - Injectable 1 milliGRAM(s) IV Push every 6 hours PRN  morphine  - Injectable 2 milliGRAM(s) IV Push every 6 hours PRN  multivitamin 1 Tablet(s) Oral daily  OLANZapine Injectable 2.5 milliGRAM(s) IntraMuscular every 12 hours PRN      Objective:    04-10 @ :  -   @ 07:00  --------------------------------------------------------  IN: 480 mL / OUT: 1150 mL / NET: -670 mL      T(C): 36.8 (22 @ 10:50), Max: 37.3 (04-10-22 @ 14:11)  HR: 79 (22 @ 10:50) (56 - 85)  BP: 150/72 (22 @ 10:50) (115/60 - 165/95)  RR: 18 (22 @ 10:50) (18 - 20)  SpO2: 97% (22 @ 10:50) (94% - 98%)      Physical Exam:  General:  no acute distress confused  Neck: supple, trachea midline  Lungs: Decreased, no wheeze/rhonchi  Cardiovascular: regular rate and rhythm, S1 S2  Abdomen: soft, nontender,  bowel sounds normal  Neurological: Dementia confused  Skin: no rash  Extremities: no edema        LABS:                          11.3   10.60 )-----------( 183      ( 2022 07:32 )             33.9       04-11    137  |  99  |  20  ----------------------------<  108<H>  4.2   |  31  |  1.01    Ca    8.4      2022 07:32  Phos  3.9     04-  Mg     2.0     -11    TPro  6.6  /  Alb  3.0<L>  /  TBili  0.5  /  DBili  x   /  AST  25  /  ALT  32  /  AlkPhos  71  04-11      PT/INR - ( 2022 22:04 )   PT: 12.3 sec;   INR: 1.07 ratio         PTT - ( 2022 22:04 )  PTT:37.0 sec  Urinalysis Basic - ( 10 Apr 2022 12:15 )    Color: Yellow / Appearance: Clear / S.010 / pH: x  Gluc: x / Ketone: Negative  / Bili: Negative / Urobili: Negative mg/dL   Blood: x / Protein: 30 mg/dL / Nitrite: Negative   Leuk Esterase: Trace / RBC: 0-2 /HPF / WBC 3-5   Sq Epi: x / Non Sq Epi: Moderate / Bacteria: Few      MICROBIOLOGY:      RADIOLOGY & ADDITIONAL STUDIES:    ACC: [id number] EXAM:  CT ANGIO CHEST PULM ART [facility]                          *** ADDENDUM***    Addendum:    The sternum is angled anteriorly inferiorly, secondary to prior sternal   fracture. This, however, is new since 6/3/2020.    --- End of Report ---    *** END OF ADDENDUM***      PROCEDURE DATE:  04/10/2022          INTERPRETATION:  CTA CHEST    INDICATION: Elevated d-dimer.. Evaluate for pulmonary embolus.    TECHNIQUE: Enhanced helical images were obtained of the chest. Coronal   and sagittal images were reconstructed.  Images were obtained after the   uneventful administration of 90 cc of nonionic intravenous contrast   (Omnipaque 350). 10 cc of nonionic intravenous contrast (Omnipaque 350)   was discarded. Maximum intensity projection images were generated.    COMPARISON: Radiograph chest 2022. CT chest 6/3/2020.    FINDINGS:    Pulmonary Artery:  There is no main, central, lobar, or proximal   segmental pulmonary embolus. The mid to distal segmental and subsegmental   divisions are not well evaluated secondary to respiratory motion artifact.    Tubes/Lines: None.    Lungs, airways and pleura: Emphysema. Right upper lobe, left upper lobe   and bilateral lower lobe linear opacities are likely due to atelectasis.   No pleural effusion. No pneumothorax. The airways are unremarkable   allowing for respiratory motion.    Mediastinum: The thyroid gland is not well evaluated. There are no   enlarged chest lymph nodes. Hiatal hernia.    The atria are likely enlarged. Renal heart is normal in size. Mitral   annular calcification. Coronary artery calcifications. No pericardial   effusion.    The ascending aorta is tortuous and ectatic. The descending thoracic   aorta measures 3.4 cm at the main pulmonary artery. And 3.8 x3.5 cm,   just proximal to the diaphragmatic hiatus. Atheromatous disease of the   aorta.    Upper Abdomen: Cholelithiasis. The 1.4 x 1.6 cm right adrenal nodule and   a 2.7 x 2.0 cm left adrenal nodule that likely represent adenomas, are   unchanged.    Bones And Soft Tissues: Degenerative change of the spine. Compression   deformities of the T4-T6, T8-T9, and L1-L2 vertebral bodies.  The soft   tissues are unremarkable.      IMPRESSION:    1.  No pulmonary embolus, however, the mid to distal segmental and   subsegmental divisions are not well evaluated secondary to respiratory   motion.  2.  Compression deformities of the thoracic and lumbar spine.        Assessment :   76YO F resident of  [facility], DJD, HTN/HLD, carotid stenosis, hypothyroidism admitted sp fall and right hip pain found to have right displaced femoral neck fracture.    Sp hypothermic. UA neg CXR neg   DDimer elevated. CTA chest neg for PE and pneumonia  Doubt infectious process  TSH elevated    Plan :   Defer antibiotics  Trend temps and cbc  Fu cultures  Asp precautions  No objections from ID standpoint to proceed with surgery    D/w Dr Stone    Continue with present regiment.  Appropriate use of antibiotics and adverse effects reviewed.      > 35 minutes were spent in direct patient care reviewing notes, medications ,labs data/ imaging , discussion with multidisciplinary team.    Thank you for allowing me to participate in care of your patient .    Radha Pryor MD  Infectious Disease  [phone]

## 2022-04-11 NOTE — BH CONSULTATION LIAISON ASSESSMENT NOTE - NSBHCHARTREVIEWVS_PSY_A_CORE FT
Vital Signs Last 24 Hrs  T(C): 36.8 (11 Apr 2022 10:50), Max: 37.3 (10 Apr 2022 14:11)  T(F): 98.3 (11 Apr 2022 10:50), Max: 99.2 (10 Apr 2022 14:11)  HR: 79 (11 Apr 2022 10:50) (56 - 85)  BP: 150/72 (11 Apr 2022 10:50) (115/60 - 165/95)  BP(mean): --  RR: 18 (11 Apr 2022 10:50) (18 - 20)  SpO2: 97% (11 Apr 2022 10:50) (94% - 98%)

## 2022-04-11 NOTE — PROGRESS NOTE ADULT - ASSESSMENT
A/P:    77F with dementia, DJD, HTN/HLD, carotid stenosis, hypothyroidism who presents from [facility] after a fall.  Found to have right femoral neck fracture.      Right femoral neck fracture  - pain control as needed  - NPO p MN  - IV hydration while NPO  - Cardio, pulm and endo note reviewed. TSH elevated but unclear if due to non-compliance. Patient is medically optimized at intermediate-high risk for moderate risk procedure with routine hemodynamic monitoring  - Use caution with BZD's per endocrine in setting of elevated TSH    Desats - occurred after she came back from CT  - unclear cause, but will continue to monitor  - Pulm input appreciated, D-dimerr elevated but CTA neg for PE    Hypothermia and bradycardia  - Likely in setting of hypothyroidism   - Surveillance blood and urine cultures ordered  - ID, cardio and endocrine input appreciated    Hypothyroidism  - cont with synthroid  - Endocrine input appreciated     HTN/HLD  - Continue amlodipine 5 mg daily  - Resume losartan post-operatively  - Continue atorvastatin 20 mg daily  - Resume aspirin post-operatively    Dementia  - does not have namenda XR -> can switch to Namenda 10mg BID  - can give mirtazapine  - Seen by psych and neuro, acute aggression and agitation in setting of hospital acquired delerium    Preventive measures  - SCD only in interim, post op orders per ortho

## 2022-04-11 NOTE — PROGRESS NOTE ADULT - ASSESSMENT
77F with dementia, DJD, HTN/HLD, carotid stenosis, hypothyroidism who presents from [facility] after a fall.     fall  hip fx  OP  OA  dementia  Hiatal hernia  Emphysema - ex smoker - known COPD -   HTN  HLD  Thyroid disease    TSH noted - thyroid disease optimization in progress  CT chest - neg for PE -   Ortho eval in progress - OR time as per Ortho  no objection from Pulm for OR    fall prec - assist with needs - ADL -   Ortho eval in progress - marisol op eval and optimization in progress - plan for ORIF  Hypoxemia - 02 support - keep sat > 88 pct  Emphysema - COPD - DUONEB BID - Albuterol PRN - sob and or wheezing, Solumedrol IV - 3 doses - VBG -   Card eval in progress - planned for TTE  monitor VS and HD and Sat  o2 supplementation as needed - keep sat > 88 pct  old records reviewed  monitor for needs  eventual GOC discussion

## 2022-04-12 ENCOUNTER — TRANSCRIPTION ENCOUNTER (OUTPATIENT)
Age: 77
End: 2022-04-12

## 2022-04-12 LAB
ANION GAP SERPL CALC-SCNC: 8 MMOL/L — SIGNIFICANT CHANGE UP (ref 5–17)
ANION GAP SERPL CALC-SCNC: 8 MMOL/L — SIGNIFICANT CHANGE UP (ref 5–17)
BASOPHILS # BLD AUTO: 0 K/UL — SIGNIFICANT CHANGE UP (ref 0–0.2)
BASOPHILS NFR BLD AUTO: 0 % — SIGNIFICANT CHANGE UP (ref 0–2)
BUN SERPL-MCNC: 36 MG/DL — HIGH (ref 7–23)
BUN SERPL-MCNC: 38 MG/DL — HIGH (ref 7–23)
CALCIUM SERPL-MCNC: 8.1 MG/DL — LOW (ref 8.4–10.5)
CALCIUM SERPL-MCNC: 8.5 MG/DL — SIGNIFICANT CHANGE UP (ref 8.4–10.5)
CHLORIDE SERPL-SCNC: 98 MMOL/L — SIGNIFICANT CHANGE UP (ref 96–108)
CHLORIDE SERPL-SCNC: 99 MMOL/L — SIGNIFICANT CHANGE UP (ref 96–108)
CO2 SERPL-SCNC: 28 MMOL/L — SIGNIFICANT CHANGE UP (ref 22–31)
CO2 SERPL-SCNC: 30 MMOL/L — SIGNIFICANT CHANGE UP (ref 22–31)
CREAT SERPL-MCNC: 1.55 MG/DL — HIGH (ref 0.5–1.3)
CREAT SERPL-MCNC: 1.61 MG/DL — HIGH (ref 0.5–1.3)
EGFR: 32 ML/MIN/1.73M2 — LOW
EGFR: 34 ML/MIN/1.73M2 — LOW
EOSINOPHIL # BLD AUTO: 0 K/UL — SIGNIFICANT CHANGE UP (ref 0–0.5)
EOSINOPHIL NFR BLD AUTO: 0 % — SIGNIFICANT CHANGE UP (ref 0–6)
GLUCOSE SERPL-MCNC: 120 MG/DL — HIGH (ref 70–99)
GLUCOSE SERPL-MCNC: 99 MG/DL — SIGNIFICANT CHANGE UP (ref 70–99)
HCT VFR BLD CALC: 28.9 % — LOW (ref 34.5–45)
HGB BLD-MCNC: 9.5 G/DL — LOW (ref 11.5–15.5)
IMM GRANULOCYTES NFR BLD AUTO: 1.5 % — SIGNIFICANT CHANGE UP (ref 0–1.5)
LYMPHOCYTES # BLD AUTO: 0.77 K/UL — LOW (ref 1–3.3)
LYMPHOCYTES # BLD AUTO: 5.7 % — LOW (ref 13–44)
MCHC RBC-ENTMCNC: 31.8 PG — SIGNIFICANT CHANGE UP (ref 27–34)
MCHC RBC-ENTMCNC: 32.9 GM/DL — SIGNIFICANT CHANGE UP (ref 32–36)
MCV RBC AUTO: 96.7 FL — SIGNIFICANT CHANGE UP (ref 80–100)
MONOCYTES # BLD AUTO: 0.87 K/UL — SIGNIFICANT CHANGE UP (ref 0–0.9)
MONOCYTES NFR BLD AUTO: 6.4 % — SIGNIFICANT CHANGE UP (ref 2–14)
NEUTROPHILS # BLD AUTO: 11.72 K/UL — HIGH (ref 1.8–7.4)
NEUTROPHILS NFR BLD AUTO: 86.4 % — HIGH (ref 43–77)
NRBC # BLD: 0 /100 WBCS — SIGNIFICANT CHANGE UP (ref 0–0)
PLATELET # BLD AUTO: 189 K/UL — SIGNIFICANT CHANGE UP (ref 150–400)
POTASSIUM SERPL-MCNC: 4.3 MMOL/L — SIGNIFICANT CHANGE UP (ref 3.5–5.3)
POTASSIUM SERPL-MCNC: 4.3 MMOL/L — SIGNIFICANT CHANGE UP (ref 3.5–5.3)
POTASSIUM SERPL-SCNC: 4.3 MMOL/L — SIGNIFICANT CHANGE UP (ref 3.5–5.3)
POTASSIUM SERPL-SCNC: 4.3 MMOL/L — SIGNIFICANT CHANGE UP (ref 3.5–5.3)
RBC # BLD: 2.99 M/UL — LOW (ref 3.8–5.2)
RBC # FLD: 14 % — SIGNIFICANT CHANGE UP (ref 10.3–14.5)
SODIUM SERPL-SCNC: 134 MMOL/L — LOW (ref 135–145)
SODIUM SERPL-SCNC: 137 MMOL/L — SIGNIFICANT CHANGE UP (ref 135–145)
WBC # BLD: 13.56 K/UL — HIGH (ref 3.8–10.5)
WBC # FLD AUTO: 13.56 K/UL — HIGH (ref 3.8–10.5)

## 2022-04-12 PROCEDURE — 99233 SBSQ HOSP IP/OBS HIGH 50: CPT

## 2022-04-12 RX ORDER — CEFAZOLIN SODIUM 1 G
500 VIAL (EA) INJECTION EVERY 8 HOURS
Refills: 0 | Status: DISCONTINUED | OUTPATIENT
Start: 2022-04-12 | End: 2022-04-12

## 2022-04-12 RX ORDER — OXYCODONE HYDROCHLORIDE 5 MG/1
5 TABLET ORAL EVERY 8 HOURS
Refills: 0 | Status: DISCONTINUED | OUTPATIENT
Start: 2022-04-12 | End: 2022-04-18

## 2022-04-12 RX ORDER — ACETAMINOPHEN 500 MG
1000 TABLET ORAL EVERY 8 HOURS
Refills: 0 | Status: COMPLETED | OUTPATIENT
Start: 2022-04-12 | End: 2022-04-15

## 2022-04-12 RX ORDER — LEVOTHYROXINE SODIUM 125 MCG
188 TABLET ORAL DAILY
Refills: 0 | Status: DISCONTINUED | OUTPATIENT
Start: 2022-04-13 | End: 2022-04-20

## 2022-04-12 RX ORDER — HEPARIN SODIUM 5000 [USP'U]/ML
5000 INJECTION INTRAVENOUS; SUBCUTANEOUS EVERY 12 HOURS
Refills: 0 | Status: DISCONTINUED | OUTPATIENT
Start: 2022-04-12 | End: 2022-04-13

## 2022-04-12 RX ORDER — TRAMADOL HYDROCHLORIDE 50 MG/1
50 TABLET ORAL EVERY 4 HOURS
Refills: 0 | Status: DISCONTINUED | OUTPATIENT
Start: 2022-04-12 | End: 2022-04-12

## 2022-04-12 RX ORDER — OXYCODONE HYDROCHLORIDE 5 MG/1
5 TABLET ORAL EVERY 8 HOURS
Refills: 0 | Status: DISCONTINUED | OUTPATIENT
Start: 2022-04-12 | End: 2022-04-12

## 2022-04-12 RX ORDER — CEFAZOLIN SODIUM 1 G
2000 VIAL (EA) INJECTION ONCE
Refills: 0 | Status: COMPLETED | OUTPATIENT
Start: 2022-04-12 | End: 2022-04-12

## 2022-04-12 RX ORDER — OXYCODONE HYDROCHLORIDE 5 MG/1
2.5 TABLET ORAL EVERY 8 HOURS
Refills: 0 | Status: DISCONTINUED | OUTPATIENT
Start: 2022-04-12 | End: 2022-04-12

## 2022-04-12 RX ORDER — OXYCODONE HYDROCHLORIDE 5 MG/1
2.5 TABLET ORAL EVERY 8 HOURS
Refills: 0 | Status: DISCONTINUED | OUTPATIENT
Start: 2022-04-12 | End: 2022-04-19

## 2022-04-12 RX ORDER — CEFAZOLIN SODIUM 1 G
500 VIAL (EA) INJECTION ONCE
Refills: 0 | Status: DISCONTINUED | OUTPATIENT
Start: 2022-04-12 | End: 2022-04-12

## 2022-04-12 RX ORDER — CEFAZOLIN SODIUM 1 G
VIAL (EA) INJECTION
Refills: 0 | Status: DISCONTINUED | OUTPATIENT
Start: 2022-04-12 | End: 2022-04-12

## 2022-04-12 RX ORDER — SODIUM CHLORIDE 9 MG/ML
1000 INJECTION INTRAMUSCULAR; INTRAVENOUS; SUBCUTANEOUS
Refills: 0 | Status: DISCONTINUED | OUTPATIENT
Start: 2022-04-12 | End: 2022-04-13

## 2022-04-12 RX ADMIN — MEMANTINE HYDROCHLORIDE 10 MILLIGRAM(S): 10 TABLET ORAL at 05:57

## 2022-04-12 RX ADMIN — SODIUM CHLORIDE 75 MILLILITER(S): 9 INJECTION INTRAMUSCULAR; INTRAVENOUS; SUBCUTANEOUS at 22:08

## 2022-04-12 RX ADMIN — OXYCODONE HYDROCHLORIDE 2.5 MILLIGRAM(S): 5 TABLET ORAL at 20:01

## 2022-04-12 RX ADMIN — MORPHINE SULFATE 1 MILLIGRAM(S): 50 CAPSULE, EXTENDED RELEASE ORAL at 05:58

## 2022-04-12 RX ADMIN — Medication 1000 MILLIGRAM(S): at 21:27

## 2022-04-12 RX ADMIN — Medication 3 MILLILITER(S): at 07:16

## 2022-04-12 RX ADMIN — Medication 81 MILLIGRAM(S): at 12:55

## 2022-04-12 RX ADMIN — HEPARIN SODIUM 5000 UNIT(S): 5000 INJECTION INTRAVENOUS; SUBCUTANEOUS at 17:43

## 2022-04-12 RX ADMIN — OLANZAPINE 2.5 MILLIGRAM(S): 15 TABLET, FILM COATED ORAL at 07:06

## 2022-04-12 RX ADMIN — MORPHINE SULFATE 2 MILLIGRAM(S): 50 CAPSULE, EXTENDED RELEASE ORAL at 01:46

## 2022-04-12 RX ADMIN — MORPHINE SULFATE 1 MILLIGRAM(S): 50 CAPSULE, EXTENDED RELEASE ORAL at 06:15

## 2022-04-12 RX ADMIN — Medication 94 MICROGRAM(S): at 07:46

## 2022-04-12 RX ADMIN — OXYCODONE HYDROCHLORIDE 2.5 MILLIGRAM(S): 5 TABLET ORAL at 20:30

## 2022-04-12 RX ADMIN — Medication 1 MILLIGRAM(S): at 12:56

## 2022-04-12 RX ADMIN — Medication 1 TABLET(S): at 12:56

## 2022-04-12 RX ADMIN — Medication 100 MILLIGRAM(S): at 17:45

## 2022-04-12 RX ADMIN — Medication 1000 MILLIGRAM(S): at 22:00

## 2022-04-12 RX ADMIN — PREGABALIN 500 MICROGRAM(S): 225 CAPSULE ORAL at 12:56

## 2022-04-12 RX ADMIN — Medication 100 MILLIGRAM(S): at 08:39

## 2022-04-12 RX ADMIN — ATORVASTATIN CALCIUM 20 MILLIGRAM(S): 80 TABLET, FILM COATED ORAL at 21:28

## 2022-04-12 RX ADMIN — Medication 1000 UNIT(S): at 05:57

## 2022-04-12 RX ADMIN — Medication 3 MILLIGRAM(S): at 21:28

## 2022-04-12 RX ADMIN — MORPHINE SULFATE 2 MILLIGRAM(S): 50 CAPSULE, EXTENDED RELEASE ORAL at 02:02

## 2022-04-12 RX ADMIN — MIRTAZAPINE 15 MILLIGRAM(S): 45 TABLET, ORALLY DISINTEGRATING ORAL at 21:28

## 2022-04-12 RX ADMIN — Medication 1000 UNIT(S): at 17:45

## 2022-04-12 RX ADMIN — Medication 3 MILLILITER(S): at 19:57

## 2022-04-12 RX ADMIN — SODIUM CHLORIDE 75 MILLILITER(S): 9 INJECTION INTRAMUSCULAR; INTRAVENOUS; SUBCUTANEOUS at 08:39

## 2022-04-12 RX ADMIN — MEMANTINE HYDROCHLORIDE 10 MILLIGRAM(S): 10 TABLET ORAL at 17:45

## 2022-04-12 RX ADMIN — Medication 1000 MILLIGRAM(S): at 12:56

## 2022-04-12 NOTE — OCCUPATIONAL THERAPY INITIAL EVALUATION ADULT - PERTINENT HX OF CURRENT PROBLEM, REHAB EVAL
77F PMH dementia, DJD, HTN/HLD, carotid stenosis, hypothyroidism who presents from [facility] after an unwitnessed fall. In the ED, patient cannot recall events and does not know why she is here. She does admit to some pain the right hip. The CT of her hip showed a right displaced femoral neck fracture. Dr. Barajas (ortho) was made aware. Pt s/p R hip ORIF 4/11/2022.

## 2022-04-12 NOTE — PHYSICAL THERAPY INITIAL EVALUATION ADULT - BED MOBILITY TRAINING, PT EVAL
In 3-5 days pt will be able to perform bed mobility with contact guard x 1 1 week pt will be able to perform bed mobility with min assist x 1

## 2022-04-12 NOTE — DISCHARGE NOTE PROVIDER - ATTENDING DISCHARGE PHYSICAL EXAMINATION:
Vital Signs Last 24 Hrs  T(C): 36.9 (18 Apr 2022 09:15), Max: 36.9 (17 Apr 2022 23:07)  T(F): 98.5 (18 Apr 2022 09:15), Max: 98.5 (18 Apr 2022 09:15)  HR: 70 (18 Apr 2022 09:15) (67 - 86)  BP: 148/76 (18 Apr 2022 09:15) (131/69 - 148/76)  BP(mean): --  RR: 18 (18 Apr 2022 09:15) (16 - 18)  SpO2: 96% (18 Apr 2022 09:15) (94% - 99%)    GENERAL: elderly appearing female in NAD  HEAD: atraumatic, normocephalic   EYES: EOMI, conjunctiva and sclera clear  ENMT: edentulous mouth  RESPIRATORY:  diminished bs at bases, poor inspiratory effort   CARDIOVASCULAR:  soft ii/vi holosystolic murmur LUSB  GASTROINTESTINAL:  soft, nontender, nondistended, bowel sounds present  EXTREMITIES: no clubbing or cyanosis or edema  MUSCULOSKELETAL:  dressing c/d/i. ROM LTD due to pain  NERVOUS SYSTEM:  sensation intact   PSYCH:  awake and alert but confused  SKIN: Multiple ecchymotic areas on skin  Vital Signs Last 24 Hrs  T(C): 36.4 (20 Apr 2022 10:57), Max: 36.8 (19 Apr 2022 14:25)  T(F): 97.5 (20 Apr 2022 10:57), Max: 98.3 (20 Apr 2022 04:35)  HR: 65 (20 Apr 2022 10:57) (56 - 82)  BP: 129/81 (20 Apr 2022 10:57) (110/62 - 154/70)  BP(mean): --  RR: 19 (20 Apr 2022 10:57) (18 - 20)  SpO2: 91% (20 Apr 2022 10:57) (90% - 97%)      GENERAL: elderly appearing female in NAD  HEAD: atraumatic, normocephalic   EYES: EOMI, conjunctiva and sclera clear  ENMT: edentulous mouth  RESPIRATORY:  diminished bs at bases, poor inspiratory effort   CARDIOVASCULAR:  soft ii/vi holosystolic murmur LUSB  GASTROINTESTINAL:  soft, nontender, nondistended, bowel sounds present  EXTREMITIES: no clubbing or cyanosis or edema  MUSCULOSKELETAL:  dressing c/d/i. ROM LTD due to pain  NERVOUS SYSTEM:  sensation intact   PSYCH:  awake and alert but confused  SKIN: Multiple ecchymotic areas on skin

## 2022-04-12 NOTE — PHYSICAL THERAPY INITIAL EVALUATION ADULT - ADDITIONAL COMMENTS
Pt is poor historian, but prior level was collected from case management. Pt was independent prior to admission. Pt is poor historian, but prior level was collected from case management. Pt was independent and ambulating prior to admission.

## 2022-04-12 NOTE — DISCHARGE NOTE PROVIDER - NSDCCPTREATMENT_GEN_ALL_CORE_FT
PRINCIPAL PROCEDURE  Procedure: Open reduction and internal fixation (ORIF) of right hip with C-arm fluoroscopic guidance  Findings and Treatment:

## 2022-04-12 NOTE — PROGRESS NOTE ADULT - SUBJECTIVE AND OBJECTIVE BOX
Date/Time Patient Seen:  		  Referring MD:   Data Reviewed	       Patient is a 77y old  Female who presents with a chief complaint of fall -> right femoral fracture (11 Apr 2022 12:09)      Subjective/HPI     PAST MEDICAL & SURGICAL HISTORY:  Hypertension, unspecified type    Hyperlipidemia, unspecified hyperlipidemia type    Dementia without behavioral disturbance, unspecified dementia type    Hypothyroidism, unspecified type    Hypothyroid    Osteoarthritis    Carotid stenosis    Degenerative joint disease    Hypertension    Hyperlipidemia    Dementia    No significant past surgical history          Medication list         MEDICATIONS  (STANDING):  albuterol/ipratropium for Nebulization 3 milliLiter(s) Nebulizer every 12 hours  aspirin enteric coated 81 milliGRAM(s) Oral daily  atorvastatin 20 milliGRAM(s) Oral at bedtime  cholecalciferol 1000 Unit(s) Oral two times a day  cyanocobalamin 500 MICROGram(s) Oral daily  enoxaparin Injectable 40 milliGRAM(s) SubCutaneous every 24 hours  folic acid 1 milliGRAM(s) Oral daily  lactated ringers. 1000 milliLiter(s) (100 mL/Hr) IV Continuous <Continuous>  levothyroxine Injectable 94 MICROGram(s) IV Push <User Schedule>  melatonin 3 milliGRAM(s) Oral at bedtime  memantine 10 milliGRAM(s) Oral two times a day  mirtazapine 15 milliGRAM(s) Oral at bedtime  multivitamin 1 Tablet(s) Oral daily    MEDICATIONS  (PRN):  acetaminophen     Tablet .. 650 milliGRAM(s) Oral every 6 hours PRN Temp greater or equal to 38C (100.4F), Mild Pain (1 - 3)  ALBUTerol    0.083% 2.5 milliGRAM(s) Nebulizer every 2 hours PRN Shortness of Breath and/or Wheezing  aluminum hydroxide/magnesium hydroxide/simethicone Suspension 30 milliLiter(s) Oral every 4 hours PRN Dyspepsia  magnesium hydroxide Suspension 30 milliLiter(s) Oral daily PRN Constipation  morphine  - Injectable 1 milliGRAM(s) IV Push every 6 hours PRN Moderate Pain (4 - 6)  morphine  - Injectable 2 milliGRAM(s) IV Push every 6 hours PRN Severe Pain (7 - 10)  OLANZapine Injectable 2.5 milliGRAM(s) IntraMuscular every 12 hours PRN Agitation         Vitals log        ICU Vital Signs Last 24 Hrs  T(C): 36.4 (12 Apr 2022 04:58), Max: 36.8 (11 Apr 2022 10:50)  T(F): 97.6 (12 Apr 2022 04:58), Max: 98.3 (11 Apr 2022 10:50)  HR: 62 (12 Apr 2022 04:58) (56 - 96)  BP: 102/51 (12 Apr 2022 04:58) (102/51 - 166/91)  BP(mean): --  ABP: --  ABP(mean): --  RR: 18 (12 Apr 2022 04:58) (8 - 18)  SpO2: 95% (12 Apr 2022 04:58) (93% - 98%)           Input and Output:  I&O's Detail    10 Apr 2022 07:01  -  11 Apr 2022 07:00  --------------------------------------------------------  IN:    IV PiggyBack: 480 mL  Total IN: 480 mL    OUT:    Voided (mL): 1150 mL  Total OUT: 1150 mL    Total NET: -670 mL      11 Apr 2022 07:01  -  12 Apr 2022 06:35  --------------------------------------------------------  IN:    Lactated Ringers: 500 mL    Lactated Ringers: 1200 mL  Total IN: 1700 mL    OUT:    Blood Loss (mL): 50 mL    Voided (mL): 250 mL  Total OUT: 300 mL    Total NET: 1400 mL          Lab Data                        11.3   10.60 )-----------( 183      ( 11 Apr 2022 07:32 )             33.9     04-11    137  |  99  |  20  ----------------------------<  108<H>  4.2   |  31  |  1.01    Ca    8.4      11 Apr 2022 07:32  Phos  3.9     04-11  Mg     2.0     04-11    TPro  6.6  /  Alb  3.0<L>  /  TBili  0.5  /  DBili  x   /  AST  25  /  ALT  32  /  AlkPhos  71  04-11            Review of Systems	      Objective     Physical Examination    heart s1s2  lung dec BS  abd soft      Pertinent Lab findings & Imaging      Dominga:  NO   Adequate UO     I&O's Detail    10 Apr 2022 07:01  -  11 Apr 2022 07:00  --------------------------------------------------------  IN:    IV PiggyBack: 480 mL  Total IN: 480 mL    OUT:    Voided (mL): 1150 mL  Total OUT: 1150 mL    Total NET: -670 mL      11 Apr 2022 07:01  -  12 Apr 2022 06:35  --------------------------------------------------------  IN:    Lactated Ringers: 500 mL    Lactated Ringers: 1200 mL  Total IN: 1700 mL    OUT:    Blood Loss (mL): 50 mL    Voided (mL): 250 mL  Total OUT: 300 mL    Total NET: 1400 mL               Discussed with:     Cultures:	        Radiology

## 2022-04-12 NOTE — CHART NOTE - NSCHARTNOTEFT_GEN_A_CORE
As per requested by pharmacy, patient's levothyroxine changed to PO from IV (patient taking PO).  Changed to 188mcg while taking PO.

## 2022-04-12 NOTE — PROGRESS NOTE ADULT - SUBJECTIVE AND OBJECTIVE BOX
Chief Complaint: Fall    Interval Events: No events overnight.    Review of Systems:  General: No fevers, chills, weight gain  Skin: No rashes, color changes  Cardiovascular: No chest pain, orthopnea  Respiratory: No shortness of breath, cough  Gastrointestinal: No nausea, abdominal pain  Genitourinary: No incontinence, pain with urination  Musculoskeletal: No pain, swelling, decreased range of motion  Neurological: No headache, weakness  Psychiatric: No depression, anxiety  Endocrine: No weight gain, increased thirst  All other systems are comprehensively negative.    Physical Exam:  Vital Signs Last 24 Hrs  T(C): 36.4 (12 Apr 2022 04:58), Max: 36.8 (11 Apr 2022 10:50)  T(F): 97.6 (12 Apr 2022 04:58), Max: 98.3 (11 Apr 2022 10:50)  HR: 52 (12 Apr 2022 07:22) (52 - 96)  BP: 102/51 (12 Apr 2022 04:58) (102/51 - 166/91)  BP(mean): --  RR: 18 (12 Apr 2022 04:58) (8 - 18)  SpO2: 93% (12 Apr 2022 07:22) (93% - 98%)  General: NAD  HEENT: MMM  Neck: No JVD, no carotid bruit  Lungs: CTAB  CV: RRR, nl S1/S2, 2/6 systolic murmur  Abdomen: S/NT/ND, +BS  Extremities: No LE edema, no cyanosis  Neuro: AAOx1  Skin: No rash    Labs:             04-12    134<L>  |  98  |  36<H>  ----------------------------<  120<H>  4.3   |  28  |  1.61<H>    Ca    8.5      12 Apr 2022 07:26  Phos  3.9     04-11  Mg     2.0     04-11    TPro  6.6  /  Alb  3.0<L>  /  TBili  0.5  /  DBili  x   /  AST  25  /  ALT  32  /  AlkPhos  71  04-11                        9.5    13.56 )-----------( 189      ( 12 Apr 2022 07:26 )             28.9       Telemetry: Sinus rhythm

## 2022-04-12 NOTE — OCCUPATIONAL THERAPY INITIAL EVALUATION ADULT - WEIGHT-BEARING RESTRICTIONS: STAND/SIT, REHAB EVAL
Dosing Month 4 (Required For Cumulative Dosing): 40mg BID Xerosis Aggressive Treatment: I recommended application of Cetaphil or CeraVe numerous times a day and before going to bed to all dry areas. I also prescribed a topical steroid for twice daily use. Dosing Month 8 (Required For Cumulative Dosing): 40mg TID Calculate Months Of Therapy Based On Documented Dosages (Will Hide Months Of Therapy Question)?: No Cheilitis Aggressive Treatment: I recommended application of Vaseline or Aquaphor numerous times a day (as often as every hour) and before going to bed. I also prescribed a topical steroid for twice daily use. Myalgia Monitoring: I explained this is common when taking isotretinoin. If this worsens they will contact us. Hypertriglyceridemia Treatment: I explained this is common when taking isotretinoin. If this worsens they will contact us. They may try OTC ibuprofen. Show Text Field For Brand Names Of Contraception?: Yes Female Completion Statement: After discussing her treatment course we decided to discontinue isotretinoin therapy at this time. I explained that she would need to continue her birth control methods for at least one month after the last dosage. She should also get a pregnancy test one month after the last dose. She shouldn't donate blood for one month after the last dose. She should call with any new symptoms of depression. Retinoid Dermatitis Normal Treatment: I recommended more frequent application of Cetaphil or CeraVe to the areas of dermatitis. Counseling Text: I reviewed the side effect in detail. Patient should get monthly blood tests, not donate blood, not drive at night if vision affected, and not share medication. Kilograms Preamble Statement (Weight Entered In Details Tab): Reported Weight in kilograms: Xerosis Normal Treatment: I recommended application of Cetaphil or CeraVe numerous times a day and before going to bed to all dry areas. Pounds Preamble Statement (Weight Entered In Details Tab): Reported Weight in pounds: Headache Monitoring: I recommended monitoring the headaches for now. There is no evidence of increased intracranial pressure. They were instructed to call if the headaches are worsening. Hypertriglyceridemia Monitoring: I explained this is common when taking isotretinoin. We will monitor closely. Xerosis Aggressive Treatment: I recommended application of Cetaphil or CeraVe numerous times a day going to bed to all dry areas. I also prescribed a topical steroid for twice daily use. RLE/weight-bearing as tolerated Xerosis Normal Treatment: I recommended application of Cetaphil or CeraVe numerous times a day going to bed to all dry areas. Retinoid Dermatitis Aggressive Treatment: I recommended more frequent application of Cetaphil or CeraVe to the areas of dermatitis. I also prescribed a topical steroid for twice daily use until the dermatitis resolves. Target Cumulative Dosage (In Mg/Kg): 135 Cheilitis Normal Treatment: I recommended application of Vaseline or Aquaphor numerous times a day (as often as every hour) and before going to bed. Lower Range (In Mg/Kg): 120 Upper Range (In Mg/Kg): 150 Use Therapeutic Ranged Or Therapeutic Target: please select Range or Target Weight Units: pounds Male Completion Statement: After discussing his treatment course we decided to discontinue isotretinoin therapy at this time. He shouldn't donate blood for one month after the last dose. He should call with any new symptoms of depression. Months Of Therapy Completed: 8 Nosebleeds Normal Treatment: I explained this is common when taking isotretinoin. I recommended saline mist in each nostril multiple times a day. If this worsens they will contact us. Detail Level: Zone What Is The Patient's Gender: Male Female Pregnancy Counseling Text: Female patients should also be on two forms of birth control while taking this medication and for one month after their last dose. Next Month's Dosage: 80mg Daily

## 2022-04-12 NOTE — OCCUPATIONAL THERAPY INITIAL EVALUATION ADULT - ADDITIONAL COMMENTS
X-Ray Pelvis 4/9/22: There is an impacted intertrochanteric fracture of the right hip. The right femoral head remains in articulation with the acetabulum. The sacroiliac joints appear symmetric bilaterally. Vascular calcifications are noted in the proximal medial thigh region.

## 2022-04-12 NOTE — DISCHARGE NOTE PROVIDER - INSTRUCTIONS
The pain medications are likely to cause constipation.  This problem can be minimized by increasing your fruits and vegetables and fluid intake. You may also benefit from over-the-counter laxatives, such as senokot or miralax.

## 2022-04-12 NOTE — PROGRESS NOTE ADULT - ASSESSMENT
A/P:    77F with dementia, DJD, HTN/HLD, carotid stenosis, hypothyroidism who presents from [facility] after a fall.  Found to have right femoral neck fracture.      Right femoral neck fracture  - pain control as needed  - POD# 1   - PT/ OT    CRISTIANO  - Likely pre-renal  - Gentle hydration, check bladder scan  - Avoid nephrotoxic meds     Desats  - occurred on admission  - unclear cause, but will continue to monitor  - Pulm input appreciated, D-dimerr elevated but CTA neg for PE  - US LLE neg for DVT    Hypothermia and bradycardia  - Improved  - Likely in setting of hypothyroidism   - Surveillance blood and urine cultures NGTD  - ID, cardio and endocrine input appreciated    Hypothyroidism  - cont with synthroid  - Endocrine input appreciated     HTN/HLD  - Continue amlodipine 5 mg daily  - Resume losartan POD #2  - Continue atorvastatin 20 mg daily  - Resumed aspirin    Dementia  - does not have namenda XR -> can switch to Namenda 10mg BID  - can give mirtazapine  - Seen by psych and neuro, acute aggression and agitation in setting of hospital acquired delerium    Preventive measures  - On heparin for DVT ppx     A/P:    77F with dementia, DJD, HTN/HLD, carotid stenosis, hypothyroidism who presents from [facility] after a fall.  Found to have right femoral neck fracture.      Right femoral neck fracture  - pain control as needed  - POD# 1   - PT/ OT  -Mild anemia post op likely due to acute blood loss  -Mild leukocytosis post op      CRISTIANO  - Likely pre-renal  - Gentle hydration, check bladder scan  - Avoid nephrotoxic meds     Desats  - occurred on admission  - unclear cause, but will continue to monitor  - Pulm input appreciated, D-dimerr elevated but CTA neg for PE  - US LLE neg for DVT    Hypothermia and bradycardia  - Improved  - Likely in setting of hypothyroidism   - Surveillance blood and urine cultures NGTD  - ID, cardio and endocrine input appreciated    Hypothyroidism  - cont with synthroid  - Endocrine input appreciated     HTN/HLD  - Continue amlodipine 5 mg daily  - Resume losartan POD #2  - Continue atorvastatin 20 mg daily  - Resumed aspirin    Dementia  - does not have namenda XR -> can switch to Namenda 10mg BID  - can give mirtazapine  - Seen by psych and neuro, acute aggression and agitation in setting of hospital acquired delerium    Preventive measures  - On heparin for DVT ppx     A/P:    77F with dementia, DJD, HTN/HLD, carotid stenosis, hypothyroidism who presents from [facility] after a fall.  Found to have right femoral neck fracture.      Right femoral neck fracture  - pain control as needed  - POD# 1   - PT/ OT  -Mild anemia post op likely due to acute blood loss  -Mild leukocytosis post op      CRISTIANO  - Likely pre-renal  - Gentle hydration, check bladder scan  - Avoid nephrotoxic meds     Desats  - occurred on admission  - unclear cause, but will continue to monitor  - Pulm input appreciated, D-dimerr elevated but CTA neg for PE  - US LLE neg for DVT    Hypothermia and bradycardia  - Improved  - Likely in setting of hypothyroidism   - Surveillance blood and urine cultures NGTD  - ID, cardio and endocrine input appreciated    Hypothyroidism  - cont with synthroid  - Endocrine input appreciated     HTN/HLD  - Continue amlodipine 5 mg daily  - Resume losartan POD #2  - Continue atorvastatin 20 mg daily  - Resumed aspirin    Dementia  - does not have namenda XR -> can switch to Namenda 10mg BID  - can give mirtazapine  - Seen by psych and neuro, acute aggression and agitation in setting of hospital acquired delerium    Incidental findings on CT  Cholelithiasis. The 1.4 x 1.6 cm right adrenal nodule and   a 2.7 x 2.0 cm left adrenal nodule that likely represent adenomas, are   unchanged. Compression deformities of the T4-T6, T8-T9, and L1-L2 vertebral bodies.   Follow up outpatient for further management     Preventive measures  - On heparin for DVT ppx

## 2022-04-12 NOTE — PHYSICAL THERAPY INITIAL EVALUATION ADULT - TRANSFER SAFETY CONCERNS NOTED: SIT/STAND, REHAB EVAL
decreased step length/decreased weight-shifting ability Stood for only 5 seconds max assist x 2 hand held assist/decreased step length/decreased weight-shifting ability

## 2022-04-12 NOTE — PROGRESS NOTE ADULT - ASSESSMENT
77F with dementia, DJD, HTN/HLD, carotid stenosis, hypothyroidism who presents from [facility] after a fall.     fall  hip fx  OP  OA  dementia  Hiatal hernia  Emphysema - ex smoker - known COPD -   HTN  HLD  Thyroid disease    right hip ORIF - POD 1 -   vs noted  wean o2 as tolerated -     fall prec - assist with needs - ADL -   Ortho eval in progress - marisol op eval and optimization in progress - plan for ORIF  Hypoxemia - 02 support - keep sat > 88 pct  Emphysema - COPD - s/p steroids - s/p nebs - may need additional Nebulized therapy - has Inhaler on order   Card eval in progress - planned for TTE  monitor VS and HD and Sat  o2 supplementation as needed - keep sat > 88 pct  old records reviewed  monitor for needs  eventual GOC discussion

## 2022-04-12 NOTE — PROGRESS NOTE ADULT - SUBJECTIVE AND OBJECTIVE BOX
POST OPERATIVE DAY #:  [ 1]   STATUS POST: orif right hip                   Patient is a 77y old  Female who presents with a chief complaint of fall -> right femoral fracture (12 Apr 2022 06:35)  h/o of being demented/confused  pt agitated this am and pulled IV out    OBJECTIVE:     Vital Signs Last 24 Hrs  T(C): 36.4 (12 Apr 2022 04:58), Max: 36.8 (11 Apr 2022 10:50)  T(F): 97.6 (12 Apr 2022 04:58), Max: 98.3 (11 Apr 2022 10:50)  HR: 62 (12 Apr 2022 04:58) (56 - 96)  BP: 102/51 (12 Apr 2022 04:58) (102/51 - 166/91)  BP(mean): --  RR: 18 (12 Apr 2022 04:58) (8 - 18)  SpO2: 95% (12 Apr 2022 04:58) (93% - 98%)    Affected extremity:         silverlon dressing x 2 :  clean/dry/intact           Sensation; intact to light touch  [         Motor exam: Toes warm and mobile         No calf tenderness bilateral LE's    LABS:                        11.3   10.60 )-----------( 183      ( 11 Apr 2022 07:32 )             33.9     04-11    137  |  99  |  20  ----------------------------<  108<H>  4.2   |  31  |  1.01    Ca    8.4      11 Apr 2022 07:32  Phos  3.9     04-11  Mg     2.0     04-11    TPro  6.6  /  Alb  3.0<L>  /  TBili  0.5  /  DBili  x   /  AST  25  /  ALT  32  /  AlkPhos  71  04-11            A/P :    -    Analgesics PRN  -    DVT ppx: [ ]ASA 81 bid [ x] Lovenox [ ] Coumadin   [ ] Eliquis   -    Check AM labs  -    Weight bearing status: WBAT [ x]        PWB    [ ]     TTWB  [ ]      NWB  [ ]  -    Physical Therapy  -    Occupational Therapy  -    Dispo: Home [ ]     Rehab [ ]      FATOUMATA [ ]      To be determined [x ]

## 2022-04-12 NOTE — DISCHARGE NOTE PROVIDER - CARE PROVIDER_API CALL
Alejandro Barajas)  Orthopaedic Surgery  [address] [place] [address]  Phone: [phone]  Fax: [phone]  Follow Up Time: 2 weeks

## 2022-04-12 NOTE — PHYSICAL THERAPY INITIAL EVALUATION ADULT - TRANSFER TRAINING, PT EVAL
In 3-5 days the pt will be able to perform transfers with contact guard x 1 with RW 1 week the pt will be able to perform transfers with min assist x 1 with RW

## 2022-04-12 NOTE — OCCUPATIONAL THERAPY INITIAL EVALUATION ADULT - RANGE OF MOTION EXAMINATION, LOWER EXTREMITY
RLE not assessed 2* ORIF with verbal indicators of pain present with PROM/Left LE Passive ROM was WFL (w/i functional limits)

## 2022-04-12 NOTE — PHYSICAL THERAPY INITIAL EVALUATION ADULT - PERTINENT HX OF CURRENT PROBLEM, REHAB EVAL
with dementia, DJD, HTN/HLD, carotid stenosis, hypothyroidism who presents from [facility] after a fall.  Per report, staff heard her fall but the patient was awake when they arrived in her room.  Patient was brought here for further evaluation.  In the ED, patient cannot recall events and does not know why she is here.  She does admit to some pain the right hip.  She did say that she has been feeling well prior to the injury.

## 2022-04-12 NOTE — OCCUPATIONAL THERAPY INITIAL EVALUATION ADULT - IMPAIRMENTS CONTRIBUTING IMPAIRED BED MOBILITY, REHAB EVAL
impaired balance/cognition/impaired motor control/pain/impaired postural control/decreased ROM/decreased strength

## 2022-04-12 NOTE — PROGRESS NOTE ADULT - SUBJECTIVE AND OBJECTIVE BOX
Neurology follow up note    OPAL [id number]      Interval History:    Patient resting in bed     Allergies    No Known Allergies    Intolerances        MEDICATIONS    acetaminophen     Tablet .. 650 milliGRAM(s) Oral every 6 hours PRN  ALBUTerol    0.083% 2.5 milliGRAM(s) Nebulizer every 2 hours PRN  albuterol/ipratropium for Nebulization 3 milliLiter(s) Nebulizer every 12 hours  aluminum hydroxide/magnesium hydroxide/simethicone Suspension 30 milliLiter(s) Oral every 4 hours PRN  aspirin enteric coated 81 milliGRAM(s) Oral daily  atorvastatin 20 milliGRAM(s) Oral at bedtime  ceFAZolin   IVPB 2000 milliGRAM(s) IV Intermittent once  ceFAZolin   IVPB 2000 milliGRAM(s) IV Intermittent once  cholecalciferol 1000 Unit(s) Oral two times a day  cyanocobalamin 500 MICROGram(s) Oral daily  enoxaparin Injectable 40 milliGRAM(s) SubCutaneous every 24 hours  folic acid 1 milliGRAM(s) Oral daily  levothyroxine Injectable 94 MICROGram(s) IV Push <User Schedule>  magnesium hydroxide Suspension 30 milliLiter(s) Oral daily PRN  melatonin 3 milliGRAM(s) Oral at bedtime  memantine 10 milliGRAM(s) Oral two times a day  mirtazapine 15 milliGRAM(s) Oral at bedtime  morphine  - Injectable 1 milliGRAM(s) IV Push every 6 hours PRN  morphine  - Injectable 2 milliGRAM(s) IV Push every 6 hours PRN  multivitamin 1 Tablet(s) Oral daily  OLANZapine Injectable 2.5 milliGRAM(s) IntraMuscular every 12 hours PRN  sodium chloride 0.9%. 1000 milliLiter(s) IV Continuous <Continuous>  traMADol 50 milliGRAM(s) Oral every 4 hours PRN          Height (cm): 170 ( @ 17:02)  Weight (kg): 73 ( @ 17:02)  BMI (kg/m2): 25.3 ( @ 17:02)    Vital Signs Last 24 Hrs  T(C): 36.4 (2022 04:58), Max: 36.8 (2022 10:50)  T(F): 97.6 (2022 04:58), Max: 98.3 (2022 10:50)  HR: 52 (2022 07:22) (52 - 96)  BP: 102/51 (2022 04:58) (102/51 - 166/91)  BP(mean): --  RR: 18 (2022 04:58) (8 - 18)  SpO2: 93% (2022 07:22) (93% - 98%)      REVIEW OF SYSTEMS:  Very limited secondary to the patient with underlying dementia, at times could not answer questions accordingly.    PHYSICAL EXAMINATION:   HEENT:  Head:  Normocephalic, atraumatic.  Eyes:  No scleral icterus.  Ears:  Hard to evaluate secondary to the patient could not answers questions accordingly at times.  NECK:  Had slightly increased tone.  RESPIRATORY:  Good air entry bilaterally.  CARDIOVASCULAR:  S1 and S2 heard.  ABDOMEN:  Soft and nontender.  Extremities:  No clubbing or cyanosis were noted.      NEUROLOGIC:  The patient awake and alert.  On and off blink to visual threat.  The patient does appear to have poor vision out of both eyes.  Speech:  The patient would articulate a few words, no dysarthria was noted.  Intact bilateral nasolabial folds.  Motor:  Bilateral upper 4/5.  Right lower was not tested secondary to fracture.  Left lower with plantar stimulation positive flexion of the hip and knee, would say 3+/5.            LABS:  CBC Full  -  ( 2022 07:26 )  WBC Count : 13.56 K/uL  RBC Count : 2.99 M/uL  Hemoglobin : 9.5 g/dL  Hematocrit : 28.9 %  Platelet Count - Automated : 189 K/uL  Mean Cell Volume : 96.7 fl  Mean Cell Hemoglobin : 31.8 pg  Mean Cell Hemoglobin Concentration : 32.9 gm/dL  Auto Neutrophil # : 11.72 K/uL  Auto Lymphocyte # : 0.77 K/uL  Auto Monocyte # : 0.87 K/uL  Auto Eosinophil # : 0.00 K/uL  Auto Basophil # : 0.00 K/uL  Auto Neutrophil % : 86.4 %  Auto Lymphocyte % : 5.7 %  Auto Monocyte % : 6.4 %  Auto Eosinophil % : 0.0 %  Auto Basophil % : 0.0 %    Urinalysis Basic - ( 10 Apr 2022 12:15 )    Color: Yellow / Appearance: Clear / S.010 / pH: x  Gluc: x / Ketone: Negative  / Bili: Negative / Urobili: Negative mg/dL   Blood: x / Protein: 30 mg/dL / Nitrite: Negative   Leuk Esterase: Trace / RBC: 0-2 /HPF / WBC 3-5   Sq Epi: x / Non Sq Epi: Moderate / Bacteria: Few      -    134<L>  |  98  |  36<H>  ----------------------------<  120<H>  4.3   |  28  |  1.61<H>    Ca    8.5      2022 07:26  Phos  3.9     -  Mg     2.0     -    TPro  6.6  /  Alb  3.0<L>  /  TBili  0.5  /  DBili  x   /  AST  25  /  ALT  32  /  AlkPhos  71  -    Hemoglobin A1C:     LIVER FUNCTIONS - ( 2022 07:32 )  Alb: 3.0 g/dL / Pro: 6.6 g/dL / ALK PHOS: 71 U/L / ALT: 32 U/L DA / AST: 25 U/L / GGT: x           Vitamin B12         RADIOLOGY      ANALYSIS AND PLAN:  This is a 77-year-old with episode of fall.  For episode of fall, what could be ascertained from chart, there appears to be no seizure-like activity or syncopal event, possibly suspect this could be a mechanical fall in nature.  For episode of altered mental status, most likely secondary to dementia becoming more prominent in the hospital setting, Psychiatry followup.  For history of dementia, would recommend to continue the patient on her memantine.  Hyperlipidemia, statin.  For hypothyroidism, Synthroid.  For hypertension, monitor systolic blood pressure consider Seroquel 12.5mg at 8pm if no contraindications       Spoke with daughter, Opal Cedeno, at [phone].  She understands my reasoning and thought process.  While in the hospital setting she may become more disoriented.    Greater than 40 minutes of time was spent with the patient, planning care, reviewing data, speaking to multidisciplinary healthcare team with greater than 50% of time in counseling and care coordination.    Thank you for the courtesy of this consultation.

## 2022-04-12 NOTE — PROGRESS NOTE ADULT - ASSESSMENT
The patient is a 77 year old female with a history of HTN, HL, hypothyroidism, carotid stenosis, dementia who presents with a fall, found to have a hip fracture.    Plan:  - Echo with normal LV systolic function, no significant valve issues  - Continue amlodipine 5 mg daily  - Resume losartan if BP trends up  - Continue atorvastatin 20 mg daily  - Continue aspirin 81 mg daily  - Hypothermia - possibly due to significant hypothyroidism  - Ortho follow-up  - PT

## 2022-04-12 NOTE — DISCHARGE NOTE PROVIDER - NSDCMRMEDTOKEN_GEN_ALL_CORE_FT
amLODIPine 5 mg oral tablet: tab(s) orally once a day  aspirin 81 mg oral delayed release tablet: 1 tab(s) orally once a day  atorvastatin 20 mg oral tablet: 1 tab(s) orally once a day  folic acid 1 mg oral tablet: 1 tab(s) orally once a day  hydrochlorothiazide-losartan: 100-2  levothyroxine 125 mcg (0.125 mg) oral tablet: 1 tab(s) orally once a day  Melatonin 3 mg oral tablet: orally once a day (at bedtime) 2 TABS  memantine 28 mg oral capsule, extended release: 1 cap(s) orally once a day  mirtazapine 15 mg oral tablet: 1 tab(s) orally once a day (at bedtime)  Multiple Vitamins oral tablet: 1 tab(s) orally once a day  Omega-3 1000 mg oral capsule: 2 cap(s) orally once a day  Vitamin B-12: 500mcg  Vitamin D3 1000 intl units (25 mcg) oral tablet: orally 2 times a day   Adult Aspirin Regimen 81 mg oral delayed release tablet: 1 tab(s) orally once a day  amLODIPine 5 mg oral tablet: tab(s) orally once a day  atorvastatin 20 mg oral tablet: 1 tab(s) orally once a day  folic acid 1 mg oral tablet: 1 tab(s) orally once a day  heparin: 5000 unit(s) subcutaneous 2 times a day  hydrochlorothiazide-losartan: 100-2  levothyroxine 125 mcg (0.125 mg) oral tablet: 1 tab(s) orally once a day  losartan 100 mg oral tablet: 1 tab(s) orally once a day  Melatonin 3 mg oral tablet: orally once a day (at bedtime) 2 TABS  memantine 28 mg oral capsule, extended release: 1 cap(s) orally once a day  mirtazapine 15 mg oral tablet: 1 tab(s) orally once a day (at bedtime)  Multiple Vitamins oral tablet: 1 tab(s) orally once a day  Omega-3 1000 mg oral capsule: 2 cap(s) orally once a day  oxyCODONE 5 mg oral tablet: 1 tab(s) orally every 8 hours, As needed, Severe Pain (7 - 10)  Vitamin B-12: 500mcg  Vitamin D3 1000 intl units (25 mcg) oral tablet: orally 2 times a day   Adult Aspirin Regimen 81 mg oral delayed release tablet: 1 tab(s) orally once a day  amLODIPine 5 mg oral tablet: tab(s) orally once a day  atorvastatin 20 mg oral tablet: 1 tab(s) orally once a day  folic acid 1 mg oral tablet: 1 tab(s) orally once a day  heparin: 5000 unit(s) subcutaneous 2 times a day  levothyroxine 125 mcg (0.125 mg) oral tablet: 1 tab(s) orally once a day  losartan 100 mg oral tablet: 1 tab(s) orally once a day  Melatonin 3 mg oral tablet: orally once a day (at bedtime) 2 TABS  memantine 28 mg oral capsule, extended release: 1 cap(s) orally once a day  mirtazapine 15 mg oral tablet: 1 tab(s) orally once a day (at bedtime)  Multiple Vitamins oral tablet: 1 tab(s) orally once a day  Omega-3 1000 mg oral capsule: 2 cap(s) orally once a day  oxyCODONE 5 mg oral tablet: 1 tab(s) orally every 8 hours, As needed, Severe Pain (7 - 10)  Vitamin B-12: 500mcg  Vitamin D3 1000 intl units (25 mcg) oral tablet: orally 2 times a day

## 2022-04-12 NOTE — DISCHARGE NOTE PROVIDER - NSDCCPCAREPLAN_GEN_ALL_CORE_FT
PRINCIPAL DISCHARGE DIAGNOSIS  Diagnosis: Hip fracture, right  Assessment and Plan of Treatment: Physical Therapy /Occupational Therapy    - Ambulation  - Transfers   - Stairs   - ADLs (activities of daily living)  • Ice 20 minutes several times daily with at least a 20 minute break in between icing sessions  Daily dressing changes if incision is not completely dry.  If inicision is dry, it may be left open to air.   Keep incision clean. DO NOT APPLY ANYTHING to incision site (salves/ointments/creams).  May shower 5 days after surgery post-op if no drainage from incision.  Do not scrub incision site. Pat dry after shower.   Staple removal on or close to 14 days after surgery.       PRINCIPAL DISCHARGE DIAGNOSIS  Diagnosis: Hip fracture, right  Assessment and Plan of Treatment: Physical Therapy /Occupational Therapy    - Ambulation  - Transfers   - Stairs   - ADLs (activities of daily living)  • Ice 20 minutes several times daily with at least a 20 minute break in between icing sessions  Daily dressing changes if incision is not completely dry.  If inicision is dry, it may be left open to air.   Keep incision clean. DO NOT APPLY ANYTHING to incision site (salves/ointments/creams).  May shower 5 days after surgery post-op if no drainage from incision.  Do not scrub incision site. Pat dry after shower.   Staple removal on or close to 14 days after surgery.  Monitor CBC once she gets to rehab.  Monitor PRN after that

## 2022-04-12 NOTE — OCCUPATIONAL THERAPY INITIAL EVALUATION ADULT - LIVES WITH, PROFILE
Pt resides at an residential. As per SW, pt was mostly independent with functional mobility/transfers and ADLs, occasionally with hand held assist, no assistive device./other relative

## 2022-04-12 NOTE — PHYSICAL THERAPY INITIAL EVALUATION ADULT - GAIT TRAINING, PT EVAL
In 3-5 days the pt will be able to perform transfer with contact guard x 1 with RW 1 week the pt will be able to perform transfer with min assist x 1 with RW 1 week the pt will be able to perform ambulation 50 feet with min assist x 1 with RW

## 2022-04-12 NOTE — PROGRESS NOTE ADULT - SUBJECTIVE AND OBJECTIVE BOX
OPAL ARBOLEDA is a 77yFemale , patient examined and chart reviewed.     INTERVAL HPI/ OVERNIGHT EVENTS:   SP ORIF, Right hip, using trochanteric nail 10-Apr-2022   Afebrile. NAD.      PAST MEDICAL & SURGICAL HISTORY:  Dementia without behavioral disturbance, unspecified dementia type  Hypothyroid  Osteoarthritis  Carotid stenosis  Degenerative joint disease  Hypertension  Hyperlipidemia      For details regarding the patient's social history, family history, and other miscellaneous elements, please refer the initial infectious diseases consultation and/or the admitting history and physical examination for this admission.    ROS:  Unable to obtain due to : Dementia      Current inpatient medications :    ANTIBIOTICS/RELEVANT:    MEDICATIONS  (STANDING):  acetaminophen     Tablet .. 1000 milliGRAM(s) Oral every 8 hours  albuterol/ipratropium for Nebulization 3 milliLiter(s) Nebulizer every 12 hours  aspirin enteric coated 81 milliGRAM(s) Oral daily  atorvastatin 20 milliGRAM(s) Oral at bedtime  cholecalciferol 1000 Unit(s) Oral two times a day  cyanocobalamin 500 MICROGram(s) Oral daily  folic acid 1 milliGRAM(s) Oral daily  heparin   Injectable 5000 Unit(s) SubCutaneous every 12 hours  levothyroxine Injectable 94 MICROGram(s) IV Push <User Schedule>  melatonin 3 milliGRAM(s) Oral at bedtime  memantine 10 milliGRAM(s) Oral two times a day  mirtazapine 15 milliGRAM(s) Oral at bedtime  multivitamin 1 Tablet(s) Oral daily  sodium chloride 0.9%. 1000 milliLiter(s) (75 mL/Hr) IV Continuous <Continuous>    MEDICATIONS  (PRN):  ALBUTerol    0.083% 2.5 milliGRAM(s) Nebulizer every 2 hours PRN Shortness of Breath and/or Wheezing  aluminum hydroxide/magnesium hydroxide/simethicone Suspension 30 milliLiter(s) Oral every 4 hours PRN Dyspepsia  magnesium hydroxide Suspension 30 milliLiter(s) Oral daily PRN Constipation  oxyCODONE    IR 5 milliGRAM(s) Oral every 8 hours PRN Severe Pain (7 - 10)  oxyCODONE    IR 2.5 milliGRAM(s) Oral every 8 hours PRN Moderate Pain (4 - 6)        Objective:  Vital Signs Last 24 Hrs  T(C): 36.8 (2022 10:05), Max: 36.8 (2022 15:11)  T(F): 98.2 (2022 10:05), Max: 98.3 (2022 15:11)  HR: 70 (2022 10:05) (52 - 96)  BP: 102/59 (2022 10:05) (102/51 - 166/91)  RR: 18 (2022 10:05) (8 - 18)  SpO2: 94% (2022 10:05) (93% - 98%)      Physical Exam:  General:  no acute distress confused  Neck: supple, trachea midline  Lungs: Decreased, no wheeze/rhonchi  Cardiovascular: regular rate and rhythm, S1 S2  Abdomen: soft, nontender,  bowel sounds normal  Neurological: Dementia confused  Skin: no rash  Extremities: right hip drsg c/d/i      LABS:                        9.5    13.56 )-----------( 189      ( 2022 07:26 )             28.9   04-12    134<L>  |  98  |  36<H>  ----------------------------<  120<H>  4.3   |  28  |  1.61<H>    Ca    8.5      2022 07:26  Phos  3.9     04-11  Mg     2.0     04-11    TPro  6.6  /  Alb  3.0<L>  /  TBili  0.5  /  DBili  x   /  AST  25  /  ALT  32  /  AlkPhos  71  04-11    Urinalysis Basic - ( 10 Apr 2022 12:15 )    Color: Yellow / Appearance: Clear / S.010 / pH: x  Gluc: x / Ketone: Negative  / Bili: Negative / Urobili: Negative mg/dL   Blood: x / Protein: 30 mg/dL / Nitrite: Negative   Leuk Esterase: Trace / RBC: 0-2 /HPF / WBC 3-5   Sq Epi: x / Non Sq Epi: Moderate / Bacteria: Few      MICROBIOLOGY:    Culture - Blood (collected 10 Apr 2022 20:26)  Source: .Blood Blood  Preliminary Report (2022 21:00):    No growth to date.    Culture - Blood (collected 10 Apr 2022 20:26)  Source: .Blood Blood  Preliminary Report (2022 21:00):    No growth to date.    Culture - Urine (collected 10 Apr 2022 20:25)  Source: Catheterized Catheterized  Final Report (2022 15:39):    No growth        RADIOLOGY & ADDITIONAL STUDIES:    ACC: [id number] EXAM:  CT ANGIO CHEST PULM ART WAWI                          *** ADDENDUM***    Addendum:    The sternum is angled anteriorly inferiorly, secondary to prior sternal   fracture. This, however, is new since 6/3/2020.    --- End of Report ---    *** END OF ADDENDUM***      PROCEDURE DATE:  04/10/2022          INTERPRETATION:  CTA CHEST    INDICATION: Elevated d-dimer.. Evaluate for pulmonary embolus.    TECHNIQUE: Enhanced helical images were obtained of the chest. Coronal   and sagittal images were reconstructed.  Images were obtained after the   uneventful administration of 90 cc of nonionic intravenous contrast   (Omnipaque 350). 10 cc of nonionic intravenous contrast (Omnipaque 350)   was discarded. Maximum intensity projection images were generated.    COMPARISON: Radiograph chest 2022. CT chest 6/3/2020.    FINDINGS:    Pulmonary Artery:  There is no main, central, lobar, or proximal   segmental pulmonary embolus. The mid to distal segmental and subsegmental   divisions are not well evaluated secondary to respiratory motion artifact.    Tubes/Lines: None.    Lungs, airways and pleura: Emphysema. Right upper lobe, left upper lobe   and bilateral lower lobe linear opacities are likely due to atelectasis.   No pleural effusion. No pneumothorax. The airways are unremarkable   allowing for respiratory motion.    Mediastinum: The thyroid gland is not well evaluated. There are no   enlarged chest lymph nodes. Hiatal hernia.    The atria are likely enlarged. Renal heart is normal in size. Mitral   annular calcification. Coronary artery calcifications. No pericardial   effusion.    The ascending aorta is tortuous and ectatic. The descending thoracic   aorta measures 3.4 cm at the main pulmonary artery. And 3.8 x3.5 cm,   just proximal to the diaphragmatic hiatus. Atheromatous disease of the   aorta.    Upper Abdomen: Cholelithiasis. The 1.4 x 1.6 cm right adrenal nodule and   a 2.7 x 2.0 cm left adrenal nodule that likely represent adenomas, are   unchanged.    Bones And Soft Tissues: Degenerative change of the spine. Compression   deformities of the T4-T6, T8-T9, and L1-L2 vertebral bodies.  The soft   tissues are unremarkable.      IMPRESSION:    1.  No pulmonary embolus, however, the mid to distal segmental and   subsegmental divisions are not well evaluated secondary to respiratory   motion.  2.  Compression deformities of the thoracic and lumbar spine.      Assessment :   76YO F resident of  [facility] [place], DJD, HTN/HLD, carotid stenosis, hypothyroidism admitted sp fall and right hip pain found to have right displaced femoral neck fracture.    SP ORIF, Right hip, using trochanteric nail 10-Apr-2022   Sp hypothermic. UA neg CXR neg   DDimer elevated. CTA chest neg for PE and pneumonia  Doubt infectious process Cultures ngtd  TSH elevated  Tolerated surgery    Plan :   Monitor off antibiotics  Trend temps and cbc  Asp precautions  Pulm toileting  Increase activity per ortho  Stable from ID standpoint    Continue with present regiment.  Appropriate use of antibiotics and adverse effects reviewed.      > 35 minutes were spent in direct patient care reviewing notes, medications ,labs data/ imaging , discussion with multidisciplinary team.    Thank you for allowing me to participate in care of your patient .    Radha Pryor MD  Infectious Disease  [phone]

## 2022-04-12 NOTE — PHYSICAL THERAPY INITIAL EVALUATION ADULT - RANGE OF MOTION EXAMINATION, REHAB EVAL
right LE not tested due to surgery/bilateral upper extremity ROM was WFL (within functional limits)/Left LE ROM was WFL (within functional limits) Due to decreased cognition pt was resistive to movement

## 2022-04-12 NOTE — PROGRESS NOTE ADULT - SUBJECTIVE AND OBJECTIVE BOX
Patient is a 77y old  Female who presents with a chief complaint of fall -> right femoral fracture (2022 08:18)      INTERVAL HPI/OVERNIGHT EVENTS: Patient seen and examined at bedside. No overnight events    MEDICATIONS  (STANDING):  albuterol/ipratropium for Nebulization 3 milliLiter(s) Nebulizer every 12 hours  aspirin enteric coated 81 milliGRAM(s) Oral daily  atorvastatin 20 milliGRAM(s) Oral at bedtime  ceFAZolin   IVPB 2000 milliGRAM(s) IV Intermittent once  cholecalciferol 1000 Unit(s) Oral two times a day  cyanocobalamin 500 MICROGram(s) Oral daily  enoxaparin Injectable 40 milliGRAM(s) SubCutaneous every 24 hours  folic acid 1 milliGRAM(s) Oral daily  levothyroxine Injectable 94 MICROGram(s) IV Push <User Schedule>  melatonin 3 milliGRAM(s) Oral at bedtime  memantine 10 milliGRAM(s) Oral two times a day  mirtazapine 15 milliGRAM(s) Oral at bedtime  multivitamin 1 Tablet(s) Oral daily  sodium chloride 0.9%. 1000 milliLiter(s) (75 mL/Hr) IV Continuous <Continuous>    MEDICATIONS  (PRN):  acetaminophen     Tablet .. 650 milliGRAM(s) Oral every 6 hours PRN Temp greater or equal to 38C (100.4F), Mild Pain (1 - 3)  ALBUTerol    0.083% 2.5 milliGRAM(s) Nebulizer every 2 hours PRN Shortness of Breath and/or Wheezing  aluminum hydroxide/magnesium hydroxide/simethicone Suspension 30 milliLiter(s) Oral every 4 hours PRN Dyspepsia  magnesium hydroxide Suspension 30 milliLiter(s) Oral daily PRN Constipation  morphine  - Injectable 1 milliGRAM(s) IV Push every 6 hours PRN Moderate Pain (4 - 6)  morphine  - Injectable 2 milliGRAM(s) IV Push every 6 hours PRN Severe Pain (7 - 10)  OLANZapine Injectable 2.5 milliGRAM(s) IntraMuscular every 12 hours PRN Agitation  traMADol 50 milliGRAM(s) Oral every 4 hours PRN Mild Pain (1 - 3)      Allergies    No Known Allergies    Intolerances        REVIEW OF SYSTEMS:  Unable to obtain meaningful ROS due to mental status      Vital Signs Last 24 Hrs  T(C): 36.4 (2022 04:58), Max: 36.8 (2022 10:50)  T(F): 97.6 (2022 04:58), Max: 98.3 (2022 10:50)  HR: 52 (2022 07:22) (52 - 96)  BP: 102/51 (2022 04:58) (102/51 - 166/91)  BP(mean): --  RR: 18 (2022 04:58) (8 - 18)  SpO2: 93% (2022 07:22) (93% - 98%)      PHYSICAL EXAM  GENERAL: elderly appearing female in NAD  HEAD: atraumatic, normocephalic   EYES: EOMI, conjunctiva and sclera clear  ENMT: poor dentition  RESPIRATORY:  diminished bs at bases, poor inspiratory effort   CARDIOVASCULAR:  soft ii/vi holosystolic murmur LUSB  GASTROINTESTINAL:  soft, nontender, nondistended, bowel sounds present  EXTREMITIES: no clubbing or cyanosis or edema  MUSCULOSKELETAL:  right leg shortened and externally rotated, ROM limited due to pain  NERVOUS SYSTEM:  sensation intact   PSYCH:  confused and agitated     LABS:                        9.5    13.56 )-----------( 189      ( 2022 07:26 )             28.9     CBC Full  -  ( 2022 07:26 )  WBC Count : 13.56 K/uL  Hemoglobin : 9.5 g/dL  Hematocrit : 28.9 %  Platelet Count - Automated : 189 K/uL  Mean Cell Volume : 96.7 fl  Mean Cell Hemoglobin : 31.8 pg  Mean Cell Hemoglobin Concentration : 32.9 gm/dL  Auto Neutrophil # : 11.72 K/uL  Auto Lymphocyte # : 0.77 K/uL  Auto Monocyte # : 0.87 K/uL  Auto Eosinophil # : 0.00 K/uL  Auto Basophil # : 0.00 K/uL  Auto Neutrophil % : 86.4 %  Auto Lymphocyte % : 5.7 %  Auto Monocyte % : 6.4 %  Auto Eosinophil % : 0.0 %  Auto Basophil % : 0.0 %    2022 07:26    134    |  98     |  36     ----------------------------<  120    4.3     |  28     |  1.61     Ca    8.5        2022 07:26        Urinalysis Basic - ( 10 Apr 2022 12:15 )    Color: Yellow / Appearance: Clear / S.010 / pH: x  Gluc: x / Ketone: Negative  / Bili: Negative / Urobili: Negative mg/dL   Blood: x / Protein: 30 mg/dL / Nitrite: Negative   Leuk Esterase: Trace / RBC: 0-2 /HPF / WBC 3-5   Sq Epi: x / Non Sq Epi: Moderate / Bacteria: Few      CAPILLARY BLOOD GLUCOSE            Culture - Blood (collected 04-10-22 @ 20:26)  Source: .Blood Blood  Preliminary Report (22 @ 21:00):    No growth to date.    Culture - Blood (collected 04-10-22 @ 20:26)  Source: .Blood Blood  Preliminary Report (22 @ 21:00):    No growth to date.    Culture - Urine (collected 04-10-22 @ 20:25)  Source: Catheterized Catheterized  Final Report (22 @ 15:39):    No growth        RADIOLOGY & ADDITIONAL TESTS: < from: US Duplex Venous Lower Ext Complete, Bilateral (22 @ 15:22) >    ACC: [id number] EXAM:  US DPLX LWR EXT VEINS COMPL BI                          PROCEDURE DATE:  2022          INTERPRETATION:  CLINICAL INFORMATION: Elevated d-dimer    COMPARISON: None available.    TECHNIQUE: Duplex sonography of the BILATERAL LOWER extremity veins with   color and spectral Doppler, with and without compression.    FINDINGS:    RIGHT:  Normal compressibility of the RIGHT common femoral, femoral and popliteal   veins.  Doppler examination shows normal spontaneous and phasic flow.  No RIGHT calf vein thrombosis is detected.    LEFT:  Normal compressibility of the LEFT common femoral, femoral and popliteal   veins.  Doppler examination shows normal spontaneous and phasic flow.  No LEFT calf vein thrombosis is detected.    IMPRESSION:  No evidence of deep venous thrombosis in either lower extremity.    --- End of Report ---            BEBETO MARTINO MD; Attending Radiologist  This document has been electronically signed. 2022  3:35PM    < end of copied text >      Consultant(s) Notes Reviewed:  [x] YES  [ ] NO    Care Discussed with [x] Consultants  [x] Patient  [ ] Family  [ ]      [ x] Other; RN  DVT ppx   Patient is a 77y old  Female who presents with a chief complaint of fall -> right femoral fracture (2022 08:18)      INTERVAL HPI/OVERNIGHT EVENTS: Patient seen and examined at bedside. No overnight events    MEDICATIONS  (STANDING):  albuterol/ipratropium for Nebulization 3 milliLiter(s) Nebulizer every 12 hours  aspirin enteric coated 81 milliGRAM(s) Oral daily  atorvastatin 20 milliGRAM(s) Oral at bedtime  ceFAZolin   IVPB 2000 milliGRAM(s) IV Intermittent once  cholecalciferol 1000 Unit(s) Oral two times a day  cyanocobalamin 500 MICROGram(s) Oral daily  enoxaparin Injectable 40 milliGRAM(s) SubCutaneous every 24 hours  folic acid 1 milliGRAM(s) Oral daily  levothyroxine Injectable 94 MICROGram(s) IV Push <User Schedule>  melatonin 3 milliGRAM(s) Oral at bedtime  memantine 10 milliGRAM(s) Oral two times a day  mirtazapine 15 milliGRAM(s) Oral at bedtime  multivitamin 1 Tablet(s) Oral daily  sodium chloride 0.9%. 1000 milliLiter(s) (75 mL/Hr) IV Continuous <Continuous>    MEDICATIONS  (PRN):  acetaminophen     Tablet .. 650 milliGRAM(s) Oral every 6 hours PRN Temp greater or equal to 38C (100.4F), Mild Pain (1 - 3)  ALBUTerol    0.083% 2.5 milliGRAM(s) Nebulizer every 2 hours PRN Shortness of Breath and/or Wheezing  aluminum hydroxide/magnesium hydroxide/simethicone Suspension 30 milliLiter(s) Oral every 4 hours PRN Dyspepsia  magnesium hydroxide Suspension 30 milliLiter(s) Oral daily PRN Constipation  morphine  - Injectable 1 milliGRAM(s) IV Push every 6 hours PRN Moderate Pain (4 - 6)  morphine  - Injectable 2 milliGRAM(s) IV Push every 6 hours PRN Severe Pain (7 - 10)  OLANZapine Injectable 2.5 milliGRAM(s) IntraMuscular every 12 hours PRN Agitation  traMADol 50 milliGRAM(s) Oral every 4 hours PRN Mild Pain (1 - 3)      Allergies    No Known Allergies    Intolerances        REVIEW OF SYSTEMS:  Unable to obtain meaningful ROS due to mental status      Vital Signs Last 24 Hrs  T(C): 36.4 (2022 04:58), Max: 36.8 (2022 10:50)  T(F): 97.6 (2022 04:58), Max: 98.3 (2022 10:50)  HR: 52 (2022 07:22) (52 - 96)  BP: 102/51 (2022 04:58) (102/51 - 166/91)  BP(mean): --  RR: 18 (2022 04:58) (8 - 18)  SpO2: 93% (2022 07:22) (93% - 98%)      PHYSICAL EXAM  GENERAL: elderly appearing female in NAD  HEAD: atraumatic, normocephalic   EYES: EOMI, conjunctiva and sclera clear  ENMT: poor dentition  RESPIRATORY:  diminished bs at bases, poor inspiratory effort   CARDIOVASCULAR:  soft ii/vi holosystolic murmur LUSB  GASTROINTESTINAL:  soft, nontender, nondistended, bowel sounds present  EXTREMITIES: no clubbing or cyanosis or edema  MUSCULOSKELETAL:  dressing c/d/i. ROM LTD due to pain  NERVOUS SYSTEM:  sensation intact   PSYCH:  confused and agitated     LABS:                        9.5    13.56 )-----------( 189      ( 2022 07:26 )             28.9     CBC Full  -  ( 2022 07:26 )  WBC Count : 13.56 K/uL  Hemoglobin : 9.5 g/dL  Hematocrit : 28.9 %  Platelet Count - Automated : 189 K/uL  Mean Cell Volume : 96.7 fl  Mean Cell Hemoglobin : 31.8 pg  Mean Cell Hemoglobin Concentration : 32.9 gm/dL  Auto Neutrophil # : 11.72 K/uL  Auto Lymphocyte # : 0.77 K/uL  Auto Monocyte # : 0.87 K/uL  Auto Eosinophil # : 0.00 K/uL  Auto Basophil # : 0.00 K/uL  Auto Neutrophil % : 86.4 %  Auto Lymphocyte % : 5.7 %  Auto Monocyte % : 6.4 %  Auto Eosinophil % : 0.0 %  Auto Basophil % : 0.0 %    2022 07:26    134    |  98     |  36     ----------------------------<  120    4.3     |  28     |  1.61     Ca    8.5        2022 07:26        Urinalysis Basic - ( 10 Apr 2022 12:15 )    Color: Yellow / Appearance: Clear / S.010 / pH: x  Gluc: x / Ketone: Negative  / Bili: Negative / Urobili: Negative mg/dL   Blood: x / Protein: 30 mg/dL / Nitrite: Negative   Leuk Esterase: Trace / RBC: 0-2 /HPF / WBC 3-5   Sq Epi: x / Non Sq Epi: Moderate / Bacteria: Few      CAPILLARY BLOOD GLUCOSE            Culture - Blood (collected 04-10-22 @ 20:26)  Source: .Blood Blood  Preliminary Report (22 @ 21:00):    No growth to date.    Culture - Blood (collected 04-10-22 @ 20:26)  Source: .Blood Blood  Preliminary Report (22 @ 21:00):    No growth to date.    Culture - Urine (collected 04-10-22 @ 20:25)  Source: Catheterized Catheterized  Final Report (22 @ 15:39):    No growth        RADIOLOGY & ADDITIONAL TESTS: < from: US Duplex Venous Lower Ext Complete, Bilateral (22 @ 15:22) >    ACC: [id number] EXAM:  US DPLX LWR EXT VEINS COMPL BI                          PROCEDURE DATE:  2022          INTERPRETATION:  CLINICAL INFORMATION: Elevated d-dimer    COMPARISON: None available.    TECHNIQUE: Duplex sonography of the BILATERAL LOWER extremity veins with   color and spectral Doppler, with and without compression.    FINDINGS:    RIGHT:  Normal compressibility of the RIGHT common femoral, femoral and popliteal   veins.  Doppler examination shows normal spontaneous and phasic flow.  No RIGHT calf vein thrombosis is detected.    LEFT:  Normal compressibility of the LEFT common femoral, femoral and popliteal   veins.  Doppler examination shows normal spontaneous and phasic flow.  No LEFT calf vein thrombosis is detected.    IMPRESSION:  No evidence of deep venous thrombosis in either lower extremity.    --- End of Report ---            BEBETO MARTINO MD; Attending Radiologist  This document has been electronically signed. 2022  3:35PM    < end of copied text >      Consultant(s) Notes Reviewed:  [x] YES  [ ] NO    Care Discussed with [x] Consultants  [x] Patient  [ ] Family  [ ]      [ x] Other; RN  DVT ppx

## 2022-04-13 LAB
ANION GAP SERPL CALC-SCNC: 6 MMOL/L — SIGNIFICANT CHANGE UP (ref 5–17)
BASOPHILS # BLD AUTO: 0.02 K/UL — SIGNIFICANT CHANGE UP (ref 0–0.2)
BASOPHILS NFR BLD AUTO: 0.2 % — SIGNIFICANT CHANGE UP (ref 0–2)
BLD GP AB SCN SERPL QL: SIGNIFICANT CHANGE UP
BUN SERPL-MCNC: 40 MG/DL — HIGH (ref 7–23)
CALCIUM SERPL-MCNC: 7.9 MG/DL — LOW (ref 8.4–10.5)
CHLORIDE SERPL-SCNC: 103 MMOL/L — SIGNIFICANT CHANGE UP (ref 96–108)
CO2 SERPL-SCNC: 29 MMOL/L — SIGNIFICANT CHANGE UP (ref 22–31)
CREAT SERPL-MCNC: 1.6 MG/DL — HIGH (ref 0.5–1.3)
EGFR: 33 ML/MIN/1.73M2 — LOW
EOSINOPHIL # BLD AUTO: 0.19 K/UL — SIGNIFICANT CHANGE UP (ref 0–0.5)
EOSINOPHIL NFR BLD AUTO: 2.3 % — SIGNIFICANT CHANGE UP (ref 0–6)
FERRITIN SERPL-MCNC: 372 NG/ML — HIGH (ref 15–150)
FOLATE SERPL-MCNC: 18.3 NG/ML — SIGNIFICANT CHANGE UP
GLUCOSE SERPL-MCNC: 82 MG/DL — SIGNIFICANT CHANGE UP (ref 70–99)
HCT VFR BLD CALC: 19.3 % — CRITICAL LOW (ref 34.5–45)
HCT VFR BLD CALC: 20.1 % — CRITICAL LOW (ref 34.5–45)
HCT VFR BLD CALC: 22.2 % — LOW (ref 34.5–45)
HGB BLD-MCNC: 6.2 G/DL — CRITICAL LOW (ref 11.5–15.5)
HGB BLD-MCNC: 6.6 G/DL — CRITICAL LOW (ref 11.5–15.5)
HGB BLD-MCNC: 7.3 G/DL — LOW (ref 11.5–15.5)
IMM GRANULOCYTES NFR BLD AUTO: 1.4 % — SIGNIFICANT CHANGE UP (ref 0–1.5)
IRON SATN MFR SERPL: 16 % — SIGNIFICANT CHANGE UP (ref 14–50)
IRON SATN MFR SERPL: 32 UG/DL — SIGNIFICANT CHANGE UP (ref 30–160)
LYMPHOCYTES # BLD AUTO: 1.16 K/UL — SIGNIFICANT CHANGE UP (ref 1–3.3)
LYMPHOCYTES # BLD AUTO: 13.9 % — SIGNIFICANT CHANGE UP (ref 13–44)
MAGNESIUM SERPL-MCNC: 2.1 MG/DL — SIGNIFICANT CHANGE UP (ref 1.6–2.6)
MCHC RBC-ENTMCNC: 31.2 PG — SIGNIFICANT CHANGE UP (ref 27–34)
MCHC RBC-ENTMCNC: 31.7 PG — SIGNIFICANT CHANGE UP (ref 27–34)
MCHC RBC-ENTMCNC: 32.8 GM/DL — SIGNIFICANT CHANGE UP (ref 32–36)
MCHC RBC-ENTMCNC: 32.9 GM/DL — SIGNIFICANT CHANGE UP (ref 32–36)
MCV RBC AUTO: 94.9 FL — SIGNIFICANT CHANGE UP (ref 80–100)
MCV RBC AUTO: 96.6 FL — SIGNIFICANT CHANGE UP (ref 80–100)
MONOCYTES # BLD AUTO: 0.48 K/UL — SIGNIFICANT CHANGE UP (ref 0–0.9)
MONOCYTES NFR BLD AUTO: 5.7 % — SIGNIFICANT CHANGE UP (ref 2–14)
NEUTROPHILS # BLD AUTO: 6.39 K/UL — SIGNIFICANT CHANGE UP (ref 1.8–7.4)
NEUTROPHILS NFR BLD AUTO: 76.5 % — SIGNIFICANT CHANGE UP (ref 43–77)
NRBC # BLD: 0 /100 WBCS — SIGNIFICANT CHANGE UP (ref 0–0)
NRBC # BLD: 0 /100 WBCS — SIGNIFICANT CHANGE UP (ref 0–0)
PHOSPHATE SERPL-MCNC: 2.6 MG/DL — SIGNIFICANT CHANGE UP (ref 2.5–4.5)
PLATELET # BLD AUTO: 143 K/UL — LOW (ref 150–400)
PLATELET # BLD AUTO: 146 K/UL — LOW (ref 150–400)
POTASSIUM SERPL-MCNC: 3.6 MMOL/L — SIGNIFICANT CHANGE UP (ref 3.5–5.3)
POTASSIUM SERPL-SCNC: 3.6 MMOL/L — SIGNIFICANT CHANGE UP (ref 3.5–5.3)
RBC # BLD: 2.08 M/UL — LOW (ref 3.8–5.2)
RBC # BLD: 2.34 M/UL — LOW (ref 3.8–5.2)
RBC # FLD: 14.5 % — SIGNIFICANT CHANGE UP (ref 10.3–14.5)
RBC # FLD: 15.4 % — HIGH (ref 10.3–14.5)
SODIUM SERPL-SCNC: 138 MMOL/L — SIGNIFICANT CHANGE UP (ref 135–145)
TIBC SERPL-MCNC: 196 UG/DL — LOW (ref 220–430)
UIBC SERPL-MCNC: 165 UG/DL — SIGNIFICANT CHANGE UP (ref 110–370)
VIT B12 SERPL-MCNC: 1363 PG/ML — HIGH (ref 232–1245)
WBC # BLD: 8.09 K/UL — SIGNIFICANT CHANGE UP (ref 3.8–10.5)
WBC # BLD: 8.36 K/UL — SIGNIFICANT CHANGE UP (ref 3.8–10.5)
WBC # FLD AUTO: 8.09 K/UL — SIGNIFICANT CHANGE UP (ref 3.8–10.5)
WBC # FLD AUTO: 8.36 K/UL — SIGNIFICANT CHANGE UP (ref 3.8–10.5)

## 2022-04-13 PROCEDURE — 99233 SBSQ HOSP IP/OBS HIGH 50: CPT

## 2022-04-13 PROCEDURE — 76770 US EXAM ABDO BACK WALL COMP: CPT | Mod: 26

## 2022-04-13 RX ORDER — PANTOPRAZOLE SODIUM 20 MG/1
40 TABLET, DELAYED RELEASE ORAL DAILY
Refills: 0 | Status: DISCONTINUED | OUTPATIENT
Start: 2022-04-13 | End: 2022-04-20

## 2022-04-13 RX ADMIN — MEMANTINE HYDROCHLORIDE 10 MILLIGRAM(S): 10 TABLET ORAL at 06:13

## 2022-04-13 RX ADMIN — PANTOPRAZOLE SODIUM 40 MILLIGRAM(S): 20 TABLET, DELAYED RELEASE ORAL at 11:51

## 2022-04-13 RX ADMIN — Medication 1000 MILLIGRAM(S): at 06:13

## 2022-04-13 RX ADMIN — MEMANTINE HYDROCHLORIDE 10 MILLIGRAM(S): 10 TABLET ORAL at 18:20

## 2022-04-13 RX ADMIN — AMLODIPINE BESYLATE 5 MILLIGRAM(S): 2.5 TABLET ORAL at 06:13

## 2022-04-13 RX ADMIN — Medication 1 MILLIGRAM(S): at 11:49

## 2022-04-13 RX ADMIN — Medication 1000 UNIT(S): at 18:20

## 2022-04-13 RX ADMIN — Medication 188 MICROGRAM(S): at 07:48

## 2022-04-13 RX ADMIN — OXYCODONE HYDROCHLORIDE 2.5 MILLIGRAM(S): 5 TABLET ORAL at 13:20

## 2022-04-13 RX ADMIN — OXYCODONE HYDROCHLORIDE 2.5 MILLIGRAM(S): 5 TABLET ORAL at 12:23

## 2022-04-13 RX ADMIN — Medication 3 MILLILITER(S): at 20:00

## 2022-04-13 RX ADMIN — Medication 1 TABLET(S): at 11:52

## 2022-04-13 RX ADMIN — Medication 1000 MILLIGRAM(S): at 21:30

## 2022-04-13 RX ADMIN — OXYCODONE HYDROCHLORIDE 5 MILLIGRAM(S): 5 TABLET ORAL at 09:50

## 2022-04-13 RX ADMIN — HEPARIN SODIUM 5000 UNIT(S): 5000 INJECTION INTRAVENOUS; SUBCUTANEOUS at 06:13

## 2022-04-13 RX ADMIN — OXYCODONE HYDROCHLORIDE 5 MILLIGRAM(S): 5 TABLET ORAL at 08:52

## 2022-04-13 RX ADMIN — PREGABALIN 500 MICROGRAM(S): 225 CAPSULE ORAL at 11:50

## 2022-04-13 RX ADMIN — Medication 1000 UNIT(S): at 06:13

## 2022-04-13 RX ADMIN — Medication 3 MILLIGRAM(S): at 21:30

## 2022-04-13 RX ADMIN — Medication 3 MILLILITER(S): at 07:48

## 2022-04-13 RX ADMIN — ATORVASTATIN CALCIUM 20 MILLIGRAM(S): 80 TABLET, FILM COATED ORAL at 21:30

## 2022-04-13 RX ADMIN — MIRTAZAPINE 15 MILLIGRAM(S): 45 TABLET, ORALLY DISINTEGRATING ORAL at 21:30

## 2022-04-13 NOTE — PROGRESS NOTE ADULT - SUBJECTIVE AND OBJECTIVE BOX
Date/Time Patient Seen:  		  Referring MD:   Data Reviewed	       Patient is a 77y old  Female who presents with a chief complaint of fall -> right femoral fracture (12 Apr 2022 14:52)      Subjective/HPI     PAST MEDICAL & SURGICAL HISTORY:  Hypertension, unspecified type    Hyperlipidemia, unspecified hyperlipidemia type    Dementia without behavioral disturbance, unspecified dementia type    Hypothyroidism, unspecified type    Hypothyroid    Osteoarthritis    Carotid stenosis    Degenerative joint disease    Hypertension    Hyperlipidemia    Dementia    No significant past surgical history          Medication list         MEDICATIONS  (STANDING):  acetaminophen     Tablet .. 1000 milliGRAM(s) Oral every 8 hours  albuterol/ipratropium for Nebulization 3 milliLiter(s) Nebulizer every 12 hours  amLODIPine   Tablet 5 milliGRAM(s) Oral daily  aspirin enteric coated 81 milliGRAM(s) Oral daily  atorvastatin 20 milliGRAM(s) Oral at bedtime  cholecalciferol 1000 Unit(s) Oral two times a day  cyanocobalamin 500 MICROGram(s) Oral daily  folic acid 1 milliGRAM(s) Oral daily  heparin   Injectable 5000 Unit(s) SubCutaneous every 12 hours  levothyroxine 188 MICROGram(s) Oral daily  melatonin 3 milliGRAM(s) Oral at bedtime  memantine 10 milliGRAM(s) Oral two times a day  mirtazapine 15 milliGRAM(s) Oral at bedtime  multivitamin 1 Tablet(s) Oral daily  sodium chloride 0.9%. 1000 milliLiter(s) (75 mL/Hr) IV Continuous <Continuous>    MEDICATIONS  (PRN):  ALBUTerol    0.083% 2.5 milliGRAM(s) Nebulizer every 2 hours PRN Shortness of Breath and/or Wheezing  aluminum hydroxide/magnesium hydroxide/simethicone Suspension 30 milliLiter(s) Oral every 4 hours PRN Dyspepsia  bisacodyl Suppository 10 milliGRAM(s) Rectal daily PRN If no bowel movement  magnesium hydroxide Suspension 30 milliLiter(s) Oral daily PRN Constipation  oxyCODONE    IR 5 milliGRAM(s) Oral every 8 hours PRN Severe Pain (7 - 10)  oxyCODONE    IR 2.5 milliGRAM(s) Oral every 8 hours PRN Moderate Pain (4 - 6)         Vitals log        ICU Vital Signs Last 24 Hrs  T(C): 36.3 (13 Apr 2022 05:29), Max: 36.8 (12 Apr 2022 10:05)  T(F): 97.3 (13 Apr 2022 05:29), Max: 98.2 (12 Apr 2022 10:05)  HR: 72 (13 Apr 2022 05:29) (52 - 90)  BP: 126/52 (13 Apr 2022 05:29) (99/63 - 126/52)  BP(mean): --  ABP: --  ABP(mean): --  RR: 18 (13 Apr 2022 05:29) (18 - 18)  SpO2: 98% (13 Apr 2022 05:29) (90% - 98%)           Input and Output:  I&O's Detail    11 Apr 2022 07:01  -  12 Apr 2022 07:00  --------------------------------------------------------  IN:    Lactated Ringers: 1200 mL    Lactated Ringers: 500 mL  Total IN: 1700 mL    OUT:    Blood Loss (mL): 50 mL    Voided (mL): 250 mL  Total OUT: 300 mL    Total NET: 1400 mL      12 Apr 2022 07:01  -  13 Apr 2022 06:02  --------------------------------------------------------  IN:    sodium chloride 0.9%: 675 mL  Total IN: 675 mL    OUT:  Total OUT: 0 mL    Total NET: 675 mL          Lab Data                        9.5    13.56 )-----------( 189      ( 12 Apr 2022 07:26 )             28.9     04-12    137  |  99  |  38<H>  ----------------------------<  99  4.3   |  30  |  1.55<H>    Ca    8.1<L>      12 Apr 2022 12:38  Phos  3.9     04-11  Mg     2.0     04-11    TPro  6.6  /  Alb  3.0<L>  /  TBili  0.5  /  DBili  x   /  AST  25  /  ALT  32  /  AlkPhos  71  04-11            Review of Systems	      Objective     Physical Examination  heart s1s2  lung dec BS  abd soft  head nc      Pertinent Lab findings & Imaging      Dominga:  NO   Adequate UO     I&O's Detail    11 Apr 2022 07:01  -  12 Apr 2022 07:00  --------------------------------------------------------  IN:    Lactated Ringers: 1200 mL    Lactated Ringers: 500 mL  Total IN: 1700 mL    OUT:    Blood Loss (mL): 50 mL    Voided (mL): 250 mL  Total OUT: 300 mL    Total NET: 1400 mL      12 Apr 2022 07:01  -  13 Apr 2022 06:02  --------------------------------------------------------  IN:    sodium chloride 0.9%: 675 mL  Total IN: 675 mL    OUT:  Total OUT: 0 mL    Total NET: 675 mL               Discussed with:     Cultures:	        Radiology

## 2022-04-13 NOTE — PROGRESS NOTE ADULT - ASSESSMENT
A/P:    77F with dementia, DJD, HTN/HLD, carotid stenosis, hypothyroidism who presents from [facility] after a fall.  Found to have right femoral neck fracture.      Right femoral neck fracture  - pain control as needed  - POD# 2  - PT/ OT eval appreciated   - Leukocytosis resolved, likely reactive  - HGB 6.6, this morning, repeat with T&S, may need PRBC transfusion.  - Anemia likely due to acute blood loss as did have saturated dressing per RN  - Discussed with Dr. Barajas, ok to hold Heparin     CRISTIANO  - Likely pre-renal  - Cr still elevated  - check US kidney and bladder   - Avoid nephrotoxic meds   - Nephrology consult    Desats  - occurred on admission  - unclear cause, but will continue to monitor  - Pulm input appreciated, D-dimerr elevated but CTA neg for PE  - US LLE neg for DVT  - Titrate off supplemental O2 as tolerated     Hypothermia and bradycardia  - Improved  - Likely in setting of hypothyroidism   - Surveillance blood and urine cultures NGTD  - ID, cardio and endocrine input appreciated    Hypothyroidism  - cont with synthroid  - Endocrine input appreciated     HTN/HLD  - Continue amlodipine 5 mg daily  - Resume losartan once BP improves   - Continue atorvastatin 20 mg daily  - Restart ASA if HGB stable     Dementia  - does not have namenda XR -> can switch to Namenda 10mg BID  - mirtazapine  - Seen by psych and neuro, acute aggression and agitation in setting of hospital acquired delerium    Incidental findings on CT  Cholelithiasis. The 1.4 x 1.6 cm right adrenal nodule and a 2.7 x 2.0 cm left adrenal nodule that likely represent adenomas, are   unchanged. Compression deformities of the T4-T6, T8-T9, and L1-L2 vertebral bodies.   Follow up outpatient for further management     Preventive measures  - On SCD's for DVT ppx due to acute drop in HGB

## 2022-04-13 NOTE — PROGRESS NOTE ADULT - SUBJECTIVE AND OBJECTIVE BOX
Neurology follow up note    OPAL [id number]      Interval History:    Patient feels ok no new complaints.    Allergies    No Known Allergies    Intolerances        MEDICATIONS    acetaminophen     Tablet .. 1000 milliGRAM(s) Oral every 8 hours  ALBUTerol    0.083% 2.5 milliGRAM(s) Nebulizer every 2 hours PRN  albuterol/ipratropium for Nebulization 3 milliLiter(s) Nebulizer every 12 hours  aluminum hydroxide/magnesium hydroxide/simethicone Suspension 30 milliLiter(s) Oral every 4 hours PRN  amLODIPine   Tablet 5 milliGRAM(s) Oral daily  aspirin enteric coated 81 milliGRAM(s) Oral daily  atorvastatin 20 milliGRAM(s) Oral at bedtime  bisacodyl Suppository 10 milliGRAM(s) Rectal daily PRN  cholecalciferol 1000 Unit(s) Oral two times a day  cyanocobalamin 500 MICROGram(s) Oral daily  folic acid 1 milliGRAM(s) Oral daily  heparin   Injectable 5000 Unit(s) SubCutaneous every 12 hours  levothyroxine 188 MICROGram(s) Oral daily  magnesium hydroxide Suspension 30 milliLiter(s) Oral daily PRN  melatonin 3 milliGRAM(s) Oral at bedtime  memantine 10 milliGRAM(s) Oral two times a day  mirtazapine 15 milliGRAM(s) Oral at bedtime  multivitamin 1 Tablet(s) Oral daily  oxyCODONE    IR 5 milliGRAM(s) Oral every 8 hours PRN  oxyCODONE    IR 2.5 milliGRAM(s) Oral every 8 hours PRN  sodium chloride 0.9%. 1000 milliLiter(s) IV Continuous <Continuous>              Vital Signs Last 24 Hrs  T(C): 36.3 (13 Apr 2022 05:29), Max: 36.8 (12 Apr 2022 10:05)  T(F): 97.3 (13 Apr 2022 05:29), Max: 98.2 (12 Apr 2022 10:05)  HR: 72 (13 Apr 2022 05:29) (70 - 90)  BP: 126/52 (13 Apr 2022 05:29) (99/63 - 126/52)  BP(mean): --  RR: 18 (13 Apr 2022 05:29) (18 - 18)  SpO2: 98% (13 Apr 2022 05:29) (90% - 98%)      REVIEW OF SYSTEMS:  Very limited secondary to the patient with underlying dementia, at times could not answer questions accordingly.    PHYSICAL EXAMINATION:   HEENT:  Head:  Normocephalic, atraumatic.  Eyes:  No scleral icterus.  Ears:  Hard to evaluate secondary to the patient could not answers questions accordingly at times.  NECK:  Had slightly increased tone.  RESPIRATORY:  Good air entry bilaterally.  CARDIOVASCULAR:  S1 and S2 heard.  ABDOMEN:  Soft and nontender.  Extremities:  No clubbing or cyanosis were noted.      NEUROLOGIC:  The patient awake and alert.  On and off blink to visual threat.  The patient does appear to have poor vision out of both eyes.  Speech:  The patient would articulate a few words, no dysarthria was noted.  Intact bilateral nasolabial folds.  Motor:  Bilateral upper 4/5.  Right lower was not tested secondary to fracture.  Left lower with plantar stimulation positive flexion of the hip and knee, would say 3+/5.               LABS:  CBC Full  -  ( 12 Apr 2022 07:26 )  WBC Count : 13.56 K/uL  RBC Count : 2.99 M/uL  Hemoglobin : 9.5 g/dL  Hematocrit : 28.9 %  Platelet Count - Automated : 189 K/uL  Mean Cell Volume : 96.7 fl  Mean Cell Hemoglobin : 31.8 pg  Mean Cell Hemoglobin Concentration : 32.9 gm/dL  Auto Neutrophil # : 11.72 K/uL  Auto Lymphocyte # : 0.77 K/uL  Auto Monocyte # : 0.87 K/uL  Auto Eosinophil # : 0.00 K/uL  Auto Basophil # : 0.00 K/uL  Auto Neutrophil % : 86.4 %  Auto Lymphocyte % : 5.7 %  Auto Monocyte % : 6.4 %  Auto Eosinophil % : 0.0 %  Auto Basophil % : 0.0 %      04-12    137  |  99  |  38<H>  ----------------------------<  99  4.3   |  30  |  1.55<H>    Ca    8.1<L>      12 Apr 2022 12:38  Phos  3.9     04-11  Mg     2.0     04-11    TPro  6.6  /  Alb  3.0<L>  /  TBili  0.5  /  DBili  x   /  AST  25  /  ALT  32  /  AlkPhos  71  04-11    Hemoglobin A1C:     LIVER FUNCTIONS - ( 11 Apr 2022 07:32 )  Alb: 3.0 g/dL / Pro: 6.6 g/dL / ALK PHOS: 71 U/L / ALT: 32 U/L DA / AST: 25 U/L / GGT: x           Vitamin B12         RADIOLOGY      ANALYSIS AND PLAN:  This is a 77-year-old with episode of fall.  For episode of fall, what could be ascertained from chart, there appears to be no seizure-like activity or syncopal event, possibly suspect this could be a mechanical fall in nature.  For episode of altered mental status, most likely secondary to dementia becoming more prominent in the hospital setting, Psychiatry followup.  For history of dementia, would recommend to continue the patient on her memantine.  Hyperlipidemia, statin.  For hypothyroidism, Synthroid.  For hypertension, monitor systolic blood pressure consider Seroquel 12.5mg at 8pm if no contraindications       Spoke with daughter, Opal Cedeno, at [phone].  She understands my reasoning and thought process.  While in the hospital setting she may become more disoriented.    Greater than 40 minutes of time was spent with the patient, planning care, reviewing data, speaking to multidisciplinary healthcare team with greater than 50% of time in counseling and care coordination.    Thank you for the courtesy of this consultation.   Neurology follow up note    OPAL [id number]      Interval History:    Patient resting in bed events noted     Allergies    No Known Allergies    Intolerances        MEDICATIONS    acetaminophen     Tablet .. 1000 milliGRAM(s) Oral every 8 hours  ALBUTerol    0.083% 2.5 milliGRAM(s) Nebulizer every 2 hours PRN  albuterol/ipratropium for Nebulization 3 milliLiter(s) Nebulizer every 12 hours  aluminum hydroxide/magnesium hydroxide/simethicone Suspension 30 milliLiter(s) Oral every 4 hours PRN  amLODIPine   Tablet 5 milliGRAM(s) Oral daily  aspirin enteric coated 81 milliGRAM(s) Oral daily  atorvastatin 20 milliGRAM(s) Oral at bedtime  bisacodyl Suppository 10 milliGRAM(s) Rectal daily PRN  cholecalciferol 1000 Unit(s) Oral two times a day  cyanocobalamin 500 MICROGram(s) Oral daily  folic acid 1 milliGRAM(s) Oral daily  heparin   Injectable 5000 Unit(s) SubCutaneous every 12 hours  levothyroxine 188 MICROGram(s) Oral daily  magnesium hydroxide Suspension 30 milliLiter(s) Oral daily PRN  melatonin 3 milliGRAM(s) Oral at bedtime  memantine 10 milliGRAM(s) Oral two times a day  mirtazapine 15 milliGRAM(s) Oral at bedtime  multivitamin 1 Tablet(s) Oral daily  oxyCODONE    IR 5 milliGRAM(s) Oral every 8 hours PRN  oxyCODONE    IR 2.5 milliGRAM(s) Oral every 8 hours PRN  sodium chloride 0.9%. 1000 milliLiter(s) IV Continuous <Continuous>              Vital Signs Last 24 Hrs  T(C): 36.3 (13 Apr 2022 05:29), Max: 36.8 (12 Apr 2022 10:05)  T(F): 97.3 (13 Apr 2022 05:29), Max: 98.2 (12 Apr 2022 10:05)  HR: 72 (13 Apr 2022 05:29) (70 - 90)  BP: 126/52 (13 Apr 2022 05:29) (99/63 - 126/52)  BP(mean): --  RR: 18 (13 Apr 2022 05:29) (18 - 18)  SpO2: 98% (13 Apr 2022 05:29) (90% - 98%)      REVIEW OF SYSTEMS:  Very limited secondary to the patient with underlying dementia, at times could not answer questions accordingly.    PHYSICAL EXAMINATION:   HEENT:  Head:  Normocephalic, atraumatic.  Eyes:  No scleral icterus.  Ears:  Hard to evaluate secondary to the patient could not answers questions accordingly at times.  NECK:  Had slightly increased tone.  RESPIRATORY:  Good air entry bilaterally.  CARDIOVASCULAR:  S1 and S2 heard.  ABDOMEN:  Soft and nontender.  Extremities:  No clubbing or cyanosis were noted.      NEUROLOGIC:  The patient resting in bed  he patient does appear to have poor vision out of both eyes.  Speech:  The patient would articulate a few words, no dysarthria was noted.  Intact bilateral nasolabial folds.  Motor:  Bilateral upper 4/5.  Right lower was not tested secondary to fracture.  Left lower with plantar stimulation positive flexion of the hip and knee, would say 3+/5.               LABS:  CBC Full  -  ( 12 Apr 2022 07:26 )  WBC Count : 13.56 K/uL  RBC Count : 2.99 M/uL  Hemoglobin : 9.5 g/dL  Hematocrit : 28.9 %  Platelet Count - Automated : 189 K/uL  Mean Cell Volume : 96.7 fl  Mean Cell Hemoglobin : 31.8 pg  Mean Cell Hemoglobin Concentration : 32.9 gm/dL  Auto Neutrophil # : 11.72 K/uL  Auto Lymphocyte # : 0.77 K/uL  Auto Monocyte # : 0.87 K/uL  Auto Eosinophil # : 0.00 K/uL  Auto Basophil # : 0.00 K/uL  Auto Neutrophil % : 86.4 %  Auto Lymphocyte % : 5.7 %  Auto Monocyte % : 6.4 %  Auto Eosinophil % : 0.0 %  Auto Basophil % : 0.0 %      04-12    137  |  99  |  38<H>  ----------------------------<  99  4.3   |  30  |  1.55<H>    Ca    8.1<L>      12 Apr 2022 12:38  Phos  3.9     04-11  Mg     2.0     04-11    TPro  6.6  /  Alb  3.0<L>  /  TBili  0.5  /  DBili  x   /  AST  25  /  ALT  32  /  AlkPhos  71  04-11    Hemoglobin A1C:     LIVER FUNCTIONS - ( 11 Apr 2022 07:32 )  Alb: 3.0 g/dL / Pro: 6.6 g/dL / ALK PHOS: 71 U/L / ALT: 32 U/L DA / AST: 25 U/L / GGT: x           Vitamin B12         RADIOLOGY      ANALYSIS AND PLAN:  This is a 77-year-old with episode of fall.  For episode of fall, what could be ascertained from chart, there appears to be no seizure-like activity or syncopal event, possibly suspect this could be a mechanical fall in nature.  For episode of altered mental status, most likely secondary to dementia becoming more prominent in the hospital setting, Psychiatry followup.  For history of dementia, would recommend to continue the patient on her memantine.  Hyperlipidemia, statin.  For hypothyroidism, Synthroid.  For hypertension, monitor systolic blood pressure consider Seroquel 12.5mg at 8pm if no contraindications and if needed   spoke to medical team monitor they attempted to contact family multiple times       Spoke with daughter, Opal Cedeno, at [phone] 4/12.  She understands my reasoning and thought process.  While in the hospital setting she may become more disoriented.    Greater than 31 minutes of time was spent with the patient, planning care, reviewing data, speaking to multidisciplinary healthcare team with greater than 50% of time in counseling and care coordination.    Thank you for the courtesy of this consultation.

## 2022-04-13 NOTE — PROGRESS NOTE ADULT - SUBJECTIVE AND OBJECTIVE BOX
OPAL ARBOLEDA is a 77yFemale , patient examined and chart reviewed.     INTERVAL HPI/ OVERNIGHT EVENTS:   Drop in H/H noted.  Afebrile. NAD.      PAST MEDICAL & SURGICAL HISTORY:  Dementia without behavioral disturbance, unspecified dementia type  Hypothyroid  Osteoarthritis  Carotid stenosis  Degenerative joint disease  Hypertension  Hyperlipidemia      For details regarding the patient's social history, family history, and other miscellaneous elements, please refer the initial infectious diseases consultation and/or the admitting history and physical examination for this admission.    ROS:  Unable to obtain due to : Dementia      Current inpatient medications :    ANTIBIOTICS/RELEVANT:    MEDICATIONS  (STANDING):  acetaminophen     Tablet .. 1000 milliGRAM(s) Oral every 8 hours  albuterol/ipratropium for Nebulization 3 milliLiter(s) Nebulizer every 12 hours  amLODIPine   Tablet 5 milliGRAM(s) Oral daily  atorvastatin 20 milliGRAM(s) Oral at bedtime  cholecalciferol 1000 Unit(s) Oral two times a day  cyanocobalamin 500 MICROGram(s) Oral daily  folic acid 1 milliGRAM(s) Oral daily  levothyroxine 188 MICROGram(s) Oral daily  melatonin 3 milliGRAM(s) Oral at bedtime  memantine 10 milliGRAM(s) Oral two times a day  mirtazapine 15 milliGRAM(s) Oral at bedtime  multivitamin 1 Tablet(s) Oral daily  pantoprazole  Injectable 40 milliGRAM(s) IV Push daily    MEDICATIONS  (PRN):  ALBUTerol    0.083% 2.5 milliGRAM(s) Nebulizer every 2 hours PRN Shortness of Breath and/or Wheezing  aluminum hydroxide/magnesium hydroxide/simethicone Suspension 30 milliLiter(s) Oral every 4 hours PRN Dyspepsia  bisacodyl Suppository 10 milliGRAM(s) Rectal daily PRN If no bowel movement  magnesium hydroxide Suspension 30 milliLiter(s) Oral daily PRN Constipation  oxyCODONE    IR 5 milliGRAM(s) Oral every 8 hours PRN Severe Pain (7 - 10)  oxyCODONE    IR 2.5 milliGRAM(s) Oral every 8 hours PRN Moderate Pain (4 - 6)      Objective:  Vital Signs Last 24 Hrs  T(C): 36.3 (2022 05:29), Max: 36.8 (2022 21:56)  T(F): 97.3 (2022 05:29), Max: 98.2 (2022 21:56)  HR: 86 (2022 08:57) (71 - 90)  BP: 109/60 (2022 08:57) (99/63 - 126/52)  RR: 18 (2022 08:57) (18 - 18)  SpO2: 95% (2022 08:57) (90% - 98%)      Physical Exam:  General:  no acute distress confused  Neck: supple, trachea midline  Lungs: Decreased, no wheeze/rhonchi  Cardiovascular: regular rate and rhythm, S1 S2  Abdomen: soft, nontender,  bowel sounds normal  Neurological: Dementia confused  Skin: no rash  Extremities: right hip drsg c/d/i      LABS:  Complete Blood Count + Automated Diff in AM (22 @ 08:02)    WBC Count: 8.36 K/uL    RBC Count: 2.08 M/uL    Hemoglobin: 6.6: TYPE:(C=Critical, N=Notification, A=Abnormal)     Hematocrit: 20.1: TYPE:(C=Critical, N=Notification, A=Abnormal)         138  |  103  |  40<H>  ----------------------------<  82  3.6   |  29  |  1.60<H>    Ca    7.9<L>      2022 08:02  Phos  2.6     -  Mg     2.1     -13      Urinalysis Basic - ( 10 Apr 2022 12:15 )    Color: Yellow / Appearance: Clear / S.010 / pH: x  Gluc: x / Ketone: Negative  / Bili: Negative / Urobili: Negative mg/dL   Blood: x / Protein: 30 mg/dL / Nitrite: Negative   Leuk Esterase: Trace / RBC: 0-2 /HPF / WBC 3-5   Sq Epi: x / Non Sq Epi: Moderate / Bacteria: Few      MICROBIOLOGY:    Culture - Blood (collected 10 Apr 2022 20:26)  Source: .Blood Blood  Preliminary Report (2022 21:00):    No growth to date.    Culture - Blood (collected 10 Apr 2022 20:26)  Source: .Blood Blood  Preliminary Report (2022 21:00):    No growth to date.    Culture - Urine (collected 10 Apr 2022 20:25)  Source: Catheterized Catheterized  Final Report (2022 15:39):    No growth        RADIOLOGY & ADDITIONAL STUDIES:    ACC: [id number] EXAM:  CT ANGIO CHEST PULM ART WAWIC                          *** ADDENDUM***    Addendum:    The sternum is angled anteriorly inferiorly, secondary to prior sternal   fracture. This, however, is new since 6/3/2020.    --- End of Report ---    *** END OF ADDENDUM***      PROCEDURE DATE:  04/10/2022          INTERPRETATION:  CTA CHEST    INDICATION: Elevated d-dimer.. Evaluate for pulmonary embolus.    TECHNIQUE: Enhanced helical images were obtained of the chest. Coronal   and sagittal images were reconstructed.  Images were obtained after the   uneventful administration of 90 cc of nonionic intravenous contrast   (Omnipaque 350). 10 cc of nonionic intravenous contrast (Omnipaque 350)   was discarded. Maximum intensity projection images were generated.    COMPARISON: Radiograph chest 2022. CT chest 6/3/2020.    FINDINGS:    Pulmonary Artery:  There is no main, central, lobar, or proximal   segmental pulmonary embolus. The mid to distal segmental and subsegmental   divisions are not well evaluated secondary to respiratory motion artifact.    Tubes/Lines: None.    Lungs, airways and pleura: Emphysema. Right upper lobe, left upper lobe   and bilateral lower lobe linear opacities are likely due to atelectasis.   No pleural effusion. No pneumothorax. The airways are unremarkable   allowing for respiratory motion.    Mediastinum: The thyroid gland is not well evaluated. There are no   enlarged chest lymph nodes. Hiatal hernia.    The atria are likely enlarged. Renal heart is normal in size. Mitral   annular calcification. Coronary artery calcifications. No pericardial   effusion.    The ascending aorta is tortuous and ectatic. The descending thoracic   aorta measures 3.4 cm at the main pulmonary artery. And 3.8 x3.5 cm,   just proximal to the diaphragmatic hiatus. Atheromatous disease of the   aorta.    Upper Abdomen: Cholelithiasis. The 1.4 x 1.6 cm right adrenal nodule and   a 2.7 x 2.0 cm left adrenal nodule that likely represent adenomas, are   unchanged.    Bones And Soft Tissues: Degenerative change of the spine. Compression   deformities of the T4-T6, T8-T9, and L1-L2 vertebral bodies.  The soft   tissues are unremarkable.      IMPRESSION:    1.  No pulmonary embolus, however, the mid to distal segmental and   subsegmental divisions are not well evaluated secondary to respiratory   motion.  2.  Compression deformities of the thoracic and lumbar spine.      Assessment :   76YO F resident of  [facility] [place], DJD, HTN/HLD, carotid stenosis, hypothyroidism admitted sp fall and right hip pain found to have right displaced femoral neck fracture.    SP ORIF, Right hip, using trochanteric nail 10-Apr-2022   Sp hypothermic. UA neg CXR neg   DDimer elevated. CTA chest neg for PE and pneumonia  Doubt infectious process Cultures ngtd  TSH elevated  Anemia  CRISTIANO- renal bladder ultrasound noted- no retention    Plan :   Monitor off antibiotics  Trend temps and cbc, H/h and renal fx  Asp precautions  Pulm toileting  Increase activity per ortho  Stable from ID standpoint    Continue with present regiment.  Appropriate use of antibiotics and adverse effects reviewed.      > 35 minutes were spent in direct patient care reviewing notes, medications ,labs data/ imaging , discussion with multidisciplinary team.    Thank you for allowing me to participate in care of your patient .    Radha Pryor MD  Infectious Disease  [phone]

## 2022-04-13 NOTE — CHART NOTE - NSCHARTNOTEFT_GEN_A_CORE
Repeat HGB 6.2, personally reexamined dressing with minimal saturation. Will order occult, iron studies, b12, folate. Will give 1 unit PRBC STAT and recheck HGB in afternoon. If drops further, will check CT A/P given concern for RP bleed. Repeat HGB 6.2, personally reexamined dressing with minimal saturation. Will order occult, iron studies, b12, folate. Will give 1 unit PRBC STAT and recheck HGB in afternoon. If drops further, will check CT A/P given concern for RP bleed. Attempted to call daughter multiple times with no response

## 2022-04-13 NOTE — PROGRESS NOTE ADULT - SUBJECTIVE AND OBJECTIVE BOX
Patient is a 77y old  Female who presents with a chief complaint of fall -> right femoral fracture (13 Apr 2022 07:24)      INTERVAL HPI/OVERNIGHT EVENTS: Patient seen and examined at bedside. No overnight events. Less agitated today, Hemoglobin 6.6 this morning. Cr 1.6    MEDICATIONS  (STANDING):  acetaminophen     Tablet .. 1000 milliGRAM(s) Oral every 8 hours  albuterol/ipratropium for Nebulization 3 milliLiter(s) Nebulizer every 12 hours  amLODIPine   Tablet 5 milliGRAM(s) Oral daily  atorvastatin 20 milliGRAM(s) Oral at bedtime  cholecalciferol 1000 Unit(s) Oral two times a day  cyanocobalamin 500 MICROGram(s) Oral daily  folic acid 1 milliGRAM(s) Oral daily  levothyroxine 188 MICROGram(s) Oral daily  melatonin 3 milliGRAM(s) Oral at bedtime  memantine 10 milliGRAM(s) Oral two times a day  mirtazapine 15 milliGRAM(s) Oral at bedtime  multivitamin 1 Tablet(s) Oral daily    MEDICATIONS  (PRN):  ALBUTerol    0.083% 2.5 milliGRAM(s) Nebulizer every 2 hours PRN Shortness of Breath and/or Wheezing  aluminum hydroxide/magnesium hydroxide/simethicone Suspension 30 milliLiter(s) Oral every 4 hours PRN Dyspepsia  bisacodyl Suppository 10 milliGRAM(s) Rectal daily PRN If no bowel movement  magnesium hydroxide Suspension 30 milliLiter(s) Oral daily PRN Constipation  oxyCODONE    IR 5 milliGRAM(s) Oral every 8 hours PRN Severe Pain (7 - 10)  oxyCODONE    IR 2.5 milliGRAM(s) Oral every 8 hours PRN Moderate Pain (4 - 6)      Allergies    No Known Allergies    Intolerances        REVIEW OF SYSTEMS:  Unable to obtain meaningful ROS due to mental status      Vital Signs Last 24 Hrs  T(C): 36.3 (13 Apr 2022 05:29), Max: 36.8 (12 Apr 2022 10:05)  T(F): 97.3 (13 Apr 2022 05:29), Max: 98.2 (12 Apr 2022 10:05)  HR: 86 (13 Apr 2022 08:57) (70 - 90)  BP: 109/60 (13 Apr 2022 08:57) (99/63 - 126/52)  BP(mean): --  RR: 18 (13 Apr 2022 08:57) (18 - 18)  SpO2: 95% (13 Apr 2022 08:57) (90% - 98%)    PHYSICAL EXAM  GENERAL: elderly appearing female in NAD  HEAD: atraumatic, normocephalic   EYES: EOMI, conjunctiva and sclera clear  ENMT: poor dentition  RESPIRATORY:  diminished bs at bases, poor inspiratory effort   CARDIOVASCULAR:  soft ii/vi holosystolic murmur LUSB  GASTROINTESTINAL:  soft, nontender, nondistended, bowel sounds present  EXTREMITIES: no clubbing or cyanosis or edema  MUSCULOSKELETAL:  dressing c/d/i. ROM LTD due to pain  NERVOUS SYSTEM:  sensation intact   PSYCH:  confused and agitated  SKIN: Multiple ecchymotic areas on skin     LABS:                        6.6    8.36  )-----------( 143      ( 13 Apr 2022 08:02 )             20.1     CBC Full  -  ( 13 Apr 2022 08:02 )  WBC Count : 8.36 K/uL  Hemoglobin : 6.6 g/dL  Hematocrit : 20.1 %  Platelet Count - Automated : 143 K/uL  Mean Cell Volume : 96.6 fl  Mean Cell Hemoglobin : 31.7 pg  Mean Cell Hemoglobin Concentration : 32.8 gm/dL  Auto Neutrophil # : 6.39 K/uL  Auto Lymphocyte # : 1.16 K/uL  Auto Monocyte # : 0.48 K/uL  Auto Eosinophil # : 0.19 K/uL  Auto Basophil # : 0.02 K/uL  Auto Neutrophil % : 76.5 %  Auto Lymphocyte % : 13.9 %  Auto Monocyte % : 5.7 %  Auto Eosinophil % : 2.3 %  Auto Basophil % : 0.2 %    13 Apr 2022 08:02    138    |  103    |  40     ----------------------------<  82     3.6     |  29     |  1.60     Ca    7.9        13 Apr 2022 08:02  Phos  2.6       13 Apr 2022 08:10  Mg     2.1       13 Apr 2022 08:10          CAPILLARY BLOOD GLUCOSE            Culture - Blood (collected 04-10-22 @ 20:26)  Source: .Blood Blood  Preliminary Report (04-11-22 @ 21:00):    No growth to date.    Culture - Blood (collected 04-10-22 @ 20:26)  Source: .Blood Blood  Preliminary Report (04-11-22 @ 21:00):    No growth to date.    Culture - Urine (collected 04-10-22 @ 20:25)  Source: Catheterized Catheterized  Final Report (04-11-22 @ 15:39):    No growth        RADIOLOGY & ADDITIONAL TESTS:     Consultant(s) Notes Reviewed:  [x] YES  [ ] NO    Care Discussed with [x] Consultants  [x] Patient  [ ] Family  [ ]      [ x] Other; RN  DVT ppx

## 2022-04-13 NOTE — PROGRESS NOTE ADULT - SUBJECTIVE AND OBJECTIVE BOX
ORTHOPEDIC PA PROGRESS NOTE  OPAL ARBOLEDA      77y Female                                 SY 1EST 108 W1                                                                                                                           POD #    3d    STATUS POST:       Procedure: ORIF, hip, using trochanteric nail sp fall               Patient seen and examined at bedside.      Current Pain Management:    acetaminophen     Tablet .. 1000 milliGRAM(s) Oral every 8 hours  melatonin 3 milliGRAM(s) Oral at bedtime  memantine 10 milliGRAM(s) Oral two times a day  mirtazapine 15 milliGRAM(s) Oral at bedtime  oxyCODONE    IR 5 milliGRAM(s) Oral every 8 hours PRN  oxyCODONE    IR 2.5 milliGRAM(s) Oral every 8 hours PRN      T(F): 97.3  HR: 72  BP: 126/52  RR: 18  SpO2: 98%               04-12-22 @ 07:01  -  04-13-22 @ 07:00  --------------------------------------------------------  IN:    sodium chloride 0.9%: 1575 mL  Total IN: 1575 mL    OUT:  Total OUT: 0 mL    Total NET: 1575 mL        Physical Exam :    -   Dressing  C/D/I.   -   Distal Neurvascular status intact grossly.   -   Warm well perfused; capillary refill <3 seconds   -   (+)EHL/FHL   -   (+) Sensation to light touch  -   (-) Calf tenderness Bilaterally      A/P: 77y Female s/p ORIF, hip, using trochanteric nail       -   Ortho Stable  -   Pain control:  acetaminophen     Tablet .. 1000 milliGRAM(s) Oral every 8 hours  melatonin 3 milliGRAM(s) Oral at bedtime  memantine 10 milliGRAM(s) Oral two times a day  mirtazapine 15 milliGRAM(s) Oral at bedtime  oxyCODONE    IR 5 milliGRAM(s) Oral every 8 hours PRN  oxyCODONE    IR 2.5 milliGRAM(s) Oral every 8 hours PRN    -   Medicine to follow  -   DVT ppx:    PAS +  heparin   Injectable: 5000 Unit(s) SubCutaneous, heparin   Injectable: 5000 Unit(s) SubCutaneous, aspirin enteric coated: 81 milliGRAM(s) Oral  -   PT/OT OOB,  Weight bearing status: WBAT   -  Dispo:  TBD

## 2022-04-13 NOTE — PROGRESS NOTE ADULT - SUBJECTIVE AND OBJECTIVE BOX
Chief Complaint: Fall    Interval Events: No events overnight.    Review of Systems:  General: No fevers, chills, weight gain  Skin: No rashes, color changes  Cardiovascular: No chest pain, orthopnea  Respiratory: No shortness of breath, cough  Gastrointestinal: No nausea, abdominal pain  Genitourinary: No incontinence, pain with urination  Musculoskeletal: No pain, swelling, decreased range of motion  Neurological: No headache, weakness  Psychiatric: No depression, anxiety  Endocrine: No weight gain, increased thirst  All other systems are comprehensively negative.    Physical Exam:  Vital Signs Last 24 Hrs  T(C): 36.3 (13 Apr 2022 05:29), Max: 36.8 (12 Apr 2022 10:05)  T(F): 97.3 (13 Apr 2022 05:29), Max: 98.2 (12 Apr 2022 10:05)  HR: 80 (13 Apr 2022 07:57) (70 - 90)  BP: 126/52 (13 Apr 2022 05:29) (99/63 - 126/52)  BP(mean): --  RR: 18 (13 Apr 2022 05:29) (18 - 18)  SpO2: 94% (13 Apr 2022 07:57) (90% - 98%)  General: NAD  HEENT: MMM  Neck: No JVD, no carotid bruit  Lungs: CTAB  CV: RRR, nl S1/S2, 2/6 systolic murmur  Abdomen: S/NT/ND, +BS  Extremities: No LE edema, no cyanosis  Neuro: AAOx1  Skin: No rash    Labs:    04-12    137  |  99  |  38<H>  ----------------------------<  99  4.3   |  30  |  1.55<H>    Ca    8.1<L>      12 Apr 2022 12:38                          9.5    13.56 )-----------( 189      ( 12 Apr 2022 07:26 )             28.9         Telemetry: Sinus rhythm

## 2022-04-13 NOTE — CONSULT NOTE ADULT - SUBJECTIVE AND OBJECTIVE BOX
HPI:  Patient is a 77y old  Female admitted on 4/10 after a fall that resulted on R fem Fx. Hospitalization was complicated by CRISTIANO within 48h of CT angio worsened by acute blood loss anemia following hip repair on .   Cr has stabilized last 24h. She is somnolent but easily aroused. Receiving blood.   Renal evaluation requested to address CRISTIANO.           PAST MEDICAL & SURGICAL HISTORY:  Dementia without behavioral disturbance, unspecified dementia type    Hypothyroid    Osteoarthritis    Carotid stenosis    Degenerative joint disease    Hypertension    Hyperlipidemia    No significant past surgical history        Allergies    No Known Allergies          MEDICATIONS  (STANDING):  acetaminophen     Tablet .. 1000 milliGRAM(s) Oral every 8 hours  albuterol/ipratropium for Nebulization 3 milliLiter(s) Nebulizer every 12 hours  amLODIPine   Tablet 5 milliGRAM(s) Oral daily  atorvastatin 20 milliGRAM(s) Oral at bedtime  cholecalciferol 1000 Unit(s) Oral two times a day  cyanocobalamin 500 MICROGram(s) Oral daily  folic acid 1 milliGRAM(s) Oral daily  levothyroxine 188 MICROGram(s) Oral daily  melatonin 3 milliGRAM(s) Oral at bedtime  memantine 10 milliGRAM(s) Oral two times a day  mirtazapine 15 milliGRAM(s) Oral at bedtime  multivitamin 1 Tablet(s) Oral daily  pantoprazole  Injectable 40 milliGRAM(s) IV Push daily    MEDICATIONS  (PRN):  ALBUTerol    0.083% 2.5 milliGRAM(s) Nebulizer every 2 hours PRN Shortness of Breath and/or Wheezing  aluminum hydroxide/magnesium hydroxide/simethicone Suspension 30 milliLiter(s) Oral every 4 hours PRN Dyspepsia  bisacodyl Suppository 10 milliGRAM(s) Rectal daily PRN If no bowel movement  magnesium hydroxide Suspension 30 milliLiter(s) Oral daily PRN Constipation  oxyCODONE    IR 5 milliGRAM(s) Oral every 8 hours PRN Severe Pain (7 - 10)  oxyCODONE    IR 2.5 milliGRAM(s) Oral every 8 hours PRN Moderate Pain (4 - 6)      Daily     Daily Weight in k.4 (2022 05:29)    Drug Dosing Weight  Height (cm): 170 (2022 17:02)  Weight (kg): 73 (2022 17:02)  BMI (kg/m2): 25.3 (2022 17:02)  BSA (m2): 1.84 (2022 17:02)      REVIEW OF SYSTEMS:    Per HPI            I&O's Detail    2022 07:01  -  2022 07:00  --------------------------------------------------------  IN:    sodium chloride 0.9%: 1575 mL  Total IN: 1575 mL    OUT:  Total OUT: 0 mL    Total NET: 1575 mL          0412 @ 07:01  -  04-13 @ 07:00  --------------------------------------------------------  IN: 1575 mL / OUT: 0 mL / NET: 1575 mL        PHYSICAL EXAM:    GENERAL: somnolent, confused.   ENMT: dry mucous membranes.   CHEST/LUNG: Clear to auscultation bilaterally  HEART: Regular rate and rhythm. No murmurs, rubs, or gallops  ABDOMEN: Soft, Nontender, Nondistended. POS BS  EXTREMITIES:  no pedal edema    LABS:  CBC Full  -  ( 2022 10:46 )  WBC Count : x  RBC Count : x  Hemoglobin : 6.2 g/dL  Hematocrit : 19.3 %  Platelet Count - Automated : x  Mean Cell Volume : x  Mean Cell Hemoglobin : x  Mean Cell Hemoglobin Concentration : x  Auto Neutrophil # : x  Auto Lymphocyte # : x  Auto Monocyte # : x  Auto Eosinophil # : x  Auto Basophil # : x  Auto Neutrophil % : x  Auto Lymphocyte % : x  Auto Monocyte % : x  Auto Eosinophil % : x  Auto Basophil % : x        138  |  103  |  40<H>  ----------------------------<  82  3.6   |  29  |  1.60<H>    Ca    7.9<L>      2022 08:02  Phos  2.6     04  Mg     2.1           CAPILLARY BLOOD GLUCOSE        Impression:  * CRISTIANO -- contrast ATN complicated by mild hypotension, blood loss  * Acute blood loss anemia  * POD #2 hip ORIF    Recommendations:   * Transfuse to Hgb > 8  * Hold Amlodipine  * Check bladder scan  * BMP in 24h.

## 2022-04-13 NOTE — PROGRESS NOTE ADULT - ASSESSMENT
77F with dementia, DJD, HTN/HLD, carotid stenosis, hypothyroidism who presents from [facility] after a fall.     fall  hip fx  OP  OA  dementia  Hiatal hernia  Emphysema - ex smoker - known COPD -   HTN  HLD  Thyroid disease    right hip ORIF - POD 2  vs noted  wean o2 as tolerated -   CRISTIANO management in progress - am Cr pending    fall prec - assist with needs - ADL -   Ortho eval in progress - marisol op eval and optimization in progress - plan for ORIF  Hypoxemia - 02 support - keep sat > 88 pct  Emphysema - COPD - s/p steroids - s/p nebs - may need additional Nebulized therapy - has Inhaler on order   Card eval in progress - planned for TTE  monitor VS and HD and Sat  o2 supplementation as needed - keep sat > 88 pct  old records reviewed  monitor for needs  eventual GOC discussion

## 2022-04-14 LAB
ANION GAP SERPL CALC-SCNC: 7 MMOL/L — SIGNIFICANT CHANGE UP (ref 5–17)
APTT BLD: 29.4 SEC — SIGNIFICANT CHANGE UP (ref 27.5–35.5)
BASOPHILS # BLD AUTO: 0.02 K/UL — SIGNIFICANT CHANGE UP (ref 0–0.2)
BASOPHILS NFR BLD AUTO: 0.3 % — SIGNIFICANT CHANGE UP (ref 0–2)
BUN SERPL-MCNC: 28 MG/DL — HIGH (ref 7–23)
CALCIUM SERPL-MCNC: 7.9 MG/DL — LOW (ref 8.4–10.5)
CHLORIDE SERPL-SCNC: 103 MMOL/L — SIGNIFICANT CHANGE UP (ref 96–108)
CO2 SERPL-SCNC: 29 MMOL/L — SIGNIFICANT CHANGE UP (ref 22–31)
CREAT SERPL-MCNC: 1.17 MG/DL — SIGNIFICANT CHANGE UP (ref 0.5–1.3)
EGFR: 47 ML/MIN/1.73M2 — LOW
EOSINOPHIL # BLD AUTO: 0.19 K/UL — SIGNIFICANT CHANGE UP (ref 0–0.5)
EOSINOPHIL NFR BLD AUTO: 2.6 % — SIGNIFICANT CHANGE UP (ref 0–6)
GLUCOSE SERPL-MCNC: 83 MG/DL — SIGNIFICANT CHANGE UP (ref 70–99)
HCT VFR BLD CALC: 19.6 % — CRITICAL LOW (ref 34.5–45)
HCT VFR BLD CALC: 25.3 % — LOW (ref 34.5–45)
HGB BLD-MCNC: 6.5 G/DL — CRITICAL LOW (ref 11.5–15.5)
HGB BLD-MCNC: 7.9 G/DL — LOW (ref 11.5–15.5)
IMM GRANULOCYTES NFR BLD AUTO: 1.4 % — SIGNIFICANT CHANGE UP (ref 0–1.5)
INR BLD: 1.05 RATIO — SIGNIFICANT CHANGE UP (ref 0.88–1.16)
LYMPHOCYTES # BLD AUTO: 0.93 K/UL — LOW (ref 1–3.3)
LYMPHOCYTES # BLD AUTO: 12.9 % — LOW (ref 13–44)
MAGNESIUM SERPL-MCNC: 2.3 MG/DL — SIGNIFICANT CHANGE UP (ref 1.6–2.6)
MCHC RBC-ENTMCNC: 30.9 PG — SIGNIFICANT CHANGE UP (ref 27–34)
MCHC RBC-ENTMCNC: 31.2 GM/DL — LOW (ref 32–36)
MCHC RBC-ENTMCNC: 31.6 PG — SIGNIFICANT CHANGE UP (ref 27–34)
MCHC RBC-ENTMCNC: 33.2 GM/DL — SIGNIFICANT CHANGE UP (ref 32–36)
MCV RBC AUTO: 95.1 FL — SIGNIFICANT CHANGE UP (ref 80–100)
MCV RBC AUTO: 98.8 FL — SIGNIFICANT CHANGE UP (ref 80–100)
MONOCYTES # BLD AUTO: 0.47 K/UL — SIGNIFICANT CHANGE UP (ref 0–0.9)
MONOCYTES NFR BLD AUTO: 6.5 % — SIGNIFICANT CHANGE UP (ref 2–14)
NEUTROPHILS # BLD AUTO: 5.51 K/UL — SIGNIFICANT CHANGE UP (ref 1.8–7.4)
NEUTROPHILS NFR BLD AUTO: 76.3 % — SIGNIFICANT CHANGE UP (ref 43–77)
NRBC # BLD: 0 /100 WBCS — SIGNIFICANT CHANGE UP (ref 0–0)
NRBC # BLD: 0 /100 WBCS — SIGNIFICANT CHANGE UP (ref 0–0)
OB PNL STL: NEGATIVE — SIGNIFICANT CHANGE UP
PHOSPHATE SERPL-MCNC: 2.1 MG/DL — LOW (ref 2.5–4.5)
PLATELET # BLD AUTO: 161 K/UL — SIGNIFICANT CHANGE UP (ref 150–400)
PLATELET # BLD AUTO: 169 K/UL — SIGNIFICANT CHANGE UP (ref 150–400)
POTASSIUM SERPL-MCNC: 3.5 MMOL/L — SIGNIFICANT CHANGE UP (ref 3.5–5.3)
POTASSIUM SERPL-SCNC: 3.5 MMOL/L — SIGNIFICANT CHANGE UP (ref 3.5–5.3)
PROTHROM AB SERPL-ACNC: 12.4 SEC — SIGNIFICANT CHANGE UP (ref 10.5–13.4)
RBC # BLD: 2.06 M/UL — LOW (ref 3.8–5.2)
RBC # BLD: 2.56 M/UL — LOW (ref 3.8–5.2)
RBC # FLD: 15.5 % — HIGH (ref 10.3–14.5)
RBC # FLD: 16 % — HIGH (ref 10.3–14.5)
SODIUM SERPL-SCNC: 139 MMOL/L — SIGNIFICANT CHANGE UP (ref 135–145)
WBC # BLD: 7.22 K/UL — SIGNIFICANT CHANGE UP (ref 3.8–10.5)
WBC # BLD: 7.45 K/UL — SIGNIFICANT CHANGE UP (ref 3.8–10.5)
WBC # FLD AUTO: 7.22 K/UL — SIGNIFICANT CHANGE UP (ref 3.8–10.5)
WBC # FLD AUTO: 7.45 K/UL — SIGNIFICANT CHANGE UP (ref 3.8–10.5)

## 2022-04-14 PROCEDURE — 99223 1ST HOSP IP/OBS HIGH 75: CPT

## 2022-04-14 PROCEDURE — 99233 SBSQ HOSP IP/OBS HIGH 50: CPT

## 2022-04-14 PROCEDURE — 73700 CT LOWER EXTREMITY W/O DYE: CPT | Mod: 26,RT

## 2022-04-14 PROCEDURE — 74176 CT ABD & PELVIS W/O CONTRAST: CPT | Mod: 26

## 2022-04-14 PROCEDURE — 99497 ADVNCD CARE PLAN 30 MIN: CPT

## 2022-04-14 RX ORDER — POTASSIUM PHOSPHATE, MONOBASIC POTASSIUM PHOSPHATE, DIBASIC 236; 224 MG/ML; MG/ML
30 INJECTION, SOLUTION INTRAVENOUS ONCE
Refills: 0 | Status: COMPLETED | OUTPATIENT
Start: 2022-04-14 | End: 2022-04-14

## 2022-04-14 RX ADMIN — POTASSIUM PHOSPHATE, MONOBASIC POTASSIUM PHOSPHATE, DIBASIC 83.33 MILLIMOLE(S): 236; 224 INJECTION, SOLUTION INTRAVENOUS at 16:14

## 2022-04-14 RX ADMIN — PREGABALIN 500 MICROGRAM(S): 225 CAPSULE ORAL at 12:18

## 2022-04-14 RX ADMIN — OXYCODONE HYDROCHLORIDE 5 MILLIGRAM(S): 5 TABLET ORAL at 11:42

## 2022-04-14 RX ADMIN — MIRTAZAPINE 15 MILLIGRAM(S): 45 TABLET, ORALLY DISINTEGRATING ORAL at 21:16

## 2022-04-14 RX ADMIN — Medication 1000 UNIT(S): at 21:16

## 2022-04-14 RX ADMIN — MEMANTINE HYDROCHLORIDE 10 MILLIGRAM(S): 10 TABLET ORAL at 21:16

## 2022-04-14 RX ADMIN — Medication 3 MILLILITER(S): at 19:30

## 2022-04-14 RX ADMIN — Medication 1000 UNIT(S): at 05:21

## 2022-04-14 RX ADMIN — Medication 3 MILLILITER(S): at 06:55

## 2022-04-14 RX ADMIN — OXYCODONE HYDROCHLORIDE 5 MILLIGRAM(S): 5 TABLET ORAL at 20:00

## 2022-04-14 RX ADMIN — Medication 1000 MILLIGRAM(S): at 05:21

## 2022-04-14 RX ADMIN — Medication 1000 MILLIGRAM(S): at 05:29

## 2022-04-14 RX ADMIN — Medication 1000 MILLIGRAM(S): at 13:18

## 2022-04-14 RX ADMIN — Medication 188 MICROGRAM(S): at 05:21

## 2022-04-14 RX ADMIN — Medication 1 MILLIGRAM(S): at 12:18

## 2022-04-14 RX ADMIN — MEMANTINE HYDROCHLORIDE 10 MILLIGRAM(S): 10 TABLET ORAL at 05:22

## 2022-04-14 RX ADMIN — OXYCODONE HYDROCHLORIDE 5 MILLIGRAM(S): 5 TABLET ORAL at 21:16

## 2022-04-14 RX ADMIN — OXYCODONE HYDROCHLORIDE 5 MILLIGRAM(S): 5 TABLET ORAL at 11:12

## 2022-04-14 RX ADMIN — Medication 3 MILLIGRAM(S): at 21:16

## 2022-04-14 RX ADMIN — Medication 1000 MILLIGRAM(S): at 23:26

## 2022-04-14 RX ADMIN — ATORVASTATIN CALCIUM 20 MILLIGRAM(S): 80 TABLET, FILM COATED ORAL at 21:16

## 2022-04-14 RX ADMIN — PANTOPRAZOLE SODIUM 40 MILLIGRAM(S): 20 TABLET, DELAYED RELEASE ORAL at 13:18

## 2022-04-14 RX ADMIN — Medication 1 TABLET(S): at 12:20

## 2022-04-14 NOTE — CONSULT NOTE ADULT - CONVERSATION DETAILS
Reviewed patient's medical and social history as well as events leading to patient's hospitalization. Writer discussed patient's current diagnosis (hip fracture s/p fall, anemia requiring blood transfusion), medical condition and management, and prognosis. Currently pt would benefit aggressive management until etiology of anemia is understood. If no meaningful recovery expected, pt in that case would qualify for hospice. Will continue current management and reaccess until pt's condition evolves. Psychosocial support provided.    Due to patient's health status and restrictions on visitations during this Public health emergency, advanced care planning discussion was performed via telephone.

## 2022-04-14 NOTE — PROGRESS NOTE ADULT - SUBJECTIVE AND OBJECTIVE BOX
Patient is a 77y old  Female who presents with a chief complaint of fall -> right femoral fracture (13 Apr 2022 13:43)      INTERVAL HPI/OVERNIGHT EVENTS: Patient seen and examined at bedside. No overnight events. Post transfusion Hemoglobin 7.3    MEDICATIONS  (STANDING):  acetaminophen     Tablet .. 1000 milliGRAM(s) Oral every 8 hours  albuterol/ipratropium for Nebulization 3 milliLiter(s) Nebulizer every 12 hours  atorvastatin 20 milliGRAM(s) Oral at bedtime  cholecalciferol 1000 Unit(s) Oral two times a day  cyanocobalamin 500 MICROGram(s) Oral daily  folic acid 1 milliGRAM(s) Oral daily  levothyroxine 188 MICROGram(s) Oral daily  melatonin 3 milliGRAM(s) Oral at bedtime  memantine 10 milliGRAM(s) Oral two times a day  mirtazapine 15 milliGRAM(s) Oral at bedtime  multivitamin 1 Tablet(s) Oral daily  pantoprazole  Injectable 40 milliGRAM(s) IV Push daily    MEDICATIONS  (PRN):  ALBUTerol    0.083% 2.5 milliGRAM(s) Nebulizer every 2 hours PRN Shortness of Breath and/or Wheezing  aluminum hydroxide/magnesium hydroxide/simethicone Suspension 30 milliLiter(s) Oral every 4 hours PRN Dyspepsia  bisacodyl Suppository 10 milliGRAM(s) Rectal daily PRN If no bowel movement  magnesium hydroxide Suspension 30 milliLiter(s) Oral daily PRN Constipation  oxyCODONE    IR 5 milliGRAM(s) Oral every 8 hours PRN Severe Pain (7 - 10)  oxyCODONE    IR 2.5 milliGRAM(s) Oral every 8 hours PRN Moderate Pain (4 - 6)      Allergies    No Known Allergies    Intolerances        REVIEW OF SYSTEMS:  Unable to obtain meaningful ROS due to mental status      Vital Signs Last 24 Hrs  T(C): 36.7 (14 Apr 2022 05:26), Max: 36.9 (13 Apr 2022 20:46)  T(F): 98 (14 Apr 2022 05:26), Max: 98.4 (13 Apr 2022 20:46)  HR: 76 (14 Apr 2022 07:41) (73 - 92)  BP: 138/79 (14 Apr 2022 05:26) (105/62 - 156/72)  BP(mean): --  RR: 18 (14 Apr 2022 05:26) (16 - 20)  SpO2: 95% (14 Apr 2022 07:41) (91% - 95%)      PHYSICAL EXAM  GENERAL: elderly appearing female in NAD  HEAD: atraumatic, normocephalic   EYES: EOMI, conjunctiva and sclera clear  ENMT: edentulous mouth  RESPIRATORY:  diminished bs at bases, poor inspiratory effort   CARDIOVASCULAR:  soft ii/vi holosystolic murmur LUSB  GASTROINTESTINAL:  soft, nontender, nondistended, bowel sounds present  EXTREMITIES: no clubbing or cyanosis or edema  MUSCULOSKELETAL:  dressing c/d/i. ROM LTD due to pain  NERVOUS SYSTEM:  sensation intact   PSYCH:  awake and alert but confused  SKIN: Multiple ecchymotic areas on skin       LABS:                        7.3    8.09  )-----------( 146      ( 13 Apr 2022 18:11 )             22.2     CBC Full  -  ( 13 Apr 2022 18:11 )  WBC Count : 8.09 K/uL  Hemoglobin : 7.3 g/dL  Hematocrit : 22.2 %  Platelet Count - Automated : 146 K/uL  Mean Cell Volume : 94.9 fl  Mean Cell Hemoglobin : 31.2 pg  Mean Cell Hemoglobin Concentration : 32.9 gm/dL  Auto Neutrophil # : x  Auto Lymphocyte # : x  Auto Monocyte # : x  Auto Eosinophil # : x  Auto Basophil # : x  Auto Neutrophil % : x  Auto Lymphocyte % : x  Auto Monocyte % : x  Auto Eosinophil % : x  Auto Basophil % : x      Ca    7.9        13 Apr 2022 08:02  Phos  2.6       13 Apr 2022 08:10  Mg     2.1       13 Apr 2022 08:10          CAPILLARY BLOOD GLUCOSE            Culture - Blood (collected 04-10-22 @ 20:26)  Source: .Blood Blood  Preliminary Report (04-11-22 @ 21:00):    No growth to date.    Culture - Blood (collected 04-10-22 @ 20:26)  Source: .Blood Blood  Preliminary Report (04-11-22 @ 21:00):    No growth to date.    Culture - Urine (collected 04-10-22 @ 20:25)  Source: Catheterized Catheterized  Final Report (04-11-22 @ 15:39):    No growth        RADIOLOGY & ADDITIONAL TESTS: < from: US Kidney and Bladder (04.13.22 @ 10:17) >    ACC: [id number] EXAM:  US KIDNEYS AND BLADDER                          PROCEDURE DATE:  04/13/2022          INTERPRETATION:  CLINICAL INFORMATION: Acute kidney injury.    COMPARISON: CT scan abdomen pelvis 6/30/2020.    TECHNIQUE: Sonography of the kidneys and bladder.    FINDINGS:    Right kidney: 10.7 cm.  No hydronephrosis or intrarenal calcification noted.    Left kidney: Not well visualized, due to patient's body habitus and   patient difficulty positioning.  9.6 cm.  No hydronephrosis noted.    Urinary bladder:   Suggestion of mild dependent bladder wall thickening.  The bilateral ureteral jets are not visualized.  Bladder volume measures 131 cc.    IMPRESSION:    Technically limited study as discussed.    No hydronephrosis.    Possiblemild dependent bladder wall thickening, correlate clinically.        --- End of Report ---            LUCY BEAULIEU MD; Attending Radiologist  This document has been electronically signed. Apr 13 2022 11:18AM    < end of copied text >      Consultant(s) Notes Reviewed:  [x] YES  [ ] NO    Care Discussed with [x] Consultants  [x] Patient  [ ] Family  [ ]      [ x] Other; RN  DVT ppx

## 2022-04-14 NOTE — PROGRESS NOTE ADULT - SUBJECTIVE AND OBJECTIVE BOX
Date/Time Patient Seen:  		  Referring MD:   Data Reviewed	       Patient is a 77y old  Female who presents with a chief complaint of fall -> right femoral fracture (14 Apr 2022 08:01)      Subjective/HPI     PAST MEDICAL & SURGICAL HISTORY:  Hypertension, unspecified type    Hyperlipidemia, unspecified hyperlipidemia type    Dementia without behavioral disturbance, unspecified dementia type    Hypothyroidism, unspecified type    Hypothyroid    Osteoarthritis    Carotid stenosis    Degenerative joint disease    Hypertension    Hyperlipidemia    Dementia    No significant past surgical history          Medication list         MEDICATIONS  (STANDING):  acetaminophen     Tablet .. 1000 milliGRAM(s) Oral every 8 hours  albuterol/ipratropium for Nebulization 3 milliLiter(s) Nebulizer every 12 hours  atorvastatin 20 milliGRAM(s) Oral at bedtime  cholecalciferol 1000 Unit(s) Oral two times a day  cyanocobalamin 500 MICROGram(s) Oral daily  folic acid 1 milliGRAM(s) Oral daily  levothyroxine 188 MICROGram(s) Oral daily  melatonin 3 milliGRAM(s) Oral at bedtime  memantine 10 milliGRAM(s) Oral two times a day  mirtazapine 15 milliGRAM(s) Oral at bedtime  multivitamin 1 Tablet(s) Oral daily  pantoprazole  Injectable 40 milliGRAM(s) IV Push daily  potassium phosphate IVPB 30 milliMole(s) IV Intermittent once    MEDICATIONS  (PRN):  ALBUTerol    0.083% 2.5 milliGRAM(s) Nebulizer every 2 hours PRN Shortness of Breath and/or Wheezing  aluminum hydroxide/magnesium hydroxide/simethicone Suspension 30 milliLiter(s) Oral every 4 hours PRN Dyspepsia  bisacodyl Suppository 10 milliGRAM(s) Rectal daily PRN If no bowel movement  magnesium hydroxide Suspension 30 milliLiter(s) Oral daily PRN Constipation  oxyCODONE    IR 5 milliGRAM(s) Oral every 8 hours PRN Severe Pain (7 - 10)  oxyCODONE    IR 2.5 milliGRAM(s) Oral every 8 hours PRN Moderate Pain (4 - 6)         Vitals log        ICU Vital Signs Last 24 Hrs  T(C): 36.8 (14 Apr 2022 09:09), Max: 36.9 (13 Apr 2022 20:46)  T(F): 98.3 (14 Apr 2022 09:09), Max: 98.4 (13 Apr 2022 20:46)  HR: 70 (14 Apr 2022 09:09) (70 - 92)  BP: 155/76 (14 Apr 2022 09:09) (105/62 - 156/72)  BP(mean): --  ABP: --  ABP(mean): --  RR: 19 (14 Apr 2022 09:09) (16 - 20)  SpO2: 95% (14 Apr 2022 09:09) (91% - 95%)           Input and Output:  I&O's Detail      Lab Data                        6.5    7.22  )-----------( 161      ( 14 Apr 2022 07:51 )             19.6     04-14    139  |  103  |  28<H>  ----------------------------<  83  3.5   |  29  |  1.17    Ca    7.9<L>      14 Apr 2022 07:51  Phos  2.1     04-14  Mg     2.3     04-14              Review of Systems	      Objective     Physical Examination    heart s1s2  lung dec BS  abd soft      Pertinent Lab findings & Imaging      Dominga:  NO   Adequate UO     I&O's Detail           Discussed with:     Cultures:	        Radiology

## 2022-04-14 NOTE — PROGRESS NOTE ADULT - SUBJECTIVE AND OBJECTIVE BOX
Chief Complaint: Fall    Interval Events: No events overnight.    Review of Systems:  General: No fevers, chills, weight gain  Skin: No rashes, color changes  Cardiovascular: No chest pain, orthopnea  Respiratory: No shortness of breath, cough  Gastrointestinal: No nausea, abdominal pain  Genitourinary: No incontinence, pain with urination  Musculoskeletal: No pain, swelling, decreased range of motion  Neurological: No headache, weakness  Psychiatric: No depression, anxiety  Endocrine: No weight gain, increased thirst  All other systems are comprehensively negative.    Physical Exam:  Vital Signs Last 24 Hrs  T(C): 36.8 (14 Apr 2022 09:09), Max: 36.9 (13 Apr 2022 20:46)  T(F): 98.3 (14 Apr 2022 09:09), Max: 98.4 (13 Apr 2022 20:46)  HR: 70 (14 Apr 2022 09:09) (70 - 92)  BP: 155/76 (14 Apr 2022 09:09) (105/62 - 156/72)  BP(mean): --  RR: 19 (14 Apr 2022 09:09) (16 - 20)  SpO2: 95% (14 Apr 2022 09:09) (91% - 95%)  General: NAD  HEENT: MMM  Neck: No JVD, no carotid bruit  Lungs: CTAB  CV: RRR, nl S1/S2, 2/6 systolic murmur  Abdomen: S/NT/ND, +BS  Extremities: No LE edema, no cyanosis  Neuro: AAOx1  Skin: No rash    Labs:    04-14    139  |  103  |  28<H>  ----------------------------<  83  3.5   |  29  |  1.17    Ca    7.9<L>      14 Apr 2022 07:51  Phos  2.1     04-14  Mg     2.3     04-14                          6.5    7.22  )-----------( 161      ( 14 Apr 2022 07:51 )             19.6       Telemetry: Sinus rhythm

## 2022-04-14 NOTE — PROGRESS NOTE ADULT - SUBJECTIVE AND OBJECTIVE BOX
ORTHOPEDIC PA PROGRESS NOTE  OPAL ARBOLEDA      77y Female                                 SY 1EST 108 W1                                                                                                                           POD #    4d    STATUS POST:       Procedure: ORIF, hip, using trochanteric nail               Patient seen and examined at bedside.      Current Pain Management:    acetaminophen     Tablet .. 1000 milliGRAM(s) Oral every 8 hours  melatonin 3 milliGRAM(s) Oral at bedtime  memantine 10 milliGRAM(s) Oral two times a day  mirtazapine 15 milliGRAM(s) Oral at bedtime  oxyCODONE    IR 5 milliGRAM(s) Oral every 8 hours PRN  oxyCODONE    IR 2.5 milliGRAM(s) Oral every 8 hours PRN      T(F): 98  HR: 78  BP: 138/79  RR: 18  SpO2: 91%               Physical Exam :    -   Dressing C/D/I.   -   Distal Neurvascular status intact grossly.   -   Warm well perfused; capillary refill <3 seconds   -   (+)EHL/FHL   -   (+) Sensation to light touch  -   (-) Calf tenderness Bilaterally      A/P: 77y Female s/p ORIF, hip, using trochanteric nail       -   Ortho Stable; Post op anemia 2/2 post op blood loss - tx as per medicine  -   Pain control:  acetaminophen     Tablet .. 1000 milliGRAM(s) Oral every 8 hours  melatonin 3 milliGRAM(s) Oral at bedtime  memantine 10 milliGRAM(s) Oral two times a day  mirtazapine 15 milliGRAM(s) Oral at bedtime  oxyCODONE    IR 5 milliGRAM(s) Oral every 8 hours PRN  oxyCODONE    IR 2.5 milliGRAM(s) Oral every 8 hours PRN    -   Medicine to follow  -   DVT ppx:    PAS - DVTp Held 2/2 Post op anemia - discuss with surgeon  -   PT/OT OOB,  Weight bearing status: WBAT   -  Dispo:  FATOUMATA

## 2022-04-14 NOTE — PROVIDER CONTACT NOTE (CRITICAL VALUE NOTIFICATION) - BACKGROUND
77 y F adm s/p fall, leading to Right femoral fracture. s/p ORIF 4/11
Admitted s/p fall, ORIF right hip on 4/11

## 2022-04-14 NOTE — GOALS OF CARE CONVERSATION - ADVANCED CARE PLANNING - CONVERSATION DETAILS
Writer called patients daughter (Nicole) pt to discuss GOC. Reviewed patient's medical and social history as well as events leading to patient's hospitalization. Writer discussed patient's current diagnosis, pt medical condition and management,  prognosis, and life expectancy may be limited due to medical conditions. Inquired about patient's wishes regarding extent of medical care to be provided including escalation of medical care into the ICU and use of vasopressor support.  All questions answered. CPR process reviewed with patient. Daughter agrees to DNR/DNI status and interested in discussing hospice options with palliative care team Writer called patients daughter (Nicole) pt to discuss GOC. Reviewed patient's medical and social history as well as events leading to patient's hospitalization. Writer discussed patient's current diagnosis, pt medical condition and management,  prognosis, and life expectancy may be limited due to medical conditions. Inquired about patient's wishes regarding extent of medical care to be provided including escalation of medical care into the ICU and use of vasopressor support.  All questions answered. CPR process reviewed. Daughter agrees to DNR/DNI status and interested in discussing hospice options with palliative care team

## 2022-04-14 NOTE — PROGRESS NOTE ADULT - ASSESSMENT
77F with dementia, DJD, HTN/HLD, carotid stenosis, hypothyroidism who presents from [facility] after a fall.     fall  hip fx  OP  OA  dementia  Hiatal hernia  Emphysema - ex smoker - known COPD -   HTN  HLD  Thyroid disease    right hip ORIF - POD 3  H and H noted  Planned for PRBC    fall prec - assist with needs - ADL -   Ortho eval in progress - marisol op eval and optimization in progress - plan for ORIF  Hypoxemia - 02 support - keep sat > 88 pct  Emphysema - COPD - s/p steroids - s/p nebs - may need additional Nebulized therapy - has Inhaler on order   Card eval in progress - planned for TTE  monitor VS and HD and Sat  o2 supplementation as needed - keep sat > 88 pct  old records reviewed  monitor for needs  eventual GOC discussion

## 2022-04-14 NOTE — PROGRESS NOTE ADULT - ASSESSMENT
A/P:    77F with dementia, DJD, HTN/HLD, carotid stenosis, hypothyroidism [facility]  Found to have right femoral neck fracture.      Right femoral neck fracture  - pain control as needed  - POD# 3  - PT/ OT eval appreciated, plan for eventual [facility] placement    - Leukocytosis resolved, likely reactive  - S/p 1 unit PRNC 4/13, repeat CBC pending. Anemia likely due to acute blood loss  - Discussed with Dr. Barajas, ok to hold Heparin for now, would restart tomorrow if HGB stable and occult negative as patient is at high thrombotic risk      CRISTIANO  - Likely pre-renal  - Cr still elevated, repeat pending  - US kidney and bladder, no hydronephrosis    - Avoid nephrotoxic meds   - Nephrology consult appreciated     Desats  - occurred on admission  - Pulm input appreciated, D-dimer elevated but CTA neg for PE  - US LLE neg for DVT  - Titrate off supplemental O2 as tolerated    Hypothermia and bradycardia  - Improved  - Likely in setting of hypothyroidism   - Surveillance blood and urine cultures NGTD  - ID, cardio and endocrine input appreciated    Hypothyroidism  - cont with synthroid  - Endocrine input appreciated     HTN/HLD  - Continue amlodipine 5 mg daily  - Resume losartan once BP improves   - Continue atorvastatin 20 mg daily  - Restart ASA if HGB stable     Dementia  - does not have namenda XR -> can switch to Namenda 10mg BID  - mirtazapine  - Seen by psych and neuro, acute aggression and agitation in setting of hospital acquired delerium    Incidental findings on CT  Cholelithiasis. The 1.4 x 1.6 cm right adrenal nodule and a 2.7 x 2.0 cm left adrenal nodule that likely represent adenomas, are   unchanged. Compression deformities of the T4-T6, T8-T9, and L1-L2 vertebral bodies.   Follow up outpatient for further management     Preventive measures  - On SCD's for DVT ppx due to acute drop in HGB, restart DVT ppx asap if HGB stable      A/P:    77F with dementia, DJD, HTN/HLD, carotid stenosis, hypothyroidism who presents from [facility] after a fall.  Found to have right femoral neck fracture.      Right femoral neck fracture  - pain control as needed  - POD# 3  - PT/ OT eval appreciated, plan for eventual [facility] placement    - Leukocytosis resolved, likely reactive  - S/p 1 unit PRNC 4/13, repeat CBC pending. Anemia likely due to acute blood loss  - Discussed with Dr. Barajas, ok to hold Heparin for now, would restart tomorrow if HGB stable and occult negative as patient is at high thrombotic risk      CRISTIANO  - Likely pre-renal  - Cr still elevated, repeat pending  - US kidney and bladder, no hydronephrosis    - Avoid nephrotoxic meds   - Nephrology consult appreciated     Desats  - occurred on admission  - Pulm input appreciated, D-dimer elevated but CTA neg for PE  - US LLE neg for DVT  - Titrate off supplemental O2 as tolerated    Hypothermia and bradycardia  - Improved  - Likely in setting of hypothyroidism   - Surveillance blood and urine cultures NGTD  - ID, cardio and endocrine input appreciated    Hypothyroidism  - cont with synthroid  - Endocrine input appreciated   - Repeat TSH outpatient in 4-6 weeks    HTN/HLD  - Continue amlodipine 5 mg daily  - Resume losartan once BP improves   - Continue atorvastatin 20 mg daily  - Restart ASA if HGB stable     Dementia  - does not have namenda XR -> can switch to Namenda 10mg BID  - mirtazapine  - Seen by psych and neuro, acute aggression and agitation in setting of hospital acquired delerium    Incidental findings on CT  Cholelithiasis. The 1.4 x 1.6 cm right adrenal nodule and a 2.7 x 2.0 cm left adrenal nodule that likely represent adenomas, are   unchanged. Compression deformities of the T4-T6, T8-T9, and L1-L2 vertebral bodies.   Follow up outpatient for further management     Preventive measures  - On SCD's for DVT ppx due to acute drop in HGB, restart DVT ppx asap if HGB stable

## 2022-04-14 NOTE — PROGRESS NOTE ADULT - ASSESSMENT
The patient is a 77 year old female with a history of HTN, HL, hypothyroidism, carotid stenosis, dementia who presents with a fall, found to have a hip fracture.    Plan:  - Echo with normal LV systolic function, no significant valve issues  - Continue amlodipine 5 mg daily  - Resume losartan if BP trends up  - Continue atorvastatin 20 mg daily  - Continue aspirin 81 mg daily  - Hypothermia - possibly due to significant hypothyroidism  - Hemoglobin low - will likely need transfusion. Monitor hemoglobin. May need additional imaging.

## 2022-04-14 NOTE — PROVIDER CONTACT NOTE (CRITICAL VALUE NOTIFICATION) - ASSESSMENT
AxO x1, currently sleeping. No acute resp distress. NSR on tele.
Dressing at right hip clean dry and intact /70

## 2022-04-14 NOTE — CONSULT NOTE ADULT - ASSESSMENT
77F with dementia, DJD, HTN/HLD, carotid stenosis, hypothyroidism who presents from [facility] after a fall.  Found to have right femoral neck fracture.      Right femoral neck fracture  - pain control as needed  - POD# 4  - PT/ OT recs for [facility] placement      Anemia  likely from blood loss.   s/p 1 unit PRBC 4/13  repeat Hb is stable  restarted heparin as she is high risk for DVT    CRISTIANO  - Likely pre-renal  - US kidney and bladder, no hydronephrosis    - Nephrology consult noted    Dementia  - Namenda   - mirtazapine  - Seen by psych and neuro, acute aggression and agitation in setting of hospital acquired delirium    Goals of Care/Advance Care Planning  DNR/DNI  plan is to continue current management and reaccess as condition evolves. D/c to [facility] unless unable to find source of bleeding/control, would then discuss hospice. Will continue to follow.    Appreciate consult. Discussed with the primary team.    Tyrel Seay MD, [facility]

## 2022-04-14 NOTE — PROGRESS NOTE ADULT - SUBJECTIVE AND OBJECTIVE BOX
Patient is a 77y old  Female who presents with a chief complaint of fall -> right femoral fracture (14 Apr 2022 12:19)    Patient seen in follow up for CRISTIANO.        PAST MEDICAL HISTORY:  Hypertension, unspecified type    Hyperlipidemia, unspecified hyperlipidemia type    Dementia without behavioral disturbance, unspecified dementia type    Hypothyroidism, unspecified type    Hypothyroid    Osteoarthritis    Carotid stenosis    Degenerative joint disease    Hypertension    Hyperlipidemia    Dementia      MEDICATIONS  (STANDING):  acetaminophen     Tablet .. 1000 milliGRAM(s) Oral every 8 hours  albuterol/ipratropium for Nebulization 3 milliLiter(s) Nebulizer every 12 hours  atorvastatin 20 milliGRAM(s) Oral at bedtime  cholecalciferol 1000 Unit(s) Oral two times a day  cyanocobalamin 500 MICROGram(s) Oral daily  folic acid 1 milliGRAM(s) Oral daily  levothyroxine 188 MICROGram(s) Oral daily  melatonin 3 milliGRAM(s) Oral at bedtime  memantine 10 milliGRAM(s) Oral two times a day  mirtazapine 15 milliGRAM(s) Oral at bedtime  multivitamin 1 Tablet(s) Oral daily  pantoprazole  Injectable 40 milliGRAM(s) IV Push daily  potassium phosphate IVPB 30 milliMole(s) IV Intermittent once    MEDICATIONS  (PRN):  ALBUTerol    0.083% 2.5 milliGRAM(s) Nebulizer every 2 hours PRN Shortness of Breath and/or Wheezing  aluminum hydroxide/magnesium hydroxide/simethicone Suspension 30 milliLiter(s) Oral every 4 hours PRN Dyspepsia  bisacodyl Suppository 10 milliGRAM(s) Rectal daily PRN If no bowel movement  magnesium hydroxide Suspension 30 milliLiter(s) Oral daily PRN Constipation  oxyCODONE    IR 5 milliGRAM(s) Oral every 8 hours PRN Severe Pain (7 - 10)  oxyCODONE    IR 2.5 milliGRAM(s) Oral every 8 hours PRN Moderate Pain (4 - 6)    T(C): 36.8 (04-14-22 @ 09:09), Max: 36.9 (04-13-22 @ 20:46)  HR: 70 (04-14-22 @ 09:09) (70 - 92)  BP: 155/76 (04-14-22 @ 09:09) (105/62 - 156/72)  RR: 19 (04-14-22 @ 09:09) (16 - 20)  SpO2: 95% (04-14-22 @ 09:09) (91% - 98%)  Wt(kg): --  I&O's Detail      PHYSICAL EXAM:  General: No distress  Respiratory: b/l air entry  Cardiovascular: S1 S2  Gastrointestinal: soft  Extremities:  no edema                              6.5    7.22  )-----------( 161      ( 14 Apr 2022 07:51 )             19.6     04-14    139  |  103  |  28<H>  ----------------------------<  83  3.5   |  29  |  1.17    Ca    7.9<L>      14 Apr 2022 07:51  Phos  2.1     04-14  Mg     2.3     04-14          Sodium, Serum: 139 (04-14 @ 07:51)  Sodium, Serum: 138 (04-13 @ 08:02)  Sodium, Serum: 137 (04-12 @ 12:38)  Sodium, Serum: 134 (04-12 @ 07:26)    Creatinine, Serum: 1.17 (04-14 @ 07:51)  Creatinine, Serum: 1.60 (04-13 @ 08:02)  Creatinine, Serum: 1.55 (04-12 @ 12:38)  Creatinine, Serum: 1.61 (04-12 @ 07:26)  Creatinine, Serum: 1.01 (04-11 @ 07:32)    Potassium, Serum: 3.5 (04-14 @ 07:51)  Potassium, Serum: 3.6 (04-13 @ 08:02)  Potassium, Serum: 4.3 (04-12 @ 12:38)  Potassium, Serum: 4.3 (04-12 @ 07:26)    Hemoglobin: 6.5 (04-14 @ 07:51)  Hemoglobin: 7.3 (04-13 @ 18:11)  Hemoglobin: 6.2 (04-13 @ 10:46)  Hemoglobin: 6.6 (04-13 @ 08:02)

## 2022-04-14 NOTE — CONSULT NOTE ADULT - CONSULT REQUESTED DATE/TIME
10-Apr-2022 07:18
Please schedule this patient for a Pre surgical clearance appt.    Colonoscopy is on 6/28/2019 under [facility] anethesia.    (Outpatient Procedure)  Diagnosis is screening With Dr. Carvalho  
10-Apr-2022 12:09
13-Apr-2022 13:44
10-Apr-2022 08:36
14-Apr-2022 12:20
11-Apr-2022 09:41

## 2022-04-14 NOTE — PROGRESS NOTE ADULT - ASSESSMENT
* CRISTIANO -- contrast ATN complicated by mild hypotension, blood loss  * Acute blood loss anemia  * POD #2 hip ORIF    Improving renal indices. PRBC transfusion in progress. Potassium supplementation. Will follow electrolytes and renal function trend.

## 2022-04-14 NOTE — CHART NOTE - NSCHARTNOTEFT_GEN_A_CORE
CT results reviewed, no RP bleed. Noted to have Intramuscular gluteus medius hematoma and lateral subcutaneous hematomas. Discussed with orthopedics team, likely post op changes. Attempted to do HUSSAIN with no stool. Will give enema x1. If HGB stable, will restart heparin for DVT ppx as patient is at high thrombotic risk.

## 2022-04-14 NOTE — CONSULT NOTE ADULT - REASON FOR ADMISSION
fall -> right femoral fracture

## 2022-04-14 NOTE — CONSULT NOTE ADULT - SUBJECTIVE AND OBJECTIVE BOX
HPI:  ***Patient with dementia and cannot recall recent events.  Therefore collateral information obtained via charts and notes.***    77F with dementia, DJD, HTN/HLD, carotid stenosis, hypothyroidism who presents from [facility] after a fall.  Per report, staff heard her fall but the patient was awake when they arrived in her room.  Patient was brought here for further evaluation.  In the ED, patient cannot recall events and does not know why she is here.  She does admit to some pain the right hip.  She did say that she has been feeling well prior to the injury.  The CT of her hip showed a right displaced femoral neck fracture.  Dr. Barajas (ortho) was made aware.  Patient currently has no pain while lying in the stretcher but does have pain when moved.  Of note, patient had episodes of desats that required some O2 supplementation to keep sats >90%.  Patient also noted to be bradycardic as well.       (10 Apr 2022 00:07)    PERTINENT PM/SXH:   Hypertension, unspecified type    Hyperlipidemia, unspecified hyperlipidemia type    Dementia without behavioral disturbance, unspecified dementia type    Hypothyroidism, unspecified type    Hypothyroid    Osteoarthritis    Carotid stenosis    Degenerative joint disease    Hypertension    Hyperlipidemia    Dementia      No significant past surgical history      FAMILY HISTORY:    ITEMS NOT CHECKED ARE NOT PRESENT    SOCIAL HISTORY:   Significant other/partner[ ]  Children[ ]  Orthodox/Spirituality:  Substance hx:  [ ]   Tobacco hx:  [ ]   Alcohol hx: [ ]   Home Opioid hx:  [ ] I-Stop Reference No:  Living Situation: [ ]Home  [ ]Long term care  [ ]Rehab [ ]Other    ADVANCE DIRECTIVES:    DNR  MOLST  [ ]  Living Will  [ ]   DECISION MAKER(s):  [ ] Health Care Proxy(s)  [ ] Surrogate(s)  [ ] Guardian           Name(s): Phone Number(s):    BASELINE (I)ADL(s) (prior to admission):  [place]: [ ]Total  [ ] Moderate [ ]Dependent    Allergies    No Known Allergies    Intolerances    MEDICATIONS  (STANDING):  acetaminophen     Tablet .. 1000 milliGRAM(s) Oral every 8 hours  albuterol/ipratropium for Nebulization 3 milliLiter(s) Nebulizer every 12 hours  atorvastatin 20 milliGRAM(s) Oral at bedtime  cholecalciferol 1000 Unit(s) Oral two times a day  cyanocobalamin 500 MICROGram(s) Oral daily  folic acid 1 milliGRAM(s) Oral daily  levothyroxine 188 MICROGram(s) Oral daily  melatonin 3 milliGRAM(s) Oral at bedtime  memantine 10 milliGRAM(s) Oral two times a day  mirtazapine 15 milliGRAM(s) Oral at bedtime  multivitamin 1 Tablet(s) Oral daily  pantoprazole  Injectable 40 milliGRAM(s) IV Push daily  potassium phosphate IVPB 30 milliMole(s) IV Intermittent once    MEDICATIONS  (PRN):  ALBUTerol    0.083% 2.5 milliGRAM(s) Nebulizer every 2 hours PRN Shortness of Breath and/or Wheezing  aluminum hydroxide/magnesium hydroxide/simethicone Suspension 30 milliLiter(s) Oral every 4 hours PRN Dyspepsia  bisacodyl Suppository 10 milliGRAM(s) Rectal daily PRN If no bowel movement  magnesium hydroxide Suspension 30 milliLiter(s) Oral daily PRN Constipation  oxyCODONE    IR 5 milliGRAM(s) Oral every 8 hours PRN Severe Pain (7 - 10)  oxyCODONE    IR 2.5 milliGRAM(s) Oral every 8 hours PRN Moderate Pain (4 - 6)    PRESENT SYMPTOMS: [ ]Unable to obtain due to poor mentation   Source if other than patient:  [ ]Family   [ ]Team     Pain: [ ]yes [ ]no  QOL impact -   Location -                    Aggravating factors -  Quality -  Radiation -  Timing-  Severity (0-10 scale):  Minimal acceptable level (0-10 scale):     CPOT:    https://www.[facility].org/getattachment/[email or web address]/Critical-Care-Pain-Observation-Tool-(CPOT)      PAIN AD Score:     http://geriatrictoolkit.[facility]/cog/painad.pdf (press ctrl +  left click to view)    Dyspnea:                           [ ]Mild [ ]Moderate [ ]Severe  Anxiety:                             [ ]Mild [ ]Moderate [ ]Severe  Fatigue:                             [ ]Mild [ ]Moderate [ ]Severe  Nausea:                             [ ]Mild [ ]Moderate [ ]Severe  Loss of appetite:              [ ]Mild [ ]Moderate [ ]Severe  Constipation:                    [ ]Mild [ ]Moderate [ ]Severe    Other Symptoms:  [ ]All other review of systems negative     Palliative Performance Status Version 2:         %    http://npcrc.org/files/news/palliative_performance_scale_ppsv2.pdf  PHYSICAL EXAM:  Vital Signs Last 24 Hrs  T(C): 36.8 (14 Apr 2022 09:09), Max: 36.9 (13 Apr 2022 20:46)  T(F): 98.3 (14 Apr 2022 09:09), Max: 98.4 (13 Apr 2022 20:46)  HR: 70 (14 Apr 2022 09:09) (70 - 92)  BP: 155/76 (14 Apr 2022 09:09) (105/62 - 156/72)  BP(mean): --  RR: 19 (14 Apr 2022 09:09) (16 - 19)  SpO2: 95% (14 Apr 2022 09:09) (91% - 95%) I&O's Summary    GENERAL:  [ ]Alert  [ ]Oriented x   [ ]Lethargic  [ ]Cachexia  [ ]Unarousable  [ ]Verbal  [ ]Non-Verbal  Behavioral:   [ ] Anxiety  [ ] Delirium [ ] Agitation [ ] Other  HEENT:  [ ]Normal   [ ]Dry mouth   [ ]ET Tube/Trach  [ ]Oral lesions  PULMONARY:   [ ]Clear [ ]Tachypnea  [ ]Audible excessive secretions   [ ]Rhonchi        [ ]Right [ ]Left [ ]Bilateral  [ ]Crackles        [ ]Right [ ]Left [ ]Bilateral  [ ]Wheezing     [ ]Right [ ]Left [ ]Bilateral  [ ]Diminished breath sounds [ ]right [ ]left [ ]bilateral  CARDIOVASCULAR:    [ ]Regular [ ]Irregular [ ]Tachy  [ ]Boaz [ ]Murmur [ ]Other  GASTROINTESTINAL:  [ ]Soft  [ ]Distended   [ ]+BS  [ ]Non tender [ ]Tender  [ ]PEG [ ]OGT/ NGT  Last BM:   GENITOURINARY:  [ ]Normal [ ] Incontinent   [ ]Oliguria/Anuria   [ ]Orr  MUSCULOSKELETAL:   [ ]Normal   [ ]Weakness  [ ]Bed/Wheelchair bound [ ]Edema  NEUROLOGIC:   [ ]No focal deficits  [ ]Cognitive impairment  [ ]Dysphagia [ ]Dysarthria [ ]Paresis [ ]Other   SKIN:   [ ]Normal    [ ]Rash  [ ]Pressure ulcer(s)       Present on admission [ ]y [ ]n    CRITICAL CARE:  [ ] Shock Present  [ ]Septic [ ]Cardiogenic [ ]Neurologic [ ]Hypovolemic  [ ]  Vasopressors [ ]  Inotropes   [ ]Respiratory failure present [ ]Mechanical ventilation [ ]Non-invasive ventilatory support [ ]High flow    [ ]Acute  [ ]Chronic [ ]Hypoxic  [ ]Hypercarbic [ ]Other  [ ]Other organ failure     LABS:                        6.5    7.22  )-----------( 161      ( 14 Apr 2022 07:51 )             19.6   04-14    139  |  103  |  28<H>  ----------------------------<  83  3.5   |  29  |  1.17    Ca    7.9<L>      14 Apr 2022 07:51  Phos  2.1     04-14  Mg     2.3     04-14    PT/INR - ( 14 Apr 2022 11:58 )   PT: 12.4 sec;   INR: 1.05 ratio         PTT - ( 14 Apr 2022 11:58 )  PTT:29.4 sec      RADIOLOGY & ADDITIONAL STUDIES: reviewed    PROTEIN CALORIE MALNUTRITION PRESENT: [ ]mild [ ]moderate [ ]severe [ ]underweight [ ]morbid obesity  https://www.andeal.org/vault/2440/web/files/ONC/Table_Clinical%20Characteristics%20to%20Document%20Malnutrition-White%20JV%20et%20al%202012.pdf    Height (cm): 170 (04-11-22 @ 17:02), 170.2 (06-24-21 @ 21:06)  Weight (kg): 73 (04-11-22 @ 17:02), 72.575 (06-24-21 @ 21:06)  BMI (kg/m2): 25.3 (04-11-22 @ 17:02), 25.1 (06-24-21 @ 21:06)    [ ]PPSV2 < or = to 30% [ ]significant weight loss  [ ]poor nutritional intake  [ ]anasarca      [ ]Artificial Nutrition      REFERRALS:   [ ]Chaplaincy  [ ]Hospice  [ ]Child Life  [ ]Social Work  [ ]Case management [ ]Holistic Therapy     Goals of Care Document:  HPI:  ***Patient with dementia and cannot recall recent events.  Therefore collateral information obtained via charts and notes.***    77F with dementia, DJD, HTN/HLD, carotid stenosis, hypothyroidism who presents from [facility] after a fall.  Per report, staff heard her fall but the patient was awake when they arrived in her room.  Patient was brought here for further evaluation.  In the ED, patient cannot recall events and does not know why she is here.  She does admit to some pain the right hip.  She did say that she has been feeling well prior to the injury.  The CT of her hip showed a right displaced femoral neck fracture.  Dr. Barajas (ortho) was made aware.  Patient currently has no pain while lying in the stretcher but does have pain when moved.  Of note, patient had episodes of desats that required some O2 supplementation to keep sats >90%.  Patient also noted to be bradycardic as well.       (10 Apr 2022 00:07)    PERTINENT PM/SXH:   Hypertension, unspecified type    Hyperlipidemia, unspecified hyperlipidemia type    Dementia without behavioral disturbance, unspecified dementia type    Hypothyroidism, unspecified type    Hypothyroid    Osteoarthritis    Carotid stenosis    Degenerative joint disease    Hypertension    Hyperlipidemia    Dementia      No significant past surgical history      FAMILY HISTORY:    ITEMS NOT CHECKED ARE NOT PRESENT    SOCIAL HISTORY:   Significant other/partner[ ]  Children[ x]  Restorationism/Spirituality:  Substance hx:  [ ]   Tobacco hx:  [ ]   Alcohol hx: [ ]    (x) none  Home Opioid hx:  [ ] I-Stop Reference No:    Living Situation: [ x]Home  [ ]Long term care  [ ]Rehab [ ]Other    ADVANCE DIRECTIVES:    DNR  MOLST  [x ]  Living Will  [ ]   DECISION MAKER(s):  [x ] Health Care Proxy(s)  [ ] Surrogate(s)  [ ] Guardian           Name(s): Phone Number(s):    BASELINE (I)ADL(s) (prior to admission):  [place]: [ ]Total  [ x] Moderate [ ]Dependent    Allergies    No Known Allergies    Intolerances    MEDICATIONS  (STANDING):  acetaminophen     Tablet .. 1000 milliGRAM(s) Oral every 8 hours  albuterol/ipratropium for Nebulization 3 milliLiter(s) Nebulizer every 12 hours  atorvastatin 20 milliGRAM(s) Oral at bedtime  cholecalciferol 1000 Unit(s) Oral two times a day  cyanocobalamin 500 MICROGram(s) Oral daily  folic acid 1 milliGRAM(s) Oral daily  levothyroxine 188 MICROGram(s) Oral daily  melatonin 3 milliGRAM(s) Oral at bedtime  memantine 10 milliGRAM(s) Oral two times a day  mirtazapine 15 milliGRAM(s) Oral at bedtime  multivitamin 1 Tablet(s) Oral daily  pantoprazole  Injectable 40 milliGRAM(s) IV Push daily  potassium phosphate IVPB 30 milliMole(s) IV Intermittent once    MEDICATIONS  (PRN):  ALBUTerol    0.083% 2.5 milliGRAM(s) Nebulizer every 2 hours PRN Shortness of Breath and/or Wheezing  aluminum hydroxide/magnesium hydroxide/simethicone Suspension 30 milliLiter(s) Oral every 4 hours PRN Dyspepsia  bisacodyl Suppository 10 milliGRAM(s) Rectal daily PRN If no bowel movement  magnesium hydroxide Suspension 30 milliLiter(s) Oral daily PRN Constipation  oxyCODONE    IR 5 milliGRAM(s) Oral every 8 hours PRN Severe Pain (7 - 10)  oxyCODONE    IR 2.5 milliGRAM(s) Oral every 8 hours PRN Moderate Pain (4 - 6)    PRESENT SYMPTOMS: [ ]Unable to obtain due to poor mentation   Source if other than patient:  [ ]Family   [ ]Team     Pain: [ ]yes [ x]no  QOL impact -   Location -                    Aggravating factors -  Quality -  Radiation -  Timing-  Severity (0-10 scale):  Minimal acceptable level (0-10 scale):     CPOT:    https://www.[facility].org/getattachment/gio50g90-4a5d-8n5l-5v7o-1948j2661y7g/Critical-Care-Pain-Observation-Tool-(CPOT)      PAIN AD Score:     http://geriatrictoolkit.[email or web address]/cog/painad.pdf (press ctrl +  left click to view)    Dyspnea:                           [ ]Mild [ ]Moderate [ ]Severe  Anxiety:                             [ ]Mild [ ]Moderate [ ]Severe  Fatigue:                             [ ]Mild [ ]Moderate [ ]Severe  Nausea:                             [ ]Mild [ ]Moderate [ ]Severe  Loss of appetite:              [ ]Mild [ ]Moderate [ ]Severe  Constipation:                    [ ]Mild [ ]Moderate [ ]Severe    Other Symptoms:  [ x]All other review of systems negative     Palliative Performance Status Version 2:         %    http://npcrc.org/files/news/palliative_performance_scale_ppsv2.pdf  PHYSICAL EXAM:  Vital Signs Last 24 Hrs  T(C): 36.8 (14 Apr 2022 09:09), Max: 36.9 (13 Apr 2022 20:46)  T(F): 98.3 (14 Apr 2022 09:09), Max: 98.4 (13 Apr 2022 20:46)  HR: 70 (14 Apr 2022 09:09) (70 - 92)  BP: 155/76 (14 Apr 2022 09:09) (105/62 - 156/72)  BP(mean): --  RR: 19 (14 Apr 2022 09:09) (16 - 19)  SpO2: 95% (14 Apr 2022 09:09) (91% - 95%) I&O's Summary      GENERAL: elderly appearing female in NAD  HEAD: atraumatic, normocephalic   EYES: EOMI, conjunctiva and sclera clear  ENMT: edentulous mouth  RESPIRATORY:  diminished bs at bases, poor inspiratory effort   CARDIOVASCULAR:  soft ii/vi holosystolic murmur LUSB  GASTROINTESTINAL:  soft, nontender, nondistended, bowel sounds present  EXTREMITIES: no clubbing or cyanosis or edema  MUSCULOSKELETAL:  dressing c/d/i. ROM LTD due to pain  NERVOUS SYSTEM:  sensation intact   PSYCH:  awake and alert but confused  SKIN: Multiple ecchymotic areas on skin   LABS:                        6.5    7.22  )-----------( 161      ( 14 Apr 2022 07:51 )             19.6   04-14    139  |  103  |  28<H>  ----------------------------<  83  3.5   |  29  |  1.17    Ca    7.9<L>      14 Apr 2022 07:51  Phos  2.1     04-14  Mg     2.3     04-14    PT/INR - ( 14 Apr 2022 11:58 )   PT: 12.4 sec;   INR: 1.05 ratio         PTT - ( 14 Apr 2022 11:58 )  PTT:29.4 sec      RADIOLOGY & ADDITIONAL STUDIES: reviewed    PROTEIN CALORIE MALNUTRITION PRESENT: [ ]mild [ ]moderate [ ]severe [ ]underweight [ ]morbid obesity  https://www.andeal.org/vault/8170/web/files/ONC/Table_Clinical%20Characteristics%20to%20Document%20Malnutrition-White%20JV%20et%20al%202012.pdf    Height (cm): 170 (04-11-22 @ 17:02), 170.2 (06-24-21 @ 21:06)  Weight (kg): 73 (04-11-22 @ 17:02), 72.575 (06-24-21 @ 21:06)  BMI (kg/m2): 25.3 (04-11-22 @ 17:02), 25.1 (06-24-21 @ 21:06)    [ ]PPSV2 < or = to 30% [ ]significant weight loss  [ ]poor nutritional intake  [ ]anasarca      [ ]Artificial Nutrition      REFERRALS:   [ ]Chaplaincy  [ ]Hospice  [ ]Child Life  [ ]Social Work  [ ]Case management [ ]Holistic Therapy     Goals of Care Document:

## 2022-04-14 NOTE — PROGRESS NOTE ADULT - SUBJECTIVE AND OBJECTIVE BOX
OPAL ARBOLEDA is a 77yFemale , patient examined and chart reviewed.     INTERVAL HPI/ OVERNIGHT EVENTS:   Drop in H/H again noted.   Afebrile. NAD.      PAST MEDICAL & SURGICAL HISTORY:  Dementia without behavioral disturbance, unspecified dementia type  Hypothyroid  Osteoarthritis  Carotid stenosis  Degenerative joint disease  Hypertension  Hyperlipidemia      For details regarding the patient's social history, family history, and other miscellaneous elements, please refer the initial infectious diseases consultation and/or the admitting history and physical examination for this admission.    ROS:  Unable to obtain due to : Dementia      Current inpatient medications :    ANTIBIOTICS/RELEVANT:    MEDICATIONS  (STANDING):  acetaminophen     Tablet .. 1000 milliGRAM(s) Oral every 8 hours  albuterol/ipratropium for Nebulization 3 milliLiter(s) Nebulizer every 12 hours  atorvastatin 20 milliGRAM(s) Oral at bedtime  cholecalciferol 1000 Unit(s) Oral two times a day  cyanocobalamin 500 MICROGram(s) Oral daily  folic acid 1 milliGRAM(s) Oral daily  levothyroxine 188 MICROGram(s) Oral daily  melatonin 3 milliGRAM(s) Oral at bedtime  memantine 10 milliGRAM(s) Oral two times a day  mirtazapine 15 milliGRAM(s) Oral at bedtime  multivitamin 1 Tablet(s) Oral daily  pantoprazole  Injectable 40 milliGRAM(s) IV Push daily    MEDICATIONS  (PRN):  ALBUTerol    0.083% 2.5 milliGRAM(s) Nebulizer every 2 hours PRN Shortness of Breath and/or Wheezing  aluminum hydroxide/magnesium hydroxide/simethicone Suspension 30 milliLiter(s) Oral every 4 hours PRN Dyspepsia  bisacodyl Suppository 10 milliGRAM(s) Rectal daily PRN If no bowel movement  magnesium hydroxide Suspension 30 milliLiter(s) Oral daily PRN Constipation  oxyCODONE    IR 5 milliGRAM(s) Oral every 8 hours PRN Severe Pain (7 - 10)  oxyCODONE    IR 2.5 milliGRAM(s) Oral every 8 hours PRN Moderate Pain (4 - 6)        Objective:  Vital Signs Last 24 Hrs  T(C): 36.8 (14 Apr 2022 17:54), Max: 36.9 (13 Apr 2022 20:46)  T(F): 98.2 (14 Apr 2022 17:54), Max: 98.5 (14 Apr 2022 15:50)  HR: 81 (14 Apr 2022 17:54) (66 - 92)  BP: 138/62 (14 Apr 2022 17:54) (105/62 - 155/76)  RR: 20 (14 Apr 2022 17:54) (16 - 20)  SpO2: 98% (14 Apr 2022 17:54) (91% - 98%)      Physical Exam:  General:  no acute distress confused  Neck: supple, trachea midline  Lungs: Decreased, no wheeze/rhonchi  Cardiovascular: regular rate and rhythm, S1 S2  Abdomen: soft, nontender,  bowel sounds normal  Neurological: Dementia confused  Skin: no rash  Extremities: right hip drsg c/d/i      LABS:                        6.5    7.22  )-----------( 161      ( 14 Apr 2022 07:51 )             19.6   04-14    139  |  103  |  28<H>  ----------------------------<  83  3.5   |  29  |  1.17    Ca    7.9<L>      14 Apr 2022 07:51  Phos  2.1     04-14  Mg     2.3     04-14      MICROBIOLOGY:    Culture - Blood (collected 10 Apr 2022 20:26)  Source: .Blood Blood  Preliminary Report (11 Apr 2022 21:00):    No growth to date.    Culture - Blood (collected 10 Apr 2022 20:26)  Source: .Blood Blood  Preliminary Report (11 Apr 2022 21:00):    No growth to date.    Culture - Urine (collected 10 Apr 2022 20:25)  Source: Catheterized Catheterized  Final Report (11 Apr 2022 15:39):    No growth      RADIOLOGY & ADDITIONAL STUDIES:      PROCEDURE DATE:  04/14/2022          INTERPRETATION:  CT of the right hip    CLINICAL INFORMATION: Drop in hemoglobin.  TECHNIQUE: Axial CT images were obtained of the right hip with coronal   and sagittal reconstructions. No contrast was administered.    FINDINGS:    Motion degraded exam.  Status post ORIF of right intertrochanteric fracture with short   intramedullary tashia with proximal and distal screws. There is diffuse   enlargement and increased signal within the right gluteus medius   musculature, consistent with a hematoma. The higher density portion of   the hematoma is along the posterior aspect of the intertrochanteric   region which measures approximately 6.0 x 5.0 x 1.6 cm. There is more   ill-defined enlargement and increased signal within the more proximal   gluteus vincenzo, too ill-defined to measure. There is a hematoma within   the lateral gluteal subcutaneous tissues measuring approximately 8.5 x   7.0 x 4.3 cm. Exam is not optimized to evaluate for active extravasation.   There are vascular calcifications. Imaged pelvic viscera is grossly   unremarkable. Surgical staples are present along the lateral cutaneous   soft tissues.    IMPRESSION:    Status post ORIF of right intertrochanteric fracture.  Intramuscular gluteus medius hematoma and lateral subcutaneous hematomas.    Assessment :   76YO F resident of  [facility], DJD, HTN/HLD, carotid stenosis, hypothyroidism admitted sp fall and right hip pain found to have right displaced femoral neck fracture.    SP ORIF, Right hip, using trochanteric nail 10-Apr-2022   Sp hypothermic. UA neg CXR neg   DDimer elevated. CTA chest neg for PE and pneumonia  Doubt infectious process Cultures ngtd  TSH elevated  CRISTIANO- renal bladder ultrasound noted- no retention  Anemia- Acute blood loss sec hematoma      Plan :   Monitor off antibiotics  Trend temps and cbc, H/h and renal fx  Asp precautions  Pulm toileting  No active ID issues at this time  Stable from ID standpoint  Will sign off     Continue with present regiment.  Appropriate use of antibiotics and adverse effects reviewed.      > 35 minutes were spent in direct patient care reviewing notes, medications ,labs data/ imaging , discussion with multidisciplinary team.    Thank you for allowing me to participate in care of your patient .    Radha Pryor MD  Infectious Disease  [phone]

## 2022-04-15 LAB
ANION GAP SERPL CALC-SCNC: 4 MMOL/L — LOW (ref 5–17)
BASOPHILS # BLD AUTO: 0.02 K/UL — SIGNIFICANT CHANGE UP (ref 0–0.2)
BASOPHILS NFR BLD AUTO: 0.3 % — SIGNIFICANT CHANGE UP (ref 0–2)
BUN SERPL-MCNC: 24 MG/DL — HIGH (ref 7–23)
CALCIUM SERPL-MCNC: 7.7 MG/DL — LOW (ref 8.4–10.5)
CHLORIDE SERPL-SCNC: 107 MMOL/L — SIGNIFICANT CHANGE UP (ref 96–108)
CO2 SERPL-SCNC: 29 MMOL/L — SIGNIFICANT CHANGE UP (ref 22–31)
CREAT SERPL-MCNC: 1.05 MG/DL — SIGNIFICANT CHANGE UP (ref 0.5–1.3)
CULTURE RESULTS: SIGNIFICANT CHANGE UP
CULTURE RESULTS: SIGNIFICANT CHANGE UP
EGFR: 54 ML/MIN/1.73M2 — LOW
EOSINOPHIL # BLD AUTO: 0.24 K/UL — SIGNIFICANT CHANGE UP (ref 0–0.5)
EOSINOPHIL NFR BLD AUTO: 3.3 % — SIGNIFICANT CHANGE UP (ref 0–6)
GLUCOSE SERPL-MCNC: 83 MG/DL — SIGNIFICANT CHANGE UP (ref 70–99)
HCT VFR BLD CALC: 24.7 % — LOW (ref 34.5–45)
HGB BLD-MCNC: 8.1 G/DL — LOW (ref 11.5–15.5)
IMM GRANULOCYTES NFR BLD AUTO: 1.8 % — HIGH (ref 0–1.5)
LYMPHOCYTES # BLD AUTO: 1.03 K/UL — SIGNIFICANT CHANGE UP (ref 1–3.3)
LYMPHOCYTES # BLD AUTO: 14.1 % — SIGNIFICANT CHANGE UP (ref 13–44)
MAGNESIUM SERPL-MCNC: 2.2 MG/DL — SIGNIFICANT CHANGE UP (ref 1.6–2.6)
MCHC RBC-ENTMCNC: 31.4 PG — SIGNIFICANT CHANGE UP (ref 27–34)
MCHC RBC-ENTMCNC: 32.8 GM/DL — SIGNIFICANT CHANGE UP (ref 32–36)
MCV RBC AUTO: 95.7 FL — SIGNIFICANT CHANGE UP (ref 80–100)
MONOCYTES # BLD AUTO: 0.57 K/UL — SIGNIFICANT CHANGE UP (ref 0–0.9)
MONOCYTES NFR BLD AUTO: 7.8 % — SIGNIFICANT CHANGE UP (ref 2–14)
NEUTROPHILS # BLD AUTO: 5.29 K/UL — SIGNIFICANT CHANGE UP (ref 1.8–7.4)
NEUTROPHILS NFR BLD AUTO: 72.7 % — SIGNIFICANT CHANGE UP (ref 43–77)
NRBC # BLD: 0 /100 WBCS — SIGNIFICANT CHANGE UP (ref 0–0)
NT-PROBNP SERPL-SCNC: 1058 PG/ML — HIGH (ref 0–450)
PHOSPHATE SERPL-MCNC: 2.7 MG/DL — SIGNIFICANT CHANGE UP (ref 2.5–4.5)
PLATELET # BLD AUTO: 174 K/UL — SIGNIFICANT CHANGE UP (ref 150–400)
POTASSIUM SERPL-MCNC: 4.2 MMOL/L — SIGNIFICANT CHANGE UP (ref 3.5–5.3)
POTASSIUM SERPL-SCNC: 4.2 MMOL/L — SIGNIFICANT CHANGE UP (ref 3.5–5.3)
RBC # BLD: 2.58 M/UL — LOW (ref 3.8–5.2)
RBC # FLD: 16 % — HIGH (ref 10.3–14.5)
SODIUM SERPL-SCNC: 140 MMOL/L — SIGNIFICANT CHANGE UP (ref 135–145)
SPECIMEN SOURCE: SIGNIFICANT CHANGE UP
SPECIMEN SOURCE: SIGNIFICANT CHANGE UP
WBC # BLD: 7.28 K/UL — SIGNIFICANT CHANGE UP (ref 3.8–10.5)
WBC # FLD AUTO: 7.28 K/UL — SIGNIFICANT CHANGE UP (ref 3.8–10.5)

## 2022-04-15 PROCEDURE — 71045 X-RAY EXAM CHEST 1 VIEW: CPT | Mod: 26

## 2022-04-15 PROCEDURE — 99497 ADVNCD CARE PLAN 30 MIN: CPT

## 2022-04-15 PROCEDURE — 99233 SBSQ HOSP IP/OBS HIGH 50: CPT

## 2022-04-15 RX ORDER — AMLODIPINE BESYLATE 2.5 MG/1
5 TABLET ORAL DAILY
Refills: 0 | Status: DISCONTINUED | OUTPATIENT
Start: 2022-04-15 | End: 2022-04-20

## 2022-04-15 RX ORDER — HEPARIN SODIUM 5000 [USP'U]/ML
5000 INJECTION INTRAVENOUS; SUBCUTANEOUS EVERY 12 HOURS
Refills: 0 | Status: DISCONTINUED | OUTPATIENT
Start: 2022-04-15 | End: 2022-04-17

## 2022-04-15 RX ORDER — LOSARTAN POTASSIUM 100 MG/1
100 TABLET, FILM COATED ORAL DAILY
Refills: 0 | Status: DISCONTINUED | OUTPATIENT
Start: 2022-04-15 | End: 2022-04-20

## 2022-04-15 RX ADMIN — HEPARIN SODIUM 5000 UNIT(S): 5000 INJECTION INTRAVENOUS; SUBCUTANEOUS at 17:14

## 2022-04-15 RX ADMIN — MEMANTINE HYDROCHLORIDE 10 MILLIGRAM(S): 10 TABLET ORAL at 08:33

## 2022-04-15 RX ADMIN — Medication 1000 MILLIGRAM(S): at 07:50

## 2022-04-15 RX ADMIN — OXYCODONE HYDROCHLORIDE 5 MILLIGRAM(S): 5 TABLET ORAL at 17:18

## 2022-04-15 RX ADMIN — Medication 3 MILLILITER(S): at 07:38

## 2022-04-15 RX ADMIN — Medication 1000 UNIT(S): at 17:13

## 2022-04-15 RX ADMIN — LOSARTAN POTASSIUM 100 MILLIGRAM(S): 100 TABLET, FILM COATED ORAL at 05:52

## 2022-04-15 RX ADMIN — OXYCODONE HYDROCHLORIDE 5 MILLIGRAM(S): 5 TABLET ORAL at 18:15

## 2022-04-15 RX ADMIN — PANTOPRAZOLE SODIUM 40 MILLIGRAM(S): 20 TABLET, DELAYED RELEASE ORAL at 13:25

## 2022-04-15 RX ADMIN — Medication 188 MICROGRAM(S): at 05:51

## 2022-04-15 RX ADMIN — AMLODIPINE BESYLATE 5 MILLIGRAM(S): 2.5 TABLET ORAL at 05:52

## 2022-04-15 RX ADMIN — Medication 1 MILLIGRAM(S): at 13:24

## 2022-04-15 RX ADMIN — MIRTAZAPINE 15 MILLIGRAM(S): 45 TABLET, ORALLY DISINTEGRATING ORAL at 21:54

## 2022-04-15 RX ADMIN — PREGABALIN 500 MICROGRAM(S): 225 CAPSULE ORAL at 17:13

## 2022-04-15 RX ADMIN — MEMANTINE HYDROCHLORIDE 10 MILLIGRAM(S): 10 TABLET ORAL at 17:14

## 2022-04-15 RX ADMIN — Medication 1000 MILLIGRAM(S): at 01:00

## 2022-04-15 RX ADMIN — Medication 1000 MILLIGRAM(S): at 06:54

## 2022-04-15 RX ADMIN — Medication 1000 UNIT(S): at 08:33

## 2022-04-15 RX ADMIN — Medication 1 TABLET(S): at 13:24

## 2022-04-15 RX ADMIN — Medication 3 MILLILITER(S): at 19:24

## 2022-04-15 RX ADMIN — ATORVASTATIN CALCIUM 20 MILLIGRAM(S): 80 TABLET, FILM COATED ORAL at 21:54

## 2022-04-15 RX ADMIN — Medication 3 MILLIGRAM(S): at 21:54

## 2022-04-15 NOTE — DIETITIAN INITIAL EVALUATION ADULT - SIGNS/SYMPTOMS
as evidenced by variable po intake (0-75%), advanced age/hx dementia, partial edentulism, s/p hip sx

## 2022-04-15 NOTE — PROGRESS NOTE ADULT - ASSESSMENT
This is a 77-year-old with episode of fall.  S/p Fall  Right Hip Fx S/P Sx  No Head injury  CT Head and CT C spine  No acute pathology  H/o Dementia  AMS/Delirium  On Namenda 10mg BID  Mirtazapine  Ortho Follows  Fall/safety precautions.  Would Follow.

## 2022-04-15 NOTE — PROGRESS NOTE ADULT - ASSESSMENT
* CRISTIANO -- contrast ATN complicated by mild hypotension, blood loss  * Acute blood loss anemia  * POD #2 hip ORIF    Improving renal indices. Hgb better. Will follow electrolytes and renal function trend.   Ortho follow up.

## 2022-04-15 NOTE — PROGRESS NOTE ADULT - SUBJECTIVE AND OBJECTIVE BOX
Orthopedic P.A.- POD# * - s/p *    Patient alert and comfortable in * with * for pain control.  Denies hip pain or nausea.    Vital Signs Last 24 Hrs  T(C): 36.6 (15 Apr 2022 04:47), Max: 36.9 (14 Apr 2022 15:50)  T(F): 97.9 (15 Apr 2022 04:47), Max: 98.5 (14 Apr 2022 15:50)  HR: 69 (15 Apr 2022 07:38) (66 - 85)  BP: 162/72 (15 Apr 2022 04:47) (135/70 - 162/72)  BP(mean): --  RR: 19 (15 Apr 2022 04:47) (18 - 20)  SpO2: 97% (15 Apr 2022 07:38) (93% - 98%)         I&O's Detail    14 Apr 2022 07:01  -  15 Apr 2022 07:00  --------------------------------------------------------  IN:    Oral Fluid: 310 mL  Total IN: 310 mL    OUT:    Voided (mL): 0 mL  Total OUT: 0 mL    Total NET: 310 mL             CAPILLARY BLOOD GLUCOSE      Labs:                          8.1<L>  7.28  )-----------( 174      ( 15 Apr 2022 07:19 )             24.7<L>  15 Apr 2022 07:19                 15 Apr 2022 07:19    140    |  107    |  24<H>  ----------------------------<  83     4.2     |  29     |  1.05     Ca    7.7<L>      15 Apr 2022 07:19                    MEDICATIONS:ALBUTerol    0.083% 2.5 milliGRAM(s) Nebulizer every 2 hours PRN  albuterol/ipratropium for Nebulization 3 milliLiter(s) Nebulizer every 12 hours  aluminum hydroxide/magnesium hydroxide/simethicone Suspension 30 milliLiter(s) Oral every 4 hours PRN  amLODIPine   Tablet 5 milliGRAM(s) Oral daily  atorvastatin 20 milliGRAM(s) Oral at bedtime  bisacodyl Suppository 10 milliGRAM(s) Rectal daily PRN  cholecalciferol 1000 Unit(s) Oral two times a day  cyanocobalamin 500 MICROGram(s) Oral daily  folic acid 1 milliGRAM(s) Oral daily  levothyroxine 188 MICROGram(s) Oral daily  losartan 100 milliGRAM(s) Oral daily  magnesium hydroxide Suspension 30 milliLiter(s) Oral daily PRN  melatonin 3 milliGRAM(s) Oral at bedtime  memantine 10 milliGRAM(s) Oral two times a day  mirtazapine 15 milliGRAM(s) Oral at bedtime  multivitamin 1 Tablet(s) Oral daily  oxyCODONE    IR 5 milliGRAM(s) Oral every 8 hours PRN  oxyCODONE    IR 2.5 milliGRAM(s) Oral every 8 hours PRN  pantoprazole  Injectable 40 milliGRAM(s) IV Push daily    Anticoagulation: PAS to LE's only        Pain medications:   melatonin 3 milliGRAM(s) Oral at bedtime  memantine 10 milliGRAM(s) Oral two times a day  mirtazapine 15 milliGRAM(s) Oral at bedtime  oxyCODONE    IR 5 milliGRAM(s) Oral every 8 hours PRN  oxyCODONE    IR 2.5 milliGRAM(s) Oral every 8 hours PRN                                       Physical Exam:  Right hip-Bandage changed and stapled wounds all dry and intact right thigh. Thigh completely soft on palpation.  Neurovascular grossly intact LE's. Feet mobile and sensate.  PAS on LE's.  Calves soft and non-tender.                                                                                                                                                        A/P:  Orthopedically stable.  -Continue pain management with above plan  -DVT prophylaxis with PAS; Hospitalist to reevaluate for possible restart of Lovenox   -Hemoglobin improved but still low (8.1) after 2 units packed red blood cells  -PT/OT to increase ambulation, full WBAT with walker   -Dr. Villagomez for medical care today  -Discharge planning for Rehab when medically cleared  -Further plan as per attendings.

## 2022-04-15 NOTE — PROGRESS NOTE ADULT - ASSESSMENT
A/P:    77F with dementia, DJD, HTN/HLD, carotid stenosis, hypothyroidism who presents from [facility] after a fall.  Found to have right femoral neck fracture.      Right femoral neck fracture  - pain control as needed  - POD# 4  - PT/ OT eval appreciated, plan for eventual FATOUMATA placement    - Leukocytosis resolved, likely reactive  - S/p 1 unit PRBC 4/13  - CT scan reviewed  - restarted heparin BID    Anemia  likely from blood loss.   s/p 1 unit PRBC 4/13  repeat Hb is stable  restarted heparin as she is high risk for DVT    CRISTIANO  - Likely pre-renal  - Cr still elevated, repeat pending  - US kidney and bladder, no hydronephrosis    - Avoid nephrotoxic meds   - Nephrology consult appreciated     Desats  - occurred on admission  - Pulm input appreciated, D-dimer elevated but CTA neg for PE  - US LLE neg for DVT  - Titrate off supplemental O2 as tolerated    Hypothermia and bradycardia  - Improved  - Likely in setting of hypothyroidism   - Surveillance blood and urine cultures NGTD  - ID, cardio and endocrine input appreciated    Hypothyroidism  - cont with synthroid  - Endocrine input appreciated   - Repeat TSH outpatient in 4-6 weeks    HTN/HLD  - Continue amlodipine 5 mg daily  - Resume losartan once BP improves   - Continue atorvastatin 20 mg daily  - Restart ASA if HGB stable     Dementia  - does not have namenda XR -> can switch to Namenda 10mg BID  - mirtazapine  - Seen by psych and neuro, acute aggression and agitation in setting of hospital acquired delerium    Incidental findings on CT  Cholelithiasis. The 1.4 x 1.6 cm right adrenal nodule and a 2.7 x 2.0 cm left adrenal nodule that likely represent adenomas, are   unchanged. Compression deformities of the T4-T6, T8-T9, and L1-L2 vertebral bodies.   Follow up outpatient for further management     Preventive measures  - On SCD's for DVT ppx due to acute drop in HGB, restart DVT ppx asap if HGB stable

## 2022-04-15 NOTE — DIETITIAN INITIAL EVALUATION ADULT - PERTINENT MEDS FT
MEDICATIONS  (STANDING):  albuterol/ipratropium for Nebulization 3 milliLiter(s) Nebulizer every 12 hours  amLODIPine   Tablet 5 milliGRAM(s) Oral daily  atorvastatin 20 milliGRAM(s) Oral at bedtime  cholecalciferol 1000 Unit(s) Oral two times a day  cyanocobalamin 500 MICROGram(s) Oral daily  folic acid 1 milliGRAM(s) Oral daily  heparin   Injectable 5000 Unit(s) SubCutaneous every 12 hours  levothyroxine 188 MICROGram(s) Oral daily  losartan 100 milliGRAM(s) Oral daily  melatonin 3 milliGRAM(s) Oral at bedtime  memantine 10 milliGRAM(s) Oral two times a day  mirtazapine 15 milliGRAM(s) Oral at bedtime  multivitamin 1 Tablet(s) Oral daily  pantoprazole  Injectable 40 milliGRAM(s) IV Push daily    MEDICATIONS  (PRN):  ALBUTerol    0.083% 2.5 milliGRAM(s) Nebulizer every 2 hours PRN Shortness of Breath and/or Wheezing  aluminum hydroxide/magnesium hydroxide/simethicone Suspension 30 milliLiter(s) Oral every 4 hours PRN Dyspepsia  bisacodyl Suppository 10 milliGRAM(s) Rectal daily PRN If no bowel movement  magnesium hydroxide Suspension 30 milliLiter(s) Oral daily PRN Constipation  oxyCODONE    IR 5 milliGRAM(s) Oral every 8 hours PRN Severe Pain (7 - 10)  oxyCODONE    IR 2.5 milliGRAM(s) Oral every 8 hours PRN Moderate Pain (4 - 6)

## 2022-04-15 NOTE — CHART NOTE - NSCHARTNOTEFT_GEN_A_CORE
Called for O2 order as patient had a desaturation on 3L O2 via NC. Per chart review, this is a known issue and patient has been noted to desaturate at night. O2 was increased to 5L/min and SpO2 went to 90s. Patient without complaints. Patient had a negative CT angio chest on 4/10. Will check routine CXR as patient did receive recent PRBC transfusion. Pulmonology following. Called for O2 order as patient had a desaturation on 3L O2 via NC. Per chart review, this is a known issue and patient has been noted to desaturate since admission. O2 was increased to 5L/min and SpO2 went to mid to high 90s. Patient without complaints but she has a history of dementia and is sleeping in bed. She turns for comfort but is breathing mostly with her mouth. Patient had a negative CT angio chest on 4/10. Will check routine CXR as patient did receive recent PRBC transfusion. Pulmonology following.    Vital Signs Last 24 Hrs  T(C): 36.4 (15 Apr 2022 01:13), Max: 36.9 (14 Apr 2022 15:50)  T(F): 97.5 (15 Apr 2022 01:13), Max: 98.5 (14 Apr 2022 15:50)  HR: 70 (15 Apr 2022 01:13) (66 - 85)  BP: 136/58 (15 Apr 2022 01:13) (135/70 - 155/76)  RR: 19 (15 Apr 2022 01:13) (18 - 20)  SpO2: 95% (15 Apr 2022 01:13) (91% - 98%)    Gen: in NAD, sleeping, breathing with mouth wide open; NC in place  Cor: S1S2 RRR  Pulm: decreased breath sounds    Impression: Hypoxemia  Desats have been since admission  Has history of COPD/emphysema and breathing pattern is primarily with mouth  Supplemental O2 as needed, wean as tolerated  Follow up chest X-ray  Check proBNP  Pulm/Cardio follow up  DNR/DNI - stable on nasal cannula

## 2022-04-15 NOTE — DIETITIAN INITIAL EVALUATION ADULT - PERTINENT LABORATORY DATA
04-15    140  |  107  |  24<H>  ----------------------------<  83  4.2   |  29  |  1.05    Ca    7.7<L>      15 Apr 2022 07:19  Phos  2.7     04-15  Mg     2.2     04-15

## 2022-04-15 NOTE — PROGRESS NOTE ADULT - ASSESSMENT
The patient is a 77 year old female with a history of HTN, HL, hypothyroidism, carotid stenosis, dementia who presents with a fall, found to have a hip fracture.    Plan:  - Echo with normal LV systolic function, no significant valve issues  - Continue amlodipine 5 mg daily  - Continue losartan 100 mg daily  - Continue atorvastatin 20 mg daily  - Continue aspirin 81 mg daily  - Hypothermia - resolved. Possibly due to significant hypothyroidism.  - Hemoglobin improved with transfusion

## 2022-04-15 NOTE — DIETITIAN INITIAL EVALUATION ADULT - OTHER INFO
Per H&P, pt is a "77F with dementia, DJD, HTN/HLD, carotid stenosis, hypothyroidism who presents from [facility] after a fall.  Per report, staff heard her fall but the patient was awake when they arrived in her room.  Patient was brought here for further evaluation.  In the ED, patient cannot recall events and does not know why she is here.  She does admit to some pain the right hip.  She did say that she has been feeling well prior to the injury.  The CT of her hip showed a right displaced femoral neck fracture."    Pt seen for nutrition assessment secondary to length of stay policy.  Pt with a hx dementia, lives at [facility].  Pt on minced and moist consistency diet - observed partial edentulism.  Dtr at bedside during time of visit - reports pt on softer consistency diet PTA.  Eats fairly well at baseline while a resident at [facility], UBW ~150#/no significant weight changes PTA (prior to placement, pt with <oral intake and associated wt loss secondary to progression of dementia).  Dtr reports pt regularly drinks Ensure supplement at [facility] and is agreeable for pt to receive 4oz BID while in house (addtl 350kcal, 20g protein).  Skin: L head abrasion, L elbow abrasion, R knee abrasion.

## 2022-04-15 NOTE — PROGRESS NOTE ADULT - SUBJECTIVE AND OBJECTIVE BOX
Neurology follow up note    Covering Dr. Mcclain    OPAL [id number]    77F with dementia, DJD, HTN/HLD, carotid stenosis, hypothyroidism who presents from [facility] after a fall.  Per report, staff heard her fall but the patient was awake when they arrived in her room.  Patient was brought here for further evaluation.  In the ED, patient cannot recall events and does not know why she is here.  She does admit to some pain the right hip.  She did say that she has been feeling well prior to the injury.  The CT of her hip showed a right displaced femoral neck fracture.  Dr. Barajas (ortho) was made aware.  Patient currently has no pain while lying in the stretcher but does have pain when moved.  Of note, patient had episodes of desats that required some O2 supplementation to keep sats >90%.  Patient also noted to be bradycardic as well.        Interval History:    Patient resting in bed.    Allergies    No Known Allergies    MEDICATIONS    acetaminophen     Tablet .. 1000 milliGRAM(s) Oral every 8 hours  ALBUTerol    0.083% 2.5 milliGRAM(s) Nebulizer every 2 hours PRN  albuterol/ipratropium for Nebulization 3 milliLiter(s) Nebulizer every 12 hours  aluminum hydroxide/magnesium hydroxide/simethicone Suspension 30 milliLiter(s) Oral every 4 hours PRN  amLODIPine   Tablet 5 milliGRAM(s) Oral daily  aspirin enteric coated 81 milliGRAM(s) Oral daily  atorvastatin 20 milliGRAM(s) Oral at bedtime  bisacodyl Suppository 10 milliGRAM(s) Rectal daily PRN  cholecalciferol 1000 Unit(s) Oral two times a day  cyanocobalamin 500 MICROGram(s) Oral daily  folic acid 1 milliGRAM(s) Oral daily  heparin   Injectable 5000 Unit(s) SubCutaneous every 12 hours  levothyroxine 188 MICROGram(s) Oral daily  magnesium hydroxide Suspension 30 milliLiter(s) Oral daily PRN  melatonin 3 milliGRAM(s) Oral at bedtime  memantine 10 milliGRAM(s) Oral two times a day  mirtazapine 15 milliGRAM(s) Oral at bedtime  multivitamin 1 Tablet(s) Oral daily  oxyCODONE    IR 5 milliGRAM(s) Oral every 8 hours PRN  oxyCODONE    IR 2.5 milliGRAM(s) Oral every 8 hours PRN  sodium chloride 0.9%. 1000 milliLiter(s) IV Continuous <Continuous>    Vital Signs Last 24 Hrs    T(C): 36.8 (15 Apr 2022 14:13), Max: 36.8 (14 Apr 2022 17:54)  T(F): 98.3 (15 Apr 2022 14:13), Max: 98.3 (15 Apr 2022 14:13)  HR: 74 (15 Apr 2022 14:13) (64 - 85)  BP: 135/74 (15 Apr 2022 14:13) (117/66 - 162/72)  RR: 18 (15 Apr 2022 14:13) (18 - 20)  SpO2: 96% (15 Apr 2022 14:13) (93% - 98%)    NEUROLOGIC EXAMINATION:      On Neurological Examination:    Mental Status - Pt is alert, awake, Poor cognition. Follows simple commands.     Speech -  Able to speak few words. No aphasia.    Cranial Nerves - Pupils 3 mm equal and reactive to light. .  No facial asymmetry. Tongue - is in midline.    Motor Exam - 4 plus/5, S/p Right Hip Sx. .    Sensory Exam - Pt withdraws all extremities equally on stimulation. No asymmetry seen. No complaints of tingling, numbness.    Gait - No tested.     Deep tendon Reflexes - 2 plus all over.    Neck Supple -  Yes.    LABS:    CBC Full  -  ( 12 Apr 2022 07:26 )  WBC Count : 13.56 K/uL  RBC Count : 2.99 M/uL  Hemoglobin : 9.5 g/dL  Hematocrit : 28.9 %  Platelet Count - Automated : 189 K/uL  Mean Cell Volume : 96.7 fl  Mean Cell Hemoglobin : 31.8 pg  Mean Cell Hemoglobin Concentration : 32.9 gm/dL  Auto Neutrophil # : 11.72 K/uL  Auto Lymphocyte # : 0.77 K/uL  Auto Monocyte # : 0.87 K/uL  Auto Eosinophil # : 0.00 K/uL  Auto Basophil # : 0.00 K/uL  Auto Neutrophil % : 86.4 %  Auto Lymphocyte % : 5.7 %  Auto Monocyte % : 6.4 %  Auto Eosinophil % : 0.0 %  Auto Basophil % : 0.0 %      04-12    137  |  99  |  38<H>  ----------------------------<  99  4.3   |  30  |  1.55<H>    Ca    8.1<L>      12 Apr 2022 12:38  Phos  3.9     04-11  Mg     2.0     04-11    TPro  6.6  /  Alb  3.0<L>  /  TBili  0.5  /  DBili  x   /  AST  25  /  ALT  32  /  AlkPhos  71  04-11    Hemoglobin A1C:     LIVER FUNCTIONS - ( 11 Apr 2022 07:32 )  Alb: 3.0 g/dL / Pro: 6.6 g/dL / ALK PHOS: 71 U/L / ALT: 32 U/L DA / AST: 25 U/L / GGT: x           Vitamin B12       RADIOLOGY    < from: CT Head No Cont (04.09.22 @ 22:49) >  IMPRESSION:  CT head:  No acute intracranial hemorrhage or mass effect.    CT cervical spine:  No CT evidence of cervical spine fracture.    < end of copied text >  < from: Xray Hip w/ Pelvis 2 or 3 Views, Right (04.09.22 @ 22:45) >      IMPRESSION:    Impacted intertrochanteric fracture of the right hip.      < end of copied text >

## 2022-04-15 NOTE — PROGRESS NOTE ADULT - SUBJECTIVE AND OBJECTIVE BOX
Patient is a 77y old  Female who presents with a chief complaint of fall -> right femoral fracture (15 Apr 2022 08:38)      INTERVAL HPI/OVERNIGHT EVENTS:    overnight events noted.  seen at bedside today.      MEDICATIONS  (STANDING):  albuterol/ipratropium for Nebulization 3 milliLiter(s) Nebulizer every 12 hours  amLODIPine   Tablet 5 milliGRAM(s) Oral daily  atorvastatin 20 milliGRAM(s) Oral at bedtime  cholecalciferol 1000 Unit(s) Oral two times a day  cyanocobalamin 500 MICROGram(s) Oral daily  folic acid 1 milliGRAM(s) Oral daily  levothyroxine 188 MICROGram(s) Oral daily  losartan 100 milliGRAM(s) Oral daily  melatonin 3 milliGRAM(s) Oral at bedtime  memantine 10 milliGRAM(s) Oral two times a day  mirtazapine 15 milliGRAM(s) Oral at bedtime  multivitamin 1 Tablet(s) Oral daily  pantoprazole  Injectable 40 milliGRAM(s) IV Push daily    MEDICATIONS  (PRN):  ALBUTerol    0.083% 2.5 milliGRAM(s) Nebulizer every 2 hours PRN Shortness of Breath and/or Wheezing  aluminum hydroxide/magnesium hydroxide/simethicone Suspension 30 milliLiter(s) Oral every 4 hours PRN Dyspepsia  bisacodyl Suppository 10 milliGRAM(s) Rectal daily PRN If no bowel movement  magnesium hydroxide Suspension 30 milliLiter(s) Oral daily PRN Constipation  oxyCODONE    IR 5 milliGRAM(s) Oral every 8 hours PRN Severe Pain (7 - 10)  oxyCODONE    IR 2.5 milliGRAM(s) Oral every 8 hours PRN Moderate Pain (4 - 6)      Allergies    No Known Allergies    Intolerances        REVIEW OF SYSTEMS:  not a good historian    Vital Signs Last 24 Hrs  T(C): 36.6 (15 Apr 2022 04:47), Max: 36.9 (14 Apr 2022 15:50)  T(F): 97.9 (15 Apr 2022 04:47), Max: 98.5 (14 Apr 2022 15:50)  HR: 69 (15 Apr 2022 07:38) (66 - 85)  BP: 162/72 (15 Apr 2022 04:47) (135/70 - 162/72)  BP(mean): --  RR: 19 (15 Apr 2022 04:47) (18 - 20)  SpO2: 97% (15 Apr 2022 07:38) (93% - 98%)    PHYSICAL EXAM:  GENERAL: elderly appearing female in NAD  HEAD: atraumatic, normocephalic   EYES: EOMI, conjunctiva and sclera clear  ENMT: edentulous mouth  RESPIRATORY:  diminished bs at bases, poor inspiratory effort   CARDIOVASCULAR:  soft ii/vi holosystolic murmur LUSB  GASTROINTESTINAL:  soft, nontender, nondistended, bowel sounds present  EXTREMITIES: no clubbing or cyanosis or edema  MUSCULOSKELETAL:  dressing c/d/i. ROM LTD due to pain  NERVOUS SYSTEM:  sensation intact   PSYCH:  awake and alert but confused  SKIN: Multiple ecchymotic areas on skin     LABS:                        8.1    7.28  )-----------( 174      ( 15 Apr 2022 07:19 )             24.7     15 Apr 2022 07:19    140    |  107    |  24     ----------------------------<  83     4.2     |  29     |  1.05     Ca    7.7        15 Apr 2022 07:19      PT/INR - ( 14 Apr 2022 11:58 )   PT: 12.4 sec;   INR: 1.05 ratio         PTT - ( 14 Apr 2022 11:58 )  PTT:29.4 sec    CAPILLARY BLOOD GLUCOSE          RADIOLOGY & ADDITIONAL TESTS:

## 2022-04-15 NOTE — CHART NOTE - NSCHARTNOTEFT_GEN_A_CORE
Called by RN re: elevated BP.    Vital Signs Last 24 Hrs  T(C): 36.6 (15 Apr 2022 04:47), Max: 36.9 (14 Apr 2022 15:50)  T(F): 97.9 (15 Apr 2022 04:47), Max: 98.5 (14 Apr 2022 15:50)  HR: 68 (15 Apr 2022 04:47) (66 - 85)  BP: 162/72 (15 Apr 2022 04:47) (135/70 - 162/72)  RR: 19 (15 Apr 2022 04:47) (18 - 20)  SpO2: 94% (15 Apr 2022 04:47) (93% - 98%)    Impression: HTN  Cardiology/Hospitalist progress notes reviewed  Will resume amlodipine 5mg and losartan 100mg (CRISTIANO resolved)  Monitor hemodynamics

## 2022-04-15 NOTE — PROGRESS NOTE ADULT - SUBJECTIVE AND OBJECTIVE BOX
Patient is a 77y old  Female who presents with a chief complaint of fall -> right femoral fracture (14 Apr 2022 12:19)    Patient seen in follow up for CRISTIANO.        PAST MEDICAL HISTORY:  Hypertension, unspecified type    Hyperlipidemia, unspecified hyperlipidemia type    Dementia without behavioral disturbance, unspecified dementia type    Hypothyroidism, unspecified type    Hypothyroid    Osteoarthritis    Carotid stenosis    Degenerative joint disease    Hypertension    Hyperlipidemia    Dementia      MEDICATIONS  (STANDING):  albuterol/ipratropium for Nebulization 3 milliLiter(s) Nebulizer every 12 hours  amLODIPine   Tablet 5 milliGRAM(s) Oral daily  atorvastatin 20 milliGRAM(s) Oral at bedtime  cholecalciferol 1000 Unit(s) Oral two times a day  cyanocobalamin 500 MICROGram(s) Oral daily  folic acid 1 milliGRAM(s) Oral daily  heparin   Injectable 5000 Unit(s) SubCutaneous every 12 hours  levothyroxine 188 MICROGram(s) Oral daily  losartan 100 milliGRAM(s) Oral daily  melatonin 3 milliGRAM(s) Oral at bedtime  memantine 10 milliGRAM(s) Oral two times a day  mirtazapine 15 milliGRAM(s) Oral at bedtime  multivitamin 1 Tablet(s) Oral daily  pantoprazole  Injectable 40 milliGRAM(s) IV Push daily    MEDICATIONS  (PRN):  ALBUTerol    0.083% 2.5 milliGRAM(s) Nebulizer every 2 hours PRN Shortness of Breath and/or Wheezing  aluminum hydroxide/magnesium hydroxide/simethicone Suspension 30 milliLiter(s) Oral every 4 hours PRN Dyspepsia  bisacodyl Suppository 10 milliGRAM(s) Rectal daily PRN If no bowel movement  magnesium hydroxide Suspension 30 milliLiter(s) Oral daily PRN Constipation  oxyCODONE    IR 5 milliGRAM(s) Oral every 8 hours PRN Severe Pain (7 - 10)  oxyCODONE    IR 2.5 milliGRAM(s) Oral every 8 hours PRN Moderate Pain (4 - 6)    T(C): 36.8 (04-15-22 @ 14:13), Max: 36.9 (04-13-22 @ 20:46)  HR: 74 (04-15-22 @ 14:13) (64 - 92)  BP: 135/74 (04-15-22 @ 14:13) (105/62 - 162/72)  RR: 18 (04-15-22 @ 14:13)  SpO2: 96% (04-15-22 @ 14:13)  Wt(kg): --  I&O's Detail    14 Apr 2022 07:01  -  15 Apr 2022 07:00  --------------------------------------------------------  IN:    Oral Fluid: 310 mL  Total IN: 310 mL    OUT:    Voided (mL): 0 mL  Total OUT: 0 mL    Total NET: 310 mL          PHYSICAL EXAM:  General: No distress  Respiratory: b/l air entry  Cardiovascular: S1 S2  Gastrointestinal: soft  Extremities:  no edema             LABORATORY:                        8.1    7.28  )-----------( 174      ( 15 Apr 2022 07:19 )             24.7     04-15    140  |  107  |  24<H>  ----------------------------<  83  4.2   |  29  |  1.05    Ca    7.7<L>      15 Apr 2022 07:19  Phos  2.7     04-15  Mg     2.2     04-15      Sodium, Serum: 140 mmol/L (04-15 @ 07:19)  Sodium, Serum: 139 mmol/L (04-14 @ 07:51)    Potassium, Serum: 4.2 mmol/L (04-15 @ 07:19)  Potassium, Serum: 3.5 mmol/L (04-14 @ 07:51)    Hemoglobin: 8.1 g/dL (04-15 @ 07:19)  Hemoglobin: 7.9 g/dL (04-14 @ 19:54)  Hemoglobin: 6.5 g/dL (04-14 @ 07:51)  Hemoglobin: 7.3 g/dL (04-13 @ 18:11)    Creatinine, Serum 1.05 (04-15 @ 07:19)  Creatinine, Serum 1.17 (04-14 @ 07:51)  Creatinine, Serum 1.60 (04-13 @ 08:02)

## 2022-04-15 NOTE — PROGRESS NOTE ADULT - SUBJECTIVE AND OBJECTIVE BOX
Chief Complaint: Fall    Interval Events: No events overnight.    Review of Systems:  General: No fevers, chills, weight gain  Skin: No rashes, color changes  Cardiovascular: No chest pain, orthopnea  Respiratory: No shortness of breath, cough  Gastrointestinal: No nausea, abdominal pain  Genitourinary: No incontinence, pain with urination  Musculoskeletal: No pain, swelling, decreased range of motion  Neurological: No headache, weakness  Psychiatric: No depression, anxiety  Endocrine: No weight gain, increased thirst  All other systems are comprehensively negative.    Physical Exam:  Vital Signs Last 24 Hrs  T(C): 36.6 (15 Apr 2022 04:47), Max: 36.9 (14 Apr 2022 15:50)  T(F): 97.9 (15 Apr 2022 04:47), Max: 98.5 (14 Apr 2022 15:50)  HR: 69 (15 Apr 2022 07:38) (66 - 85)  BP: 162/72 (15 Apr 2022 04:47) (135/70 - 162/72)  BP(mean): --  RR: 19 (15 Apr 2022 04:47) (18 - 20)  SpO2: 97% (15 Apr 2022 07:38) (93% - 98%)  General: NAD  HEENT: MMM  Neck: No JVD, no carotid bruit  Lungs: CTAB  CV: RRR, nl S1/S2, 2/6 systolic murmur  Abdomen: S/NT/ND, +BS  Extremities: No LE edema, no cyanosis  Neuro: AAOx1  Skin: No rash    Labs:    04-15    140  |  107  |  24<H>  ----------------------------<  83  4.2   |  29  |  1.05    Ca    7.7<L>      15 Apr 2022 07:19  Phos  2.1     04-14  Mg     2.3     04-14                          8.1    7.28  )-----------( 174      ( 15 Apr 2022 07:19 )             24.7       Telemetry: Sinus rhythm

## 2022-04-16 LAB
ALBUMIN SERPL ELPH-MCNC: 2.3 G/DL — LOW (ref 3.3–5)
ALP SERPL-CCNC: 55 U/L — SIGNIFICANT CHANGE UP (ref 30–120)
ALT FLD-CCNC: 14 U/L DA — SIGNIFICANT CHANGE UP (ref 10–60)
ANION GAP SERPL CALC-SCNC: 5 MMOL/L — SIGNIFICANT CHANGE UP (ref 5–17)
AST SERPL-CCNC: 26 U/L — SIGNIFICANT CHANGE UP (ref 10–40)
BILIRUB SERPL-MCNC: 0.8 MG/DL — SIGNIFICANT CHANGE UP (ref 0.2–1.2)
BUN SERPL-MCNC: 22 MG/DL — SIGNIFICANT CHANGE UP (ref 7–23)
CALCIUM SERPL-MCNC: 8.4 MG/DL — SIGNIFICANT CHANGE UP (ref 8.4–10.5)
CHLORIDE SERPL-SCNC: 106 MMOL/L — SIGNIFICANT CHANGE UP (ref 96–108)
CO2 SERPL-SCNC: 29 MMOL/L — SIGNIFICANT CHANGE UP (ref 22–31)
CREAT SERPL-MCNC: 0.89 MG/DL — SIGNIFICANT CHANGE UP (ref 0.5–1.3)
EGFR: 66 ML/MIN/1.73M2 — SIGNIFICANT CHANGE UP
GLUCOSE SERPL-MCNC: 94 MG/DL — SIGNIFICANT CHANGE UP (ref 70–99)
HCT VFR BLD CALC: 24.6 % — LOW (ref 34.5–45)
HGB BLD-MCNC: 7.9 G/DL — LOW (ref 11.5–15.5)
MCHC RBC-ENTMCNC: 30.9 PG — SIGNIFICANT CHANGE UP (ref 27–34)
MCHC RBC-ENTMCNC: 32.1 GM/DL — SIGNIFICANT CHANGE UP (ref 32–36)
MCV RBC AUTO: 96.1 FL — SIGNIFICANT CHANGE UP (ref 80–100)
NRBC # BLD: 0 /100 WBCS — SIGNIFICANT CHANGE UP (ref 0–0)
PLATELET # BLD AUTO: 217 K/UL — SIGNIFICANT CHANGE UP (ref 150–400)
POTASSIUM SERPL-MCNC: 4.1 MMOL/L — SIGNIFICANT CHANGE UP (ref 3.5–5.3)
POTASSIUM SERPL-SCNC: 4.1 MMOL/L — SIGNIFICANT CHANGE UP (ref 3.5–5.3)
PROT SERPL-MCNC: 5.8 G/DL — LOW (ref 6–8.3)
RBC # BLD: 2.56 M/UL — LOW (ref 3.8–5.2)
RBC # FLD: 15.9 % — HIGH (ref 10.3–14.5)
SARS-COV-2 RNA SPEC QL NAA+PROBE: SIGNIFICANT CHANGE UP
SODIUM SERPL-SCNC: 140 MMOL/L — SIGNIFICANT CHANGE UP (ref 135–145)
WBC # BLD: 8.13 K/UL — SIGNIFICANT CHANGE UP (ref 3.8–10.5)
WBC # FLD AUTO: 8.13 K/UL — SIGNIFICANT CHANGE UP (ref 3.8–10.5)

## 2022-04-16 PROCEDURE — 99497 ADVNCD CARE PLAN 30 MIN: CPT

## 2022-04-16 PROCEDURE — 99233 SBSQ HOSP IP/OBS HIGH 50: CPT

## 2022-04-16 RX ADMIN — Medication 3 MILLIGRAM(S): at 21:56

## 2022-04-16 RX ADMIN — OXYCODONE HYDROCHLORIDE 5 MILLIGRAM(S): 5 TABLET ORAL at 04:13

## 2022-04-16 RX ADMIN — ATORVASTATIN CALCIUM 20 MILLIGRAM(S): 80 TABLET, FILM COATED ORAL at 21:56

## 2022-04-16 RX ADMIN — Medication 1000 UNIT(S): at 17:15

## 2022-04-16 RX ADMIN — Medication 188 MICROGRAM(S): at 05:37

## 2022-04-16 RX ADMIN — Medication 1 MILLIGRAM(S): at 12:11

## 2022-04-16 RX ADMIN — LOSARTAN POTASSIUM 100 MILLIGRAM(S): 100 TABLET, FILM COATED ORAL at 05:37

## 2022-04-16 RX ADMIN — MEMANTINE HYDROCHLORIDE 10 MILLIGRAM(S): 10 TABLET ORAL at 05:37

## 2022-04-16 RX ADMIN — AMLODIPINE BESYLATE 5 MILLIGRAM(S): 2.5 TABLET ORAL at 05:37

## 2022-04-16 RX ADMIN — Medication 1000 UNIT(S): at 05:42

## 2022-04-16 RX ADMIN — PANTOPRAZOLE SODIUM 40 MILLIGRAM(S): 20 TABLET, DELAYED RELEASE ORAL at 12:11

## 2022-04-16 RX ADMIN — HEPARIN SODIUM 5000 UNIT(S): 5000 INJECTION INTRAVENOUS; SUBCUTANEOUS at 05:37

## 2022-04-16 RX ADMIN — MEMANTINE HYDROCHLORIDE 10 MILLIGRAM(S): 10 TABLET ORAL at 17:15

## 2022-04-16 RX ADMIN — Medication 1 TABLET(S): at 12:11

## 2022-04-16 RX ADMIN — MIRTAZAPINE 15 MILLIGRAM(S): 45 TABLET, ORALLY DISINTEGRATING ORAL at 21:56

## 2022-04-16 RX ADMIN — HEPARIN SODIUM 5000 UNIT(S): 5000 INJECTION INTRAVENOUS; SUBCUTANEOUS at 17:16

## 2022-04-16 RX ADMIN — Medication 3 MILLILITER(S): at 06:31

## 2022-04-16 RX ADMIN — Medication 3 MILLILITER(S): at 20:55

## 2022-04-16 RX ADMIN — OXYCODONE HYDROCHLORIDE 5 MILLIGRAM(S): 5 TABLET ORAL at 03:43

## 2022-04-16 NOTE — PROGRESS NOTE ADULT - SUBJECTIVE AND OBJECTIVE BOX
Chief Complaint: Fall    Interval Events: No events overnight.    Review of Systems:  General: No fevers, chills, weight gain  Skin: No rashes, color changes  Cardiovascular: No chest pain, orthopnea  Respiratory: No shortness of breath, cough  Gastrointestinal: No nausea, abdominal pain  Genitourinary: No incontinence, pain with urination  Musculoskeletal: No pain, swelling, decreased range of motion  Neurological: No headache, weakness  Psychiatric: No depression, anxiety  Endocrine: No weight gain, increased thirst  All other systems are comprehensively negative.    Physical Exam:  Vital Signs Last 24 Hrs  T(C): 37.2 (16 Apr 2022 05:09), Max: 37.2 (16 Apr 2022 05:09)  T(F): 98.9 (16 Apr 2022 05:09), Max: 98.9 (16 Apr 2022 05:09)  HR: 84 (16 Apr 2022 06:34) (74 - 88)  BP: 156/74 (16 Apr 2022 05:09) (122/89 - 156/74)  BP(mean): --  RR: 18 (16 Apr 2022 05:09) (18 - 19)  SpO2: 96% (16 Apr 2022 06:34) (95% - 96%)  General: NAD  HEENT: MMM  Neck: No JVD, no carotid bruit  Lungs: CTAB  CV: RRR, nl S1/S2, 2/6 systolic murmur  Abdomen: S/NT/ND, +BS  Extremities: No LE edema, no cyanosis  Neuro: AAOx1  Skin: No rash    Labs:    04-16    140  |  106  |  22  ----------------------------<  94  4.1   |  29  |  0.89    Ca    8.4      16 Apr 2022 07:16  Phos  2.7     04-15  Mg     2.2     04-15    TPro  5.8<L>  /  Alb  2.3<L>  /  TBili  0.8  /  DBili  x   /  AST  26  /  ALT  14  /  AlkPhos  55  04-16                        7.9    8.13  )-----------( 217      ( 16 Apr 2022 07:16 )             24.6       Telemetry: Sinus rhythm

## 2022-04-16 NOTE — PROGRESS NOTE ADULT - SUBJECTIVE AND OBJECTIVE BOX
ORTHOPEDIC PA PROGRESS NOTE  OPAL ARBOLEDA      77y Female                                                                                                                               POD #        STATUS POST:               Pre-Op Dx: Fracture of right hip      Post-Op Dx:  Fracture of right hip      Procedure: ORIF, hip, using trochanteric nail                                                Patient resting comfortable confused at baseline history of dementia.    Pain controlled.        T(F): 98.9  HR: 84  BP: 156/74  RR: 18  SpO2: 96%                        7.9    8.13  )-----------( 217      ( 16 Apr 2022 07:16 )             24.6                     04-15    140  |  107  |  24<H>  ----------------------------<  83  4.2   |  29  |  1.05    Ca    7.7<L>      15 Apr 2022 07:19  Phos  2.7     04-15  Mg     2.2     04-15      Physical Exam :    -  Surgical site clean and dry  -   Distal Neurvascular status intact grossly.   -   Warm well perfused; capillary refill <3 seconds   -   (+)EHL/FHL 5/5 Dorsi/plantar flexion intact  -   (+) Sensation to light touch  -   (-) Calf tenderness Bilaterally    A/P: 77y Female s/p Fracture of right hip      -   Ortho Stable  -   Pain control   -   Medicine to follow  -   DVT ppx:     [ x]SCDs     [ ] ASA     [ ] Eliquis     [ ] Lovenox Sub Q heparin  -   Weight bearing status:  WBAT [ x]        PWB    [ ]     TTWB  [ ]      NWB  [ ]   -  Dispo:     Home [ ]     Acute Rehab [ ]     FATOUMATA [x ]     TBD [ ]  -  Acute blood loss anemia s/p [facility] vitals stable.  Further recommendations as per medicine

## 2022-04-16 NOTE — PROGRESS NOTE ADULT - ASSESSMENT
This is a 77-year-old with episode of fall.  S/p Fall  Right Hip Fx S/P Sx  No Head injury  CT Head and CT C spine  No acute pathology  AMS/Delirium  - improving.   H/o Dementia - On Namenda 10mg BID  On Mirtazapine  Ortho Follows  Fall/safety precautions.  D/w covering nurse.  Would Follow.

## 2022-04-16 NOTE — PROGRESS NOTE ADULT - SUBJECTIVE AND OBJECTIVE BOX
Patient is a 77y old  Female who presents with a chief complaint of fall -> right femoral fracture (16 Apr 2022 07:58)      INTERVAL HPI/OVERNIGHT EVENTS:    seepage around bandage, more bloody in the AM.  However Hb is remaining stable.   Seen at bedside, no complaints.     MEDICATIONS  (STANDING):  albuterol/ipratropium for Nebulization 3 milliLiter(s) Nebulizer every 12 hours  amLODIPine   Tablet 5 milliGRAM(s) Oral daily  atorvastatin 20 milliGRAM(s) Oral at bedtime  cholecalciferol 1000 Unit(s) Oral two times a day  cyanocobalamin 500 MICROGram(s) Oral daily  folic acid 1 milliGRAM(s) Oral daily  heparin   Injectable 5000 Unit(s) SubCutaneous every 12 hours  levothyroxine 188 MICROGram(s) Oral daily  losartan 100 milliGRAM(s) Oral daily  melatonin 3 milliGRAM(s) Oral at bedtime  memantine 10 milliGRAM(s) Oral two times a day  mirtazapine 15 milliGRAM(s) Oral at bedtime  multivitamin 1 Tablet(s) Oral daily  pantoprazole  Injectable 40 milliGRAM(s) IV Push daily    MEDICATIONS  (PRN):  ALBUTerol    0.083% 2.5 milliGRAM(s) Nebulizer every 2 hours PRN Shortness of Breath and/or Wheezing  aluminum hydroxide/magnesium hydroxide/simethicone Suspension 30 milliLiter(s) Oral every 4 hours PRN Dyspepsia  bisacodyl Suppository 10 milliGRAM(s) Rectal daily PRN If no bowel movement  magnesium hydroxide Suspension 30 milliLiter(s) Oral daily PRN Constipation  oxyCODONE    IR 5 milliGRAM(s) Oral every 8 hours PRN Severe Pain (7 - 10)  oxyCODONE    IR 2.5 milliGRAM(s) Oral every 8 hours PRN Moderate Pain (4 - 6)      Allergies    No Known Allergies    Intolerances        REVIEW OF SYSTEMS:  as above    Vital Signs Last 24 Hrs  T(C): 37.1 (16 Apr 2022 09:30), Max: 37.2 (16 Apr 2022 05:09)  T(F): 98.7 (16 Apr 2022 09:30), Max: 98.9 (16 Apr 2022 05:09)  HR: 72 (16 Apr 2022 09:30) (72 - 88)  BP: 134/76 (16 Apr 2022 09:30) (122/89 - 156/74)  BP(mean): --  RR: 17 (16 Apr 2022 09:30) (17 - 19)  SpO2: 96% (16 Apr 2022 09:30) (95% - 96%)    PHYSICAL EXAM:  GENERAL: elderly appearing female in NAD  HEAD: atraumatic, normocephalic   EYES: EOMI, conjunctiva and sclera clear  ENMT: edentulous mouth  RESPIRATORY:  diminished bs at bases, poor inspiratory effort   CARDIOVASCULAR:  soft ii/vi holosystolic murmur LUSB  GASTROINTESTINAL:  soft, nontender, nondistended, bowel sounds present  EXTREMITIES: no clubbing or cyanosis or edema  MUSCULOSKELETAL:  dressing c/d/i. ROM LTD due to pain  NERVOUS SYSTEM:  sensation intact   PSYCH:  awake and alert but confused  SKIN: Multiple ecchymotic areas on skin     LABS:                        7.9    8.13  )-----------( 217      ( 16 Apr 2022 07:16 )             24.6     16 Apr 2022 07:16    140    |  106    |  22     ----------------------------<  94     4.1     |  29     |  0.89     Ca    8.4        16 Apr 2022 07:16    TPro  5.8    /  Alb  2.3    /  TBili  0.8    /  DBili  x      /  AST  26     /  ALT  14     /  AlkPhos  55     16 Apr 2022 07:16        CAPILLARY BLOOD GLUCOSE          RADIOLOGY & ADDITIONAL TESTS:

## 2022-04-16 NOTE — PROGRESS NOTE ADULT - ASSESSMENT
The patient is a 77 year old female with a history of HTN, HL, hypothyroidism, carotid stenosis, dementia who presents with a fall, found to have a hip fracture.    Plan:  - Echo with normal LV systolic function, no significant valve issues  - Continue amlodipine 5 mg daily  - Continue losartan 100 mg daily  - Continue atorvastatin 20 mg daily  - Continue aspirin 81 mg daily  - Hypothermia - resolved. Possibly due to significant hypothyroidism.  - Hemoglobin improved with transfusion - low but stable

## 2022-04-16 NOTE — PROGRESS NOTE ADULT - SUBJECTIVE AND OBJECTIVE BOX
Neurology follow up note    Covering Dr. Mcclain    OPAL [id number]    77F with dementia, DJD, HTN/HLD, carotid stenosis, hypothyroidism who presents from [facility] after a fall.  Per report, staff heard her fall but the patient was awake when they arrived in her room.  Patient was brought here for further evaluation.  In the ED, patient cannot recall events and does not know why she is here.  She does admit to some pain the right hip.  She did say that she has been feeling well prior to the injury.  The CT of her hip showed a right displaced femoral neck fracture.  Dr. Barajas (ortho) was made aware.  Patient currently has no pain while lying in the stretcher but does have pain when moved.  Of note, patient had episodes of desats that required some O2 supplementation to keep sats >90%.  Patient also noted to be bradycardic as well.        Interval History:    Patient resting in bed.  No shaking or seizures.    Allergies    No Known Allergies    MEDICATIONS  (STANDING):    albuterol/ipratropium for Nebulization 3 milliLiter(s) Nebulizer every 12 hours  amLODIPine   Tablet 5 milliGRAM(s) Oral daily  atorvastatin 20 milliGRAM(s) Oral at bedtime  cholecalciferol 1000 Unit(s) Oral two times a day  cyanocobalamin 500 MICROGram(s) Oral daily  folic acid 1 milliGRAM(s) Oral daily  heparin   Injectable 5000 Unit(s) SubCutaneous every 12 hours  levothyroxine 188 MICROGram(s) Oral daily  losartan 100 milliGRAM(s) Oral daily  melatonin 3 milliGRAM(s) Oral at bedtime  memantine 10 milliGRAM(s) Oral two times a day  mirtazapine 15 milliGRAM(s) Oral at bedtime  multivitamin 1 Tablet(s) Oral daily  pantoprazole  Injectable 40 milliGRAM(s) IV Push daily    MEDICATIONS  (PRN):    ALBUTerol    0.083% 2.5 milliGRAM(s) Nebulizer every 2 hours PRN Shortness of Breath and/or Wheezing  aluminum hydroxide/magnesium hydroxide/simethicone Suspension 30 milliLiter(s) Oral every 4 hours PRN Dyspepsia  bisacodyl Suppository 10 milliGRAM(s) Rectal daily PRN If no bowel movement  magnesium hydroxide Suspension 30 milliLiter(s) Oral daily PRN Constipation  oxyCODONE    IR 5 milliGRAM(s) Oral every 8 hours PRN Severe Pain (7 - 10)  oxyCODONE    IR 2.5 milliGRAM(s) Oral every 8 hours PRN Moderate Pain (4 - 6)    Vital Signs Last 24 Hrs    T(C): 36.7 (04-16-22 @ 13:30), Max: 37.2 (04-16-22 @ 05:09)  T(F): 98.1 (04-16-22 @ 13:30), Max: 98.9 (04-16-22 @ 05:09)  HR: 68 (04-16-22 @ 13:30) (68 - 88)  BP: 125/77 (04-16-22 @ 13:30) (122/89 - 156/74)  RR: 18 (04-16-22 @ 13:30) (17 - 19)  SpO2: 98% (04-16-22 @ 13:30) (95% - 98%)    NEUROLOGIC EXAMINATION:      On Neurological Examination:    Mental Status - Pt is alert, awake, Poor cognition. Follows simple commands.     Speech -  Able to speak few words. No aphasia.    Cranial Nerves - Pupils 3 mm equal and reactive to light. .  No facial asymmetry. Tongue - is in midline.    Motor Exam - 4 plus/5, S/p Right Hip Sx. .    Sensory Exam - Pt withdraws all extremities equally on stimulation.     Gait - Not tested.     Deep tendon Reflexes - 2 plus all over.    Neck Supple -  Yes.    LABS:    CBC Full  -  ( 16 Apr 2022 07:16 )  WBC Count : 8.13 K/uL  RBC Count : 2.56 M/uL  Hemoglobin : 7.9 g/dL  Hematocrit : 24.6 %  Platelet Count - Automated : 217 K/uL  Mean Cell Volume : 96.1 fl  Mean Cell Hemoglobin : 30.9 pg  Mean Cell Hemoglobin Concentration : 32.1 gm/dL    04-16    140  |  106  |  22  ----------------------------<  94  4.1   |  29  |  0.89    Ca    8.4      16 Apr 2022 07:16  Phos  2.7     04-15  Mg     2.2     04-15    TPro  5.8<L>  /  Alb  2.3<L>  /  TBili  0.8  /  DBili  x   /  AST  26  /  ALT  14  /  AlkPhos  55  04-16    LIVER FUNCTIONS - ( 11 Apr 2022 07:32 )  Alb: 3.0 g/dL / Pro: 6.6 g/dL / ALK PHOS: 71 U/L / ALT: 32 U/L DA / AST: 25 U/L / GGT: x         Vit B12 - 1363      RADIOLOGY    < from: CT Head No Cont (04.09.22 @ 22:49) >    IMPRESSION:  CT head:  No acute intracranial hemorrhage or mass effect.    CT cervical spine:  No CT evidence of cervical spine fracture.    < end of copied text >  < from: Xray Hip w/ Pelvis 2 or 3 Views, Right (04.09.22 @ 22:45) >      IMPRESSION:    Impacted intertrochanteric fracture of the right hip.      < end of copied text >

## 2022-04-16 NOTE — PROGRESS NOTE ADULT - ASSESSMENT
A/P:    77F with dementia, DJD, HTN/HLD, carotid stenosis, hypothyroidism who presents from [facility] after a fall.  Found to have right femoral neck fracture.      Right femoral neck fracture  - pain control as needed  - POD# 5  - PT/ OT eval appreciated, plan for eventual FATOUMATA placement    - Leukocytosis resolved, likely reactive  - S/p 1 unit PRBC 4/13  - CT scan reviewed  - restarted heparin BID on 4/15     Anemia  likely from blood loss.   s/p 1 unit PRBC 4/13  restarted heparin as she is high risk for DVT 4/15  Hb is stable , continue to monitor    CRISTIANO  - Likely pre-renal  - US kidney and bladder, no hydronephrosis    - Avoid nephrotoxic meds   - Nephrology consult appreciated   improved    Desats  - occurred on admission  - Pulm input appreciated, D-dimer elevated but CTA neg for PE  - US LLE neg for DVT  - Titrate off supplemental O2 as tolerated    Hypothermia and bradycardia  - Improved  - Likely in setting of hypothyroidism   - Surveillance blood and urine cultures NGTD  - ID, cardio and endocrine input appreciated    Hypothyroidism  - cont with synthroid  - Endocrine input appreciated   - Repeat TSH outpatient in 4-6 weeks    HTN/HLD  - Continue amlodipine 5 mg daily  - Resume losartan once BP improves   - Continue atorvastatin 20 mg daily  - Restart ASA if HGB stable     Dementia  - does not have namenda XR -> can switch to Namenda 10mg BID  - mirtazapine  - Seen by psych and neuro, acute aggression and agitation in setting of hospital acquired delerium    Incidental findings on CT  Cholelithiasis. The 1.4 x 1.6 cm right adrenal nodule and a 2.7 x 2.0 cm left adrenal nodule that likely represent adenomas, are   unchanged. Compression deformities of the T4-T6, T8-T9, and L1-L2 vertebral bodies.   Follow up outpatient for further management     Preventive measures  - On SCD's for DVT ppx due to acute drop in HGB, restart DVT ppx asap if HGB stable

## 2022-04-17 LAB
ALBUMIN SERPL ELPH-MCNC: 2.4 G/DL — LOW (ref 3.3–5)
ALP SERPL-CCNC: 56 U/L — SIGNIFICANT CHANGE UP (ref 30–120)
ALT FLD-CCNC: 20 U/L DA — SIGNIFICANT CHANGE UP (ref 10–60)
ANION GAP SERPL CALC-SCNC: 5 MMOL/L — SIGNIFICANT CHANGE UP (ref 5–17)
AST SERPL-CCNC: 24 U/L — SIGNIFICANT CHANGE UP (ref 10–40)
BILIRUB SERPL-MCNC: 1 MG/DL — SIGNIFICANT CHANGE UP (ref 0.2–1.2)
BLD GP AB SCN SERPL QL: SIGNIFICANT CHANGE UP
BUN SERPL-MCNC: 22 MG/DL — SIGNIFICANT CHANGE UP (ref 7–23)
CALCIUM SERPL-MCNC: 8.5 MG/DL — SIGNIFICANT CHANGE UP (ref 8.4–10.5)
CHLORIDE SERPL-SCNC: 104 MMOL/L — SIGNIFICANT CHANGE UP (ref 96–108)
CO2 SERPL-SCNC: 30 MMOL/L — SIGNIFICANT CHANGE UP (ref 22–31)
CREAT SERPL-MCNC: 0.86 MG/DL — SIGNIFICANT CHANGE UP (ref 0.5–1.3)
EGFR: 69 ML/MIN/1.73M2 — SIGNIFICANT CHANGE UP
GLUCOSE SERPL-MCNC: 94 MG/DL — SIGNIFICANT CHANGE UP (ref 70–99)
HCT VFR BLD CALC: 23.3 % — LOW (ref 34.5–45)
HCT VFR BLD CALC: 24.2 % — LOW (ref 34.5–45)
HGB BLD-MCNC: 7.4 G/DL — LOW (ref 11.5–15.5)
HGB BLD-MCNC: 7.6 G/DL — LOW (ref 11.5–15.5)
MCHC RBC-ENTMCNC: 30.9 PG — SIGNIFICANT CHANGE UP (ref 27–34)
MCHC RBC-ENTMCNC: 31.1 PG — SIGNIFICANT CHANGE UP (ref 27–34)
MCHC RBC-ENTMCNC: 31.4 GM/DL — LOW (ref 32–36)
MCHC RBC-ENTMCNC: 31.8 GM/DL — LOW (ref 32–36)
MCV RBC AUTO: 97.9 FL — SIGNIFICANT CHANGE UP (ref 80–100)
MCV RBC AUTO: 98.4 FL — SIGNIFICANT CHANGE UP (ref 80–100)
NRBC # BLD: 0 /100 WBCS — SIGNIFICANT CHANGE UP (ref 0–0)
NRBC # BLD: 0 /100 WBCS — SIGNIFICANT CHANGE UP (ref 0–0)
PLATELET # BLD AUTO: 224 K/UL — SIGNIFICANT CHANGE UP (ref 150–400)
PLATELET # BLD AUTO: 245 K/UL — SIGNIFICANT CHANGE UP (ref 150–400)
POTASSIUM SERPL-MCNC: 4.3 MMOL/L — SIGNIFICANT CHANGE UP (ref 3.5–5.3)
POTASSIUM SERPL-SCNC: 4.3 MMOL/L — SIGNIFICANT CHANGE UP (ref 3.5–5.3)
PROT SERPL-MCNC: 5.5 G/DL — LOW (ref 6–8.3)
RBC # BLD: 2.38 M/UL — LOW (ref 3.8–5.2)
RBC # BLD: 2.46 M/UL — LOW (ref 3.8–5.2)
RBC # FLD: 15.5 % — HIGH (ref 10.3–14.5)
RBC # FLD: 15.8 % — HIGH (ref 10.3–14.5)
SODIUM SERPL-SCNC: 139 MMOL/L — SIGNIFICANT CHANGE UP (ref 135–145)
WBC # BLD: 7.22 K/UL — SIGNIFICANT CHANGE UP (ref 3.8–10.5)
WBC # BLD: 7.79 K/UL — SIGNIFICANT CHANGE UP (ref 3.8–10.5)
WBC # FLD AUTO: 7.22 K/UL — SIGNIFICANT CHANGE UP (ref 3.8–10.5)
WBC # FLD AUTO: 7.79 K/UL — SIGNIFICANT CHANGE UP (ref 3.8–10.5)

## 2022-04-17 PROCEDURE — 99233 SBSQ HOSP IP/OBS HIGH 50: CPT

## 2022-04-17 PROCEDURE — 99497 ADVNCD CARE PLAN 30 MIN: CPT

## 2022-04-17 RX ADMIN — Medication 3 MILLILITER(S): at 18:33

## 2022-04-17 RX ADMIN — Medication 1 MILLIGRAM(S): at 12:14

## 2022-04-17 RX ADMIN — OXYCODONE HYDROCHLORIDE 5 MILLIGRAM(S): 5 TABLET ORAL at 04:07

## 2022-04-17 RX ADMIN — Medication 188 MICROGRAM(S): at 07:06

## 2022-04-17 RX ADMIN — Medication 3 MILLIGRAM(S): at 21:39

## 2022-04-17 RX ADMIN — OXYCODONE HYDROCHLORIDE 5 MILLIGRAM(S): 5 TABLET ORAL at 04:57

## 2022-04-17 RX ADMIN — Medication 1000 UNIT(S): at 08:41

## 2022-04-17 RX ADMIN — PREGABALIN 500 MICROGRAM(S): 225 CAPSULE ORAL at 12:14

## 2022-04-17 RX ADMIN — MIRTAZAPINE 15 MILLIGRAM(S): 45 TABLET, ORALLY DISINTEGRATING ORAL at 21:39

## 2022-04-17 RX ADMIN — PANTOPRAZOLE SODIUM 40 MILLIGRAM(S): 20 TABLET, DELAYED RELEASE ORAL at 12:14

## 2022-04-17 RX ADMIN — Medication 1 TABLET(S): at 12:14

## 2022-04-17 RX ADMIN — MEMANTINE HYDROCHLORIDE 10 MILLIGRAM(S): 10 TABLET ORAL at 17:30

## 2022-04-17 RX ADMIN — MEMANTINE HYDROCHLORIDE 10 MILLIGRAM(S): 10 TABLET ORAL at 07:05

## 2022-04-17 RX ADMIN — LOSARTAN POTASSIUM 100 MILLIGRAM(S): 100 TABLET, FILM COATED ORAL at 07:05

## 2022-04-17 RX ADMIN — Medication 1000 UNIT(S): at 17:30

## 2022-04-17 RX ADMIN — ATORVASTATIN CALCIUM 20 MILLIGRAM(S): 80 TABLET, FILM COATED ORAL at 21:39

## 2022-04-17 RX ADMIN — AMLODIPINE BESYLATE 5 MILLIGRAM(S): 2.5 TABLET ORAL at 07:05

## 2022-04-17 RX ADMIN — HEPARIN SODIUM 5000 UNIT(S): 5000 INJECTION INTRAVENOUS; SUBCUTANEOUS at 07:04

## 2022-04-17 RX ADMIN — Medication 3 MILLILITER(S): at 08:02

## 2022-04-17 NOTE — PROGRESS NOTE ADULT - ASSESSMENT
A/P:    77F with dementia, DJD, HTN/HLD, carotid stenosis, hypothyroidism who presents from [facility] after a fall.  Found to have right femoral neck fracture.      Right femoral neck fracture  - pain control as needed  - POD# 5  - PT/ OT eval appreciated, plan for eventual FATOUMATA placement    - Leukocytosis resolved, likely reactive  - S/p 1 unit PRBC 4/13  - CT scan reviewed  - restarted heparin BID on 4/15    Anemia  likely from blood loss.   s/p 1 unit PRBC 4/13  restarted heparin as she is high risk for DVT 4/15  today's Hb 7.6 from 7.9, will repeat at 12pm today to see if there is anymore loss otherwise will need to transfuse      CRISTIANO  - Likely pre-renal  - US kidney and bladder, no hydronephrosis    - Avoid nephrotoxic meds   - Nephrology consult appreciated   improved    Desats  - occurred on admission  - Pulm input appreciated, D-dimer elevated but CTA neg for PE  - US LLE neg for DVT  - Titrate off supplemental O2 as tolerated    Hypothermia and bradycardia  - Improved  - Likely in setting of hypothyroidism   - Surveillance blood and urine cultures NGTD  - ID, cardio and endocrine input appreciated    Hypothyroidism  - cont with synthroid  - Endocrine input appreciated   - Repeat TSH outpatient in 4-6 weeks    HTN/HLD  - Continue amlodipine 5 mg daily  - Resume losartan once BP improves   - Continue atorvastatin 20 mg daily  - Restart ASA if HGB stable     Dementia  - does not have namenda XR -> can switch to Namenda 10mg BID  - mirtazapine  - Seen by psych and neuro, acute aggression and agitation in setting of hospital acquired delerium    Incidental findings on CT  Cholelithiasis. The 1.4 x 1.6 cm right adrenal nodule and a 2.7 x 2.0 cm left adrenal nodule that likely represent adenomas, are   unchanged. Compression deformities of the T4-T6, T8-T9, and L1-L2 vertebral bodies.   Follow up outpatient for further management     Preventive measures  - On SCD's for DVT ppx due to acute drop in HGB, restart DVT ppx asap if HGB stable      A/P:    77F with dementia, DJD, HTN/HLD, carotid stenosis, hypothyroidism who presents from [facility] after a fall.  Found to have right femoral neck fracture.      Right femoral neck fracture  - pain control as needed  - POD# 5  - PT/ OT eval appreciated, plan for eventual FATOUMATA placement    - Leukocytosis resolved, likely reactive  - S/p 1 unit PRBC 4/13  - CT scan reviewed  - restarted heparin BID on 4/15    Anemia  likely from blood loss.   s/p 1 unit PRBC 4/13  restarted heparin as she is high risk for DVT 4/15  today's Hb 7.6 from 7.9, will repeat at 12pm today to see if there is anymore loss otherwise will need to transfuse      CRISTIANO  - Likely pre-renal  - US kidney and bladder, no hydronephrosis    - Avoid nephrotoxic meds   - Nephrology consult appreciated   improved    Desats  - occurred on admission  - Pulm input appreciated, D-dimer elevated but CTA neg for PE  - US LLE neg for DVT  - Titrate off supplemental O2 as tolerated    Hypothermia and bradycardia  - Improved  - Likely in setting of hypothyroidism   - Surveillance blood and urine cultures NGTD  - ID, cardio and endocrine input appreciated    Hypothyroidism  - cont with synthroid  - Endocrine input appreciated   - Repeat TSH outpatient in 4-6 weeks    HTN/HLD  - Continue amlodipine 5 mg daily  - Resume losartan once BP improves   - Continue atorvastatin 20 mg daily  - Restart ASA if HGB stable     Dementia  - does not have namenda XR -> can switch to Namenda 10mg BID  - mirtazapine  - Seen by psych and neuro, acute aggression and agitation in setting of hospital acquired delerium    Incidental findings on CT  Cholelithiasis. The 1.4 x 1.6 cm right adrenal nodule and a 2.7 x 2.0 cm left adrenal nodule that likely represent adenomas, are   unchanged. Compression deformities of the T4-T6, T8-T9, and L1-L2 vertebral bodies.   Follow up outpatient for further management     Preventive measures  - restarted heparin 4/15

## 2022-04-17 NOTE — PROGRESS NOTE ADULT - SUBJECTIVE AND OBJECTIVE BOX
Patient is a 77y Female with a known history of :  Hypothyroidism [E03.9]      HPI:  ***Patient with dementia and cannot recall recent events.  Therefore collateral information obtained via charts and notes.***    77F with dementia, DJD, HTN/HLD, carotid stenosis, hypothyroidism who presents from [facility] after a fall.  Per report, staff heard her fall but the patient was awake when they arrived in her room.  Patient was brought here for further evaluation.  In the ED, patient cannot recall events and does not know why she is here.  She does admit to some pain the right hip.  She did say that she has been feeling well prior to the injury.  The CT of her hip showed a right displaced femoral neck fracture.  Dr. Barajas (ortho) was made aware.  Patient currently has no pain while lying in the stretcher but does have pain when moved.  Of note, patient had episodes of desats that required some O2 supplementation to keep sats >90%.  Patient also noted to be bradycardic as well.       (10 Apr 2022 00:07)      REVIEW OF SYSTEMS:    CONSTITUTIONAL: No fever, weight loss, or fatigue  EYES: No eye pain, visual disturbances, or discharge  ENMT:  No difficulty hearing, tinnitus, vertigo; No sinus or throat pain  NECK: No pain or stiffness  BREASTS: No pain, masses, or nipple discharge  RESPIRATORY: No cough, wheezing, chills or hemoptysis; No shortness of breath  CARDIOVASCULAR: No chest pain, palpitations, dizziness, or leg swelling  GASTROINTESTINAL: No abdominal or epigastric pain. No nausea, vomiting, or hematemesis; No diarrhea or constipation. No melena or hematochezia.  GENITOURINARY: No dysuria, frequency, hematuria, or incontinence  NEUROLOGICAL: No headaches, memory loss, loss of strength, numbness, or tremors  SKIN: No itching, burning, rashes, or lesions   LYMPH NODES: No enlarged glands  ENDOCRINE: No heat or cold intolerance; No hair loss  MUSCULOSKELETAL: No joint pain or swelling; No muscle, back, or extremity pain  PSYCHIATRIC: No depression, anxiety, mood swings, or difficulty sleeping  HEME/LYMPH: No easy bruising, or bleeding gums  ALLERGY AND IMMUNOLOGIC: No hives or eczema    MEDICATIONS  (STANDING):  albuterol/ipratropium for Nebulization 3 milliLiter(s) Nebulizer every 12 hours  amLODIPine   Tablet 5 milliGRAM(s) Oral daily  atorvastatin 20 milliGRAM(s) Oral at bedtime  cholecalciferol 1000 Unit(s) Oral two times a day  cyanocobalamin 500 MICROGram(s) Oral daily  folic acid 1 milliGRAM(s) Oral daily  heparin   Injectable 5000 Unit(s) SubCutaneous every 12 hours  levothyroxine 188 MICROGram(s) Oral daily  losartan 100 milliGRAM(s) Oral daily  melatonin 3 milliGRAM(s) Oral at bedtime  memantine 10 milliGRAM(s) Oral two times a day  mirtazapine 15 milliGRAM(s) Oral at bedtime  multivitamin 1 Tablet(s) Oral daily  pantoprazole  Injectable 40 milliGRAM(s) IV Push daily    MEDICATIONS  (PRN):  ALBUTerol    0.083% 2.5 milliGRAM(s) Nebulizer every 2 hours PRN Shortness of Breath and/or Wheezing  aluminum hydroxide/magnesium hydroxide/simethicone Suspension 30 milliLiter(s) Oral every 4 hours PRN Dyspepsia  bisacodyl Suppository 10 milliGRAM(s) Rectal daily PRN If no bowel movement  magnesium hydroxide Suspension 30 milliLiter(s) Oral daily PRN Constipation  oxyCODONE    IR 5 milliGRAM(s) Oral every 8 hours PRN Severe Pain (7 - 10)  oxyCODONE    IR 2.5 milliGRAM(s) Oral every 8 hours PRN Moderate Pain (4 - 6)      ALLERGIES: No Known Allergies      FAMILY HISTORY:      Social history:  Alochol:   Smoking:   Drug Use:   Marital Status:     PHYSICAL EXAMINATION:  -----------------------------  T(C): 36.8 (04-17-22 @ 10:16), Max: 37.2 (04-16-22 @ 21:55)  HR: 67 (04-17-22 @ 10:16) (62 - 87)  BP: 148/68 (04-17-22 @ 10:16) (125/77 - 154/67)  RR: 17 (04-17-22 @ 10:16) (17 - 19)  SpO2: 94% (04-17-22 @ 10:16) (92% - 98%)  Wt(kg): --    04-16 @ 07:01  -  04-17 @ 07:00  --------------------------------------------------------  IN:    Oral Fluid: 250 mL  Total IN: 250 mL    OUT:  Total OUT: 0 mL    Total NET: 250 mL            Constitutional: well developed, normal appearance, well groomed, well nourished, no deformities and no acute distress.   Eyes: the conjunctiva exhibited no abnormalities and the eyelids demonstrated no xanthelasmas.   HEENT: normal oral mucosa, no oral pallor and no oral cyanosis.   Neck: normal jugular venous A waves present, normal jugular venous V waves present and no jugular venous garcia A waves.   Pulmonary: no respiratory distress, normal respiratory rhythm and effort, no accessory muscle use and lungs were clear to auscultation bilaterally. Anteriorly  Cardiovascular: heart rate and rhythm were normal, normal S1 and S2 and no murmur, gallop, rub, heave or thrill are present.   Musculoskeletal: the gait could not be assessed.   Extremities: no clubbing of the fingernails, no localized cyanosis, no petechial hemorrhages and no ischemic changes.   Skin: normal skin color and pigmentation, no rash, no venous stasis, no skin lesions, no skin ulcer and no xanthoma was observed.     LABS:   --------  04-17    139  |  104  |  22  ----------------------------<  94  4.3   |  30  |  0.86    Ca    8.5      17 Apr 2022 06:46    TPro  5.5<L>  /  Alb  2.4<L>  /  TBili  1.0  /  DBili  x   /  AST  24  /  ALT  20  /  AlkPhos  56  04-17                         7.6    7.79  )-----------( 245      ( 17 Apr 2022 06:46 )             24.2                   Radiology:    < from: US Transthoracic Echocardiogram w/Doppler Complete (04.10.22 @ 11:44) >    ACC: [id number] EXAM:  US TTE W DOPPLER COMPLETE                          PROCEDURE DATE:  04/10/2022          INTERPRETATION:  Indication: Cardiac murmur, hypertension    Technician: Cindy Estrada    Height 5 feet 7 inches, weight 158 pounds, blood pressure 167/90 mmHg    Study Quality: Technical difficult study    Measurements: Aortic root size 2.8 cm, left atrial size 3.9 cm, right   atrial size 2.9 cm, right ventricular size 1.9 cm, left ventricular   end-diastolic diameter 2.9 cm, left ventricular end-systolic diameter 1.9   cm, septal wall thickness 1.2 cm, posterior wall thickness 1.2 cm, aortic   velocity 1.62 m/s, mitral E velocity 0.4 m/s, ejection fraction   approximately 55%    Mitral Valve: There is some mild mitral annular calcification, thickened   mitral valve with the mild to moderate eccentric mitral regurgitation  Aortic Valve: Moderate fibrocalcific aortic sclerosis with normal cusp   excursion  Left Atrium: Normal size  Left Ventricle: Normal size, mild left ventricular hypertrophy, basal   septal hypertrophy without outflow track obstruction. Normal overall   systolic function, ejection fraction approximately 55%. Stage I diastolic   dysfunction.  Pericardium: No pericardial effusion  Tricuspid Valve: Appears normal mild eccentric tricuspid regurgitation.   Estimated right femur systolic pressure 34 mmHg  Pulmonic Valve: Appears normal  Right Ventricle: Normal right ventricular size, normal right ventricular   systolic function  Right Atrium: Normal right atrial size    IMPRESSION:  1. Mild left ventricular hypertrophy, basal septal ( sigmoid )   hypertrophy without outflow track obstruction with preserved systolic   function with stage I diastolic dysfunction  2. aortic sclerosis  3. mild to moderate mitral regurgitation.  4. Mild tricuspid regurgitation with estimated right ventricular systolic   pressure 34 mmHg    --- End of Report ---            [facility]  This document has been electronically signed. Apr 11 2022  7:52AM    < end of copied text >

## 2022-04-17 NOTE — PROGRESS NOTE ADULT - SUBJECTIVE AND OBJECTIVE BOX
`Post Op     OPAL ARBOLEDA      77y        Female                                                                                                                 T(C): 37 (04-17-22 @ 05:01), Max: 37.2 (04-16-22 @ 21:55)  HR: 62 (04-17-22 @ 08:07) (62 - 87)  BP: 154/67 (04-17-22 @ 05:01) (125/77 - 154/67)  RR: 18 (04-17-22 @ 05:01) (17 - 19)  SpO2: 97% (04-17-22 @ 08:07) (92% - 98%)  Wt(kg): --    S/P   open reduction internal fixation left hip    Patient denies shortness of breath, chest pain, dyspnea, No complaints  Pain is 3 /10    Physical Exam    Extremity: Bilaterally:  No holmon                                           No Cord                                          PAS on                                          Neurovascular intact                                          Motor intact EHL/FHL                                          Sensation intact                                          Pulses intact DP/PT                                         Calves Soft                                         Dressing Clean / serous drainage  noted patient restarted Heparin 4/15 no redness staples noted some eccymosis yellow purple noted                                         Capillary refill with 5 seconds                          7.6    7.79  )-----------( 245      ( 17 Apr 2022 06:46 )             24.2       04-17    139  |  104  |  22  ----------------------------<  94  4.3   |  30  |  0.86    Ca    8.5      17 Apr 2022 06:46    TPro  5.5<L>  /  Alb  2.4<L>  /  TBili  1.0  /  DBili  x   /  AST  24  /  ALT  20  /  AlkPhos  56  04-17      A/P  -- S/P open reduction internal fixation left hip    -  Medicine To Follow   - DVT prophylaxis PAS heparin asa   anemia as per medicine  Dressing changed incision clean  staples intact cleaned with saline  no sign of infection   - PT & OT   - Analagesia  - Incentive Spirometry  - Discharge Planning  - Safety Precautions  -  CBC , BMP daily    discussed with surgeon agrees with plan  daily dressing  changes

## 2022-04-17 NOTE — PROGRESS NOTE ADULT - SUBJECTIVE AND OBJECTIVE BOX
Neurology follow up note    Covering Dr. Mcclain    OPAL [id number]    77F with dementia, DJD, HTN/HLD, carotid stenosis, hypothyroidism who presents from [facility] after a fall.  Per report, staff heard her fall but the patient was awake when they arrived in her room.  Patient was brought here for further evaluation.  In the ED, patient cannot recall events and does not know why she is here.  She does admit to some pain the right hip.  She did say that she has been feeling well prior to the injury.  The CT of her hip showed a right displaced femoral neck fracture.  Dr. Barajas (ortho) was made aware.  Patient currently has no pain while lying in the stretcher but does have pain when moved.  Of note, patient had episodes of desats that required some O2 supplementation to keep sats >90%.  Patient also noted to be bradycardic as well.        Interval History:    Patient resting in bed.  No new complaints.     Allergies    No Known Allergies    MEDICATIONS  (STANDING):    albuterol/ipratropium for Nebulization 3 milliLiter(s) Nebulizer every 12 hours  amLODIPine   Tablet 5 milliGRAM(s) Oral daily  atorvastatin 20 milliGRAM(s) Oral at bedtime  cholecalciferol 1000 Unit(s) Oral two times a day  cyanocobalamin 500 MICROGram(s) Oral daily  folic acid 1 milliGRAM(s) Oral daily  levothyroxine 188 MICROGram(s) Oral daily  losartan 100 milliGRAM(s) Oral daily  melatonin 3 milliGRAM(s) Oral at bedtime  memantine 10 milliGRAM(s) Oral two times a day  mirtazapine 15 milliGRAM(s) Oral at bedtime  multivitamin 1 Tablet(s) Oral daily  pantoprazole  Injectable 40 milliGRAM(s) IV Push daily    MEDICATIONS  (PRN):    ALBUTerol    0.083% 2.5 milliGRAM(s) Nebulizer every 2 hours PRN Shortness of Breath and/or Wheezing  aluminum hydroxide/magnesium hydroxide/simethicone Suspension 30 milliLiter(s) Oral every 4 hours PRN Dyspepsia  bisacodyl Suppository 10 milliGRAM(s) Rectal daily PRN If no bowel movement  magnesium hydroxide Suspension 30 milliLiter(s) Oral daily PRN Constipation  oxyCODONE    IR 5 milliGRAM(s) Oral every 8 hours PRN Severe Pain (7 - 10)  oxyCODONE    IR 2.5 milliGRAM(s) Oral every 8 hours PRN Moderate Pain (4 - 6)    Vital Signs Last 24 Hrs    T(C): 36.5 (04-17-22 @ 15:45), Max: 37.2 (04-16-22 @ 21:55)  T(F): 97.7 (04-17-22 @ 15:45), Max: 99 (04-16-22 @ 21:55)  HR: 74 (04-17-22 @ 15:45) (62 - 87)  BP: 148/68 (04-17-22 @ 15:45) (130/64 - 154/67)  RR: 18 (04-17-22 @ 15:45) (16 - 19)  SpO2: 94% (04-17-22 @ 15:45) (92% - 97%)    NEUROLOGIC EXAMINATION:      On Neurological Examination:    Mental Status - Pt is alert, awake, Poor cognition. Follows simple commands.     Speech -  Able to speak few words. No aphasia.    Cranial Nerves - Pupils 3 mm equal and reactive to light. . No facial asymmetry. Tongue - is in midline.    Motor Exam - 4 plus/5, S/p Right Hip Sx. .    Sensory Exam - Pt withdraws all extremities equally on stimulation.     Gait - Not tested.     Deep tendon Reflexes - 2 plus all over.    Neck Supple -  Yes.    LABS:    CBC Full  -  ( 17 Apr 2022 11:59 )  WBC Count : 7.22 K/uL  RBC Count : 2.38 M/uL  Hemoglobin : 7.4 g/dL  Hematocrit : 23.3 %  Platelet Count - Automated : 224 K/uL  Mean Cell Volume : 97.9 fl  Mean Cell Hemoglobin : 31.1 pg  Mean Cell Hemoglobin Concentration : 31.8 gm/dL    04-17    139  |  104  |  22  ----------------------------<  94  4.3   |  30  |  0.86    Ca    8.5      17 Apr 2022 06:46    TPro  5.5<L>  /  Alb  2.4<L>  /  TBili  1.0  /  DBili  x   /  AST  24  /  ALT  20  /  AlkPhos  56  04-17    LIVER FUNCTIONS - ( 11 Apr 2022 07:32 )    Alb: 3.0 g/dL / Pro: 6.6 g/dL / ALK PHOS: 71 U/L / ALT: 32 U/L DA / AST: 25 U/L / GGT: x         Vit B12 - 1363      RADIOLOGY    < from: CT Head No Cont (04.09.22 @ 22:49) >    IMPRESSION:    CT head:  No acute intracranial hemorrhage or mass effect.    CT cervical spine:  No CT evidence of cervical spine fracture.    < end of copied text >  < from: Xray Hip w/ Pelvis 2 or 3 Views, Right (04.09.22 @ 22:45) >      IMPRESSION:    Impacted intertrochanteric fracture of the right hip.      < end of copied text >

## 2022-04-17 NOTE — PROGRESS NOTE ADULT - ASSESSMENT
This is a 77-year-old adm s/p Fall  Right Hip Fx S/P Sx  No LOC or Head injury  CT Head and CT C spine  No acute pathology  AMS/Delirium  - improving.   H/o Dementia - On Namenda 10mg BID  On Mirtazapine  Ortho Follows  PT  Fall/safety precautions.  D/w covering nurse.  Would Follow.

## 2022-04-17 NOTE — PROGRESS NOTE ADULT - ASSESSMENT
The patient is a 77 year old female with a history of HTN, HL, hypothyroidism, carotid stenosis, dementia who presents with a fall, found to have a hip fracture.    4/17/22  Seen at [facility] telemetry  Lying flat, sleeping comfortably    Plan:  - Echo with normal LV systolic function, no significant valve issues-see above  - Continue amlodipine 5 mg daily  - Continue losartan 100 mg daily  - Continue atorvastatin 20 mg daily  - Aspirin 81 mg daily stopped  - Hypothermia - resolved. Possibly due to significant hypothyroidism.  - Hemoglobin improved with transfusion - low but stable  - Being followed by Orthopedics, Neurology, Pulmonary, Renal  - Post-op care  - Discharge planning

## 2022-04-17 NOTE — PROGRESS NOTE ADULT - SUBJECTIVE AND OBJECTIVE BOX
Patient is a 77y old  Female who presents with a chief complaint of fall -> right femoral fracture (16 Apr 2022 15:41)      INTERVAL HPI/OVERNIGHT EVENTS:    Hb is lower than yesterday.      MEDICATIONS  (STANDING):  albuterol/ipratropium for Nebulization 3 milliLiter(s) Nebulizer every 12 hours  amLODIPine   Tablet 5 milliGRAM(s) Oral daily  atorvastatin 20 milliGRAM(s) Oral at bedtime  cholecalciferol 1000 Unit(s) Oral two times a day  cyanocobalamin 500 MICROGram(s) Oral daily  folic acid 1 milliGRAM(s) Oral daily  heparin   Injectable 5000 Unit(s) SubCutaneous every 12 hours  levothyroxine 188 MICROGram(s) Oral daily  losartan 100 milliGRAM(s) Oral daily  melatonin 3 milliGRAM(s) Oral at bedtime  memantine 10 milliGRAM(s) Oral two times a day  mirtazapine 15 milliGRAM(s) Oral at bedtime  multivitamin 1 Tablet(s) Oral daily  pantoprazole  Injectable 40 milliGRAM(s) IV Push daily    MEDICATIONS  (PRN):  ALBUTerol    0.083% 2.5 milliGRAM(s) Nebulizer every 2 hours PRN Shortness of Breath and/or Wheezing  aluminum hydroxide/magnesium hydroxide/simethicone Suspension 30 milliLiter(s) Oral every 4 hours PRN Dyspepsia  bisacodyl Suppository 10 milliGRAM(s) Rectal daily PRN If no bowel movement  magnesium hydroxide Suspension 30 milliLiter(s) Oral daily PRN Constipation  oxyCODONE    IR 5 milliGRAM(s) Oral every 8 hours PRN Severe Pain (7 - 10)  oxyCODONE    IR 2.5 milliGRAM(s) Oral every 8 hours PRN Moderate Pain (4 - 6)      Allergies    No Known Allergies    Intolerances    Vital Signs Last 24 Hrs  T(C): 37 (17 Apr 2022 05:01), Max: 37.2 (16 Apr 2022 21:55)  T(F): 98.6 (17 Apr 2022 05:01), Max: 99 (16 Apr 2022 21:55)  HR: 62 (17 Apr 2022 08:07) (62 - 87)  BP: 154/67 (17 Apr 2022 05:01) (125/77 - 154/67)  BP(mean): --  RR: 18 (17 Apr 2022 05:01) (17 - 19)  SpO2: 97% (17 Apr 2022 08:07) (92% - 98%)    PHYSICAL EXAM:  GENERAL: elderly appearing female in NAD  HEAD: atraumatic, normocephalic   EYES: EOMI, conjunctiva and sclera clear  ENMT: edentulous mouth  RESPIRATORY:  diminished bs at bases, poor inspiratory effort   CARDIOVASCULAR:  soft ii/vi holosystolic murmur LUSB  GASTROINTESTINAL:  soft, nontender, nondistended, bowel sounds present  EXTREMITIES: no clubbing or cyanosis or edema  MUSCULOSKELETAL:  dressing c/d/i. ROM LTD due to pain  NERVOUS SYSTEM:  sensation intact   PSYCH:  awake and alert but confused  SKIN: Multiple ecchymotic areas on skin     LABS:                        7.6    7.79  )-----------( 245      ( 17 Apr 2022 06:46 )             24.2     17 Apr 2022 06:46    139    |  104    |  22     ----------------------------<  94     4.3     |  30     |  0.86     Ca    8.5        17 Apr 2022 06:46    TPro  5.5    /  Alb  2.4    /  TBili  1.0    /  DBili  x      /  AST  24     /  ALT  20     /  AlkPhos  56     17 Apr 2022 06:46        CAPILLARY BLOOD GLUCOSE          RADIOLOGY & ADDITIONAL TESTS:

## 2022-04-18 ENCOUNTER — TRANSCRIPTION ENCOUNTER (OUTPATIENT)
Age: 77
End: 2022-04-18

## 2022-04-18 LAB
ALBUMIN SERPL ELPH-MCNC: 2.5 G/DL — LOW (ref 3.3–5)
ALP SERPL-CCNC: 60 U/L — SIGNIFICANT CHANGE UP (ref 30–120)
ALT FLD-CCNC: 18 U/L DA — SIGNIFICANT CHANGE UP (ref 10–60)
ANION GAP SERPL CALC-SCNC: 5 MMOL/L — SIGNIFICANT CHANGE UP (ref 5–17)
AST SERPL-CCNC: 27 U/L — SIGNIFICANT CHANGE UP (ref 10–40)
BILIRUB SERPL-MCNC: 1.3 MG/DL — HIGH (ref 0.2–1.2)
BUN SERPL-MCNC: 23 MG/DL — SIGNIFICANT CHANGE UP (ref 7–23)
CALCIUM SERPL-MCNC: 8.9 MG/DL — SIGNIFICANT CHANGE UP (ref 8.4–10.5)
CHLORIDE SERPL-SCNC: 103 MMOL/L — SIGNIFICANT CHANGE UP (ref 96–108)
CO2 SERPL-SCNC: 30 MMOL/L — SIGNIFICANT CHANGE UP (ref 22–31)
CREAT SERPL-MCNC: 0.88 MG/DL — SIGNIFICANT CHANGE UP (ref 0.5–1.3)
EGFR: 67 ML/MIN/1.73M2 — SIGNIFICANT CHANGE UP
GLUCOSE SERPL-MCNC: 95 MG/DL — SIGNIFICANT CHANGE UP (ref 70–99)
HCT VFR BLD CALC: 27.4 % — LOW (ref 34.5–45)
HGB BLD-MCNC: 8.9 G/DL — LOW (ref 11.5–15.5)
MCHC RBC-ENTMCNC: 30.9 PG — SIGNIFICANT CHANGE UP (ref 27–34)
MCHC RBC-ENTMCNC: 32.5 GM/DL — SIGNIFICANT CHANGE UP (ref 32–36)
MCV RBC AUTO: 95.1 FL — SIGNIFICANT CHANGE UP (ref 80–100)
NRBC # BLD: 0 /100 WBCS — SIGNIFICANT CHANGE UP (ref 0–0)
PLATELET # BLD AUTO: 291 K/UL — SIGNIFICANT CHANGE UP (ref 150–400)
POTASSIUM SERPL-MCNC: 3.7 MMOL/L — SIGNIFICANT CHANGE UP (ref 3.5–5.3)
POTASSIUM SERPL-SCNC: 3.7 MMOL/L — SIGNIFICANT CHANGE UP (ref 3.5–5.3)
PROT SERPL-MCNC: 6.3 G/DL — SIGNIFICANT CHANGE UP (ref 6–8.3)
RBC # BLD: 2.88 M/UL — LOW (ref 3.8–5.2)
RBC # FLD: 15.9 % — HIGH (ref 10.3–14.5)
SODIUM SERPL-SCNC: 138 MMOL/L — SIGNIFICANT CHANGE UP (ref 135–145)
WBC # BLD: 7.47 K/UL — SIGNIFICANT CHANGE UP (ref 3.8–10.5)
WBC # FLD AUTO: 7.47 K/UL — SIGNIFICANT CHANGE UP (ref 3.8–10.5)

## 2022-04-18 PROCEDURE — 99233 SBSQ HOSP IP/OBS HIGH 50: CPT

## 2022-04-18 PROCEDURE — 99497 ADVNCD CARE PLAN 30 MIN: CPT

## 2022-04-18 RX ORDER — ASPIRIN/CALCIUM CARB/MAGNESIUM 324 MG
1 TABLET ORAL
Qty: 0 | Refills: 0 | DISCHARGE

## 2022-04-18 RX ORDER — ASPIRIN/CALCIUM CARB/MAGNESIUM 324 MG
1 TABLET ORAL
Qty: 0 | Refills: 0 | DISCHARGE
Start: 2022-04-18

## 2022-04-18 RX ORDER — HEPARIN SODIUM 5000 [USP'U]/ML
5000 INJECTION INTRAVENOUS; SUBCUTANEOUS EVERY 12 HOURS
Refills: 0 | Status: DISCONTINUED | OUTPATIENT
Start: 2022-04-18 | End: 2022-04-20

## 2022-04-18 RX ORDER — LOSARTAN/HYDROCHLOROTHIAZIDE 100MG-25MG
0 TABLET ORAL
Qty: 0 | Refills: 0 | DISCHARGE

## 2022-04-18 RX ORDER — OXYCODONE HYDROCHLORIDE 5 MG/1
1 TABLET ORAL
Qty: 0 | Refills: 0 | DISCHARGE
Start: 2022-04-18

## 2022-04-18 RX ORDER — HEPARIN SODIUM 5000 [USP'U]/ML
5000 INJECTION INTRAVENOUS; SUBCUTANEOUS
Qty: 0 | Refills: 0 | DISCHARGE
Start: 2022-04-18

## 2022-04-18 RX ORDER — LOSARTAN POTASSIUM 100 MG/1
1 TABLET, FILM COATED ORAL
Qty: 0 | Refills: 0 | DISCHARGE
Start: 2022-04-18

## 2022-04-18 RX ADMIN — Medication 1 TABLET(S): at 11:21

## 2022-04-18 RX ADMIN — Medication 1 MILLIGRAM(S): at 11:21

## 2022-04-18 RX ADMIN — OXYCODONE HYDROCHLORIDE 5 MILLIGRAM(S): 5 TABLET ORAL at 18:46

## 2022-04-18 RX ADMIN — Medication 3 MILLIGRAM(S): at 21:48

## 2022-04-18 RX ADMIN — LOSARTAN POTASSIUM 100 MILLIGRAM(S): 100 TABLET, FILM COATED ORAL at 06:40

## 2022-04-18 RX ADMIN — Medication 188 MICROGRAM(S): at 06:40

## 2022-04-18 RX ADMIN — MIRTAZAPINE 15 MILLIGRAM(S): 45 TABLET, ORALLY DISINTEGRATING ORAL at 21:48

## 2022-04-18 RX ADMIN — Medication 1000 UNIT(S): at 17:18

## 2022-04-18 RX ADMIN — HEPARIN SODIUM 5000 UNIT(S): 5000 INJECTION INTRAVENOUS; SUBCUTANEOUS at 17:18

## 2022-04-18 RX ADMIN — PANTOPRAZOLE SODIUM 40 MILLIGRAM(S): 20 TABLET, DELAYED RELEASE ORAL at 11:21

## 2022-04-18 RX ADMIN — AMLODIPINE BESYLATE 5 MILLIGRAM(S): 2.5 TABLET ORAL at 06:40

## 2022-04-18 RX ADMIN — Medication 3 MILLILITER(S): at 07:23

## 2022-04-18 RX ADMIN — PREGABALIN 500 MICROGRAM(S): 225 CAPSULE ORAL at 11:21

## 2022-04-18 RX ADMIN — ATORVASTATIN CALCIUM 20 MILLIGRAM(S): 80 TABLET, FILM COATED ORAL at 21:48

## 2022-04-18 RX ADMIN — Medication 3 MILLILITER(S): at 20:19

## 2022-04-18 RX ADMIN — OXYCODONE HYDROCHLORIDE 5 MILLIGRAM(S): 5 TABLET ORAL at 19:30

## 2022-04-18 RX ADMIN — MEMANTINE HYDROCHLORIDE 10 MILLIGRAM(S): 10 TABLET ORAL at 06:40

## 2022-04-18 RX ADMIN — MEMANTINE HYDROCHLORIDE 10 MILLIGRAM(S): 10 TABLET ORAL at 17:18

## 2022-04-18 RX ADMIN — Medication 1000 UNIT(S): at 06:37

## 2022-04-18 NOTE — PROGRESS NOTE ADULT - ASSESSMENT
77F with dementia, DJD, HTN/HLD, carotid stenosis, hypothyroidism who presents from [facility] after a fall.  Found to have right femoral neck fracture.      Right femoral neck fracture  - pain control as needed  - PT/ OT eval appreciated, plan for eventual FATOUMATA placement    - Leukocytosis resolved, likely reactive  - S/p 1 unit PRBC 4/13  - CT scan reviewed  - restarted heparin BID on 4/15    Anemia  likely from blood loss.   s/p 1 unit PRBC 4/13  restarted heparin as she is high risk for DVT 4/15  today's Hb 8.9 from 7.4 s/p 1 unit PRBC 4/17.  No drainage from incision site, dry padding.   will have ecchymosis on skin 2/2 heparin use.   repeat CBC stable    CRISTIANO  - Likely pre-renal  - US kidney and bladder, no hydronephrosis    - Avoid nephrotoxic meds   - Nephrology consult appreciated   improved    Desats  - occurred on admission  - Pulm input appreciated, D-dimer elevated but CTA neg for PE  - US LLE neg for DVT  - Titrate off supplemental O2 as tolerated    Hypothermia and bradycardia  - Improved  - Likely in setting of hypothyroidism   - Surveillance blood and urine cultures NGTD  - ID, cardio and endocrine input appreciated    Hypothyroidism  - cont with synthroid  - Endocrine input appreciated   - Repeat TSH outpatient in 4-6 weeks    HTN/HLD  - Continue amlodipine 5 mg daily  - Resume losartan once BP improves   - Continue atorvastatin 20 mg daily  - Restarted ASA    Dementia  - Namenda 10mg BID  - mirtazapine  - Seen by psych and neuro, acute aggression and agitation in setting of hospital acquired delerium    Incidental findings on CT  Cholelithiasis. The 1.4 x 1.6 cm right adrenal nodule and a 2.7 x 2.0 cm left adrenal nodule that likely represent adenomas, are   unchanged. Compression deformities of the T4-T6, T8-T9, and L1-L2 vertebral bodies.   Follow up outpatient for further management     Updated daughter, agrees to plan

## 2022-04-18 NOTE — PROGRESS NOTE ADULT - SUBJECTIVE AND OBJECTIVE BOX
Neurology follow up note    Covering Dr. Mcclain    OPAL [id number]    77F with dementia, DJD, HTN/HLD, carotid stenosis, hypothyroidism who presents from [facility] after a fall.  Per report, staff heard her fall but the patient was awake when they arrived in her room.  Patient was brought here for further evaluation.  In the ED, patient cannot recall events and does not know why she is here.  She does admit to some pain the right hip.  She did say that she has been feeling well prior to the injury.  The CT of her hip showed a right displaced femoral neck fracture.  Dr. Barajas (ortho) was made aware.  Patient currently has no pain while lying in the stretcher but does have pain when moved.  Of note, patient had episodes of desats that required some O2 supplementation to keep sats >90%.  Patient also noted to be bradycardic as well.        Interval History:    Follows simple commands.    Allergies    No Known Allergies    MEDICATIONS  (STANDING):    albuterol/ipratropium for Nebulization 3 milliLiter(s) Nebulizer every 12 hours  amLODIPine   Tablet 5 milliGRAM(s) Oral daily  atorvastatin 20 milliGRAM(s) Oral at bedtime  cholecalciferol 1000 Unit(s) Oral two times a day  cyanocobalamin 500 MICROGram(s) Oral daily  folic acid 1 milliGRAM(s) Oral daily  heparin   Injectable 5000 Unit(s) SubCutaneous every 12 hours  levothyroxine 188 MICROGram(s) Oral daily  losartan 100 milliGRAM(s) Oral daily  melatonin 3 milliGRAM(s) Oral at bedtime  memantine 10 milliGRAM(s) Oral two times a day  mirtazapine 15 milliGRAM(s) Oral at bedtime  multivitamin 1 Tablet(s) Oral daily  pantoprazole  Injectable 40 milliGRAM(s) IV Push daily    MEDICATIONS  (PRN):    ALBUTerol    0.083% 2.5 milliGRAM(s) Nebulizer every 2 hours PRN Shortness of Breath and/or Wheezing  aluminum hydroxide/magnesium hydroxide/simethicone Suspension 30 milliLiter(s) Oral every 4 hours PRN Dyspepsia  bisacodyl Suppository 10 milliGRAM(s) Rectal daily PRN If no bowel movement  magnesium hydroxide Suspension 30 milliLiter(s) Oral daily PRN Constipation  oxyCODONE    IR 5 milliGRAM(s) Oral every 8 hours PRN Severe Pain (7 - 10)  oxyCODONE    IR 2.5 milliGRAM(s) Oral every 8 hours PRN Moderate Pain (4 - 6)    Vital Signs Last 24 Hrs    T(C): 36.9 (18 Apr 2022 09:15), Max: 36.9 (17 Apr 2022 23:07)  T(F): 98.5 (18 Apr 2022 09:15), Max: 98.5 (18 Apr 2022 09:15)  HR: 70 (18 Apr 2022 09:15) (68 - 86)  BP: 148/76 (18 Apr 2022 09:15) (131/69 - 148/76)  RR: 18 (18 Apr 2022 09:15) (16 - 18)  SpO2: 96% (18 Apr 2022 09:15) (94% - 99%)    NEUROLOGIC EXAMINATION:      On Neurological Examination:    Mental Status - Pt is alert, awake, Poor cognition. Follows simple commands.     Speech -  Able to speak few words. No aphasia.    Cranial Nerves - Pupils 3 mm equal and reactive to light. . No facial asymmetry. Tongue - is in midline.    Motor Exam - 4 plus/5, S/p Right Hip Sx. .    Sensory Exam - Pt withdraws all extremities equally on stimulation.     Gait - Not tested.     Deep tendon Reflexes - 2 plus all over.    Neck Supple -  Yes.    CBC Full  -  ( 18 Apr 2022 07:39 )  WBC Count : 7.47 K/uL  RBC Count : 2.88 M/uL  Hemoglobin : 8.9 g/dL  Hematocrit : 27.4 %  Platelet Count - Automated : 291 K/uL  Mean Cell Volume : 95.1 fl  Mean Cell Hemoglobin : 30.9 pg  Mean Cell Hemoglobin Concentration : 32.5 gm/dL    04-18    138  |  103  |  23  ----------------------------<  95  3.7   |  30  |  0.88    Ca    8.9      18 Apr 2022 07:39    TPro  6.3  /  Alb  2.5<L>  /  TBili  1.3<H>  /  DBili  x   /  AST  27  /  ALT  18  /  AlkPhos  60  04-18    LIVER FUNCTIONS - ( 11 Apr 2022 07:32 )    Alb: 3.0 g/dL / Pro: 6.6 g/dL / ALK PHOS: 71 U/L / ALT: 32 U/L DA / AST: 25 U/L / GGT: x         Vit B12 - 1363      RADIOLOGY    < from: CT Head No Cont (04.09.22 @ 22:49) >    IMPRESSION:    CT head: No acute intracranial hemorrhage or mass effect.    CT cervical spine: No CT evidence of cervical spine fracture.    < end of copied text >  < from: Xray Hip w/ Pelvis 2 or 3 Views, Right (04.09.22 @ 22:45) >    IMPRESSION:    Impacted intertrochanteric fracture of the right hip.    < end of copied text >

## 2022-04-18 NOTE — DISCHARGE NOTE NURSING/CASE MANAGEMENT/SOCIAL WORK - NSDCPEFALRISK_GEN_ALL_CORE
For information on Fall & Injury Prevention, visit: [email or web address] OR  [email or web address] OR  https://www.cdc.gov/steadi/patient.html

## 2022-04-18 NOTE — PROGRESS NOTE ADULT - ASSESSMENT
The patient is a 77 year old female with a history of HTN, HL, hypothyroidism, carotid stenosis, dementia who presents with a fall, found to have a hip fracture.    4/18/22  Seen at [facility] telemetry  Lying semi-erect, sleeping comfortably  Easily arousable    Plan:  - Echo with normal LV systolic function, no significant valve issues  - Continue amlodipine 5 mg daily  - Continue losartan 100 mg daily  - Continue atorvastatin 20 mg daily  - Aspirin 81 mg daily stopped  - Hypothermia - resolved. Possibly due to significant hypothyroidism.  - Hemoglobin improved with transfusion - low but stable  - Being followed by Orthopedics, Neurology, Pulmonary, Renal  - Post-op care  - Discharge planning

## 2022-04-18 NOTE — PROGRESS NOTE ADULT - SUBJECTIVE AND OBJECTIVE BOX
ORTHOPEDIC PA PROGRESS NOTE  OPAL ARBOLEDA      77y Female                                                                                                                               POD #    7    STATUS POST:               Pre-Op Dx: Fracture of right hip      Post-Op Dx:  Fracture of right hip      Procedure: ORIF, hip, using trochanteric nail                                                  Pain (0-10): 2  Current Pain Management:  [ ] PCA   [x ] Po Analgesics [ ] IM /IV Anagesics     T(F): 98  HR: 72  BP: 145/71  RR: 18  SpO2: 96%                        7.4    7.22  )-----------( 224      ( 17 Apr 2022 11:59 )             23.3                     04-17    139  |  104  |  22  ----------------------------<  94  4.3   |  30  |  0.86    Ca    8.5      17 Apr 2022 06:46    TPro  5.5<L>  /  Alb  2.4<L>  /  TBili  1.0  /  DBili  x   /  AST  24  /  ALT  20  /  AlkPhos  56  04-17    Physical Exam :    -   Dressing changed sterile.   -   Wound C/D/I.   -   Distal Neurvascular status intact grossly.   -   Warm well perfused; capillary refill <3 seconds   -   (+)EHL/FHL 5/5  -   (+) Sensation to light touch  -   (-) Calf tenderness Bilaterally    A/P: 77y Female s/p Fracture of right hip      -   Ortho Stable  -   Pain control   -   Medicine to follow  -   DVT ppx:     [x ]SCDs     [ ] ASA     [ ] Eliquis     [ ] Lovenox   [x] SQ Heparin  -   Weight bearing status:  WBAT [x ]        PWB    [ ]     TTWB  [ ]      NWB  [ ]   -  Dispo:     Home [ ]     Acute Rehab [ ]     FATOUMATA [x ]     TBD [ ]

## 2022-04-18 NOTE — PROGRESS NOTE ADULT - SUBJECTIVE AND OBJECTIVE BOX
Patient is a 77y old  Female who presents with a chief complaint of fall -> right femoral fracture (19 Apr 2022 07:41)      INTERVAL HPI/OVERNIGHT EVENTS:    Hb stable, dry padding during dressing change    MEDICATIONS  (STANDING):  acetaminophen     Tablet .. 1000 milliGRAM(s) Oral every 8 hours  albuterol/ipratropium for Nebulization 3 milliLiter(s) Nebulizer every 12 hours  amLODIPine   Tablet 5 milliGRAM(s) Oral daily  atorvastatin 20 milliGRAM(s) Oral at bedtime  cholecalciferol 1000 Unit(s) Oral two times a day  cyanocobalamin 500 MICROGram(s) Oral daily  folic acid 1 milliGRAM(s) Oral daily  heparin   Injectable 5000 Unit(s) SubCutaneous every 12 hours  levothyroxine 188 MICROGram(s) Oral daily  losartan 100 milliGRAM(s) Oral daily  melatonin 3 milliGRAM(s) Oral at bedtime  memantine 10 milliGRAM(s) Oral two times a day  mirtazapine 15 milliGRAM(s) Oral at bedtime  multivitamin 1 Tablet(s) Oral daily  pantoprazole  Injectable 40 milliGRAM(s) IV Push daily    MEDICATIONS  (PRN):  ALBUTerol    0.083% 2.5 milliGRAM(s) Nebulizer every 2 hours PRN Shortness of Breath and/or Wheezing  aluminum hydroxide/magnesium hydroxide/simethicone Suspension 30 milliLiter(s) Oral every 4 hours PRN Dyspepsia  bisacodyl Suppository 10 milliGRAM(s) Rectal daily PRN If no bowel movement  magnesium hydroxide Suspension 30 milliLiter(s) Oral daily PRN Constipation  oxyCODONE    IR 5 milliGRAM(s) Oral every 8 hours PRN Severe Pain (7 - 10)  oxyCODONE    IR 2.5 milliGRAM(s) Oral every 8 hours PRN Moderate Pain (4 - 6)      Allergies    No Known Allergies    Intolerances        REVIEW OF SYSTEMS:  poor historian      PHYSICAL EXAM:  GENERAL: elderly appearing female in NAD  HEAD: atraumatic, normocephalic   EYES: EOMI, conjunctiva and sclera clear  ENMT: edentulous mouth  RESPIRATORY:  diminished bs at bases, poor inspiratory effort   CARDIOVASCULAR:  soft ii/vi holosystolic murmur LUSB  GASTROINTESTINAL:  soft, nontender, nondistended, bowel sounds present  EXTREMITIES: no clubbing or cyanosis or edema  MUSCULOSKELETAL:  dressing c/d/i. ROM LTD due to pain  NERVOUS SYSTEM:  sensation intact   PSYCH:  awake and alert but confused  SKIN: Multiple ecchymotic areas on skin     LABS:      Ca    8.9        18 Apr 2022 07:39          CAPILLARY BLOOD GLUCOSE          RADIOLOGY & ADDITIONAL TESTS:

## 2022-04-18 NOTE — DISCHARGE NOTE NURSING/CASE MANAGEMENT/SOCIAL WORK - PATIENT PORTAL LINK FT
You can access the FollowMyHealth Patient Portal offered by [facility] by registering at the following website: [email or web address]. By joining [facility]’s FollowMyHealth portal, you will also be able to view your health information using other applications (apps) compatible with our system.

## 2022-04-18 NOTE — PROGRESS NOTE ADULT - ASSESSMENT
This is a 77-year-old adm s/p Fall  Right Hip Fx S/P Sx  No LOC or Head injury  CT Head and CT C spine  No acute pathology  AMS/Delirium  - improving.   H/o Dementia - On Namenda 10mg BID  On Mirtazapine  Continue PT  Fall/safety precautions.  Would Follow.

## 2022-04-18 NOTE — PROGRESS NOTE ADULT - SUBJECTIVE AND OBJECTIVE BOX
Patient is a 77y Female with a known history of :  Hypothyroidism [E03.9]      HPI:  ***Patient with dementia and cannot recall recent events.  Therefore collateral information obtained via charts and notes.***    77F with dementia, DJD, HTN/HLD, carotid stenosis, hypothyroidism who presents from [facility] after a fall.  Per report, staff heard her fall but the patient was awake when they arrived in her room.  Patient was brought here for further evaluation.  In the ED, patient cannot recall events and does not know why she is here.  She does admit to some pain the right hip.  She did say that she has been feeling well prior to the injury.  The CT of her hip showed a right displaced femoral neck fracture.  Dr. Barajas (ortho) was made aware.  Patient currently has no pain while lying in the stretcher but does have pain when moved.  Of note, patient had episodes of desats that required some O2 supplementation to keep sats >90%.  Patient also noted to be bradycardic as well.       (10 Apr 2022 00:07)      REVIEW OF SYSTEMS:    CONSTITUTIONAL: No fever, weight loss, or fatigue  EYES: No eye pain, visual disturbances, or discharge  ENMT:  No difficulty hearing, tinnitus, vertigo; No sinus or throat pain  NECK: No pain or stiffness  BREASTS: No pain, masses, or nipple discharge  RESPIRATORY: No cough, wheezing, chills or hemoptysis; No shortness of breath  CARDIOVASCULAR: No chest pain, palpitations, dizziness, or leg swelling  GASTROINTESTINAL: No abdominal or epigastric pain. No nausea, vomiting, or hematemesis; No diarrhea or constipation. No melena or hematochezia.  GENITOURINARY: No dysuria, frequency, hematuria, or incontinence  NEUROLOGICAL: No headaches, memory loss, loss of strength, numbness, or tremors  SKIN: No itching, burning, rashes, or lesions   LYMPH NODES: No enlarged glands  ENDOCRINE: No heat or cold intolerance; No hair loss  MUSCULOSKELETAL: No joint pain or swelling; No muscle, back, or extremity pain  PSYCHIATRIC: No depression, anxiety, mood swings, or difficulty sleeping  HEME/LYMPH: No easy bruising, or bleeding gums  ALLERGY AND IMMUNOLOGIC: No hives or eczema    MEDICATIONS  (STANDING):  albuterol/ipratropium for Nebulization 3 milliLiter(s) Nebulizer every 12 hours  amLODIPine   Tablet 5 milliGRAM(s) Oral daily  atorvastatin 20 milliGRAM(s) Oral at bedtime  cholecalciferol 1000 Unit(s) Oral two times a day  cyanocobalamin 500 MICROGram(s) Oral daily  folic acid 1 milliGRAM(s) Oral daily  heparin   Injectable 5000 Unit(s) SubCutaneous every 12 hours  levothyroxine 188 MICROGram(s) Oral daily  losartan 100 milliGRAM(s) Oral daily  melatonin 3 milliGRAM(s) Oral at bedtime  memantine 10 milliGRAM(s) Oral two times a day  mirtazapine 15 milliGRAM(s) Oral at bedtime  multivitamin 1 Tablet(s) Oral daily  pantoprazole  Injectable 40 milliGRAM(s) IV Push daily    MEDICATIONS  (PRN):  ALBUTerol    0.083% 2.5 milliGRAM(s) Nebulizer every 2 hours PRN Shortness of Breath and/or Wheezing  aluminum hydroxide/magnesium hydroxide/simethicone Suspension 30 milliLiter(s) Oral every 4 hours PRN Dyspepsia  bisacodyl Suppository 10 milliGRAM(s) Rectal daily PRN If no bowel movement  magnesium hydroxide Suspension 30 milliLiter(s) Oral daily PRN Constipation  oxyCODONE    IR 5 milliGRAM(s) Oral every 8 hours PRN Severe Pain (7 - 10)  oxyCODONE    IR 2.5 milliGRAM(s) Oral every 8 hours PRN Moderate Pain (4 - 6)      ALLERGIES: No Known Allergies      FAMILY HISTORY:      Social history:  Alochol:   Smoking:   Drug Use:   Marital Status:     PHYSICAL EXAMINATION:  -----------------------------  T(C): 36.9 (04-18-22 @ 09:15), Max: 36.9 (04-17-22 @ 23:07)  HR: 70 (04-18-22 @ 09:15) (68 - 86)  BP: 148/76 (04-18-22 @ 09:15) (131/69 - 148/76)  RR: 18 (04-18-22 @ 09:15) (16 - 18)  SpO2: 96% (04-18-22 @ 09:15) (94% - 99%)  Wt(kg): --    04-17 @ 07:01  -  04-18 @ 07:00  --------------------------------------------------------  IN:    Oral Fluid: 50 mL  Total IN: 50 mL    OUT:  Total OUT: 0 mL    Total NET: 50 mL            Constitutional: well developed, normal appearance, well groomed, well nourished, no deformities and no acute distress.   Eyes: the conjunctiva exhibited no abnormalities and the eyelids demonstrated no xanthelasmas.   HEENT: normal oral mucosa, no oral pallor and no oral cyanosis.   Neck: normal jugular venous A waves present, normal jugular venous V waves present and no jugular venous garcia A waves.   Pulmonary: no respiratory distress, normal respiratory rhythm and effort, no accessory muscle use and lungs were clear to auscultation bilaterally. Anteriorly  Cardiovascular: heart rate and rhythm were normal, normal S1 and S2 and no murmur, gallop, rub, heave or thrill are present.   Musculoskeletal: the gait could not be assessed.   Extremities: no clubbing of the fingernails, no localized cyanosis, no petechial hemorrhages and no ischemic changes.   Skin: normal skin color and pigmentation, no rash, no venous stasis, no skin lesions, no skin ulcer and no xanthoma was observed.       LABS:   --------  04-18    138  |  103  |  23  ----------------------------<  95  3.7   |  30  |  0.88    Ca    8.9      18 Apr 2022 07:39    TPro  6.3  /  Alb  2.5<L>  /  TBili  1.3<H>  /  DBili  x   /  AST  27  /  ALT  18  /  AlkPhos  60  04-18                         8.9    7.47  )-----------( 291      ( 18 Apr 2022 07:39 )             27.4                   Radiology:

## 2022-04-19 LAB — SARS-COV-2 RNA SPEC QL NAA+PROBE: SIGNIFICANT CHANGE UP

## 2022-04-19 PROCEDURE — 99497 ADVNCD CARE PLAN 30 MIN: CPT

## 2022-04-19 PROCEDURE — 99233 SBSQ HOSP IP/OBS HIGH 50: CPT

## 2022-04-19 RX ORDER — ACETAMINOPHEN 500 MG
1000 TABLET ORAL EVERY 8 HOURS
Refills: 0 | Status: DISCONTINUED | OUTPATIENT
Start: 2022-04-19 | End: 2022-04-20

## 2022-04-19 RX ADMIN — Medication 1 TABLET(S): at 12:08

## 2022-04-19 RX ADMIN — Medication 3 MILLILITER(S): at 20:06

## 2022-04-19 RX ADMIN — Medication 1000 UNIT(S): at 17:12

## 2022-04-19 RX ADMIN — HEPARIN SODIUM 5000 UNIT(S): 5000 INJECTION INTRAVENOUS; SUBCUTANEOUS at 06:21

## 2022-04-19 RX ADMIN — Medication 1000 MILLIGRAM(S): at 14:31

## 2022-04-19 RX ADMIN — MEMANTINE HYDROCHLORIDE 10 MILLIGRAM(S): 10 TABLET ORAL at 17:12

## 2022-04-19 RX ADMIN — HEPARIN SODIUM 5000 UNIT(S): 5000 INJECTION INTRAVENOUS; SUBCUTANEOUS at 17:12

## 2022-04-19 RX ADMIN — Medication 3 MILLIGRAM(S): at 21:35

## 2022-04-19 RX ADMIN — LOSARTAN POTASSIUM 100 MILLIGRAM(S): 100 TABLET, FILM COATED ORAL at 06:20

## 2022-04-19 RX ADMIN — AMLODIPINE BESYLATE 5 MILLIGRAM(S): 2.5 TABLET ORAL at 06:20

## 2022-04-19 RX ADMIN — Medication 188 MICROGRAM(S): at 06:20

## 2022-04-19 RX ADMIN — OXYCODONE HYDROCHLORIDE 2.5 MILLIGRAM(S): 5 TABLET ORAL at 09:15

## 2022-04-19 RX ADMIN — PREGABALIN 500 MICROGRAM(S): 225 CAPSULE ORAL at 12:08

## 2022-04-19 RX ADMIN — ATORVASTATIN CALCIUM 20 MILLIGRAM(S): 80 TABLET, FILM COATED ORAL at 21:35

## 2022-04-19 RX ADMIN — Medication 1 MILLIGRAM(S): at 12:09

## 2022-04-19 RX ADMIN — PANTOPRAZOLE SODIUM 40 MILLIGRAM(S): 20 TABLET, DELAYED RELEASE ORAL at 12:09

## 2022-04-19 RX ADMIN — Medication 1000 UNIT(S): at 06:32

## 2022-04-19 RX ADMIN — Medication 1000 MILLIGRAM(S): at 23:15

## 2022-04-19 RX ADMIN — OXYCODONE HYDROCHLORIDE 2.5 MILLIGRAM(S): 5 TABLET ORAL at 10:05

## 2022-04-19 RX ADMIN — Medication 3 MILLILITER(S): at 07:53

## 2022-04-19 RX ADMIN — MEMANTINE HYDROCHLORIDE 10 MILLIGRAM(S): 10 TABLET ORAL at 06:20

## 2022-04-19 RX ADMIN — MIRTAZAPINE 15 MILLIGRAM(S): 45 TABLET, ORALLY DISINTEGRATING ORAL at 21:35

## 2022-04-19 RX ADMIN — Medication 1000 MILLIGRAM(S): at 21:35

## 2022-04-19 NOTE — PROGRESS NOTE ADULT - SUBJECTIVE AND OBJECTIVE BOX
Neurology follow up note    OPAL [id number]      Interval History:    Patient feels ok no new complaints.    Allergies    No Known Allergies    Intolerances        MEDICATIONS    acetaminophen     Tablet .. 1000 milliGRAM(s) Oral every 8 hours  ALBUTerol    0.083% 2.5 milliGRAM(s) Nebulizer every 2 hours PRN  albuterol/ipratropium for Nebulization 3 milliLiter(s) Nebulizer every 12 hours  aluminum hydroxide/magnesium hydroxide/simethicone Suspension 30 milliLiter(s) Oral every 4 hours PRN  amLODIPine   Tablet 5 milliGRAM(s) Oral daily  atorvastatin 20 milliGRAM(s) Oral at bedtime  bisacodyl Suppository 10 milliGRAM(s) Rectal daily PRN  cholecalciferol 1000 Unit(s) Oral two times a day  cyanocobalamin 500 MICROGram(s) Oral daily  folic acid 1 milliGRAM(s) Oral daily  heparin   Injectable 5000 Unit(s) SubCutaneous every 12 hours  levothyroxine 188 MICROGram(s) Oral daily  losartan 100 milliGRAM(s) Oral daily  magnesium hydroxide Suspension 30 milliLiter(s) Oral daily PRN  melatonin 3 milliGRAM(s) Oral at bedtime  memantine 10 milliGRAM(s) Oral two times a day  mirtazapine 15 milliGRAM(s) Oral at bedtime  multivitamin 1 Tablet(s) Oral daily  oxyCODONE    IR 2.5 milliGRAM(s) Oral every 8 hours PRN  oxyCODONE    IR 5 milliGRAM(s) Oral every 8 hours PRN  pantoprazole  Injectable 40 milliGRAM(s) IV Push daily              Vital Signs Last 24 Hrs  T(C): 36.6 (19 Apr 2022 10:15), Max: 36.8 (18 Apr 2022 14:06)  T(F): 97.9 (19 Apr 2022 10:15), Max: 98.2 (18 Apr 2022 14:06)  HR: 76 (19 Apr 2022 10:15) (75 - 86)  BP: 142/64 (19 Apr 2022 10:15) (122/64 - 166/84)  BP(mean): --  RR: 16 (19 Apr 2022 10:15) (15 - 19)  SpO2: 95% (19 Apr 2022 10:15) (88% - 99%)      REVIEW OF SYSTEMS:  Very limited secondary to the patient with underlying dementia, at times could not answer questions accordingly.    PHYSICAL EXAMINATION:   HEENT:  Head:  Normocephalic, atraumatic.  Eyes:  No scleral icterus.  Ears:  Hard to evaluate secondary to the patient could not answers questions accordingly at times.  NECK:  Had slightly increased tone.  RESPIRATORY:  Good air entry bilaterally.  CARDIOVASCULAR:  S1 and S2 heard.  ABDOMEN:  Soft and nontender.  Extremities:  No clubbing or cyanosis were noted.      NEUROLOGIC:  The patient resting in bed  he patient does appear to have poor vision out of both eyes.  Speech:  The patient would articulate a few words, no dysarthria was noted.  Intact bilateral nasolabial folds.  Motor:  Bilateral upper 4/5.  Right lower was not tested secondary to fracture.  Left lower with plantar stimulation positive flexion of the hip and knee, would say 3+/5.                 LABS:  CBC Full  -  ( 18 Apr 2022 07:39 )  WBC Count : 7.47 K/uL  RBC Count : 2.88 M/uL  Hemoglobin : 8.9 g/dL  Hematocrit : 27.4 %  Platelet Count - Automated : 291 K/uL  Mean Cell Volume : 95.1 fl  Mean Cell Hemoglobin : 30.9 pg  Mean Cell Hemoglobin Concentration : 32.5 gm/dL  Auto Neutrophil # : x  Auto Lymphocyte # : x  Auto Monocyte # : x  Auto Eosinophil # : x  Auto Basophil # : x  Auto Neutrophil % : x  Auto Lymphocyte % : x  Auto Monocyte % : x  Auto Eosinophil % : x  Auto Basophil % : x      04-18    138  |  103  |  23  ----------------------------<  95  3.7   |  30  |  0.88    Ca    8.9      18 Apr 2022 07:39    TPro  6.3  /  Alb  2.5<L>  /  TBili  1.3<H>  /  DBili  x   /  AST  27  /  ALT  18  /  AlkPhos  60  04-18    Hemoglobin A1C:     LIVER FUNCTIONS - ( 18 Apr 2022 07:39 )  Alb: 2.5 g/dL / Pro: 6.3 g/dL / ALK PHOS: 60 U/L / ALT: 18 U/L DA / AST: 27 U/L / GGT: x           Vitamin B12         RADIOLOGY    ANALYSIS AND PLAN:  This is a 77-year-old with episode of fall.  For episode of fall, what could be ascertained from chart, there appears to be no seizure-like activity or syncopal event, possibly suspect this could be a mechanical fall in nature.  For episode of altered mental status, most likely secondary to dementia becoming more prominent in the hospital setting, Psychiatry followup.  For history of dementia, would recommend to continue the patient on her memantine.  Hyperlipidemia, statin.  For hypothyroidism, Synthroid.  For hypertension, monitor systolic blood pressure   monitor H/H as needed  psych medications as needed       Spoke with daughter, Opal Cedeno, at [phone] 4/12 called today went to voicemail .  She understands my reasoning and thought process.  While in the hospital setting she may become more disoriented.    Greater than 27 minutes of time was spent with the patient, planning care, reviewing data, speaking to multidisciplinary healthcare team with greater than 50% of time in counseling and care coordination.    Thank you for the courtesy of this consultation.

## 2022-04-19 NOTE — PROGRESS NOTE ADULT - SUBJECTIVE AND OBJECTIVE BOX
Post Op     OPAL ARBOLEDA      77y        Female                                                                                                                 T(C): 36.6 (04-19-22 @ 05:00), Max: 36.9 (04-18-22 @ 09:15)  HR: 79 (04-19-22 @ 05:00) (70 - 82)  BP: 166/84 (04-19-22 @ 05:00) (122/64 - 166/84)  RR: 15 (04-19-22 @ 05:00) (15 - 19)  SpO2: 90% (04-19-22 @ 05:00) (88% - 99%)  Wt(kg): --    S/P  open reduction internal fixation rt hip    Patient denies shortness of breath, chest pain, dyspnea, No complaints  Pain is3 /10    Physical Exam    Extremity: Bilaterally:  No holmon                                           No Cord                                          PAS on                                          Neurovascular intact                                          Motor intact EHL/FHL                                          Sensation intact                                          Pulses intact DP/PT                                         Calves Soft                                         Dressing Clean / Intact dressing change mild serous discharge improved since sunday changed 2 distal incisions no drainage                                          Capillary refill with 5 seconds                                         eccymosis noted settling posterior              rt  lower extremity from fall scabbing                        8.9    7.47  )-----------( 291      ( 18 Apr 2022 07:39 )             27.4       04-18    138  |  103  |  23  ----------------------------<  95  3.7   |  30  |  0.88    Ca    8.9      18 Apr 2022 07:39    TPro  6.3  /  Alb  2.5<L>  /  TBili  1.3<H>  /  DBili  x   /  AST  27  /  ALT  18  /  AlkPhos  60  04-18      A/P  -- S/P open reduction internal fixation rt hip    -  Medicine To Follow   - DVT prophylaxis PAS heparin       - Incentive Spirometry  - Discharge Planning rehab pending clearance  - Safety Precautions  -  CBC , BMP daily    s/p  3 units  prbc 4.17,  4.14 and 4.13. 2022

## 2022-04-19 NOTE — PROGRESS NOTE ADULT - SUBJECTIVE AND OBJECTIVE BOX
Patient is a 77y Female with a known history of :  Hypothyroidism [E03.9]      HPI:  ***Patient with dementia and cannot recall recent events.  Therefore collateral information obtained via charts and notes.***    77F with dementia, DJD, HTN/HLD, carotid stenosis, hypothyroidism who presents from [facility] after a fall.  Per report, staff heard her fall but the patient was awake when they arrived in her room.  Patient was brought here for further evaluation.  In the ED, patient cannot recall events and does not know why she is here.  She does admit to some pain the right hip.  She did say that she has been feeling well prior to the injury.  The CT of her hip showed a right displaced femoral neck fracture.  Dr. Barajas (ortho) was made aware.  Patient currently has no pain while lying in the stretcher but does have pain when moved.  Of note, patient had episodes of desats that required some O2 supplementation to keep sats >90%.  Patient also noted to be bradycardic as well.       (10 Apr 2022 00:07)      REVIEW OF SYSTEMS:    CONSTITUTIONAL: No fever, weight loss, or fatigue  EYES: No eye pain, visual disturbances, or discharge  ENMT:  No difficulty hearing, tinnitus, vertigo; No sinus or throat pain  NECK: No pain or stiffness  BREASTS: No pain, masses, or nipple discharge  RESPIRATORY: No cough, wheezing, chills or hemoptysis; No shortness of breath  CARDIOVASCULAR: No chest pain, palpitations, dizziness, or leg swelling  GASTROINTESTINAL: No abdominal or epigastric pain. No nausea, vomiting, or hematemesis; No diarrhea or constipation. No melena or hematochezia.  GENITOURINARY: No dysuria, frequency, hematuria, or incontinence  NEUROLOGICAL: No headaches, memory loss, loss of strength, numbness, or tremors  SKIN: No itching, burning, rashes, or lesions   LYMPH NODES: No enlarged glands  ENDOCRINE: No heat or cold intolerance; No hair loss  MUSCULOSKELETAL: No joint pain or swelling; No muscle, back, or extremity pain  PSYCHIATRIC: No depression, anxiety, mood swings, or difficulty sleeping  HEME/LYMPH: No easy bruising, or bleeding gums  ALLERGY AND IMMUNOLOGIC: No hives or eczema    MEDICATIONS  (STANDING):  acetaminophen     Tablet .. 1000 milliGRAM(s) Oral every 8 hours  albuterol/ipratropium for Nebulization 3 milliLiter(s) Nebulizer every 12 hours  amLODIPine   Tablet 5 milliGRAM(s) Oral daily  atorvastatin 20 milliGRAM(s) Oral at bedtime  cholecalciferol 1000 Unit(s) Oral two times a day  cyanocobalamin 500 MICROGram(s) Oral daily  folic acid 1 milliGRAM(s) Oral daily  heparin   Injectable 5000 Unit(s) SubCutaneous every 12 hours  levothyroxine 188 MICROGram(s) Oral daily  losartan 100 milliGRAM(s) Oral daily  melatonin 3 milliGRAM(s) Oral at bedtime  memantine 10 milliGRAM(s) Oral two times a day  mirtazapine 15 milliGRAM(s) Oral at bedtime  multivitamin 1 Tablet(s) Oral daily  pantoprazole  Injectable 40 milliGRAM(s) IV Push daily    MEDICATIONS  (PRN):  ALBUTerol    0.083% 2.5 milliGRAM(s) Nebulizer every 2 hours PRN Shortness of Breath and/or Wheezing  aluminum hydroxide/magnesium hydroxide/simethicone Suspension 30 milliLiter(s) Oral every 4 hours PRN Dyspepsia  bisacodyl Suppository 10 milliGRAM(s) Rectal daily PRN If no bowel movement  magnesium hydroxide Suspension 30 milliLiter(s) Oral daily PRN Constipation  oxyCODONE    IR 5 milliGRAM(s) Oral every 8 hours PRN Severe Pain (7 - 10)  oxyCODONE    IR 2.5 milliGRAM(s) Oral every 8 hours PRN Moderate Pain (4 - 6)      ALLERGIES: No Known Allergies      FAMILY HISTORY:      Social history:  Alochol:   Smoking:   Drug Use:   Marital Status:     PHYSICAL EXAMINATION:  -----------------------------  T(C): 36.6 (04-19-22 @ 10:15), Max: 36.8 (04-18-22 @ 14:06)  HR: 76 (04-19-22 @ 10:15) (75 - 86)  BP: 142/64 (04-19-22 @ 10:15) (122/64 - 166/84)  RR: 16 (04-19-22 @ 10:15) (15 - 19)  SpO2: 95% (04-19-22 @ 10:15) (88% - 99%)  Wt(kg): --    04-18 @ 07:01  -  04-19 @ 07:00  --------------------------------------------------------  IN:  Total IN: 0 mL    OUT:    Voided (mL): 250 mL  Total OUT: 250 mL    Total NET: -250 mL            Constitutional: well developed, normal appearance, well groomed, well nourished, no deformities and no acute distress.   Eyes: the conjunctiva exhibited no abnormalities and the eyelids demonstrated no xanthelasmas.   HEENT: normal oral mucosa, no oral pallor and no oral cyanosis.   Neck: normal jugular venous A waves present, normal jugular venous V waves present and no jugular venous garcia A waves.   Pulmonary: no respiratory distress, normal respiratory rhythm and effort, no accessory muscle use and lungs were clear to auscultation bilaterally. Anteriorly  Cardiovascular: heart rate and rhythm were normal, normal S1 and S2 and no murmur, gallop, rub, heave or thrill are present.    Musculoskeletal: the gait could not be assessed.   Extremities: no clubbing of the fingernails, no localized cyanosis, no petechial hemorrhages and no ischemic changes.   Skin: normal skin color and pigmentation, no rash, no venous stasis, no skin lesions, no skin ulcer and no xanthoma was observed.        LABS:   --------  04-18    138  |  103  |  23  ----------------------------<  95  3.7   |  30  |  0.88    Ca    8.9      18 Apr 2022 07:39    TPro  6.3  /  Alb  2.5<L>  /  TBili  1.3<H>  /  DBili  x   /  AST  27  /  ALT  18  /  AlkPhos  60  04-18                         8.9    7.47  )-----------( 291      ( 18 Apr 2022 07:39 )             27.4                   Radiology:

## 2022-04-19 NOTE — PROGRESS NOTE ADULT - ASSESSMENT
The patient is a 77 year old female with a history of HTN, HL, hypothyroidism, carotid stenosis, dementia who presents with a fall, found to have a hip fracture.    4/19/22  Seen at [facility] telemetry  Lying flat, comfortably  Awake and alert    Plan:  - Echo with normal LV systolic function, no significant valve issues  - Continue amlodipine 5 mg daily  - Continue losartan 100 mg daily  - Continue atorvastatin 20 mg daily  - Aspirin 81 mg daily stopped  - Hypothermia - resolved. Possibly due to significant hypothyroidism.  - Hemoglobin improved with transfusion - low but stable  - Being followed by Orthopedics, Neurology, Pulmonary, Renal  - Post-op care  - Discharge planning

## 2022-04-19 NOTE — PROGRESS NOTE ADULT - SUBJECTIVE AND OBJECTIVE BOX
Patient is a 77y old  Female who presents with a chief complaint of fall -> right femoral fracture (19 Apr 2022 11:46)      INTERVAL HPI/OVERNIGHT EVENTS:    no overnight events.  dispo planning.  b/c pt is not vaccinated, facilities are not taking her.     MEDICATIONS  (STANDING):  acetaminophen     Tablet .. 1000 milliGRAM(s) Oral every 8 hours  albuterol/ipratropium for Nebulization 3 milliLiter(s) Nebulizer every 12 hours  amLODIPine   Tablet 5 milliGRAM(s) Oral daily  atorvastatin 20 milliGRAM(s) Oral at bedtime  cholecalciferol 1000 Unit(s) Oral two times a day  cyanocobalamin 500 MICROGram(s) Oral daily  folic acid 1 milliGRAM(s) Oral daily  heparin   Injectable 5000 Unit(s) SubCutaneous every 12 hours  levothyroxine 188 MICROGram(s) Oral daily  losartan 100 milliGRAM(s) Oral daily  melatonin 3 milliGRAM(s) Oral at bedtime  memantine 10 milliGRAM(s) Oral two times a day  mirtazapine 15 milliGRAM(s) Oral at bedtime  multivitamin 1 Tablet(s) Oral daily  pantoprazole  Injectable 40 milliGRAM(s) IV Push daily    MEDICATIONS  (PRN):  ALBUTerol    0.083% 2.5 milliGRAM(s) Nebulizer every 2 hours PRN Shortness of Breath and/or Wheezing  aluminum hydroxide/magnesium hydroxide/simethicone Suspension 30 milliLiter(s) Oral every 4 hours PRN Dyspepsia  bisacodyl Suppository 10 milliGRAM(s) Rectal daily PRN If no bowel movement  magnesium hydroxide Suspension 30 milliLiter(s) Oral daily PRN Constipation  oxyCODONE    IR 5 milliGRAM(s) Oral every 8 hours PRN Severe Pain (7 - 10)  oxyCODONE    IR 2.5 milliGRAM(s) Oral every 8 hours PRN Moderate Pain (4 - 6)      Allergies    No Known Allergies    Intolerances        REVIEW OF SYSTEMS:  poor historian    Vital Signs Last 24 Hrs  T(C): 36.6 (19 Apr 2022 10:15), Max: 36.8 (18 Apr 2022 14:06)  T(F): 97.9 (19 Apr 2022 10:15), Max: 98.2 (18 Apr 2022 14:06)  HR: 76 (19 Apr 2022 10:15) (75 - 86)  BP: 142/64 (19 Apr 2022 10:15) (122/64 - 166/84)  BP(mean): --  RR: 16 (19 Apr 2022 10:15) (15 - 19)  SpO2: 95% (19 Apr 2022 10:15) (89% - 99%)    PHYSICAL EXAM:  GENERAL: elderly appearing female in NAD  HEAD: atraumatic, normocephalic   EYES: EOMI, conjunctiva and sclera clear  ENMT: edentulous mouth  RESPIRATORY:  diminished bs at bases, poor inspiratory effort   CARDIOVASCULAR:  soft ii/vi holosystolic murmur LUSB  GASTROINTESTINAL:  soft, nontender, nondistended, bowel sounds present  EXTREMITIES: no clubbing or cyanosis or edema  MUSCULOSKELETAL:  dressing c/d/i. ROM LTD due to pain  NERVOUS SYSTEM:  sensation intact   PSYCH:  awake and alert but confused  SKIN: Multiple ecchymotic areas on skin   LABS:      Ca    8.9        18 Apr 2022 07:39          CAPILLARY BLOOD GLUCOSE          RADIOLOGY & ADDITIONAL TESTS:

## 2022-04-19 NOTE — PROGRESS NOTE ADULT - ASSESSMENT
77F with dementia, DJD, HTN/HLD, carotid stenosis, hypothyroidism who presents from [facility] after a fall.  Found to have right femoral neck fracture.      Right femoral neck fracture  - pain control as needed  - PT/ OT eval appreciated, plan for eventual FATOUMATA placement    - Leukocytosis resolved, likely reactive  - S/p 1 unit PRBC 4/13  - CT scan reviewed  - restarted heparin BID on 4/15    Anemia  likely from blood loss.   s/p 1 unit PRBC 4/13  restarted heparin as she is high risk for DVT 4/15  today's Hb 8.9 from 7.4 s/p 1 unit PRBC 4/17.  No drainage from incision site, dry padding.   will have ecchymosis on skin 2/2 heparin use.   repeat CBC stable    CRISTIANO  - Likely pre-renal  - US kidney and bladder, no hydronephrosis    - Avoid nephrotoxic meds   - Nephrology consult appreciated   improved    Desats  - occurred on admission  - Pulm input appreciated, D-dimer elevated but CTA neg for PE  - US LLE neg for DVT  - Titrate off supplemental O2 as tolerated    Hypothermia and bradycardia  - Improved  - Likely in setting of hypothyroidism   - Surveillance blood and urine cultures NGTD  - ID, cardio and endocrine input appreciated    Hypothyroidism  - cont with synthroid  - Endocrine input appreciated   - Repeat TSH outpatient in 4-6 weeks    HTN/HLD  - Continue amlodipine 5 mg daily  - Resume losartan once BP improves   - Continue atorvastatin 20 mg daily  - Restarted ASA    Dementia  - Namenda 10mg BID  - mirtazapine  - Seen by psych and neuro, acute aggression and agitation in setting of hospital acquired delerium    Incidental findings on CT  Cholelithiasis. The 1.4 x 1.6 cm right adrenal nodule and a 2.7 x 2.0 cm left adrenal nodule that likely represent adenomas, are   unchanged. Compression deformities of the T4-T6, T8-T9, and L1-L2 vertebral bodies.   Follow up outpatient for further management     dispo planning  b/c of vaccination status, FATOUMATA is still pending

## 2022-04-20 VITALS
RESPIRATION RATE: 18 BRPM | OXYGEN SATURATION: 95 % | SYSTOLIC BLOOD PRESSURE: 110 MMHG | TEMPERATURE: 98 F | DIASTOLIC BLOOD PRESSURE: 64 MMHG | HEART RATE: 68 BPM

## 2022-04-20 LAB
ALBUMIN SERPL ELPH-MCNC: 2.5 G/DL — LOW (ref 3.3–5)
ALP SERPL-CCNC: 68 U/L — SIGNIFICANT CHANGE UP (ref 30–120)
ALT FLD-CCNC: 18 U/L DA — SIGNIFICANT CHANGE UP (ref 10–60)
ANION GAP SERPL CALC-SCNC: 6 MMOL/L — SIGNIFICANT CHANGE UP (ref 5–17)
AST SERPL-CCNC: 30 U/L — SIGNIFICANT CHANGE UP (ref 10–40)
BILIRUB SERPL-MCNC: 1.2 MG/DL — SIGNIFICANT CHANGE UP (ref 0.2–1.2)
BUN SERPL-MCNC: 34 MG/DL — HIGH (ref 7–23)
CALCIUM SERPL-MCNC: 9.3 MG/DL — SIGNIFICANT CHANGE UP (ref 8.4–10.5)
CHLORIDE SERPL-SCNC: 106 MMOL/L — SIGNIFICANT CHANGE UP (ref 96–108)
CO2 SERPL-SCNC: 30 MMOL/L — SIGNIFICANT CHANGE UP (ref 22–31)
CREAT SERPL-MCNC: 0.91 MG/DL — SIGNIFICANT CHANGE UP (ref 0.5–1.3)
EGFR: 64 ML/MIN/1.73M2 — SIGNIFICANT CHANGE UP
GLUCOSE SERPL-MCNC: 87 MG/DL — SIGNIFICANT CHANGE UP (ref 70–99)
HCT VFR BLD CALC: 29.1 % — LOW (ref 34.5–45)
HGB BLD-MCNC: 9 G/DL — LOW (ref 11.5–15.5)
MCHC RBC-ENTMCNC: 30.1 PG — SIGNIFICANT CHANGE UP (ref 27–34)
MCHC RBC-ENTMCNC: 30.9 GM/DL — LOW (ref 32–36)
MCV RBC AUTO: 97.3 FL — SIGNIFICANT CHANGE UP (ref 80–100)
NRBC # BLD: 0 /100 WBCS — SIGNIFICANT CHANGE UP (ref 0–0)
PLATELET # BLD AUTO: 393 K/UL — SIGNIFICANT CHANGE UP (ref 150–400)
POTASSIUM SERPL-MCNC: 3.8 MMOL/L — SIGNIFICANT CHANGE UP (ref 3.5–5.3)
POTASSIUM SERPL-SCNC: 3.8 MMOL/L — SIGNIFICANT CHANGE UP (ref 3.5–5.3)
PROT SERPL-MCNC: 6.2 G/DL — SIGNIFICANT CHANGE UP (ref 6–8.3)
RBC # BLD: 2.99 M/UL — LOW (ref 3.8–5.2)
RBC # FLD: 15.4 % — HIGH (ref 10.3–14.5)
SODIUM SERPL-SCNC: 142 MMOL/L — SIGNIFICANT CHANGE UP (ref 135–145)
WBC # BLD: 8 K/UL — SIGNIFICANT CHANGE UP (ref 3.8–10.5)
WBC # FLD AUTO: 8 K/UL — SIGNIFICANT CHANGE UP (ref 3.8–10.5)

## 2022-04-20 PROCEDURE — 85027 COMPLETE CBC AUTOMATED: CPT

## 2022-04-20 PROCEDURE — 81001 URINALYSIS AUTO W/SCOPE: CPT

## 2022-04-20 PROCEDURE — 36415 COLL VENOUS BLD VENIPUNCTURE: CPT

## 2022-04-20 PROCEDURE — 76000 FLUOROSCOPY <1 HR PHYS/QHP: CPT

## 2022-04-20 PROCEDURE — 85379 FIBRIN DEGRADATION QUANT: CPT

## 2022-04-20 PROCEDURE — 83540 ASSAY OF IRON: CPT

## 2022-04-20 PROCEDURE — 93306 TTE W/DOPPLER COMPLETE: CPT

## 2022-04-20 PROCEDURE — 99285 EMERGENCY DEPT VISIT HI MDM: CPT | Mod: 25

## 2022-04-20 PROCEDURE — 85730 THROMBOPLASTIN TIME PARTIAL: CPT

## 2022-04-20 PROCEDURE — 84100 ASSAY OF PHOSPHORUS: CPT

## 2022-04-20 PROCEDURE — 97162 PT EVAL MOD COMPLEX 30 MIN: CPT

## 2022-04-20 PROCEDURE — C1713: CPT

## 2022-04-20 PROCEDURE — 85610 PROTHROMBIN TIME: CPT

## 2022-04-20 PROCEDURE — 87040 BLOOD CULTURE FOR BACTERIA: CPT

## 2022-04-20 PROCEDURE — 82803 BLOOD GASES ANY COMBINATION: CPT

## 2022-04-20 PROCEDURE — 86923 COMPATIBILITY TEST ELECTRIC: CPT

## 2022-04-20 PROCEDURE — 73552 X-RAY EXAM OF FEMUR 2/>: CPT

## 2022-04-20 PROCEDURE — 73700 CT LOWER EXTREMITY W/O DYE: CPT

## 2022-04-20 PROCEDURE — 71045 X-RAY EXAM CHEST 1 VIEW: CPT

## 2022-04-20 PROCEDURE — 97166 OT EVAL MOD COMPLEX 45 MIN: CPT

## 2022-04-20 PROCEDURE — 76770 US EXAM ABDO BACK WALL COMP: CPT

## 2022-04-20 PROCEDURE — 72125 CT NECK SPINE W/O DYE: CPT | Mod: MG

## 2022-04-20 PROCEDURE — 80048 BASIC METABOLIC PNL TOTAL CA: CPT

## 2022-04-20 PROCEDURE — 83735 ASSAY OF MAGNESIUM: CPT

## 2022-04-20 PROCEDURE — 71275 CT ANGIOGRAPHY CHEST: CPT

## 2022-04-20 PROCEDURE — 85025 COMPLETE CBC W/AUTO DIFF WBC: CPT

## 2022-04-20 PROCEDURE — 85018 HEMOGLOBIN: CPT

## 2022-04-20 PROCEDURE — 99239 HOSP IP/OBS DSCHRG MGMT >30: CPT

## 2022-04-20 PROCEDURE — 80053 COMPREHEN METABOLIC PANEL: CPT

## 2022-04-20 PROCEDURE — 93005 ELECTROCARDIOGRAM TRACING: CPT

## 2022-04-20 PROCEDURE — C1776: CPT

## 2022-04-20 PROCEDURE — 97530 THERAPEUTIC ACTIVITIES: CPT

## 2022-04-20 PROCEDURE — 97110 THERAPEUTIC EXERCISES: CPT

## 2022-04-20 PROCEDURE — 85014 HEMATOCRIT: CPT

## 2022-04-20 PROCEDURE — 87086 URINE CULTURE/COLONY COUNT: CPT

## 2022-04-20 PROCEDURE — 82607 VITAMIN B-12: CPT

## 2022-04-20 PROCEDURE — 36430 TRANSFUSION BLD/BLD COMPNT: CPT

## 2022-04-20 PROCEDURE — 84439 ASSAY OF FREE THYROXINE: CPT

## 2022-04-20 PROCEDURE — 84481 FREE ASSAY (FT-3): CPT

## 2022-04-20 PROCEDURE — 86901 BLOOD TYPING SEROLOGIC RH(D): CPT

## 2022-04-20 PROCEDURE — 82746 ASSAY OF FOLIC ACID SERUM: CPT

## 2022-04-20 PROCEDURE — 87635 SARS-COV-2 COVID-19 AMP PRB: CPT

## 2022-04-20 PROCEDURE — 93970 EXTREMITY STUDY: CPT

## 2022-04-20 PROCEDURE — 83550 IRON BINDING TEST: CPT

## 2022-04-20 PROCEDURE — 82272 OCCULT BLD FECES 1-3 TESTS: CPT

## 2022-04-20 PROCEDURE — 94640 AIRWAY INHALATION TREATMENT: CPT

## 2022-04-20 PROCEDURE — 83880 ASSAY OF NATRIURETIC PEPTIDE: CPT

## 2022-04-20 PROCEDURE — G1004: CPT

## 2022-04-20 PROCEDURE — 70450 CT HEAD/BRAIN W/O DYE: CPT | Mod: MG

## 2022-04-20 PROCEDURE — 73502 X-RAY EXAM HIP UNI 2-3 VIEWS: CPT

## 2022-04-20 PROCEDURE — 86850 RBC ANTIBODY SCREEN: CPT

## 2022-04-20 PROCEDURE — 86900 BLOOD TYPING SEROLOGIC ABO: CPT

## 2022-04-20 PROCEDURE — 82728 ASSAY OF FERRITIN: CPT

## 2022-04-20 PROCEDURE — 84443 ASSAY THYROID STIM HORMONE: CPT

## 2022-04-20 PROCEDURE — 74176 CT ABD & PELVIS W/O CONTRAST: CPT

## 2022-04-20 PROCEDURE — P9016: CPT

## 2022-04-20 PROCEDURE — 84484 ASSAY OF TROPONIN QUANT: CPT

## 2022-04-20 PROCEDURE — 97535 SELF CARE MNGMENT TRAINING: CPT

## 2022-04-20 RX ADMIN — PANTOPRAZOLE SODIUM 40 MILLIGRAM(S): 20 TABLET, DELAYED RELEASE ORAL at 12:06

## 2022-04-20 RX ADMIN — Medication 3 MILLILITER(S): at 07:00

## 2022-04-20 RX ADMIN — HEPARIN SODIUM 5000 UNIT(S): 5000 INJECTION INTRAVENOUS; SUBCUTANEOUS at 05:17

## 2022-04-20 RX ADMIN — PREGABALIN 500 MICROGRAM(S): 225 CAPSULE ORAL at 12:04

## 2022-04-20 RX ADMIN — AMLODIPINE BESYLATE 5 MILLIGRAM(S): 2.5 TABLET ORAL at 05:16

## 2022-04-20 RX ADMIN — MEMANTINE HYDROCHLORIDE 10 MILLIGRAM(S): 10 TABLET ORAL at 05:16

## 2022-04-20 RX ADMIN — Medication 188 MICROGRAM(S): at 05:18

## 2022-04-20 RX ADMIN — Medication 1 MILLIGRAM(S): at 12:05

## 2022-04-20 RX ADMIN — Medication 1000 UNIT(S): at 05:17

## 2022-04-20 RX ADMIN — Medication 1000 MILLIGRAM(S): at 13:29

## 2022-04-20 RX ADMIN — Medication 1000 MILLIGRAM(S): at 05:15

## 2022-04-20 RX ADMIN — LOSARTAN POTASSIUM 100 MILLIGRAM(S): 100 TABLET, FILM COATED ORAL at 05:16

## 2022-04-20 RX ADMIN — Medication 1 TABLET(S): at 12:05

## 2022-04-20 NOTE — PROGRESS NOTE ADULT - SUBJECTIVE AND OBJECTIVE BOX
Neurology follow up note    OPAL [id number]      Interval History:    Patient feels ok no new complaints.    Allergies    No Known Allergies    Intolerances        MEDICATIONS    acetaminophen     Tablet .. 1000 milliGRAM(s) Oral every 8 hours  ALBUTerol    0.083% 2.5 milliGRAM(s) Nebulizer every 2 hours PRN  albuterol/ipratropium for Nebulization 3 milliLiter(s) Nebulizer every 12 hours  aluminum hydroxide/magnesium hydroxide/simethicone Suspension 30 milliLiter(s) Oral every 4 hours PRN  amLODIPine   Tablet 5 milliGRAM(s) Oral daily  atorvastatin 20 milliGRAM(s) Oral at bedtime  bisacodyl Suppository 10 milliGRAM(s) Rectal daily PRN  cholecalciferol 1000 Unit(s) Oral two times a day  cyanocobalamin 500 MICROGram(s) Oral daily  folic acid 1 milliGRAM(s) Oral daily  heparin   Injectable 5000 Unit(s) SubCutaneous every 12 hours  levothyroxine 188 MICROGram(s) Oral daily  losartan 100 milliGRAM(s) Oral daily  magnesium hydroxide Suspension 30 milliLiter(s) Oral daily PRN  melatonin 3 milliGRAM(s) Oral at bedtime  memantine 10 milliGRAM(s) Oral two times a day  mirtazapine 15 milliGRAM(s) Oral at bedtime  multivitamin 1 Tablet(s) Oral daily  pantoprazole  Injectable 40 milliGRAM(s) IV Push daily              Vital Signs Last 24 Hrs  T(C): 36.4 (20 Apr 2022 13:15), Max: 36.8 (19 Apr 2022 14:25)  T(F): 97.6 (20 Apr 2022 13:15), Max: 98.3 (20 Apr 2022 04:35)  HR: 68 (20 Apr 2022 13:15) (56 - 82)  BP: 110/64 (20 Apr 2022 13:15) (110/62 - 154/70)  BP(mean): --  RR: 18 (20 Apr 2022 13:15) (18 - 20)  SpO2: 95% (20 Apr 2022 13:15) (90% - 97%)        REVIEW OF SYSTEMS:  Very limited secondary to the patient with underlying dementia, at times could not answer questions accordingly.    PHYSICAL EXAMINATION:   HEENT:  Head:  Normocephalic, atraumatic.  Eyes:  No scleral icterus.  Ears:  Hard to evaluate secondary to the patient could not answers questions accordingly at times.  NECK:  Had slightly increased tone.  RESPIRATORY:  Good air entry bilaterally.  CARDIOVASCULAR:  S1 and S2 heard.  ABDOMEN:  Soft and nontender.  Extremities:  No clubbing or cyanosis were noted.      NEUROLOGIC:  The patient resting in bed  he patient does appear to have poor vision out of both eyes.  Speech:  The patient would articulate a few words, no dysarthria was noted.  Intact bilateral nasolabial folds.  Motor:  Bilateral upper 4/5.  Right lower was not tested secondary to fracture.  Left lower with plantar stimulation positive flexion of the hip and knee, would say 3+/5.             LABS:  CBC Full  -  ( 20 Apr 2022 08:10 )  WBC Count : 8.00 K/uL  RBC Count : 2.99 M/uL  Hemoglobin : 9.0 g/dL  Hematocrit : 29.1 %  Platelet Count - Automated : 393 K/uL  Mean Cell Volume : 97.3 fl  Mean Cell Hemoglobin : 30.1 pg  Mean Cell Hemoglobin Concentration : 30.9 gm/dL  Auto Neutrophil # : x  Auto Lymphocyte # : x  Auto Monocyte # : x  Auto Eosinophil # : x  Auto Basophil # : x  Auto Neutrophil % : x  Auto Lymphocyte % : x  Auto Monocyte % : x  Auto Eosinophil % : x  Auto Basophil % : x      04-20    142  |  106  |  34<H>  ----------------------------<  87  3.8   |  30  |  0.91    Ca    9.3      20 Apr 2022 08:10    TPro  6.2  /  Alb  2.5<L>  /  TBili  1.2  /  DBili  x   /  AST  30  /  ALT  18  /  AlkPhos  68  04-20    Hemoglobin A1C:     LIVER FUNCTIONS - ( 20 Apr 2022 08:10 )  Alb: 2.5 g/dL / Pro: 6.2 g/dL / ALK PHOS: 68 U/L / ALT: 18 U/L DA / AST: 30 U/L / GGT: x           Vitamin B12         RADIOLOGY      ANALYSIS AND PLAN:  This is a 77-year-old with episode of fall.  For episode of fall, what could be ascertained from chart, there appears to be no seizure-like activity or syncopal event, possibly suspect this could be a mechanical fall in nature.  For episode of altered mental status, most likely secondary to dementia becoming more prominent in the hospital setting, Psychiatry followup.  For history of dementia, would recommend to continue the patient on her memantine.  Hyperlipidemia, statin.  For hypothyroidism, Synthroid.  For hypertension, monitor systolic blood pressure   monitor H/H as needed  psych medications as needed       Greater than 17 minutes of time was spent with the patient, planning care, reviewing data, speaking to multidisciplinary healthcare team with greater than 50% of time in counseling and care coordination.    Thank you for the courtesy of this consultation.

## 2022-04-20 NOTE — PROGRESS NOTE ADULT - SUBJECTIVE AND OBJECTIVE BOX
SUBJECTIVE: Patient seen and examined. Patient was sleeping comfortably this am    OBJECTIVE:     Vital Signs Last 24 Hrs  T(C): 36.8 (20 Apr 2022 04:35), Max: 36.8 (19 Apr 2022 14:25)  T(F): 98.3 (20 Apr 2022 04:35), Max: 98.3 (20 Apr 2022 04:35)  HR: 56 (20 Apr 2022 07:08) (56 - 86)  BP: 124/69 (20 Apr 2022 04:35) (110/62 - 154/70)  BP(mean): --  RR: 20 (20 Apr 2022 04:35) (16 - 20)  SpO2: 92% (20 Apr 2022 07:08) (90% - 99%)    PAIN SCORE:         SCALE USED: (1-10 VNRS)        Affected extremity:          Dressing: clean/dry/intact proximal surgical incision that is covered with no drainage noted, other two incions are C/D/i with staples in place. Surrounding tissue of the staples with mild erythema secondary to irritation, no signs of cellulitis         Sensation:  intact         Motor exam:          5/ 5 Tibialis Anterior/Gastrocnemius-Soleus , EHL         warm well perfused; capillary refill <3 seconds     LABS: none drawn          MEDICATIONS:    Anticoagulation:  heparin   Injectable 5000 Unit(s) SubCutaneous every 12 hours      Antibiotics:       Pain medications:   acetaminophen     Tablet .. 1000 milliGRAM(s) Oral every 8 hours  melatonin 3 milliGRAM(s) Oral at bedtime  memantine 10 milliGRAM(s) Oral two times a day  mirtazapine 15 milliGRAM(s) Oral at bedtime      A/P :  s/p Right [ ] Left [ ]   POD #   -    Pain control  -    DVT ppx: currently on heparin   -    Weight bearing status: WBAT   -    Physical Therapy  -    Dispo: [facility]

## 2022-04-20 NOTE — PROGRESS NOTE ADULT - ASSESSMENT
The patient is a 77 year old female with a history of HTN, HL, hypothyroidism, carotid stenosis, dementia who presents with a fall, found to have a hip fracture.    4/20/22  Seen at [facility] telemetry  Lying flat, sleeping comfortably    Plan:  - Echo with normal LV systolic function, no significant valve issues  - Continue amlodipine 5 mg daily  - Continue losartan 100 mg daily  - Continue atorvastatin 20 mg daily  - Aspirin 81 mg daily stopped  - Hypothermia - resolved. Possibly due to significant hypothyroidism.  - Hemoglobin improved with transfusion - low but stable  - Being followed by Orthopedics, Neurology, Pulmonary, Renal  - Post-op care  - Discharge planning

## 2022-04-20 NOTE — PROGRESS NOTE ADULT - PROVIDER SPECIALTY LIST ADULT
Cardiology
Hospitalist
Infectious Disease
Infectious Disease
Neurology
Neurology
Orthopedics
Cardiology
Hospitalist
Hospitalist
Infectious Disease
Nephrology
Neurology
Orthopedics
Cardiology
Hospitalist
Hospitalist
Infectious Disease
Neurology
Orthopedics
Orthopedics
Pulmonology
Cardiology
Hospitalist
Nephrology
Neurology
Pulmonology
Hospitalist

## 2022-04-20 NOTE — PROGRESS NOTE ADULT - SUBJECTIVE AND OBJECTIVE BOX
Patient is a 77y Female with a known history of :  Hypothyroidism [E03.9]      HPI:  ***Patient with dementia and cannot recall recent events.  Therefore collateral information obtained via charts and notes.***    77F with dementia, DJD, HTN/HLD, carotid stenosis, hypothyroidism who presents from [facility] after a fall.  Per report, staff heard her fall but the patient was awake when they arrived in her room.  Patient was brought here for further evaluation.  In the ED, patient cannot recall events and does not know why she is here.  She does admit to some pain the right hip.  She did say that she has been feeling well prior to the injury.  The CT of her hip showed a right displaced femoral neck fracture.  Dr. Barajas (ortho) was made aware.  Patient currently has no pain while lying in the stretcher but does have pain when moved.  Of note, patient had episodes of desats that required some O2 supplementation to keep sats >90%.  Patient also noted to be bradycardic as well.       (10 Apr 2022 00:07)      REVIEW OF SYSTEMS:    CONSTITUTIONAL: No fever, weight loss, or fatigue  EYES: No eye pain, visual disturbances, or discharge  ENMT:  No difficulty hearing, tinnitus, vertigo; No sinus or throat pain  NECK: No pain or stiffness  BREASTS: No pain, masses, or nipple discharge  RESPIRATORY: No cough, wheezing, chills or hemoptysis; No shortness of breath  CARDIOVASCULAR: No chest pain, palpitations, dizziness, or leg swelling  GASTROINTESTINAL: No abdominal or epigastric pain. No nausea, vomiting, or hematemesis; No diarrhea or constipation. No melena or hematochezia.  GENITOURINARY: No dysuria, frequency, hematuria, or incontinence  NEUROLOGICAL: No headaches, memory loss, loss of strength, numbness, or tremors  SKIN: No itching, burning, rashes, or lesions   LYMPH NODES: No enlarged glands  ENDOCRINE: No heat or cold intolerance; No hair loss  MUSCULOSKELETAL: No joint pain or swelling; No muscle, back, or extremity pain  PSYCHIATRIC: No depression, anxiety, mood swings, or difficulty sleeping  HEME/LYMPH: No easy bruising, or bleeding gums  ALLERGY AND IMMUNOLOGIC: No hives or eczema    MEDICATIONS  (STANDING):  acetaminophen     Tablet .. 1000 milliGRAM(s) Oral every 8 hours  albuterol/ipratropium for Nebulization 3 milliLiter(s) Nebulizer every 12 hours  amLODIPine   Tablet 5 milliGRAM(s) Oral daily  atorvastatin 20 milliGRAM(s) Oral at bedtime  cholecalciferol 1000 Unit(s) Oral two times a day  cyanocobalamin 500 MICROGram(s) Oral daily  folic acid 1 milliGRAM(s) Oral daily  heparin   Injectable 5000 Unit(s) SubCutaneous every 12 hours  levothyroxine 188 MICROGram(s) Oral daily  losartan 100 milliGRAM(s) Oral daily  melatonin 3 milliGRAM(s) Oral at bedtime  memantine 10 milliGRAM(s) Oral two times a day  mirtazapine 15 milliGRAM(s) Oral at bedtime  multivitamin 1 Tablet(s) Oral daily  pantoprazole  Injectable 40 milliGRAM(s) IV Push daily    MEDICATIONS  (PRN):  ALBUTerol    0.083% 2.5 milliGRAM(s) Nebulizer every 2 hours PRN Shortness of Breath and/or Wheezing  aluminum hydroxide/magnesium hydroxide/simethicone Suspension 30 milliLiter(s) Oral every 4 hours PRN Dyspepsia  bisacodyl Suppository 10 milliGRAM(s) Rectal daily PRN If no bowel movement  magnesium hydroxide Suspension 30 milliLiter(s) Oral daily PRN Constipation      ALLERGIES: No Known Allergies      FAMILY HISTORY:      Social history:  Alochol:   Smoking:   Drug Use:   Marital Status:     PHYSICAL EXAMINATION:  -----------------------------  T(C): 36.4 (04-20-22 @ 10:57), Max: 36.8 (04-19-22 @ 14:25)  HR: 65 (04-20-22 @ 10:57) (56 - 82)  BP: 129/81 (04-20-22 @ 10:57) (110/62 - 154/70)  RR: 19 (04-20-22 @ 10:57) (18 - 20)  SpO2: 91% (04-20-22 @ 10:57) (90% - 97%)  Wt(kg): --    04-19 @ 07:01  -  04-20 @ 07:00  --------------------------------------------------------  IN:  Total IN: 0 mL    OUT:    Voided (mL): 400 mL  Total OUT: 400 mL    Total NET: -400 mL            Constitutional: well developed, normal appearance, well groomed, well nourished, no deformities and no acute distress.   Eyes: the conjunctiva exhibited no abnormalities and the eyelids demonstrated no xanthelasmas.   HEENT: normal oral mucosa, no oral pallor and no oral cyanosis.   Neck: normal jugular venous A waves present, normal jugular venous V waves present and no jugular venous garcia A waves.   Pulmonary: no respiratory distress, normal respiratory rhythm and effort, no accessory muscle use and lungs were clear to auscultation bilaterally. Anteriorly  Cardiovascular: heart rate and rhythm were normal, normal S1 and S2 and no murmur, gallop, rub, heave or thrill are present.   Musculoskeletal: the gait could not be assessed.   Extremities: no clubbing of the fingernails, no localized cyanosis, no petechial hemorrhages and no ischemic changes.   Skin: normal skin color and pigmentation, no rash, no venous stasis, no skin lesions, no skin ulcer and no xanthoma was observed.       LABS:   --------  04-20    142  |  106  |  34<H>  ----------------------------<  87  3.8   |  30  |  0.91    Ca    9.3      20 Apr 2022 08:10    TPro  6.2  /  Alb  2.5<L>  /  TBili  1.2  /  DBili  x   /  AST  30  /  ALT  18  /  AlkPhos  68  04-20                         9.0    8.00  )-----------( 393      ( 20 Apr 2022 08:10 )             29.1                   Radiology:

## 2022-04-20 NOTE — PROGRESS NOTE ADULT - REASON FOR ADMISSION
fall -> right femoral fracture
fall -> right femoral fracture  S/p ORIF of the right hip
fall -> right femoral fracture

## 2022-04-26 ENCOUNTER — INPATIENT (INPATIENT)
Facility: HOSPITAL | Age: 77
LOS: 10 days | Discharge: NURSING, REHAB OR LONG-TERM CARE | DRG: 862 | End: 2022-05-07
Attending: INTERNAL MEDICINE | Admitting: INTERNAL MEDICINE
Payer: MEDICARE

## 2022-04-26 VITALS
OXYGEN SATURATION: 96 % | WEIGHT: 161.16 LBS | HEART RATE: 67 BPM | RESPIRATION RATE: 20 BRPM | SYSTOLIC BLOOD PRESSURE: 128 MMHG | HEIGHT: 67 IN | DIASTOLIC BLOOD PRESSURE: 75 MMHG | TEMPERATURE: 98 F

## 2022-04-26 DIAGNOSIS — D75.839 THROMBOCYTOSIS, UNSPECIFIED: ICD-10-CM

## 2022-04-26 DIAGNOSIS — Z98.890 OTHER SPECIFIED POSTPROCEDURAL STATES: Chronic | ICD-10-CM

## 2022-04-26 DIAGNOSIS — T14.8XXA OTHER INJURY OF UNSPECIFIED BODY REGION, INITIAL ENCOUNTER: ICD-10-CM

## 2022-04-26 DIAGNOSIS — D64.9 ANEMIA, UNSPECIFIED: ICD-10-CM

## 2022-04-26 DIAGNOSIS — E88.09 OTHER DISORDERS OF PLASMA-PROTEIN METABOLISM, NOT ELSEWHERE CLASSIFIED: ICD-10-CM

## 2022-04-26 DIAGNOSIS — I10 ESSENTIAL (PRIMARY) HYPERTENSION: ICD-10-CM

## 2022-04-26 DIAGNOSIS — E03.9 HYPOTHYROIDISM, UNSPECIFIED: ICD-10-CM

## 2022-04-26 DIAGNOSIS — Z29.9 ENCOUNTER FOR PROPHYLACTIC MEASURES, UNSPECIFIED: ICD-10-CM

## 2022-04-26 DIAGNOSIS — T81.49XA INFECTION FOLLOWING A PROCEDURE, OTHER SURGICAL SITE, INITIAL ENCOUNTER: ICD-10-CM

## 2022-04-26 DIAGNOSIS — E86.0 DEHYDRATION: ICD-10-CM

## 2022-04-26 LAB
ALBUMIN SERPL ELPH-MCNC: 2.6 G/DL — LOW (ref 3.3–5)
ALP SERPL-CCNC: 94 U/L — SIGNIFICANT CHANGE UP (ref 30–120)
ALT FLD-CCNC: 16 U/L DA — SIGNIFICANT CHANGE UP (ref 10–60)
ANION GAP SERPL CALC-SCNC: 6 MMOL/L — SIGNIFICANT CHANGE UP (ref 5–17)
APTT BLD: 34.9 SEC — SIGNIFICANT CHANGE UP (ref 27.5–35.5)
AST SERPL-CCNC: 30 U/L — SIGNIFICANT CHANGE UP (ref 10–40)
BASOPHILS # BLD AUTO: 0.04 K/UL — SIGNIFICANT CHANGE UP (ref 0–0.2)
BASOPHILS NFR BLD AUTO: 0.6 % — SIGNIFICANT CHANGE UP (ref 0–2)
BILIRUB SERPL-MCNC: 0.8 MG/DL — SIGNIFICANT CHANGE UP (ref 0.2–1.2)
BUN SERPL-MCNC: 29 MG/DL — HIGH (ref 7–23)
CALCIUM SERPL-MCNC: 9.7 MG/DL — SIGNIFICANT CHANGE UP (ref 8.4–10.5)
CHLORIDE SERPL-SCNC: 106 MMOL/L — SIGNIFICANT CHANGE UP (ref 96–108)
CO2 SERPL-SCNC: 31 MMOL/L — SIGNIFICANT CHANGE UP (ref 22–31)
CREAT SERPL-MCNC: 0.87 MG/DL — SIGNIFICANT CHANGE UP (ref 0.5–1.3)
EGFR: 68 ML/MIN/1.73M2 — SIGNIFICANT CHANGE UP
EOSINOPHIL # BLD AUTO: 0.2 K/UL — SIGNIFICANT CHANGE UP (ref 0–0.5)
EOSINOPHIL NFR BLD AUTO: 2.8 % — SIGNIFICANT CHANGE UP (ref 0–6)
GLUCOSE SERPL-MCNC: 94 MG/DL — SIGNIFICANT CHANGE UP (ref 70–99)
HCT VFR BLD CALC: 31.8 % — LOW (ref 34.5–45)
HGB BLD-MCNC: 10.1 G/DL — LOW (ref 11.5–15.5)
IMM GRANULOCYTES NFR BLD AUTO: 1.1 % — SIGNIFICANT CHANGE UP (ref 0–1.5)
INR BLD: 1.07 RATIO — SIGNIFICANT CHANGE UP (ref 0.88–1.16)
LACTATE SERPL-SCNC: 0.7 MMOL/L — SIGNIFICANT CHANGE UP (ref 0.7–2)
LYMPHOCYTES # BLD AUTO: 1.08 K/UL — SIGNIFICANT CHANGE UP (ref 1–3.3)
LYMPHOCYTES # BLD AUTO: 15.3 % — SIGNIFICANT CHANGE UP (ref 13–44)
MCHC RBC-ENTMCNC: 31.1 PG — SIGNIFICANT CHANGE UP (ref 27–34)
MCHC RBC-ENTMCNC: 31.8 GM/DL — LOW (ref 32–36)
MCV RBC AUTO: 97.8 FL — SIGNIFICANT CHANGE UP (ref 80–100)
MONOCYTES # BLD AUTO: 0.67 K/UL — SIGNIFICANT CHANGE UP (ref 0–0.9)
MONOCYTES NFR BLD AUTO: 9.5 % — SIGNIFICANT CHANGE UP (ref 2–14)
NEUTROPHILS # BLD AUTO: 4.97 K/UL — SIGNIFICANT CHANGE UP (ref 1.8–7.4)
NEUTROPHILS NFR BLD AUTO: 70.7 % — SIGNIFICANT CHANGE UP (ref 43–77)
NRBC # BLD: 0 /100 WBCS — SIGNIFICANT CHANGE UP (ref 0–0)
PLATELET # BLD AUTO: 425 K/UL — HIGH (ref 150–400)
POTASSIUM SERPL-MCNC: 4.4 MMOL/L — SIGNIFICANT CHANGE UP (ref 3.5–5.3)
POTASSIUM SERPL-SCNC: 4.4 MMOL/L — SIGNIFICANT CHANGE UP (ref 3.5–5.3)
PROT SERPL-MCNC: 6.8 G/DL — SIGNIFICANT CHANGE UP (ref 6–8.3)
PROTHROM AB SERPL-ACNC: 12.6 SEC — SIGNIFICANT CHANGE UP (ref 10.5–13.4)
RBC # BLD: 3.25 M/UL — LOW (ref 3.8–5.2)
RBC # FLD: 14.9 % — HIGH (ref 10.3–14.5)
SARS-COV-2 RNA SPEC QL NAA+PROBE: SIGNIFICANT CHANGE UP
SODIUM SERPL-SCNC: 143 MMOL/L — SIGNIFICANT CHANGE UP (ref 135–145)
WBC # BLD: 7.04 K/UL — SIGNIFICANT CHANGE UP (ref 3.8–10.5)
WBC # FLD AUTO: 7.04 K/UL — SIGNIFICANT CHANGE UP (ref 3.8–10.5)

## 2022-04-26 PROCEDURE — 93010 ELECTROCARDIOGRAM REPORT: CPT

## 2022-04-26 PROCEDURE — 73700 CT LOWER EXTREMITY W/O DYE: CPT | Mod: 26,RT,MA

## 2022-04-26 PROCEDURE — 71045 X-RAY EXAM CHEST 1 VIEW: CPT | Mod: 26

## 2022-04-26 PROCEDURE — 99285 EMERGENCY DEPT VISIT HI MDM: CPT

## 2022-04-26 PROCEDURE — 99223 1ST HOSP IP/OBS HIGH 75: CPT

## 2022-04-26 RX ORDER — VANCOMYCIN HCL 1 G
1000 VIAL (EA) INTRAVENOUS EVERY 12 HOURS
Refills: 0 | Status: DISCONTINUED | OUTPATIENT
Start: 2022-04-27 | End: 2022-04-30

## 2022-04-26 RX ORDER — LEVOTHYROXINE SODIUM 125 MCG
125 TABLET ORAL DAILY
Refills: 0 | Status: DISCONTINUED | OUTPATIENT
Start: 2022-04-26 | End: 2022-05-07

## 2022-04-26 RX ORDER — SODIUM CHLORIDE 9 MG/ML
2300 INJECTION INTRAMUSCULAR; INTRAVENOUS; SUBCUTANEOUS ONCE
Refills: 0 | Status: COMPLETED | OUTPATIENT
Start: 2022-04-26 | End: 2022-04-26

## 2022-04-26 RX ORDER — PREGABALIN 225 MG/1
500 CAPSULE ORAL DAILY
Refills: 0 | Status: DISCONTINUED | OUTPATIENT
Start: 2022-04-26 | End: 2022-05-07

## 2022-04-26 RX ORDER — AMLODIPINE BESYLATE 2.5 MG/1
5 TABLET ORAL DAILY
Refills: 0 | Status: DISCONTINUED | OUTPATIENT
Start: 2022-04-26 | End: 2022-05-07

## 2022-04-26 RX ORDER — OMEGA-3 ACID ETHYL ESTERS 1 G
2 CAPSULE ORAL DAILY
Refills: 0 | Status: DISCONTINUED | OUTPATIENT
Start: 2022-04-26 | End: 2022-05-07

## 2022-04-26 RX ORDER — LANOLIN ALCOHOL/MO/W.PET/CERES
5 CREAM (GRAM) TOPICAL AT BEDTIME
Refills: 0 | Status: DISCONTINUED | OUTPATIENT
Start: 2022-04-26 | End: 2022-05-07

## 2022-04-26 RX ORDER — ASPIRIN/CALCIUM CARB/MAGNESIUM 324 MG
81 TABLET ORAL DAILY
Refills: 0 | Status: DISCONTINUED | OUTPATIENT
Start: 2022-04-26 | End: 2022-05-07

## 2022-04-26 RX ORDER — CHOLECALCIFEROL (VITAMIN D3) 125 MCG
1000 CAPSULE ORAL
Refills: 0 | Status: DISCONTINUED | OUTPATIENT
Start: 2022-04-26 | End: 2022-05-07

## 2022-04-26 RX ORDER — HEPARIN SODIUM 5000 [USP'U]/ML
5000 INJECTION INTRAVENOUS; SUBCUTANEOUS EVERY 12 HOURS
Refills: 0 | Status: DISCONTINUED | OUTPATIENT
Start: 2022-04-26 | End: 2022-04-26

## 2022-04-26 RX ORDER — MEMANTINE HYDROCHLORIDE 10 MG/1
10 TABLET ORAL
Refills: 0 | Status: DISCONTINUED | OUTPATIENT
Start: 2022-04-26 | End: 2022-05-07

## 2022-04-26 RX ORDER — VANCOMYCIN HCL 1 G
1000 VIAL (EA) INTRAVENOUS ONCE
Refills: 0 | Status: COMPLETED | OUTPATIENT
Start: 2022-04-26 | End: 2022-04-26

## 2022-04-26 RX ORDER — LOSARTAN POTASSIUM 100 MG/1
100 TABLET, FILM COATED ORAL DAILY
Refills: 0 | Status: DISCONTINUED | OUTPATIENT
Start: 2022-04-26 | End: 2022-05-07

## 2022-04-26 RX ORDER — SODIUM CHLORIDE 9 MG/ML
1000 INJECTION, SOLUTION INTRAVENOUS
Refills: 0 | Status: DISCONTINUED | OUTPATIENT
Start: 2022-04-26 | End: 2022-04-27

## 2022-04-26 RX ORDER — PIPERACILLIN AND TAZOBACTAM 4; .5 G/20ML; G/20ML
3.38 INJECTION, POWDER, LYOPHILIZED, FOR SOLUTION INTRAVENOUS ONCE
Refills: 0 | Status: COMPLETED | OUTPATIENT
Start: 2022-04-26 | End: 2022-04-26

## 2022-04-26 RX ORDER — FOLIC ACID 0.8 MG
1 TABLET ORAL DAILY
Refills: 0 | Status: DISCONTINUED | OUTPATIENT
Start: 2022-04-26 | End: 2022-05-07

## 2022-04-26 RX ORDER — MIRTAZAPINE 45 MG/1
15 TABLET, ORALLY DISINTEGRATING ORAL AT BEDTIME
Refills: 0 | Status: DISCONTINUED | OUTPATIENT
Start: 2022-04-26 | End: 2022-05-07

## 2022-04-26 RX ORDER — ATORVASTATIN CALCIUM 80 MG/1
20 TABLET, FILM COATED ORAL AT BEDTIME
Refills: 0 | Status: DISCONTINUED | OUTPATIENT
Start: 2022-04-26 | End: 2022-05-07

## 2022-04-26 RX ADMIN — MIRTAZAPINE 15 MILLIGRAM(S): 45 TABLET, ORALLY DISINTEGRATING ORAL at 22:47

## 2022-04-26 RX ADMIN — Medication 5 MILLIGRAM(S): at 22:47

## 2022-04-26 RX ADMIN — Medication 250 MILLIGRAM(S): at 20:11

## 2022-04-26 RX ADMIN — ATORVASTATIN CALCIUM 20 MILLIGRAM(S): 80 TABLET, FILM COATED ORAL at 22:47

## 2022-04-26 RX ADMIN — SODIUM CHLORIDE 100 MILLILITER(S): 9 INJECTION, SOLUTION INTRAVENOUS at 22:48

## 2022-04-26 RX ADMIN — SODIUM CHLORIDE 2300 MILLILITER(S): 9 INJECTION INTRAMUSCULAR; INTRAVENOUS; SUBCUTANEOUS at 20:10

## 2022-04-26 RX ADMIN — PIPERACILLIN AND TAZOBACTAM 200 GRAM(S): 4; .5 INJECTION, POWDER, LYOPHILIZED, FOR SOLUTION INTRAVENOUS at 20:09

## 2022-04-26 NOTE — H&P ADULT - PROBLEM SELECTOR PLAN 8
High risk for VTE, had to hold UFH for now given her large hematoma shown in hip CT, started her on B/L intermittent pneumatic compression device for mechanical DVT prophylaxis, continue daily low dose Aspirin.

## 2022-04-26 NOTE — ED ADULT NURSE NOTE - NSIMPLEMENTINTERV_GEN_ALL_ED
Implemented All Fall with Harm Risk Interventions:  [place] to call system. Call bell, personal items and telephone within reach. Instruct patient to call for assistance. Room bathroom lighting operational. Non-slip footwear when patient is off stretcher. Physically safe environment: no spills, clutter or unnecessary equipment. Stretcher in lowest position, wheels locked, appropriate side rails in place. Provide visual cue, wrist band, yellow gown, etc. Monitor gait and stability. Monitor for mental status changes and reorient to person, place, and time. Review medications for side effects contributing to fall risk. Reinforce activity limits and safety measures with patient and family. Provide visual clues: red socks.

## 2022-04-26 NOTE — H&P ADULT - NSICDXPASTSURGICALHX_GEN_ALL_CORE_FT
[FreeTextEntry1] : 53 yo M with hx of microscopic hematuria presents to clinic for follow up with hematuria. Renal US unremarkable. Patient had cystoscopy 1 months ago and bx did not reveals any evidence of malignancy. 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 \par - all autoimmune w/u negative: BUZZ, C3/C4, SPEP/UPEP, Immunofixation. \par - Renal US: normal.  \par - advise to evaluate family members. \par - f/u in 1 years.  PAST SURGICAL HISTORY:  History of open reduction and internal fixation (ORIF) procedure

## 2022-04-26 NOTE — H&P ADULT - PROBLEM SELECTOR PLAN 1
No fever or leukocytosis, no evidence suggestive of sepsis, CT showed a collection possibly a hematoma, started empirically on Zosyn & Vancomycin IVPB X1 dose each by ED team after cultures were obtained, will continue on Vancomycin 1 gm IVPB Q 12h, monitor Vancomycin trough level, trend TLC and f/u culture results in addition to aspirate results, Orthopedic consult with Dr. Barajas was called earlier by ED team, also ID consult with Dr. Pryor was called.

## 2022-04-26 NOTE — H&P ADULT - PROBLEM SELECTOR PLAN 6
seen by endocrinology during her last hospital stay for profound hypothyroidism, continue on her current Levothyroxine home dose, check TSH in am.

## 2022-04-26 NOTE — H&P ADULT - NSHPLABSRESULTS_GEN_ALL_CORE
-      Lactate Trend  04-26 @ 19:31 Lactate:0.7                           10.1   7.04  )-----------( 425      ( 26 Apr 2022 19:31 )             31.8            04-26    143  |  106  |  29<H>  ----------------------------<  94  4.4   |  31  |  0.87    Ca    9.7      26 Apr 2022 19:31    TPro  6.8  /  Alb  2.6<L>  /  TBili  0.8  /  DBili  x   /  AST  30  /  ALT  16  /  AlkPhos  94  04-26            PT/INR - ( 26 Apr 2022 19:38 )   PT: 12.6 sec;   INR: 1.07 ratio      PTT - ( 26 Apr 2022 19:38 )  PTT:34.9 sec    COVID-19 PCR: NotDetec (26 Apr 2022 19:35)  COVID-19 PCR: NotDetec (19 Apr 2022 16:45)  COVID-19 PCR: NotDetec (16 Apr 2022 08:42)  COVID-19 PCR: NotDetec (09 Apr 2022 22:04)      Culture Results:   No Growth Final (04-10 @ 20:26)  Culture Results:   No Growth Final (04-10 @ 20:26)  Culture Results:   No growth (04-10 @ 20:25)      CT HIP ONLY RT                        PROCEDURE DATE:  04/26/2022    INTERPRETATION:  CT HIP RIGHT  HISTORY: Pain. Postop infection. Right hip intertrochanteric fracture   ORIF.  FINDINGS:  OSSEOUS STRUCTURES    Acute/Chronic Fractures:  Comminuted intertrochanteric fracture of the   right hip is again seen with a gamma nail for fixation. Distal   interlocking screw is present. There is mild residual fracture   displacement.  PELVIC JOINTS    Symphysis Pubis:  Preserved.    Sacroiliac Joints:  Maintained.  RIGHT HIP JOINT    Avascular Necrosis:  None.    Joint Space:  Grossly maintained given patient motion.    Effusion/Synovitis:  None.  LEFT HIP JOINT    Avascular Necrosis:  None.    Joint Space:  Grossly maintained given patient motion.    Effusion/Synovitis:  None.  VISUALIZED SPINE  Lower lumbar degenerative disc disease is present.  SOFT TISSUES    Neurovascular:  Severe atherosclerotic calcifications are present.    Pelvis/Abdomen:  Mild presacral edema is present.    Musculature:  Fluid collection is present extending along the posterior   margin of the greater trochanter and within the right gluteus medias   muscle measuring approximately 14.2 cm craniocaudally and up to 6.9 x 4.2   cm axially along the greater trochanter.    Subcutaneous Tissues:  Moderate subcutaneous hematoma formation is   again seen along the right hip incision site with mild hematoma formation   extending along the level of the proximal femoral diaphysis. Mild to   moderate subcutaneous edema is also noted. Overlying skin staples are   noted.  IMPRESSION:  1. Right intertrochanteric fracture ORIF is again seen.  2. Fluid collection along the right greater trochanter and within the   right gluteus medius muscle likely reflects evolution of the prior   hematoma although infection cannot be absolutely excluded with the given   history. Consider aspiration as warranted.  3. Moderate overlying subcutaneous hematoma is again seen. -      Lactate Trend  04-26 @ 19:31 Lactate:0.7                           10.1   7.04  )-----------( 425      ( 26 Apr 2022 19:31 )             31.8            04-26    143  |  106  |  29<H>  ----------------------------<  94  4.4   |  31  |  0.87    Ca    9.7      26 Apr 2022 19:31    TPro  6.8  /  Alb  2.6<L>  /  TBili  0.8  /  DBili  x   /  AST  30  /  ALT  16  /  AlkPhos  94  04-26            PT/INR - ( 26 Apr 2022 19:38 )   PT: 12.6 sec;   INR: 1.07 ratio      PTT - ( 26 Apr 2022 19:38 )  PTT:34.9 sec    COVID-19 PCR: NotDetec (26 Apr 2022 19:35)  COVID-19 PCR: NotDetec (19 Apr 2022 16:45)  COVID-19 PCR: NotDetec (16 Apr 2022 08:42)  COVID-19 PCR: NotDetec (09 Apr 2022 22:04)      Culture Results:   No Growth Final (04-10 @ 20:26)  Culture Results:   No Growth Final (04-10 @ 20:26)  Culture Results:   No growth (04-10 @ 20:25)      CT HIP ONLY RT                        PROCEDURE DATE:  04/26/2022    INTERPRETATION:  CT HIP RIGHT  HISTORY: Pain. Postop infection. Right hip intertrochanteric fracture   ORIF.  FINDINGS:  OSSEOUS STRUCTURES    Acute/Chronic Fractures:  Comminuted intertrochanteric fracture of the   right hip is again seen with a gamma nail for fixation. Distal   interlocking screw is present. There is mild residual fracture   displacement.  PELVIC JOINTS    Symphysis Pubis:  Preserved.    Sacroiliac Joints:  Maintained.  RIGHT HIP JOINT    Avascular Necrosis:  None.    Joint Space:  Grossly maintained given patient motion.    Effusion/Synovitis:  None.  LEFT HIP JOINT    Avascular Necrosis:  None.    Joint Space:  Grossly maintained given patient motion.    Effusion/Synovitis:  None.  VISUALIZED SPINE  Lower lumbar degenerative disc disease is present.  SOFT TISSUES    Neurovascular:  Severe atherosclerotic calcifications are present.    Pelvis/Abdomen:  Mild presacral edema is present.    Musculature:  Fluid collection is present extending along the posterior   margin of the greater trochanter and within the right gluteus medias   muscle measuring approximately 14.2 cm craniocaudally and up to 6.9 x 4.2   cm axially along the greater trochanter.    Subcutaneous Tissues:  Moderate subcutaneous hematoma formation is   again seen along the right hip incision site with mild hematoma formation   extending along the level of the proximal femoral diaphysis. Mild to   moderate subcutaneous edema is also noted. Overlying skin staples are   noted.  IMPRESSION:  1. Right intertrochanteric fracture ORIF is again seen.  2. Fluid collection along the right greater trochanter and within the   right gluteus medius muscle likely reflects evolution of the prior   hematoma although infection cannot be absolutely excluded with the given   history. Consider aspiration as warranted.  3. Moderate overlying subcutaneous hematoma is again seen.        CXR:    As per my review shows flipped film with cardiac apex points to the right, borderline enlarged cardiac shadow size, aortic arch calcifications, mild increased BV markings, no pulmonary infiltrates, pleural effusion, or pneumothorax. Pending official report.         EKG:    As per my review shows SR at 65/min, with 1st degree AV block, normal QTc intervals, normal QRS voltage, duration, and axis (+45), with normal transition, no ST-T abnormality.      -

## 2022-04-26 NOTE — H&P ADULT - NSHPPHYSICALEXAM_GEN_ALL_CORE
-    Vital Signs Last 24 Hrs  T(C): 37 (26 Apr 2022 19:30), Max: 37 (26 Apr 2022 19:30)  T(F): 98.6 (26 Apr 2022 19:30), Max: 98.6 (26 Apr 2022 19:30)  HR: 68 (26 Apr 2022 19:30) (67 - 68)  BP: 124/78 (26 Apr 2022 19:30) (124/78 - 128/75)  BP(mean): --  RR: 18 (26 Apr 2022 19:30) (18 - 20)  SpO2: 95% (26 Apr 2022 19:30) (92% - 96%)          PHYSICAL EXAM:  		  GENERAL: NAD, well-groomed, well-developed.  HEAD:  Atraumatic, Norm cephalic.  EYES: PERRLA, conjunctiva clear.  ENMT: no nasal discharge, mildly dry MM.   NECK: Supple, No JVD.  NERVOUS SYSTEM:  Alert with advanced dementia and irrelevant speech, moves all extremities. neurologically intact grossly.  CHEST/LUNG: Fair air entry B/L, no rales, rhonchi, or wheezing.  HEART: Normal S1 & acc S2, grade AVINASH max over cardiac base, no extra sounds.  ABDOMEN: Soft, non-tender, non-distended; bowel sounds present, no palpable masses or organomegaly.  EXTREMITIES:  No clubbing, cyanosis, or edema, normally healed drain wound at lower right thigh with 3 stables still in, upper thigh main surgical wound with intact stables, surrounded with an erythema overlying an area of induration with no detected fluctuation, non tender, (+) scanty discharge.  VASCULAR: 2+ radial, brachial pulses B/L, couldn't detect a carotid bruit.  SKIN: (+) B/L hand & forearm ecchymoses, skin surrounding the surgical wound as described above.  PSYCH: normal affect & behavior, no current agitation.

## 2022-04-26 NOTE — H&P ADULT - PROBLEM SELECTOR PLAN 5
of blood loss started following the surgery as per hospital records, received blood transfusion during her last hospital stay, CT shows a large hematoma, monitor. of blood loss started following the surgery as per hospital records, received 3 units of PRBCs transfused during her last hospital stay last was 9 days ago, CT shows a large hematoma, monitor.

## 2022-04-26 NOTE — H&P ADULT - HISTORY OF PRESENT ILLNESS
This is a 76 y/o F with PMH of HTN, Dyslipidemia, PAD, Hypothyroidism, Anemia s/p transfusion, OA, Depression, and Dementia who was sent from [facility] facility for possibly infected surgical wound. Patient was admitted to [facility] and with closed right femoral neck fracture s/p fall, and had an ORIF surgery 2 weeks ago, discharged to subacute rehab, sent back today for reported discharge from the surgical wound, no reported fever or chills. Patient with advanced dementia, with irrelevant speech, no further history could be obtained at this time..

## 2022-04-26 NOTE — ED ADULT NURSE REASSESSMENT NOTE - NS ED NURSE REASSESS COMMENT FT1
pt very confused and combative, physical aggressive to staff whenever staff was trying to get tasks done, such as blood draw, ekg etc. Pt tries to get off the bed constantly, bed alarm in place, bed close to nursing station pt very confused and combative, physical aggressive to staff, kicking, hitting, squeezing, whenever staff was trying to get tasks done, such as blood draw, ekg etc. unable to straight cath, Pt physically aggressive to staff,Pt tries to get off the bed constantly, bed alarm in place, bed close to nursing station

## 2022-04-26 NOTE — H&P ADULT - PROBLEM SELECTOR PLAN 4
Serum albumin of 2.6 gm/dl, ML related to catabolic stat, in addition to malnutrition, nutritional service consult in am.

## 2022-04-26 NOTE — H&P ADULT - PROBLEM SELECTOR PLAN 3
ML reactive, already on low dose Aspirin, continue, hold UFH for now given the large hematoma shown up in CT.

## 2022-04-26 NOTE — H&P ADULT - PROBLEM SELECTOR PLAN 2
mild after IVF bolus of 2300 ml NS was given earlier by ED team, will maintain at 100 ml/h LR, monitor I & O, and trend renal indices.

## 2022-04-26 NOTE — ED PROVIDER NOTE - CLINICAL SUMMARY MEDICAL DECISION MAKING FREE TEXT BOX
76 y/o F w/ dementia had recent R hip surgery now w/ post op wound infection. Plan labs, XR and likely admission for further evaluation and care.

## 2022-04-26 NOTE — ED ADULT NURSE NOTE - OBJECTIVE STATEMENT
77 YOF A&Ox2 with pmh of dementia, hypothyroidism brought in by EMS from [facility] for right leg post/op complication. EMS states pt had right femur fracture repair, pt now noted to have ecchymosis, redness of site, staples noted no discharge at sites. pt denies sob, chest pain, n/v/d, headaches, dizziness, blurry vision. safety maintained. 77 YOF A&Ox2 with pmh of dementia, hypothyroidism brought in by EMS from [facility] for right leg post/op complication. EMS states pt had right femur fracture repair, pt now noted to have ecchymosis, redness of site, staples noted no discharge at sites, but minimal bleeding. pt denies sob, chest pain, n/v/d, headaches, dizziness, blurry vision. safety maintained.

## 2022-04-26 NOTE — ED ADULT NURSE NOTE - CHIEF COMPLAINT QUOTE
Sent from [facility] for evluation of post-op surgical wound right hip. Noted to have ecchymosis.

## 2022-04-26 NOTE — ED PROVIDER NOTE - MUSCULOSKELETAL, MLM
multiple stapled wounds over R hip most superior w/ purulent drainage and redness full range of motion intact,
Breath sounds clear and equal bilaterally, diminished at the bases

## 2022-04-26 NOTE — H&P ADULT - ASSESSMENT
78 y/o F with PMH of HTN, Dyslipidemia, PAD, Hypothyroidism, Anemia s/p transfusion, OA, Depression, and Dementia sent for possibly infected surgical wound.

## 2022-04-26 NOTE — ED PROVIDER NOTE - OBJECTIVE STATEMENT
78 y/o F w/ PMHx of HTN, HLD, OA, hypothyroid and dementia presents to ED from [facility] for evaluation of R hip post surgical wound. Pt is s/p ORIF of the R hip w/ Dr. Barajas on 4/11. Pt is a limited historian 2/2 dementia.

## 2022-04-26 NOTE — ED ADULT TRIAGE NOTE - CHIEF COMPLAINT QUOTE
Sent from [facility] for evluation of post-op surgical wound right hip. Noted to have ecchymosis.

## 2022-04-27 DIAGNOSIS — T14.8XXA OTHER INJURY OF UNSPECIFIED BODY REGION, INITIAL ENCOUNTER: ICD-10-CM

## 2022-04-27 LAB
ANION GAP SERPL CALC-SCNC: 6 MMOL/L — SIGNIFICANT CHANGE UP (ref 5–17)
B CEREUS GROUP DNA BLD POS QL NAA+PROBE: SIGNIFICANT CHANGE UP
BASOPHILS # BLD AUTO: 0.04 K/UL — SIGNIFICANT CHANGE UP (ref 0–0.2)
BASOPHILS NFR BLD AUTO: 0.6 % — SIGNIFICANT CHANGE UP (ref 0–2)
BUN SERPL-MCNC: 22 MG/DL — SIGNIFICANT CHANGE UP (ref 7–23)
CALCIUM SERPL-MCNC: 8.6 MG/DL — SIGNIFICANT CHANGE UP (ref 8.4–10.5)
CHLORIDE SERPL-SCNC: 108 MMOL/L — SIGNIFICANT CHANGE UP (ref 96–108)
CO2 SERPL-SCNC: 28 MMOL/L — SIGNIFICANT CHANGE UP (ref 22–31)
CREAT SERPL-MCNC: 0.78 MG/DL — SIGNIFICANT CHANGE UP (ref 0.5–1.3)
EGFR: 77 ML/MIN/1.73M2 — SIGNIFICANT CHANGE UP
EOSINOPHIL # BLD AUTO: 0.26 K/UL — SIGNIFICANT CHANGE UP (ref 0–0.5)
EOSINOPHIL NFR BLD AUTO: 4.1 % — SIGNIFICANT CHANGE UP (ref 0–6)
GLUCOSE SERPL-MCNC: 83 MG/DL — SIGNIFICANT CHANGE UP (ref 70–99)
GRAM STN FLD: SIGNIFICANT CHANGE UP
HCT VFR BLD CALC: 29.1 % — LOW (ref 34.5–45)
HGB BLD-MCNC: 9 G/DL — LOW (ref 11.5–15.5)
IMM GRANULOCYTES NFR BLD AUTO: 1.1 % — SIGNIFICANT CHANGE UP (ref 0–1.5)
LYMPHOCYTES # BLD AUTO: 1.1 K/UL — SIGNIFICANT CHANGE UP (ref 1–3.3)
LYMPHOCYTES # BLD AUTO: 17.3 % — SIGNIFICANT CHANGE UP (ref 13–44)
MCHC RBC-ENTMCNC: 30.4 PG — SIGNIFICANT CHANGE UP (ref 27–34)
MCHC RBC-ENTMCNC: 30.9 GM/DL — LOW (ref 32–36)
MCV RBC AUTO: 98.3 FL — SIGNIFICANT CHANGE UP (ref 80–100)
METHOD TYPE: SIGNIFICANT CHANGE UP
MONOCYTES # BLD AUTO: 0.65 K/UL — SIGNIFICANT CHANGE UP (ref 0–0.9)
MONOCYTES NFR BLD AUTO: 10.2 % — SIGNIFICANT CHANGE UP (ref 2–14)
MRSA PCR RESULT.: DETECTED
NEUTROPHILS # BLD AUTO: 4.23 K/UL — SIGNIFICANT CHANGE UP (ref 1.8–7.4)
NEUTROPHILS NFR BLD AUTO: 66.7 % — SIGNIFICANT CHANGE UP (ref 43–77)
NRBC # BLD: 0 /100 WBCS — SIGNIFICANT CHANGE UP (ref 0–0)
PLATELET # BLD AUTO: 353 K/UL — SIGNIFICANT CHANGE UP (ref 150–400)
POTASSIUM SERPL-MCNC: 3.7 MMOL/L — SIGNIFICANT CHANGE UP (ref 3.5–5.3)
POTASSIUM SERPL-SCNC: 3.7 MMOL/L — SIGNIFICANT CHANGE UP (ref 3.5–5.3)
PROCALCITONIN SERPL-MCNC: 0.07 NG/ML — SIGNIFICANT CHANGE UP (ref 0.02–0.1)
RBC # BLD: 2.96 M/UL — LOW (ref 3.8–5.2)
RBC # FLD: 14.6 % — HIGH (ref 10.3–14.5)
S AUREUS DNA NOSE QL NAA+PROBE: DETECTED
SODIUM SERPL-SCNC: 142 MMOL/L — SIGNIFICANT CHANGE UP (ref 135–145)
SPECIMEN SOURCE: SIGNIFICANT CHANGE UP
TSH SERPL-MCNC: 18.3 UIU/ML — HIGH (ref 0.27–4.2)
WBC # BLD: 6.35 K/UL — SIGNIFICANT CHANGE UP (ref 3.8–10.5)
WBC # FLD AUTO: 6.35 K/UL — SIGNIFICANT CHANGE UP (ref 3.8–10.5)

## 2022-04-27 PROCEDURE — 99233 SBSQ HOSP IP/OBS HIGH 50: CPT

## 2022-04-27 RX ORDER — PANTOPRAZOLE SODIUM 20 MG/1
40 TABLET, DELAYED RELEASE ORAL
Refills: 0 | Status: DISCONTINUED | OUTPATIENT
Start: 2022-04-27 | End: 2022-05-07

## 2022-04-27 RX ORDER — ALBUTEROL 90 UG/1
2 AEROSOL, METERED ORAL EVERY 6 HOURS
Refills: 0 | Status: DISCONTINUED | OUTPATIENT
Start: 2022-04-27 | End: 2022-05-07

## 2022-04-27 RX ORDER — CEFTRIAXONE 500 MG/1
2000 INJECTION, POWDER, FOR SOLUTION INTRAMUSCULAR; INTRAVENOUS EVERY 24 HOURS
Refills: 0 | Status: DISCONTINUED | OUTPATIENT
Start: 2022-04-27 | End: 2022-05-02

## 2022-04-27 RX ADMIN — MEMANTINE HYDROCHLORIDE 10 MILLIGRAM(S): 10 TABLET ORAL at 05:54

## 2022-04-27 RX ADMIN — Medication 250 MILLIGRAM(S): at 09:15

## 2022-04-27 RX ADMIN — Medication 1000 UNIT(S): at 18:04

## 2022-04-27 RX ADMIN — Medication 2 GRAM(S): at 12:45

## 2022-04-27 RX ADMIN — Medication 1000 UNIT(S): at 05:54

## 2022-04-27 RX ADMIN — Medication 125 MICROGRAM(S): at 05:54

## 2022-04-27 RX ADMIN — MIRTAZAPINE 15 MILLIGRAM(S): 45 TABLET, ORALLY DISINTEGRATING ORAL at 21:34

## 2022-04-27 RX ADMIN — MEMANTINE HYDROCHLORIDE 10 MILLIGRAM(S): 10 TABLET ORAL at 18:04

## 2022-04-27 RX ADMIN — Medication 250 MILLIGRAM(S): at 19:48

## 2022-04-27 RX ADMIN — PREGABALIN 500 MICROGRAM(S): 225 CAPSULE ORAL at 12:45

## 2022-04-27 RX ADMIN — Medication 1 MILLIGRAM(S): at 12:45

## 2022-04-27 RX ADMIN — Medication 81 MILLIGRAM(S): at 12:45

## 2022-04-27 RX ADMIN — CEFTRIAXONE 100 MILLIGRAM(S): 500 INJECTION, POWDER, FOR SOLUTION INTRAMUSCULAR; INTRAVENOUS at 18:04

## 2022-04-27 RX ADMIN — Medication 1 TABLET(S): at 12:45

## 2022-04-27 RX ADMIN — AMLODIPINE BESYLATE 5 MILLIGRAM(S): 2.5 TABLET ORAL at 05:54

## 2022-04-27 RX ADMIN — LOSARTAN POTASSIUM 100 MILLIGRAM(S): 100 TABLET, FILM COATED ORAL at 05:54

## 2022-04-27 RX ADMIN — Medication 5 MILLIGRAM(S): at 21:34

## 2022-04-27 RX ADMIN — ATORVASTATIN CALCIUM 20 MILLIGRAM(S): 80 TABLET, FILM COATED ORAL at 21:34

## 2022-04-27 NOTE — PROGRESS NOTE ADULT - SUBJECTIVE AND OBJECTIVE BOX
Patient is a 77y old  Female who presents with a chief complaint of Sent for possibly infected surgical wound. (27 Apr 2022 09:37)      INTERVAL HPI/OVERNIGHT EVENTS: Patient seen and examined at bedside. No overnight events    MEDICATIONS  (STANDING):  amLODIPine   Tablet 5 milliGRAM(s) Oral daily  aspirin enteric coated 81 milliGRAM(s) Oral daily  atorvastatin 20 milliGRAM(s) Oral at bedtime  cholecalciferol 1000 Unit(s) Oral two times a day  cyanocobalamin 500 MICROGram(s) Oral daily  folic acid 1 milliGRAM(s) Oral daily  lactated ringers. 1000 milliLiter(s) (100 mL/Hr) IV Continuous <Continuous>  levothyroxine 125 MICROGram(s) Oral daily  losartan 100 milliGRAM(s) Oral daily  melatonin 5 milliGRAM(s) Oral at bedtime  memantine 10 milliGRAM(s) Oral two times a day  mirtazapine 15 milliGRAM(s) Oral at bedtime  multivitamin 1 Tablet(s) Oral daily  omega-3-Acid Ethyl Esters 2 Gram(s) Oral daily  vancomycin  IVPB 1000 milliGRAM(s) IV Intermittent every 12 hours    MEDICATIONS  (PRN):      Allergies    No Known Allergies    Intolerances        REVIEW OF SYSTEMS:  Unable to obtain meaningful ROS due to mental status      Vital Signs Last 24 Hrs  T(C): 36.6 (27 Apr 2022 06:10), Max: 37 (26 Apr 2022 19:30)  T(F): 97.9 (27 Apr 2022 06:10), Max: 98.6 (26 Apr 2022 19:30)  HR: 76 (27 Apr 2022 06:10) (66 - 76)  BP: 129/86 (27 Apr 2022 06:10) (124/78 - 155/76)  BP(mean): --  RR: 18 (27 Apr 2022 06:10) (18 - 20)  SpO2: 97% (27 Apr 2022 06:10) (92% - 97%)      PHYSICAL EXAM  GENERAL: elderly appearing female in NAD  HEAD: atraumatic, normocephalic   EYES: EOMI, conjunctiva and sclera clear  ENMT: edentulous mouth  RESPIRATORY:  diminished bs at bases, poor inspiratory effort   CARDIOVASCULAR:  soft ii/vi holosystolic murmur LUSB  GASTROINTESTINAL:  soft, nontender, nondistended, bowel sounds present  EXTREMITIES: no clubbing or cyanosis or edema  MUSCULOSKELETAL: No cyanosis or edema, dressing clean, intact, mild ttp  NERVOUS SYSTEM:  sensation intact   PSYCH:  awake and alert but confused  SKIN: Multiple ecchymotic areas on skin     LABS:                        9.0    6.35  )-----------( 353      ( 27 Apr 2022 06:06 )             29.1     CBC Full  -  ( 27 Apr 2022 06:06 )  WBC Count : 6.35 K/uL  Hemoglobin : 9.0 g/dL  Hematocrit : 29.1 %  Platelet Count - Automated : 353 K/uL  Mean Cell Volume : 98.3 fl  Mean Cell Hemoglobin : 30.4 pg  Mean Cell Hemoglobin Concentration : 30.9 gm/dL  Auto Neutrophil # : 4.23 K/uL  Auto Lymphocyte # : 1.10 K/uL  Auto Monocyte # : 0.65 K/uL  Auto Eosinophil # : 0.26 K/uL  Auto Basophil # : 0.04 K/uL  Auto Neutrophil % : 66.7 %  Auto Lymphocyte % : 17.3 %  Auto Monocyte % : 10.2 %  Auto Eosinophil % : 4.1 %  Auto Basophil % : 0.6 %    27 Apr 2022 06:06    142    |  108    |  22     ----------------------------<  83     3.7     |  28     |  0.78     Ca    8.6        27 Apr 2022 06:06    TPro  6.8    /  Alb  2.6    /  TBili  0.8    /  DBili  x      /  AST  30     /  ALT  16     /  AlkPhos  94     26 Apr 2022 19:31    PT/INR - ( 26 Apr 2022 19:38 )   PT: 12.6 sec;   INR: 1.07 ratio         PTT - ( 26 Apr 2022 19:38 )  PTT:34.9 sec    CAPILLARY BLOOD GLUCOSE              RADIOLOGY & ADDITIONAL TESTS: < from: CT Hip No Cont, Right (04.26.22 @ 20:13) >  ACC: [id number] EXAM:  CT HIP ONLY RT                          PROCEDURE DATE:  04/26/2022          INTERPRETATION:  CT HIP RIGHT    HISTORY: Pain. Postop infection. Right hip intertrochanteric fracture   ORIF.    TECHNIQUE: Contiguous axial imaging was performed through the pelvis   without contrast.  Coronal and sagittal reformatting was utilized.   Patient motion artifact and blurring may cause some limitation.    COMPARISON: CT of the right hip dated 4/14/2022, intraoperative x-rays   dated 4/11/2022, and pelvis and right hip x-rays dated 4/9/2022.    FINDINGS:    OSSEOUS STRUCTURES    Acute/Chronic Fractures:  Comminuted intertrochanteric fracture of the   right hip is again seen with a gamma nail for fixation. Distal   interlocking screw is present. There is mild residual fracture   displacement.    PELVIC JOINTS    Symphysis Pubis:  Preserved.    Sacroiliac Joints:  Maintained.    RIGHT HIP JOINT    Avascular Necrosis:  None.    Joint Space:  Grossly maintained given patient motion.    Effusion/Synovitis:  None.    LEFT HIP JOINT    Avascular Necrosis:  None.    Joint Space:  Grossly maintained given patient motion.    Effusion/Synovitis:  None.    VISUALIZED SPINE  Lower lumbar degenerative disc disease is present.    SOFT TISSUES    Neurovascular:  Severe atherosclerotic calcifications are present.    Pelvis/Abdomen:  Mild presacral edema is present.    Musculature:  Fluid collection is present extending along the posterior   margin of the greater trochanter and within the right gluteus medias   muscle measuring approximately 14.2 cm craniocaudally and up to 6.9 x 4.2   cm axially along the greater trochanter.    Subcutaneous Tissues:  Moderate subcutaneous hematoma formation is   again seen along the right hip incision site with mild hematoma formation   extending along the level of the proximal femoral diaphysis. Mild to   moderate subcutaneous edema is also noted. Overlying skin staples are   noted.    IMPRESSION:  1. Right intertrochanteric fracture ORIF is again seen.  2. Fluid collection along the right greater trochanter and within the   right gluteus medius muscle likely reflects evolution of the prior   hematoma although infection cannot be absolutely excluded with the given   history. Consider aspiration as warranted.  3. Moderate overlying subcutaneous hematoma is again seen.    --- End of Report ---            MINI ACKERMAN MD; Attending Radiologist  This document has been electronically signed. Apr 26 2022  8:59PM    < end of copied text >      Consultant(s) Notes Reviewed:  [x] YES  [ ] NO    Care Discussed with [x] Consultants  [x] Patient  [ ] Family  [ ]      [ x] Other; RN  DVT ppx

## 2022-04-27 NOTE — CONSULT NOTE ADULT - REASON FOR ADMISSION
Sent for possibly infected surgical wound.

## 2022-04-28 DIAGNOSIS — A49.02 METHICILLIN RESISTANT STAPHYLOCOCCUS AUREUS INFECTION, UNSPECIFIED SITE: ICD-10-CM

## 2022-04-28 LAB
CULTURE RESULTS: SIGNIFICANT CHANGE UP
ORGANISM # SPEC MICROSCOPIC CNT: SIGNIFICANT CHANGE UP
ORGANISM # SPEC MICROSCOPIC CNT: SIGNIFICANT CHANGE UP
SPECIMEN SOURCE: SIGNIFICANT CHANGE UP

## 2022-04-28 PROCEDURE — 99233 SBSQ HOSP IP/OBS HIGH 50: CPT

## 2022-04-28 RX ORDER — MUPIROCIN 20 MG/G
1 OINTMENT TOPICAL
Refills: 0 | Status: COMPLETED | OUTPATIENT
Start: 2022-04-28 | End: 2022-05-03

## 2022-04-28 RX ORDER — CHLORHEXIDINE GLUCONATE 213 G/1000ML
1 SOLUTION TOPICAL
Refills: 0 | Status: DISCONTINUED | OUTPATIENT
Start: 2022-04-28 | End: 2022-05-07

## 2022-04-28 RX ADMIN — PANTOPRAZOLE SODIUM 40 MILLIGRAM(S): 20 TABLET, DELAYED RELEASE ORAL at 05:16

## 2022-04-28 RX ADMIN — Medication 1 TABLET(S): at 12:13

## 2022-04-28 RX ADMIN — Medication 2 GRAM(S): at 15:08

## 2022-04-28 RX ADMIN — ATORVASTATIN CALCIUM 20 MILLIGRAM(S): 80 TABLET, FILM COATED ORAL at 21:53

## 2022-04-28 RX ADMIN — Medication 1 MILLIGRAM(S): at 12:12

## 2022-04-28 RX ADMIN — Medication 250 MILLIGRAM(S): at 08:03

## 2022-04-28 RX ADMIN — PREGABALIN 500 MICROGRAM(S): 225 CAPSULE ORAL at 12:12

## 2022-04-28 RX ADMIN — AMLODIPINE BESYLATE 5 MILLIGRAM(S): 2.5 TABLET ORAL at 05:15

## 2022-04-28 RX ADMIN — Medication 1000 UNIT(S): at 18:06

## 2022-04-28 RX ADMIN — Medication 5 MILLIGRAM(S): at 21:53

## 2022-04-28 RX ADMIN — Medication 81 MILLIGRAM(S): at 12:11

## 2022-04-28 RX ADMIN — MEMANTINE HYDROCHLORIDE 10 MILLIGRAM(S): 10 TABLET ORAL at 18:06

## 2022-04-28 RX ADMIN — Medication 250 MILLIGRAM(S): at 19:55

## 2022-04-28 RX ADMIN — LOSARTAN POTASSIUM 100 MILLIGRAM(S): 100 TABLET, FILM COATED ORAL at 05:15

## 2022-04-28 RX ADMIN — MUPIROCIN 1 APPLICATION(S): 20 OINTMENT TOPICAL at 18:14

## 2022-04-28 RX ADMIN — CEFTRIAXONE 100 MILLIGRAM(S): 500 INJECTION, POWDER, FOR SOLUTION INTRAMUSCULAR; INTRAVENOUS at 18:25

## 2022-04-28 RX ADMIN — Medication 125 MICROGRAM(S): at 05:15

## 2022-04-28 RX ADMIN — MEMANTINE HYDROCHLORIDE 10 MILLIGRAM(S): 10 TABLET ORAL at 05:15

## 2022-04-28 RX ADMIN — Medication 1000 UNIT(S): at 05:15

## 2022-04-28 RX ADMIN — MIRTAZAPINE 15 MILLIGRAM(S): 45 TABLET, ORALLY DISINTEGRATING ORAL at 21:53

## 2022-04-28 NOTE — CONSULT NOTE ADULT - ASSESSMENT
The patient is a 77 year old female with a history of HTN, HL, hypothyroidism, carotid stenosis, dementia, recent hip fracture who presents with possible infected surgical wound.    Plan:  - ECG with no evidence of ischemia or infarction  - Echo last admission with normal LV systolic function, no significant valve issues  - Blood cultures positive for bacillus cereus  - Unclear if contaminant or true infection  - ID follow-up  - Repeat echo  - Continue amlodipine 5 mg daily  - Continue losartan 100 mg daily
76 y/o F with PMH of HTN, Dyslipidemia, PAD, Hypothyroidism, Anemia s/p transfusion, OA, Depression, and Dementia who was sent from [facility] facility for possibly infected surgical wound    surgical wound eval  s/p PNA  htn  OP  OA  Ataxic gait  HLD  PAD  Thyroid Disease  Anemia  Depression   Dementia  Hiatal hernia  Emphysema - ex smoker - known COPD -     proventil prn for sob and or wheezing  incentive larissa if able to cooperate  on VANCO - wound infection - surgical infection - eval in progress  monitor VS and HD and Sat  assist with needs - ADL  PT   paina ssessment  supportive med rx regimen  [facility] rx regimen  PPI for Hiatal hernia hx  keep HOB elev - asp prec - oral hygiene  infiltrates - atelectasis improvement noted on most recent CXR

## 2022-04-28 NOTE — DIETITIAN INITIAL EVALUATION ADULT - OTHER INFO
Per H&P, pt is a "76 y/o F with PMH of HTN, Dyslipidemia, PAD, Hypothyroidism, Anemia s/p transfusion, OA, Depression, and Dementia who was sent from [facility] facility for possibly infected surgical wound. Patient was admitted to [facility] and with closed right femoral neck fracture s/p fall, and had an ORIF surgery 2 weeks ago, discharged to subacute rehab, sent back today for reported discharge from the surgical wound, no reported fever or chills. Patient with advanced dementia, with irrelevant speech, no further history could be obtained at this time."    Nutrition consult ordered for assessment. Visited pt this afternoon, pt resting in bed during time of visit. Minimal subjective hx obtained during visit- pt confused with advanced dementia. Pt with recent hospitalization 2 weeks ago s/p fall. During previous RD visit during last hospital stay, pt's dtr at bedside and provided subjective hx; reports pt on softer consistency diet PTA, eats fairly well at baseline, UBW ~150#/no significant weight changes PTA. Pt currently on minced and moist consistency diet - observed partial edentulism. Pt reports fair appetite and reports consumes ensure supplement daily. NKFA. Per flowsheet review, pt refusing breakfast meal yesterday. CBW on admission 161#. Nonpitting BL leg edema noted. Skin: skin tear; L forearm. Will provide ensure enlive nutritional supplement daily for addtl kcal/protein and to supplement suspected variable po intake (350kcal, 20g protein per 8 fl oz). Encouraged po intake during visit. RD to follow-up and will continue to monitor pt's nutritional status.

## 2022-04-28 NOTE — PROGRESS NOTE ADULT - SUBJECTIVE AND OBJECTIVE BOX
Patient seen in the ED for staple removal. Right hip surgical incisions intact with mild erythema noted along staple line on the proximal incision. Small area of induration noted proximally with scant amount of serous drainage. No purulence. Distal incisions clean, dry and intact. No exudate, no induration. Incisions cleaned and staples removed without incident. Skin edges well approximated without any signs of wound dehiscence. Clean dry dressing applied to proximal incision. Patient tolerated the procedure well.

## 2022-04-28 NOTE — DIETITIAN INITIAL EVALUATION ADULT - ORAL INTAKE PTA/DIET HISTORY
Pt admitted from [facility]. Reviewed transfer documents, pt on a LIZZIE, ground consistency diet and thin liquids.

## 2022-04-28 NOTE — DIETITIAN INITIAL EVALUATION ADULT - PERTINENT MEDS FT
MEDICATIONS  (STANDING):  amLODIPine   Tablet 5 milliGRAM(s) Oral daily  aspirin enteric coated 81 milliGRAM(s) Oral daily  atorvastatin 20 milliGRAM(s) Oral at bedtime  cefTRIAXone   IVPB 2000 milliGRAM(s) IV Intermittent every 24 hours  cholecalciferol 1000 Unit(s) Oral two times a day  cyanocobalamin 500 MICROGram(s) Oral daily  folic acid 1 milliGRAM(s) Oral daily  levothyroxine 125 MICROGram(s) Oral daily  losartan 100 milliGRAM(s) Oral daily  melatonin 5 milliGRAM(s) Oral at bedtime  memantine 10 milliGRAM(s) Oral two times a day  mirtazapine 15 milliGRAM(s) Oral at bedtime  multivitamin 1 Tablet(s) Oral daily  omega-3-Acid Ethyl Esters 2 Gram(s) Oral daily  pantoprazole    Tablet 40 milliGRAM(s) Oral before breakfast  vancomycin  IVPB 1000 milliGRAM(s) IV Intermittent every 12 hours    MEDICATIONS  (PRN):  ALBUTerol    90 MICROgram(s) HFA Inhaler 2 Puff(s) Inhalation every 6 hours PRN Shortness of Breath and/or Wheezing

## 2022-04-28 NOTE — PROGRESS NOTE ADULT - SUBJECTIVE AND OBJECTIVE BOX
OPAL ARBOLEDA is a 77yFemale , patient examined and chart reviewed.    INTERVAL HPI/ OVERNIGHT EVENTS:   Afebrile. NAD.  Awake.    PAST MEDICAL & SURGICAL HISTORY:  Dementia without behavioral disturbance, unspecified dementia type  Hypothyroid  Osteoarthritis  Carotid stenosis  Degenerative joint disease  Hypertension  Hyperlipidemia  History of open reduction and internal fixation (ORIF) procedure        For details regarding the patient's social history, family history, and other miscellaneous elements, please refer the initial infectious diseases consultation and/or the admitting history and physical examination for this admission.    ROS:  Unable to obtain due to : poor historian      Current inpatient medications :    ANTIBIOTICS/RELEVANT:  cefTRIAXone   IVPB 2000 milliGRAM(s) IV Intermittent every 24 hours  omega-3-Acid Ethyl Esters 2 Gram(s) Oral daily  vancomycin  IVPB 1000 milliGRAM(s) IV Intermittent every 12 hours      ALBUTerol    90 MICROgram(s) HFA Inhaler 2 Puff(s) Inhalation every 6 hours PRN  amLODIPine   Tablet 5 milliGRAM(s) Oral daily  aspirin enteric coated 81 milliGRAM(s) Oral daily  atorvastatin 20 milliGRAM(s) Oral at bedtime  cholecalciferol 1000 Unit(s) Oral two times a day  cyanocobalamin 500 MICROGram(s) Oral daily  folic acid 1 milliGRAM(s) Oral daily  levothyroxine 125 MICROGram(s) Oral daily  losartan 100 milliGRAM(s) Oral daily  melatonin 5 milliGRAM(s) Oral at bedtime  memantine 10 milliGRAM(s) Oral two times a day  mirtazapine 15 milliGRAM(s) Oral at bedtime  multivitamin 1 Tablet(s) Oral daily  pantoprazole    Tablet 40 milliGRAM(s) Oral before breakfast      Objective:    T(C): 36.7 (04-28-22 @ 05:12), Max: 36.8 (04-28-22 @ 02:06)  HR: 72 (04-28-22 @ 05:12) (68 - 77)  BP: 128/86 (04-28-22 @ 05:12) (128/86 - 147/74)  RR: 18 (04-28-22 @ 05:12) (16 - 18)  SpO2: 96% (04-28-22 @ 05:12) (91% - 96%)      Physical Exam:  General:  no acute distress  Neck: supple, trachea midline  Lungs: clear, no wheeze/rhonchi  Cardiovascular: regular rate and rhythm, S1 S2  Abdomen: soft, nontender,  bowel sounds normal  Neurological: awake confused  Skin: no rash  Extremities: Right hip decreased erythema induration drainage minimal  nontender      LABS:                          9.0    6.35  )-----------( 353      ( 27 Apr 2022 06:06 )             29.1       04-27    142  |  108  |  22  ----------------------------<  83  3.7   |  28  |  0.78    Ca    8.6      27 Apr 2022 06:06    TPro  6.8  /  Alb  2.6<L>  /  TBili  0.8  /  DBili  x   /  AST  30  /  ALT  16  /  AlkPhos  94  04-26      PT/INR - ( 26 Apr 2022 19:38 )   PT: 12.6 sec;   INR: 1.07 ratio         PTT - ( 26 Apr 2022 19:38 )  PTT:34.9 sec      MICROBIOLOGY:    Culture - Blood (collected 27 Apr 2022 00:38)  Source: .Blood Blood-Peripheral  Gram Stain (27 Apr 2022 16:42):    Growth in anaerobic bottle: Gram Positive Rods  Preliminary Report (27 Apr 2022 16:42):    Growth in anaerobic bottle: Gram Positive Rods    ***Blood Panel PCR results on this specimen are available    approximately 3 hours after the Gram stain result.***    Gram stain, PCR, and/or culture results may not always    correspond due to difference in methodologies.    ************************************************************    This PCR assay was performed by multiplex PCR. This    Assay tests for 66 bacterial and resistance gene targets.    Please refer to the [facility] test directory    at [email or web address] for details.  Organism: Blood Culture PCR (27 Apr 2022 18:28)  Organism: Blood Culture PCR (27 Apr 2022 18:28)      -  Bacillus cereus group: Detec      Method Type: PCR    Culture - Blood (collected 27 Apr 2022 00:38)  Source: .Blood Blood-Peripheral  Preliminary Report (28 Apr 2022 01:03):    No growth to date.      RADIOLOGY & ADDITIONAL STUDIES:    ACC: [id number] EXAM:  CT HIP ONLY RT                          PROCEDURE DATE:  04/26/2022          INTERPRETATION:  CT HIP RIGHT    HISTORY: Pain. Postop infection. Right hip intertrochanteric fracture   ORIF.    TECHNIQUE: Contiguous axial imaging was performed through the pelvis   without contrast.  Coronal and sagittal reformatting was utilized.   Patient motion artifact and blurring may cause some limitation.    COMPARISON: CT of the right hip dated 4/14/2022, intraoperative x-rays   dated 4/11/2022, and pelvis and right hip x-rays dated 4/9/2022.    FINDINGS:    OSSEOUS STRUCTURES    Acute/Chronic Fractures:  Comminuted intertrochanteric fracture of the   right hip is again seen with a gamma nail for fixation. Distal   interlocking screw is present. There is mild residual fracture   displacement.    PELVIC JOINTS    Symphysis Pubis:  Preserved.    Sacroiliac Joints:  Maintained.    RIGHT HIP JOINT    Avascular Necrosis:  None.    Joint Space:  Grossly maintained given patient motion.    Effusion/Synovitis:  None.    LEFT HIP JOINT    Avascular Necrosis:  None.    Joint Space:  Grossly maintained given patient motion.    Effusion/Synovitis:  None.    VISUALIZED SPINE  Lower lumbar degenerative disc disease is present.    SOFT TISSUES    Neurovascular:  Severe atherosclerotic calcifications are present.    Pelvis/Abdomen:  Mild presacral edema is present.    Musculature:  Fluid collection is present extending along the posterior   margin of the greater trochanter and within the right gluteus medias   muscle measuring approximately 14.2 cm craniocaudally and up to 6.9 x 4.2   cm axially along the greater trochanter.    Subcutaneous Tissues:  Moderate subcutaneous hematoma formation is   again seen along the right hip incision site with mild hematoma formation   extending along the level of the proximal femoral diaphysis. Mild to   moderate subcutaneous edema is also noted. Overlying skin staples are   noted.    IMPRESSION:  1. Right intertrochanteric fracture ORIF is again seen.  2. Fluid collection along the right greater trochanter and within the   right gluteus medius muscle likely reflects evolution of the prior   hematoma although infection cannot be absolutely excluded with the given   history. Consider aspiration as warranted.  3. Moderate overlying subcutaneous hematoma is again seen.    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      Plan :   Cont Vancomycin Rocephin  Repeat blood cultures  Fu wound cultures  Trend temps and cbc  Ortho following   Asp precautions    D/w Dr Stone    Continue with present regiment.  Appropriate use of antibiotics and adverse effects reviewed.    > 35 minutes were spent in direct patient care reviewing notes, medications ,labs data/ imaging , discussion with multidisciplinary team.    Thank you for allowing me to participate in care of your patient .    Radha Pryor MD  Infectious Disease  [phone] 4/22/2022    Physician No Ref-Primary  No address on file    RE: Sree Kumar       Dear Colleague,     I had the pleasure of seeing Sree Kumar in [facility].    Cardiology Progress Note    Patient seen today in follow up of: ED/post hospital follow up  Primary cardiologist: Dr. Aragon    HPI:  Sree Kumar is a very pleasant 82 year old male with a history of complex coronary artery disease with remote CABG and several prior interventions, chronic atrial fibrillation on Eliquis, symptomatic PACs and PVCs not on daily beta-blockers due to bradycardia and pauses in the past, dyslipidemia, hypertension, and obstructive sleep apnea who is here today for follow-up.    My has a longstanding history of coronary artery disease.  In 1994 he underwent two-vessel CABG.  In 2004 he underwent stenting of his left main.  In 2019 he had stents to the graft to the RCA and then left main and proximal LAD intervention.  He suffered a non-ST elevation myocardial infarction in December 2019 and had stents to his LAD.  In September 2020 he underwent stenting to the proximal vein graft to the RCA at [facility].    Unfortunately, My has had a difficult last month.  He was hospitalized on 4/1/2022 at [facility] with back discomfort.  EKG and troponins were negative.  He underwent a Lexiscan stress test which showed an area of infarct but no ischemia.  He was ruled out for aortic dissection as well.  He was discharged home as his pain had improved with follow-up with his primary cardiologist.  He was then readmitted at [facility] 2 days later on 4/3/2022.  Again, he presented with back and shoulder pain.  Troponins were negative.  He underwent coronary angiography with results as noted:  1.  Patent stents in LM and LAD. Haziness inside LM stent corresponds with area of stent under-expansion and vessel calcification by IVUS. iFR of LM/LAD negative.     2. Severe disease of distal LAD  (small caliber vessel) followed by  near apical LAD.     3. Small caliber LCX system with  of 1st OM supplied by a patent SVG to OM.     4.  of RCA supplied by a patent SVG with mild ISR of the previously placed SVG stents.     5. Normal LVEDP.     Medical therapy was recommended initially for his distal LAD disease given it was a small caliber vessel, the length of the stenosis and  distal to the lesion.  If he continued to have symptoms despite medical therapy or alternative cause, PCI of the distal LAD could be considered.  He was started on amlodipine and low-dose Isordil.    He again was readmitted at [facility] overnight on 4/15/2016 with chest pain.  Again, troponins and EKG were unremarkable.  He was seen in consultation by Dr. Courtney however he was essentially planning to leave the hospital at the time of the consult.  He was pain-free.  It was recommended he follow-up as an outpatient and his antianginal therapy be escalated.  It was noted he has a 50% stenosis of his proximal left subclavian artery and there was question of whether this should be considered for stenting given his bypass grafting.  His Isordil was increased to 10 mg 3 times daily.    My again was seen in the emergency department yesterday and ruled out for ACS.    He is back today for follow-up.  We reviewed his recent course.  He does feel like his chest discomfort symptoms have been better since he was placed on the Isordil and amlodipine earlier this year. He cannot tell me how frequently he has been having pain. I asked if he had any pain between his hospital discharge on 4/16 and his ED visit yesterday and he could not tell me. He denies dyspnea. He notes these symptoms are his anginal symptoms he has had in the past.      He is tolerating amlodipine and isordil without known side effects. He denies dizziness or lightheadedness. BP is stable.     ASSESSMENT/PLAN:  Sree Kumar is a very pleasant 82 year  old male with a history of complex coronary artery disease with remote CABG and several prior interventions, chronic atrial fibrillation on Eliquis, symptomatic PACs and PVCs not on daily beta-blockers due to bradycardia and pauses in the past, dyslipidemia, hypertension, and obstructive sleep apnea who is here today for follow-up.     As noted, above he has complex coronary disease with several prior interventions, last in 2020.  He has been experiencing intermittent back discomfort which has been his prior anginal symptom over the past month. He has been hospitalized several times and fortunately his EKGs and troponins have been unremarkable.     He had an angiogram a few weeks ago at [facility].  As noted above, there was severe disease of the distal LAD which was a small caliber vessel followed by a  of then near apical LAD.  Given the caliber of the vessel and length of the stenosis, medical management was recommended initially.  He has been started on amlodipine and low-dose Isordil which he is tolerating and he seems to have had benefit from that.  It is difficult today to get an assessment of how frequently he is having symptoms.  He has go to blood pressure room and I suggested today that we increase either his Isordil or amlodipine to help prevent recurrent symptoms.  He was hesitant to make any changes today.  At home, when he has a discomfort his pain is typically relieved by a sublingual nitroglycerin or two.  Since he did not want to adjust his antianginals today, I asked him to call office if he is continuing to have pain.  Again, in that case I would either increase his amlodipine to 5 mg or his Isordil to 20 mg or perhaps both.  If despite escalation of his antianginals, he is still continuing to have symptoms, then we can consider coronary angiography in the future.  He was agreeable with that plan.    He will continue on Plavix in addition to his Eliquis.  He has had intolerance to  statins in the past thus he takes Zetia and co-Q10.  Repatha has been prescribed for him in the past however it appears he decided not to take it.  We could certainly revisit that in the future if he is open to it.  His LDL in March was 95.    He is on Eliquis given his history of atrial fibrillation.  Rates have been controlled.     I will have My make an appointment with Dr. Aragon in the next month or two for re-evaluation. We talked about signs/symptoms that would warrant emergent evaluation in the meantime. It was a pleasure meeting him in clinic today.     Orders this Visit:  Orders Placed This Encounter   Procedures     Follow-Up with Cardiology     Orders Placed This Encounter   Medications     isosorbide dinitrate (ISORDIL) 10 MG tablet     Sig: Take 1 tablet (10 mg) by mouth 3 times daily     Dispense:  270 tablet     Refill:  3     amLODIPine (NORVASC) 2.5 MG tablet     Sig: Take 1 tablet (2.5 mg) by mouth daily     Dispense:  90 tablet     Refill:  3     nitroGLYcerin (NITROSTAT) 0.4 MG sublingual tablet     Sig: For chest pain place 1 tablet under the tongue every 5 minutes for 3 doses. If symptoms persist 5 minutes after 1st dose call 911.     Dispense:  20 tablet     Refill:  3     Medications Discontinued During This Encounter   Medication Reason     nitroGLYcerin (NITROSTAT) 0.4 MG sublingual tablet Reorder     amLODIPine (NORVASC) 2.5 MG tablet Reorder     isosorbide dinitrate (ISORDIL) 10 MG tablet Reorder       CURRENT MEDICATIONS:  Current Outpatient Medications   Medication Sig Dispense Refill     amLODIPine (NORVASC) 2.5 MG tablet Take 1 tablet (2.5 mg) by mouth daily 90 tablet 3     apixaban ANTICOAGULANT (ELIQUIS) 5 MG tablet Take 1 tablet (5 mg) by mouth 2 times daily 180 tablet 3     ascorbic acid 1000 MG TABS tablet Take 1,000 mg by mouth 2 times daily       atenolol (TENORMIN) 25 MG tablet Take 1/4 of a 25 MG pill ( 6.25 MG ) daily as needed       Cholecalciferol (VITAMIN D3) 5000  UNITS TABS Take 5,000 Units by mouth daily        clopidogrel (PLAVIX) 75 MG tablet Take 1 tablet (75 mg) by mouth daily 90 tablet 3     Coenzyme Q10 (COQ10 PO) Take 1 capsule by mouth daily       ezetimibe (ZETIA) 10 MG tablet Take 1 tablet (10 mg) FIVE days/week (Patient taking differently: Take 10 mg by mouth daily) 60 tablet 3     isosorbide dinitrate (ISORDIL) 10 MG tablet Take 1 tablet (10 mg) by mouth 3 times daily 270 tablet 3     Magnesium Oxide 500 MG TABS Take 500 mg by mouth daily       nitroGLYcerin (NITROSTAT) 0.4 MG sublingual tablet For chest pain place 1 tablet under the tongue every 5 minutes for 3 doses. If symptoms persist 5 minutes after 1st dose call 911. 20 tablet 3     tamsulosin (FLOMAX) 0.4 MG capsule Take 0.4 mg by mouth daily as needed       vitamin B complex with vitamin C (STRESS TAB) tablet Take 1 tablet by mouth daily       VITAMIN E PO Take 1 tablet by mouth daily       Zinc 30 MG TABS Take 30 mg by mouth daily       ALLERGIES  Allergies   Allergen Reactions     Hmg-Coa-R Inhibitors Muscle Pain (Myalgia)     Crestor, Lipitor and Zetia  He does tolerate Zetia, and 5mg low dose of Crestor.  Crestor, Lipitor and Zetia     Sulfa Drugs      PAST MEDICAL HISTORY:  Past Medical History:   Diagnosis Date     Atypical atrial flutter (H)      BPH (benign prostatic hyperplasia)      CAD (coronary artery disease)     6/10/2014 Stress Echo - normal, EF 55-60%, frequent PVCs noted in recovery     CAD (coronary artery disease)      Hx of CABG     1994 grafts CFX,RCA     Hyperlipidaemia      Myocardial infarct (H)     1994 and 5/2019     Unspecified essential hypertension      PAST SURGICAL HISTORY:  Past Surgical History:   Procedure Laterality Date     BYPASS GRAFT ARTERY CORONARY       CORONARY ANGIOGRAPHY ADULT ORDER  3/22/2004    SVG to first OM, SVG to RCA     CV ANGIOGRAM CORONARY GRAFT N/A 7/22/2019    Procedure: Angiogram Coronary Graft;  Surgeon: Bobby Cagle MD;  Location:   HEART CARDIAC CATH LAB     CV CORONARY ANGIOGRAM N/A 7/22/2019    Procedure: Coronary Angiogram;  Surgeon: Bobby Cagle MD;  Location:  HEART CARDIAC CATH LAB     CV CORONARY ANGIOGRAM N/A 12/16/2019    Procedure: Coronary Angiogram;  Surgeon: Elie Yepez MD;  Location:  HEART CARDIAC CATH LAB     CV CORONARY ANGIOGRAM N/A 9/3/2020    Procedure: Coronary Angiogram;  Surgeon: Iker Cornelius MD;  Location: [facility] Lab;  Service: Cardiology     CV FRACTIONAL FLOW RATIO WIRE N/A 5/31/2019    Procedure: Fractional Flow Reserve;  Surgeon: Mukesh Reno MD;  Location:  HEART CARDIAC CATH LAB     CV HEART CATHETERIZATION WITH POSSIBLE INTERVENTION N/A 5/31/2019    Procedure: Heart Catheterization with Possible Intervention;  Surgeon: Mukesh Reno MD;  Location:  HEART CARDIAC CATH LAB     CV HEART CATHETERIZATION WITH POSSIBLE INTERVENTION N/A 12/16/2019    Procedure: Heart Catheterization with Possible Intervention;  Surgeon: Elie Yepez MD;  Location:  HEART CARDIAC CATH LAB     CV LEFT HEART CATH N/A 7/22/2019    Procedure: Left Heart Cath;  Surgeon: Bobby Cagle MD;  Location:  HEART CARDIAC CATH LAB     CV PCI ANGIOPLASTY N/A 5/31/2019    Procedure: PCI Angioplasty;  Surgeon: Mukesh Reno MD;  Location:  HEART CARDIAC CATH LAB     CV PCI STENT DRUG ELUTING N/A 7/22/2019    Procedure: PCI Stent Drug Eluting;  Surgeon: Bobby Cagle MD;  Location:  HEART CARDIAC CATH LAB     CV PCI STENT DRUG ELUTING N/A 12/16/2019    Procedure: PCI Stent Drug Eluting;  Surgeon: Elie Yepez MD;  Location:  HEART CARDIAC CATH LAB     ZZC CABG, ARTERY-VEIN, THREE      1994     FAMILY HISTORY:  Family History   Problem Relation Age of Onset     Myocardial Infarction Mother 62        MI     Coronary Artery Disease Mother      Unknown/Adopted Father      Heart Disease Mother      SOCIAL HISTORY:  Social History     Socioeconomic History  "    Marital status: Single     Spouse name: None     Number of children: None     Years of education: None     Highest education level: None   Tobacco Use     Smoking status: Never Smoker     Smokeless tobacco: Never Used   Substance and Sexual Activity     Alcohol use: Not Currently     Drug use: No   Other Topics Concern     Caffeine Concern No     Sleep Concern No     Stress Concern No     Weight Concern No     Special Diet Yes     Comment: organic, no wheat, lactose free     Exercise Yes     Comment: weights, bike riding     Seat Belt No     Parent/sibling w/ CABG, MI or angioplasty before 65F 55M? Yes     Review of Systems:  Skin:  Negative rash;bruising   Eyes:  Positive for glasses  ENT:  Negative epistaxis  Respiratory:  Positive for sleep apnea  Cardiovascular:  Negative for;chest pain;palpitations;edema;exercise intolerance;dizziness Positive for;lightheadedness  Gastroenterology: Negative for melena;hematochezia  Genitourinary:  Positive for retention  Musculoskeletal:  Positive for arthritis;back pain  Neurologic:  Negative numbness or tingling of feet  Psychiatric:  Negative excessive stress  Heme/Lymph/Imm:  Negative allergies  Endocrine:  Negative diabetes     Physical Exam:  Vitals: /79   Pulse 88   Ht 1.753 m (5' 9\")   Wt 73.2 kg (161 lb 4.8 oz)   BMI 23.82 kg/m     Wt Readings from Last 4 Encounters:   04/22/22 73.2 kg (161 lb 4.8 oz)   04/21/22 70.3 kg (155 lb)   04/15/22 70.3 kg (155 lb)   04/01/22 70.3 kg (155 lb)     GEN: well nourished, in no acute distress.  HEENT:  Pupils equal, round. Sclerae nonicteric.   C/V: IRR, no murmur, rub or gallop.    RESP: Respirations are unlabored. Clear to auscultation bilaterally without wheezing, rales, or rhonchi.  GI: Abdomen soft, nontender.  EXTREM: no LE edema.  NEURO: Alert and oriented, cooperative.  SKIN: Warm and dry.     Recent Lab Results:  LIPID RESULTS:  Lab Results   Component Value Date    CHOL 162 03/14/2022    CHOL 143 01/26/2021 "    HDL 56 03/14/2022    HDL 59 01/26/2021    LDL 95 03/14/2022    LDL 76 01/26/2021    TRIG 54 03/14/2022    TRIG 39 01/26/2021    CHOLHDLRATIO 3.0 05/12/2015     LIVER ENZYME RESULTS:  Lab Results   Component Value Date    AST 18 04/21/2022    AST 19 04/20/2021    ALT 39 04/21/2022    ALT 32 04/20/2021     CBC RESULTS:  Lab Results   Component Value Date    WBC 10.2 04/21/2022    WBC 10.3 05/26/2021    RBC 4.26 (L) 04/21/2022    RBC 4.62 05/26/2021    HGB 13.1 (L) 04/21/2022    HGB 14.0 05/26/2021    HCT 39.7 (L) 04/21/2022    HCT 42.4 05/26/2021    MCV 93 04/21/2022    MCV 92 05/26/2021    MCH 30.8 04/21/2022    MCH 30.3 05/26/2021    MCHC 33.0 04/21/2022    MCHC 33.0 05/26/2021    RDW 13.3 04/21/2022    RDW 12.9 05/26/2021     04/21/2022     05/26/2021     BMP RESULTS:  Lab Results   Component Value Date     04/21/2022     05/26/2021    POTASSIUM 4.0 04/21/2022    POTASSIUM 4.0 05/26/2021    CHLORIDE 108 04/21/2022    CHLORIDE 103 05/26/2021    CO2 27 04/21/2022    CO2 29 05/26/2021    ANIONGAP 3 04/21/2022    ANIONGAP 4 05/26/2021     (H) 04/21/2022    GLC 95 05/26/2021    BUN 25 04/21/2022    BUN 21 05/26/2021    CR 1.01 04/21/2022    CR 0.93 05/26/2021    GFRESTIMATED 73 04/21/2022    GFRESTIMATED 76 05/26/2021    GFRESTBLACK 88 05/26/2021    LANE 8.9 04/21/2022    LANE 9.0 05/26/2021      A1C RESULTS:  Lab Results   Component Value Date    A1C 6.1 (H) 05/26/2021     INR RESULTS:  Lab Results   Component Value Date    INR 1.08 04/15/2022    INR 1.03 12/14/2019    INR 0.94 07/22/2019     Total time today was 40 minutes review notes, imaging, labs, patient visit, orders and documentation.       Munira Hunt PA-C  [facility]      Thank you for allowing me to participate in the care of your patient.      Sincerely,     Munira Hunt PA-C     [facility]  cc:   Aramis Ruffin MD  [id number]  [place]  [id number]

## 2022-04-28 NOTE — PROGRESS NOTE ADULT - SUBJECTIVE AND OBJECTIVE BOX
Patient is a 77y old  Female who presents with a chief complaint of Sent for possibly infected surgical wound. (28 Apr 2022 08:40)      INTERVAL HPI/OVERNIGHT EVENTS: Patient seen and examined at bedside. No overnight events    MEDICATIONS  (STANDING):  amLODIPine   Tablet 5 milliGRAM(s) Oral daily  aspirin enteric coated 81 milliGRAM(s) Oral daily  atorvastatin 20 milliGRAM(s) Oral at bedtime  cefTRIAXone   IVPB 2000 milliGRAM(s) IV Intermittent every 24 hours  cholecalciferol 1000 Unit(s) Oral two times a day  cyanocobalamin 500 MICROGram(s) Oral daily  folic acid 1 milliGRAM(s) Oral daily  levothyroxine 125 MICROGram(s) Oral daily  losartan 100 milliGRAM(s) Oral daily  melatonin 5 milliGRAM(s) Oral at bedtime  memantine 10 milliGRAM(s) Oral two times a day  mirtazapine 15 milliGRAM(s) Oral at bedtime  multivitamin 1 Tablet(s) Oral daily  omega-3-Acid Ethyl Esters 2 Gram(s) Oral daily  pantoprazole    Tablet 40 milliGRAM(s) Oral before breakfast  vancomycin  IVPB 1000 milliGRAM(s) IV Intermittent every 12 hours    MEDICATIONS  (PRN):  ALBUTerol    90 MICROgram(s) HFA Inhaler 2 Puff(s) Inhalation every 6 hours PRN Shortness of Breath and/or Wheezing      Allergies    No Known Allergies    Intolerances        REVIEW OF SYSTEMS:  Unable to obtain meaningful ROS due to mental status      Vital Signs Last 24 Hrs  T(C): 36.7 (28 Apr 2022 05:12), Max: 36.8 (28 Apr 2022 02:06)  T(F): 98.1 (28 Apr 2022 05:12), Max: 98.2 (28 Apr 2022 02:06)  HR: 72 (28 Apr 2022 05:12) (64 - 77)  BP: 128/86 (28 Apr 2022 05:12) (128/86 - 152/79)  BP(mean): --  RR: 18 (28 Apr 2022 05:12) (16 - 18)  SpO2: 96% (28 Apr 2022 05:12) (91% - 96%)    PHYSICAL EXAM  GENERAL: elderly appearing female in NAD  HEAD: atraumatic, normocephalic   EYES: EOMI, conjunctiva and sclera clear  ENMT: edentulous mouth  RESPIRATORY:  diminished bs at bases, poor inspiratory effort   CARDIOVASCULAR:  soft ii/vi holosystolic murmur LUSB  GASTROINTESTINAL:  soft, nontender, nondistended, bowel sounds present  EXTREMITIES: no clubbing or cyanosis or edema  MUSCULOSKELETAL: No cyanosis or edema, dressing clean, intact, mild ttp  NERVOUS SYSTEM:  sensation intact   PSYCH:  awake and alert but confused  SKIN: Multiple ecchymotic areas on skin     LABS:    CBC Full  -  ( 27 Apr 2022 06:06 )  WBC Count : 6.35 K/uL  Hemoglobin : 9.0 g/dL  Hematocrit : 29.1 %  Platelet Count - Automated : 353 K/uL  Mean Cell Volume : 98.3 fl  Mean Cell Hemoglobin : 30.4 pg  Mean Cell Hemoglobin Concentration : 30.9 gm/dL  Auto Neutrophil # : 4.23 K/uL  Auto Lymphocyte # : 1.10 K/uL  Auto Monocyte # : 0.65 K/uL  Auto Eosinophil # : 0.26 K/uL  Auto Basophil # : 0.04 K/uL  Auto Neutrophil % : 66.7 %  Auto Lymphocyte % : 17.3 %  Auto Monocyte % : 10.2 %  Auto Eosinophil % : 4.1 %  Auto Basophil % : 0.6 %      Ca    8.6        27 Apr 2022 06:06      PT/INR - ( 26 Apr 2022 19:38 )   PT: 12.6 sec;   INR: 1.07 ratio         PTT - ( 26 Apr 2022 19:38 )  PTT:34.9 sec    CAPILLARY BLOOD GLUCOSE            Culture - Blood (collected 04-27-22 @ 00:38)  Source: .Blood Blood-Peripheral  Gram Stain (04-27-22 @ 16:42):    Growth in anaerobic bottle: Gram Positive Rods  Preliminary Report (04-27-22 @ 16:42):    Growth in anaerobic bottle: Gram Positive Rods    ***Blood Panel PCR results on this specimen are available    approximately 3 hours after the Gram stain result.***    Gram stain, PCR, and/or culture results may not always    correspond due to difference in methodologies.    ************************************************************    This PCR assay was performed by multiplex PCR. This    Assay tests for 66 bacterial and resistance gene targets.    Please refer to the [facility] test directory    at [email or web address] for details.  Organism: Blood Culture PCR (04-27-22 @ 18:28)  Organism: Blood Culture PCR (04-27-22 @ 18:28)      -  Bacillus cereus group: Detec      Method Type: PCR    Culture - Blood (collected 04-27-22 @ 00:38)  Source: .Blood Blood-Peripheral  Preliminary Report (04-28-22 @ 01:03):    No growth to date.        RADIOLOGY & ADDITIONAL TESTS:   ACC: [id number] EXAM:  CT HIP ONLY RT                          PROCEDURE DATE:  04/26/2022          INTERPRETATION:  CT HIP RIGHT    HISTORY: Pain. Postop infection. Right hip intertrochanteric fracture   ORIF.    TECHNIQUE: Contiguous axial imaging was performed through the pelvis   without contrast.  Coronal and sagittal reformatting was utilized.   Patient motion artifact and blurring may cause some limitation.    COMPARISON: CT of the right hip dated 4/14/2022, intraoperative x-rays   dated 4/11/2022, and pelvis and right hip x-rays dated 4/9/2022.    FINDINGS:    OSSEOUS STRUCTURES    Acute/Chronic Fractures:  Comminuted intertrochanteric fracture of the   right hip is again seen with a gamma nail for fixation. Distal   interlocking screw is present. There is mild residual fracture   displacement.    PELVIC JOINTS    Symphysis Pubis:  Preserved.    Sacroiliac Joints:  Maintained.    RIGHT HIP JOINT    Avascular Necrosis:  None.    Joint Space:  Grossly maintained given patient motion.    Effusion/Synovitis:  None.    LEFT HIP JOINT    Avascular Necrosis:  None.    Joint Space:  Grossly maintained given patient motion.    Effusion/Synovitis:  None.    VISUALIZED SPINE  Lower lumbar degenerative disc disease is present.    SOFT TISSUES    Neurovascular:  Severe atherosclerotic calcifications are present.    Pelvis/Abdomen:  Mild presacral edema is present.    Musculature:  Fluid collection is present extending along the posterior   margin of the greater trochanter and within the right gluteus medias   muscle measuring approximately 14.2 cm craniocaudally and up to 6.9 x 4.2   cm axially along the greater trochanter.    Subcutaneous Tissues:  Moderate subcutaneous hematoma formation is   again seen along the right hip incision site with mild hematoma formation   extending along the level of the proximal femoral diaphysis. Mild to   moderate subcutaneous edema is also noted. Overlying skin staples are   noted.    IMPRESSION:  1. Right intertrochanteric fracture ORIF is again seen.  2. Fluid collection along the right greater trochanter and within the   right gluteus medius muscle likely reflects evolution of the prior   hematoma although infection cannot be absolutely excluded with the given   history. Consider aspiration as warranted.  3. Moderate overlying subcutaneous hematoma is again seen.    --- End of Report ---            MINI ACKERMAN MD; Attending Radiologist  This document has been electronically signed. Apr 26 2022  8:59PM      Consultant(s) Notes Reviewed:  [x] YES  [ ] NO    Care Discussed with [x] Consultants  [x] Patient  [ ] Family  [ ]      [ x] Other; RN  DVT ppx

## 2022-04-28 NOTE — DIETITIAN INITIAL EVALUATION ADULT - PERTINENT LABORATORY DATA
04-27    142  |  108  |  22  ----------------------------<  83  3.7   |  28  |  0.78    Ca    8.6      27 Apr 2022 06:06    TPro  6.8  /  Alb  2.6<L>  /  TBili  0.8  /  DBili  x   /  AST  30  /  ALT  16  /  AlkPhos  94  04-26

## 2022-04-28 NOTE — PROGRESS NOTE ADULT - SUBJECTIVE AND OBJECTIVE BOX
Date/Time Patient Seen:  		  Referring MD:   Data Reviewed	       Patient is a 77y old  Female who presents with a chief complaint of Sent for possibly infected surgical wound. (27 Apr 2022 13:59)      Subjective/HPI     PAST MEDICAL & SURGICAL HISTORY:  Hypertension, unspecified type    Hyperlipidemia, unspecified hyperlipidemia type    Dementia without behavioral disturbance, unspecified dementia type    Hypothyroidism, unspecified type    Hypothyroid    Osteoarthritis    Carotid stenosis    Degenerative joint disease    Hypertension    Hyperlipidemia    Dementia    No significant past surgical history    S/P ORIF (open reduction internal fixation) fracture    History of open reduction and internal fixation (ORIF) procedure          Medication list         MEDICATIONS  (STANDING):  amLODIPine   Tablet 5 milliGRAM(s) Oral daily  aspirin enteric coated 81 milliGRAM(s) Oral daily  atorvastatin 20 milliGRAM(s) Oral at bedtime  cefTRIAXone   IVPB 2000 milliGRAM(s) IV Intermittent every 24 hours  cholecalciferol 1000 Unit(s) Oral two times a day  cyanocobalamin 500 MICROGram(s) Oral daily  folic acid 1 milliGRAM(s) Oral daily  levothyroxine 125 MICROGram(s) Oral daily  losartan 100 milliGRAM(s) Oral daily  melatonin 5 milliGRAM(s) Oral at bedtime  memantine 10 milliGRAM(s) Oral two times a day  mirtazapine 15 milliGRAM(s) Oral at bedtime  multivitamin 1 Tablet(s) Oral daily  omega-3-Acid Ethyl Esters 2 Gram(s) Oral daily  pantoprazole    Tablet 40 milliGRAM(s) Oral before breakfast  vancomycin  IVPB 1000 milliGRAM(s) IV Intermittent every 12 hours    MEDICATIONS  (PRN):  ALBUTerol    90 MICROgram(s) HFA Inhaler 2 Puff(s) Inhalation every 6 hours PRN Shortness of Breath and/or Wheezing         Vitals log        ICU Vital Signs Last 24 Hrs  T(C): 36.7 (28 Apr 2022 05:12), Max: 36.8 (28 Apr 2022 02:06)  T(F): 98.1 (28 Apr 2022 05:12), Max: 98.2 (28 Apr 2022 02:06)  HR: 72 (28 Apr 2022 05:12) (64 - 77)  BP: 128/86 (28 Apr 2022 05:12) (128/86 - 152/79)  BP(mean): --  ABP: --  ABP(mean): --  RR: 18 (28 Apr 2022 05:12) (16 - 18)  SpO2: 96% (28 Apr 2022 05:12) (91% - 96%)           Input and Output:  I&O's Detail    26 Apr 2022 07:01  -  27 Apr 2022 07:00  --------------------------------------------------------  IN:    Sodium Chloride 0.9% Bolus: 2300 mL  Total IN: 2300 mL    OUT:  Total OUT: 0 mL    Total NET: 2300 mL          Lab Data                        9.0    6.35  )-----------( 353      ( 27 Apr 2022 06:06 )             29.1     04-27    142  |  108  |  22  ----------------------------<  83  3.7   |  28  |  0.78    Ca    8.6      27 Apr 2022 06:06    TPro  6.8  /  Alb  2.6<L>  /  TBili  0.8  /  DBili  x   /  AST  30  /  ALT  16  /  AlkPhos  94  04-26            Review of Systems	      Objective     Physical Examination    heart s1s2  lung dec BS  abd soft  head nc      Pertinent Lab findings & Imaging      Dominga:  NO   Adequate UO     I&O's Detail    26 Apr 2022 07:01  -  27 Apr 2022 07:00  --------------------------------------------------------  IN:    Sodium Chloride 0.9% Bolus: 2300 mL  Total IN: 2300 mL    OUT:  Total OUT: 0 mL    Total NET: 2300 mL               Discussed with:     Cultures:	        Radiology

## 2022-04-28 NOTE — CONSULT NOTE ADULT - CONSULT REASON
surgical wound   infection  OP  OA
trace drainage from surgical incision
Bacteremia
Infected right hip surgical wound

## 2022-04-28 NOTE — PROGRESS NOTE ADULT - SUBJECTIVE AND OBJECTIVE BOX
pt seen and examined, overall unchanged  r hip slight drainage very proximal aspect, otherwise overall benign appearance without spreading erythema.   blood cx g+tashia in single anearobic bottle, may be contaminant as secong set negative  would still rec cont conservative rx, abx, observation at this time.

## 2022-04-28 NOTE — CONSULT NOTE ADULT - SUBJECTIVE AND OBJECTIVE BOX
HPI:  76YO F PMH of Dementia, HTN, Dyslipidemia, PAD, Hypothyroidism, Anemia resident of [facility] facility SP ORIF, Right hip, using trochanteric nail 10-Apr-2022 complicated by Acute blood loss sec surgical site hematoma who presented to the hospital with infected surgical site wound. + purulent drainage. Pt is a poor historian.    Infectious Disease consult was called to evaluate pt and for antibiotic management.      Past Medical & Surgical Hx:  PAST MEDICAL & SURGICAL HISTORY:  Dementia without behavioral disturbance, unspecified dementia type  Hypothyroid  Osteoarthritis  Carotid stenosis  Degenerative joint disease  Hypertension  Hyperlipidemia  History of open reduction and internal fixation (ORIF) procedure        Social History--  EtOH: denies   Tobacco: denies  Drug Use: denies    FAMILY HISTORY:  No pertinent family history in first degree relatives        Allergies  No Known Allergies    Intolerances  NONE    Home Medications:  Adult Aspirin Regimen 81 mg oral delayed release tablet: 1 tab(s) orally once a day (18 Apr 2022 09:16)  amLODIPine 5 mg oral tablet: tab(s) orally once a day (09 Apr 2022 21:57)  atorvastatin 20 mg oral tablet: 1 tab(s) orally once a day (09 Apr 2022 21:57)  folic acid 1 mg oral tablet: 1 tab(s) orally once a day (09 Apr 2022 21:57)  heparin: 5000 unit(s) subcutaneous 2 times a day (18 Apr 2022 09:12)  levothyroxine 125 mcg (0.125 mg) oral tablet: 1 tab(s) orally once a day (10 Apr 2022 00:01)  losartan 100 mg oral tablet: 1 tab(s) orally once a day (18 Apr 2022 09:12)  Melatonin 3 mg oral tablet: orally once a day (at bedtime) 2 TABS (10 Apr 2022 00:01)  memantine 28 mg oral capsule, extended release: 1 cap(s) orally once a day (09 Apr 2022 21:57)  mirtazapine 15 mg oral tablet: 1 tab(s) orally once a day (at bedtime) (10 Apr 2022 00:01)  Multiple Vitamins oral tablet: 1 tab(s) orally once a day (09 Apr 2022 21:57)  Omega-3 1000 mg oral capsule: 2 cap(s) orally once a day (09 Apr 2022 21:57)  oxyCODONE 5 mg oral tablet: 1 tab(s) orally every 8 hours, As needed, Severe Pain (7 - 10) (18 Apr 2022 09:12)  Vitamin B-12: 500mcg (10 Apr 2022 00:01)  Vitamin D3 1000 intl units (25 mcg) oral tablet: orally 2 times a day (09 Apr 2022 21:57)    Current Inpatient Medications :    ANTIBIOTICS:   vancomycin  IVPB 1000 milliGRAM(s) IV Intermittent every 12 hours      OTHER RELEVANT MEDICATIONS :  ALBUTerol    90 MICROgram(s) HFA Inhaler 2 Puff(s) Inhalation every 6 hours PRN  amLODIPine   Tablet 5 milliGRAM(s) Oral daily  aspirin enteric coated 81 milliGRAM(s) Oral daily  atorvastatin 20 milliGRAM(s) Oral at bedtime  cholecalciferol 1000 Unit(s) Oral two times a day  cyanocobalamin 500 MICROGram(s) Oral daily  folic acid 1 milliGRAM(s) Oral daily  levothyroxine 125 MICROGram(s) Oral daily  losartan 100 milliGRAM(s) Oral daily  melatonin 5 milliGRAM(s) Oral at bedtime  memantine 10 milliGRAM(s) Oral two times a day  mirtazapine 15 milliGRAM(s) Oral at bedtime  multivitamin 1 Tablet(s) Oral daily  pantoprazole    Tablet 40 milliGRAM(s) Oral before breakfast      ROS:  Unable to obtain due to : Dementia      I&O's Detail    26 Apr 2022 07:01  -  27 Apr 2022 07:00  --------------------------------------------------------  IN:    Sodium Chloride 0.9% Bolus: 2300 mL  Total IN: 2300 mL    OUT:  Total OUT: 0 mL    Total NET: 2300 mL      Physical Exam:  Vital Signs Last 24 Hrs  T(C): 36.6 (27 Apr 2022 13:30), Max: 37 (26 Apr 2022 19:30)  T(F): 97.8 (27 Apr 2022 13:30), Max: 98.6 (26 Apr 2022 19:30)  HR: 64 (27 Apr 2022 13:30) (64 - 76)  BP: 152/79 (27 Apr 2022 13:30) (124/78 - 155/76)  RR: 17 (27 Apr 2022 13:30) (17 - 20)  SpO2: 92% (27 Apr 2022 13:30) (92% - 97%)  Height (cm): 170.2 (04-26 @ 18:00)  Weight (kg): 73.1 (04-26 @ 18:00)  BMI (kg/m2): 25.2 (04-26 @ 18:00)  BSA (m2): 1.84 (04-26 @ 18:00)    General: no acute distress  Neck: supple, trachea midline  Lungs: clear, no wheeze/rhonchi  Cardiovascular: regular rate and rhythm, S1 S2  Abdomen: soft, nontender, ND, bowel sounds normal  Neurological:  alert and oriented x3  Skin: no rash  Extremities: Right hip surgical site wound with erythema + seropurulent drainage    Labs:                         9.0    6.35  )-----------( 353      ( 27 Apr 2022 06:06 )             29.1   04-27    142  |  108  |  22  ----------------------------<  83  3.7   |  28  |  0.78    Ca    8.6      27 Apr 2022 06:06    TPro  6.8  /  Alb  2.6<L>  /  TBili  0.8  /  DBili  x   /  AST  30  /  ALT  16  /  AlkPhos  94  04-26      RECENT CULTURES:          RADIOLOGY & ADDITIONAL STUDIES:    ACC: [id number] EXAM:  CT HIP ONLY RT                          PROCEDURE DATE:  04/26/2022          INTERPRETATION:  CT HIP RIGHT    HISTORY: Pain. Postop infection. Right hip intertrochanteric fracture   ORIF.    TECHNIQUE: Contiguous axial imaging was performed through the pelvis   without contrast.  Coronal and sagittal reformatting was utilized.   Patient motion artifact and blurring may cause some limitation.    COMPARISON: CT of the right hip dated 4/14/2022, intraoperative x-rays   dated 4/11/2022, and pelvis and right hip x-rays dated 4/9/2022.    FINDINGS:    OSSEOUS STRUCTURES    Acute/Chronic Fractures:  Comminuted intertrochanteric fracture of the   right hip is again seen with a gamma nail for fixation. Distal   interlocking screw is present. There is mild residual fracture   displacement.    PELVIC JOINTS    Symphysis Pubis:  Preserved.    Sacroiliac Joints:  Maintained.    RIGHT HIP JOINT    Avascular Necrosis:  None.    Joint Space:  Grossly maintained given patient motion.    Effusion/Synovitis:  None.    LEFT HIP JOINT    Avascular Necrosis:  None.    Joint Space:  Grossly maintained given patient motion.    Effusion/Synovitis:  None.    VISUALIZED SPINE  Lower lumbar degenerative disc disease is present.    SOFT TISSUES    Neurovascular:  Severe atherosclerotic calcifications are present.    Pelvis/Abdomen:  Mild presacral edema is present.    Musculature:  Fluid collection is present extending along the posterior   margin of the greater trochanter and within the right gluteus medias   muscle measuring approximately 14.2 cm craniocaudally and up to 6.9 x 4.2   cm axially along the greater trochanter.    Subcutaneous Tissues:  Moderate subcutaneous hematoma formation is   again seen along the right hip incision site with mild hematoma formation   extending along the level of the proximal femoral diaphysis. Mild to   moderate subcutaneous edema is also noted. Overlying skin staples are   noted.    IMPRESSION:  1. Right intertrochanteric fracture ORIF is again seen.  2. Fluid collection along the right greater trochanter and within the   right gluteus medius muscle likely reflects evolution of the prior   hematoma although infection cannot be absolutely excluded with the given   history. Consider aspiration as warranted.  3. Moderate overlying subcutaneous hematoma is again seen.    Assessment :   76YO F PMH of Dementia, HTN, Dyslipidemia, PAD, Hypothyroidism, Anemia resident of [facility] facility SP ORIF, Right hip, using trochanteric nail 10-Apr-2022 complicated by Acute blood loss sec surgical site hematoma who presented to the hospital with infected surgical site wound with poss infected hematoma + seropurulent drainage.       Plan :   Cont Vancomycin  Add Rocephin  Fu cultures  Wound culture sent  Trend temps and cbc  Ortho following   Will likely need I&D    Continue with present regiment .  Approptiate use of antibiotics and adverse effects reviewed.        > 45 minutes spent in direct patient care reviewing  the notes, lab data/ imaging , discussion with multidisciplinary team. All questions were addressed and answered to the best of my capacity .    Thank you for allowing me to participate in the care of your patient .      Radha Pryor MD  Infectious Disease  [phone]
full consult dictated #[id number]  trace discharger from proximal incision, serous, non-purulent. some mild induration/erythema specific over staples, no radiation/spread of erythema.  pt afebrile, WBC  CT showed partial improvment of hematoma   trace drainage more lilkely related to underlying hematoma/seroma with poor nutritional status and previous anticoagulation than infectious etiology.  may continue with empiric abx as initiated by ER, ID to evaluate as well  staples will be removed  rec observation after staple removal and empiric abx before considering return to OR for I&D 
History of Present Illness: The patient is a 77 year old female with a history of HTN, HL, hypothyroidism, carotid stenosis, dementia, recent hip fracture who presents with possible infected surgical wound. The patient is unable to provide additional history. She was recently hospitalized for a hip fracture earlier this month and underwent surgery.    Past Medical/Surgical History:  HTN, HL, hypothyroidism, carotid stenosis, dementia, recent hip fracture    Medications:  Home Medications:  Adult Aspirin Regimen 81 mg oral delayed release tablet: 1 tab(s) orally once a day (18 Apr 2022 09:16)  amLODIPine 5 mg oral tablet: tab(s) orally once a day (09 Apr 2022 21:57)  atorvastatin 20 mg oral tablet: 1 tab(s) orally once a day (09 Apr 2022 21:57)  folic acid 1 mg oral tablet: 1 tab(s) orally once a day (09 Apr 2022 21:57)  heparin: 5000 unit(s) subcutaneous 2 times a day (18 Apr 2022 09:12)  levothyroxine 125 mcg (0.125 mg) oral tablet: 1 tab(s) orally once a day (10 Apr 2022 00:01)  losartan 100 mg oral tablet: 1 tab(s) orally once a day (18 Apr 2022 09:12)  Melatonin 3 mg oral tablet: orally once a day (at bedtime) 2 TABS (10 Apr 2022 00:01)  memantine 28 mg oral capsule, extended release: 1 cap(s) orally once a day (09 Apr 2022 21:57)  mirtazapine 15 mg oral tablet: 1 tab(s) orally once a day (at bedtime) (10 Apr 2022 00:01)  Multiple Vitamins oral tablet: 1 tab(s) orally once a day (09 Apr 2022 21:57)  Omega-3 1000 mg oral capsule: 2 cap(s) orally once a day (09 Apr 2022 21:57)  oxyCODONE 5 mg oral tablet: 1 tab(s) orally every 8 hours, As needed, Severe Pain (7 - 10) (18 Apr 2022 09:12)  Vitamin B-12: 500mcg (10 Apr 2022 00:01)  Vitamin D3 1000 intl units (25 mcg) oral tablet: orally 2 times a day (09 Apr 2022 21:57)      Family History: Non-contributory family history of premature cardiovascular atherosclerotic disease    Social History: No tobacco, alcohol or drug use    Review of Systems:  General: No fevers, chills, weight gain  Skin: No rashes, color changes  Cardiovascular: No chest pain, orthopnea  Respiratory: No shortness of breath, cough  Gastrointestinal: No nausea, abdominal pain  Genitourinary: No incontinence, pain with urination  Musculoskeletal: No pain, swelling, decreased range of motion  Neurological: No headache, weakness  Psychiatric: No depression, anxiety  Endocrine: No weight gain, increased thirst  All other systems are comprehensively negative.    Physical Exam:  Vitals:        Vital Signs Last 24 Hrs  T(C): 36.7 (28 Apr 2022 05:12), Max: 36.8 (28 Apr 2022 02:06)  T(F): 98.1 (28 Apr 2022 05:12), Max: 98.2 (28 Apr 2022 02:06)  HR: 72 (28 Apr 2022 05:12) (64 - 77)  BP: 128/86 (28 Apr 2022 05:12) (128/86 - 152/79)  BP(mean): --  RR: 18 (28 Apr 2022 05:12) (16 - 18)  SpO2: 96% (28 Apr 2022 05:12) (91% - 96%)  General: NAD  HEENT: MMM  Neck: No JVD, no carotid bruit  Lungs: CTAB  CV: RRR, nl S1/S2, no M/R/G  Abdomen: S/NT/ND, +BS  Extremities: No LE edema, no cyanosis  Neuro: AAOx3, non-focal  Skin: No rash    Labs:                        9.0    6.35  )-----------( 353      ( 27 Apr 2022 06:06 )             29.1     04-27    142  |  108  |  22  ----------------------------<  83  3.7   |  28  |  0.78    Ca    8.6      27 Apr 2022 06:06    TPro  6.8  /  Alb  2.6<L>  /  TBili  0.8  /  DBili  x   /  AST  30  /  ALT  16  /  AlkPhos  94  04-26        PT/INR - ( 26 Apr 2022 19:38 )   PT: 12.6 sec;   INR: 1.07 ratio         PTT - ( 26 Apr 2022 19:38 )  PTT:34.9 sec    ECG: NSR, 1st deg AVB, normal axis, no ST abnormality    
Date/Time Patient Seen:  		  Referring MD:   Data Reviewed	       Patient is a 77y old  Female who presents with a chief complaint of Sent for possibly infected surgical wound. (27 Apr 2022 11:03)      Subjective/HPI  in bed  seen and examined  vs noted  labs reviewed  pt is well known to me  ct hip noted  cxr shows improvement of right infiltrate  old record reviewed      History of Present Illness:  Reason for Admission: Sent for possibly infected surgical wound.  History of Present Illness:   This is a 76 y/o F with PMH of HTN, Dyslipidemia, PAD, Hypothyroidism, Anemia s/p transfusion, OA, Depression, and Dementia who was sent from [facility] facility for possibly infected surgical wound. Patient was admitted to [facility] and with closed right femoral neck fracture s/p fall, and had an ORIF surgery 2 weeks ago, discharged to subacute rehab, sent back today for reported discharge from the surgical wound, no reported fever or chills. Patient with advanced dementia, with irrelevant speech, no further history could be obtained at this time..  PAST MEDICAL & SURGICAL HISTORY:  Hypertension, unspecified type    Hyperlipidemia, unspecified hyperlipidemia type    Dementia without behavioral disturbance, unspecified dementia type    Hypothyroidism, unspecified type    Hypothyroid    Osteoarthritis    Carotid stenosis    Degenerative joint disease    Hypertension    Hyperlipidemia    Dementia    No significant past surgical history    S/P ORIF (open reduction internal fixation) fracture    History of open reduction and internal fixation (ORIF) procedure    FAMILY HISTORY:  No pertinent family history in first degree relatives. No pertinent family history of: Unable to obtain as stated above.     Social History:  Social History (marital status, living situation, occupation, tobacco use, alcohol and drug use, and sexual history): -    , resides in a NH facility, unknown smoking or alcohol/drug use history.     Tobacco Screening:  · Core Measure Site	No  · Has the patient used tobacco in the past 30 days?	Unable to assess due to patient's cognitive impairment    Risk Assessment:    Present on Admission:  Deep Venous Thrombosis	no  Pulmonary Embolus	no  Urinary Catheter	no  Central Venous Catheter/PICC Line	no  Surgical Site Incision	yes  Pressure Ulcer(s)	no     Heart Failure:  Does this patient have a history of or has been diagnosed with heart failure? no.      Medication list         MEDICATIONS  (STANDING):  amLODIPine   Tablet 5 milliGRAM(s) Oral daily  aspirin enteric coated 81 milliGRAM(s) Oral daily  atorvastatin 20 milliGRAM(s) Oral at bedtime  cholecalciferol 1000 Unit(s) Oral two times a day  cyanocobalamin 500 MICROGram(s) Oral daily  folic acid 1 milliGRAM(s) Oral daily  levothyroxine 125 MICROGram(s) Oral daily  losartan 100 milliGRAM(s) Oral daily  melatonin 5 milliGRAM(s) Oral at bedtime  memantine 10 milliGRAM(s) Oral two times a day  mirtazapine 15 milliGRAM(s) Oral at bedtime  multivitamin 1 Tablet(s) Oral daily  omega-3-Acid Ethyl Esters 2 Gram(s) Oral daily  vancomycin  IVPB 1000 milliGRAM(s) IV Intermittent every 12 hours    MEDICATIONS  (PRN):         Vitals log        ICU Vital Signs Last 24 Hrs  T(C): 36.6 (27 Apr 2022 06:10), Max: 37 (26 Apr 2022 19:30)  T(F): 97.9 (27 Apr 2022 06:10), Max: 98.6 (26 Apr 2022 19:30)  HR: 76 (27 Apr 2022 06:10) (66 - 76)  BP: 129/86 (27 Apr 2022 06:10) (124/78 - 155/76)  BP(mean): --  ABP: --  ABP(mean): --  RR: 18 (27 Apr 2022 06:10) (18 - 20)  SpO2: 97% (27 Apr 2022 06:10) (92% - 97%)           Input and Output:  I&O's Detail    26 Apr 2022 07:01  -  27 Apr 2022 07:00  --------------------------------------------------------  IN:    Sodium Chloride 0.9% Bolus: 2300 mL  Total IN: 2300 mL    OUT:  Total OUT: 0 mL    Total NET: 2300 mL          Lab Data                        9.0    6.35  )-----------( 353      ( 27 Apr 2022 06:06 )             29.1     04-27    142  |  108  |  22  ----------------------------<  83  3.7   |  28  |  0.78    Ca    8.6      27 Apr 2022 06:06    TPro  6.8  /  Alb  2.6<L>  /  TBili  0.8  /  DBili  x   /  AST  30  /  ALT  16  /  AlkPhos  94  04-26            Review of Systems	  post op  wound    Objective     Physical Examination    heart s1s2  lung dec BS  abd soft  head nc  frail  weak      Pertinent Lab findings & Imaging      Orr:  NO   Adequate UO     I&O's Detail    26 Apr 2022 07:01  -  27 Apr 2022 07:00  --------------------------------------------------------  IN:    Sodium Chloride 0.9% Bolus: 2300 mL  Total IN: 2300 mL    OUT:  Total OUT: 0 mL    Total NET: 2300 mL               Discussed with:     Cultures:	        Radiology        ACC: [id number] EXAM:  CT HIP ONLY RT                          PROCEDURE DATE:  04/26/2022          INTERPRETATION:  CT HIP RIGHT    HISTORY: Pain. Postop infection. Right hip intertrochanteric fracture   ORIF.    TECHNIQUE: Contiguous axial imaging was performed through the pelvis   without contrast.  Coronal and sagittal reformatting was utilized.   Patient motion artifact and blurring may cause some limitation.    COMPARISON: CT of the right hip dated 4/14/2022, intraoperative x-rays   dated 4/11/2022, and pelvis and right hip x-rays dated 4/9/2022.    FINDINGS:    OSSEOUS STRUCTURES    Acute/Chronic Fractures:  Comminuted intertrochanteric fracture of the   right hip is again seen with a gamma nail for fixation. Distal   interlocking screw is present. There is mild residual fracture   displacement.    PELVIC JOINTS    Symphysis Pubis:  Preserved.    Sacroiliac Joints:  Maintained.    RIGHT HIP JOINT    Avascular Necrosis:  None.    Joint Space:  Grossly maintained given patient motion.    Effusion/Synovitis:  None.    LEFT HIP JOINT    Avascular Necrosis:  None.    Joint Space:  Grossly maintained given patient motion.    Effusion/Synovitis:  None.    VISUALIZED SPINE  Lower lumbar degenerative disc disease is present.    SOFT TISSUES    Neurovascular:  Severe atherosclerotic calcifications are present.    Pelvis/Abdomen:  Mild presacral edema is present.    Musculature:  Fluid collection is present extending along the posterior   margin of the greater trochanter and within the right gluteus medias   muscle measuring approximately 14.2 cm craniocaudally and up to 6.9 x 4.2   cm axially along the greater trochanter.    Subcutaneous Tissues:  Moderate subcutaneous hematoma formation is   again seen along the right hip incision site with mild hematoma formation   extending along the level of the proximal femoral diaphysis. Mild to   moderate subcutaneous edema is also noted. Overlying skin staples are   noted.    IMPRESSION:  1. Right intertrochanteric fracture ORIF is again seen.  2. Fluid collection along the right greater trochanter and within the   right gluteus medius muscle likely reflects evolution of the prior   hematoma although infection cannot be absolutely excluded with the given   history. Consider aspiration as warranted.  3. Moderate overlying subcutaneous hematoma is again seen.    --- End of Report ---            MINI ACKERMAN MD; Attending Radiologist  This document has been electronically signed. Apr 26 2022  8:59PM        ACC: [id number] EXAM:  XR CHEST AP OR PA 1V                          PROCEDURE DATE:  04/26/2022          INTERPRETATION:  Clinical history: 77-year-old female, sepsis.    Single view of the chest is compared to 4/15/2022.    FINDINGS: Normal cardiac silhouette and normal pulmonary vasculature with   no consolidation, effusion, pneumothorax or acute osseous finding.    IMPRESSION:  No acute radiographic findings, clearing at the right base    --- End of Report ---            TIFFANY BARNETT DO; Attending Radiologist  This document has been electronically signed. Apr 27 2022 11:43AM          Ventricular Rate 62 BPM    Atrial Rate 62 BPM    P-R Interval 212 ms    QRS Duration 88 ms    Q-T Interval 408 ms    QTC Calculation(Bazett) 414 ms    P Axis 58 degrees    R Axis 47 degrees    T Axis 62 degrees    Diagnosis Line Sinus rhythm with 1st degree AV block  Otherwise normal ECG  When compared with ECG of 09-APR-2022 23:08,  Criteria for Septal infarct are no longer present  Nonspecific T wave abnormality no longer evident in Inferior leads  Confirmed by Maria T Schumacher MD ([id number]) on 4/27/2022 9:43:22 AM

## 2022-04-29 LAB
ALBUMIN SERPL ELPH-MCNC: 2.4 G/DL — LOW (ref 3.3–5)
ALP SERPL-CCNC: 90 U/L — SIGNIFICANT CHANGE UP (ref 30–120)
ALT FLD-CCNC: 16 U/L DA — SIGNIFICANT CHANGE UP (ref 10–60)
ANION GAP SERPL CALC-SCNC: 6 MMOL/L — SIGNIFICANT CHANGE UP (ref 5–17)
AST SERPL-CCNC: 19 U/L — SIGNIFICANT CHANGE UP (ref 10–40)
BASOPHILS # BLD AUTO: 0.04 K/UL — SIGNIFICANT CHANGE UP (ref 0–0.2)
BASOPHILS NFR BLD AUTO: 0.7 % — SIGNIFICANT CHANGE UP (ref 0–2)
BILIRUB SERPL-MCNC: 0.4 MG/DL — SIGNIFICANT CHANGE UP (ref 0.2–1.2)
BUN SERPL-MCNC: 14 MG/DL — SIGNIFICANT CHANGE UP (ref 7–23)
CALCIUM SERPL-MCNC: 9 MG/DL — SIGNIFICANT CHANGE UP (ref 8.4–10.5)
CHLORIDE SERPL-SCNC: 99 MMOL/L — SIGNIFICANT CHANGE UP (ref 96–108)
CO2 SERPL-SCNC: 30 MMOL/L — SIGNIFICANT CHANGE UP (ref 22–31)
CREAT SERPL-MCNC: 0.86 MG/DL — SIGNIFICANT CHANGE UP (ref 0.5–1.3)
EGFR: 69 ML/MIN/1.73M2 — SIGNIFICANT CHANGE UP
EOSINOPHIL # BLD AUTO: 0.3 K/UL — SIGNIFICANT CHANGE UP (ref 0–0.5)
EOSINOPHIL NFR BLD AUTO: 5.4 % — SIGNIFICANT CHANGE UP (ref 0–6)
GLUCOSE SERPL-MCNC: 80 MG/DL — SIGNIFICANT CHANGE UP (ref 70–99)
HCT VFR BLD CALC: 31.8 % — LOW (ref 34.5–45)
HGB BLD-MCNC: 10.2 G/DL — LOW (ref 11.5–15.5)
IMM GRANULOCYTES NFR BLD AUTO: 1.8 % — HIGH (ref 0–1.5)
LYMPHOCYTES # BLD AUTO: 0.97 K/UL — LOW (ref 1–3.3)
LYMPHOCYTES # BLD AUTO: 17.3 % — SIGNIFICANT CHANGE UP (ref 13–44)
MCHC RBC-ENTMCNC: 30.4 PG — SIGNIFICANT CHANGE UP (ref 27–34)
MCHC RBC-ENTMCNC: 32.1 GM/DL — SIGNIFICANT CHANGE UP (ref 32–36)
MCV RBC AUTO: 94.9 FL — SIGNIFICANT CHANGE UP (ref 80–100)
MONOCYTES # BLD AUTO: 0.54 K/UL — SIGNIFICANT CHANGE UP (ref 0–0.9)
MONOCYTES NFR BLD AUTO: 9.6 % — SIGNIFICANT CHANGE UP (ref 2–14)
NEUTROPHILS # BLD AUTO: 3.65 K/UL — SIGNIFICANT CHANGE UP (ref 1.8–7.4)
NEUTROPHILS NFR BLD AUTO: 65.2 % — SIGNIFICANT CHANGE UP (ref 43–77)
NRBC # BLD: 0 /100 WBCS — SIGNIFICANT CHANGE UP (ref 0–0)
PLATELET # BLD AUTO: 401 K/UL — HIGH (ref 150–400)
POTASSIUM SERPL-MCNC: 3.3 MMOL/L — LOW (ref 3.5–5.3)
POTASSIUM SERPL-SCNC: 3.3 MMOL/L — LOW (ref 3.5–5.3)
PROT SERPL-MCNC: 6.2 G/DL — SIGNIFICANT CHANGE UP (ref 6–8.3)
RBC # BLD: 3.35 M/UL — LOW (ref 3.8–5.2)
RBC # FLD: 14.3 % — SIGNIFICANT CHANGE UP (ref 10.3–14.5)
SODIUM SERPL-SCNC: 135 MMOL/L — SIGNIFICANT CHANGE UP (ref 135–145)
VANCOMYCIN TROUGH SERPL-MCNC: 19.3 UG/ML — SIGNIFICANT CHANGE UP (ref 10–20)
WBC # BLD: 5.6 K/UL — SIGNIFICANT CHANGE UP (ref 3.8–10.5)
WBC # FLD AUTO: 5.6 K/UL — SIGNIFICANT CHANGE UP (ref 3.8–10.5)

## 2022-04-29 PROCEDURE — 99232 SBSQ HOSP IP/OBS MODERATE 35: CPT

## 2022-04-29 RX ORDER — OXYCODONE HYDROCHLORIDE 5 MG/1
5 TABLET ORAL EVERY 8 HOURS
Refills: 0 | Status: DISCONTINUED | OUTPATIENT
Start: 2022-04-29 | End: 2022-04-29

## 2022-04-29 RX ORDER — ACETAMINOPHEN 500 MG
1000 TABLET ORAL EVERY 8 HOURS
Refills: 0 | Status: COMPLETED | OUTPATIENT
Start: 2022-04-29 | End: 2022-05-02

## 2022-04-29 RX ORDER — HEPARIN SODIUM 5000 [USP'U]/ML
5000 INJECTION INTRAVENOUS; SUBCUTANEOUS EVERY 12 HOURS
Refills: 0 | Status: DISCONTINUED | OUTPATIENT
Start: 2022-04-29 | End: 2022-05-07

## 2022-04-29 RX ORDER — POTASSIUM CHLORIDE 20 MEQ
10 PACKET (EA) ORAL
Refills: 0 | Status: COMPLETED | OUTPATIENT
Start: 2022-04-29 | End: 2022-04-29

## 2022-04-29 RX ORDER — OXYCODONE HYDROCHLORIDE 5 MG/1
2.5 TABLET ORAL EVERY 8 HOURS
Refills: 0 | Status: DISCONTINUED | OUTPATIENT
Start: 2022-04-29 | End: 2022-04-29

## 2022-04-29 RX ADMIN — Medication 250 MILLIGRAM(S): at 08:34

## 2022-04-29 RX ADMIN — OXYCODONE HYDROCHLORIDE 5 MILLIGRAM(S): 5 TABLET ORAL at 13:37

## 2022-04-29 RX ADMIN — Medication 1000 MILLIGRAM(S): at 16:26

## 2022-04-29 RX ADMIN — Medication 250 MILLIGRAM(S): at 23:59

## 2022-04-29 RX ADMIN — Medication 1000 MILLIGRAM(S): at 22:31

## 2022-04-29 RX ADMIN — CEFTRIAXONE 100 MILLIGRAM(S): 500 INJECTION, POWDER, FOR SOLUTION INTRAMUSCULAR; INTRAVENOUS at 18:09

## 2022-04-29 RX ADMIN — PANTOPRAZOLE SODIUM 40 MILLIGRAM(S): 20 TABLET, DELAYED RELEASE ORAL at 05:56

## 2022-04-29 RX ADMIN — MIRTAZAPINE 15 MILLIGRAM(S): 45 TABLET, ORALLY DISINTEGRATING ORAL at 22:32

## 2022-04-29 RX ADMIN — Medication 5 MILLIGRAM(S): at 22:31

## 2022-04-29 RX ADMIN — Medication 100 MILLIEQUIVALENT(S): at 12:58

## 2022-04-29 RX ADMIN — Medication 81 MILLIGRAM(S): at 12:28

## 2022-04-29 RX ADMIN — Medication 100 MILLIEQUIVALENT(S): at 16:18

## 2022-04-29 RX ADMIN — Medication 1 TABLET(S): at 12:27

## 2022-04-29 RX ADMIN — MUPIROCIN 1 APPLICATION(S): 20 OINTMENT TOPICAL at 05:56

## 2022-04-29 RX ADMIN — MUPIROCIN 1 APPLICATION(S): 20 OINTMENT TOPICAL at 18:10

## 2022-04-29 RX ADMIN — CHLORHEXIDINE GLUCONATE 1 APPLICATION(S): 213 SOLUTION TOPICAL at 05:56

## 2022-04-29 RX ADMIN — AMLODIPINE BESYLATE 5 MILLIGRAM(S): 2.5 TABLET ORAL at 05:55

## 2022-04-29 RX ADMIN — Medication 100 MILLIEQUIVALENT(S): at 12:27

## 2022-04-29 RX ADMIN — LOSARTAN POTASSIUM 100 MILLIGRAM(S): 100 TABLET, FILM COATED ORAL at 05:58

## 2022-04-29 RX ADMIN — ATORVASTATIN CALCIUM 20 MILLIGRAM(S): 80 TABLET, FILM COATED ORAL at 22:31

## 2022-04-29 RX ADMIN — Medication 125 MICROGRAM(S): at 05:58

## 2022-04-29 RX ADMIN — Medication 1000 MILLIGRAM(S): at 15:56

## 2022-04-29 RX ADMIN — Medication 2 GRAM(S): at 12:28

## 2022-04-29 RX ADMIN — HEPARIN SODIUM 5000 UNIT(S): 5000 INJECTION INTRAVENOUS; SUBCUTANEOUS at 18:11

## 2022-04-29 RX ADMIN — MEMANTINE HYDROCHLORIDE 10 MILLIGRAM(S): 10 TABLET ORAL at 18:09

## 2022-04-29 RX ADMIN — Medication 1000 MILLIGRAM(S): at 23:01

## 2022-04-29 RX ADMIN — Medication 1000 UNIT(S): at 05:55

## 2022-04-29 RX ADMIN — MEMANTINE HYDROCHLORIDE 10 MILLIGRAM(S): 10 TABLET ORAL at 05:55

## 2022-04-29 RX ADMIN — Medication 1000 UNIT(S): at 18:09

## 2022-04-29 RX ADMIN — OXYCODONE HYDROCHLORIDE 5 MILLIGRAM(S): 5 TABLET ORAL at 14:07

## 2022-04-29 NOTE — PHYSICAL THERAPY INITIAL EVALUATION ADULT - PERTINENT HX OF CURRENT PROBLEM, REHAB EVAL
Pt is a 78 y/o F with PMH of HTN, Dyslipidemia, PAD, Hypothyroidism, Anemia s/p transfusion, OA, Depression, and Dementia who was sent from [facility] facility for possibly infected surgical wound. Pt s/p ORIF 2 weeks ago. 4/26 CT Right hip revealed hematoma

## 2022-04-29 NOTE — PROGRESS NOTE ADULT - SUBJECTIVE AND OBJECTIVE BOX
Neurology follow up note    OPAL [id number]      Interval History:    Patient feels ok no new complaints.    Allergies    No Known Allergies    Intolerances        MEDICATIONS    acetaminophen     Tablet .. 1000 milliGRAM(s) Oral every 8 hours  ALBUTerol    90 MICROgram(s) HFA Inhaler 2 Puff(s) Inhalation every 6 hours PRN  amLODIPine   Tablet 5 milliGRAM(s) Oral daily  aspirin enteric coated 81 milliGRAM(s) Oral daily  atorvastatin 20 milliGRAM(s) Oral at bedtime  cefTRIAXone   IVPB 2000 milliGRAM(s) IV Intermittent every 24 hours  chlorhexidine 2% Cloths 1 Application(s) Topical <User Schedule>  cholecalciferol 1000 Unit(s) Oral two times a day  cyanocobalamin 500 MICROGram(s) Oral daily  folic acid 1 milliGRAM(s) Oral daily  heparin   Injectable 5000 Unit(s) SubCutaneous every 12 hours  levothyroxine 125 MICROGram(s) Oral daily  losartan 100 milliGRAM(s) Oral daily  melatonin 5 milliGRAM(s) Oral at bedtime  memantine 10 milliGRAM(s) Oral two times a day  mirtazapine 15 milliGRAM(s) Oral at bedtime  multivitamin 1 Tablet(s) Oral daily  mupirocin 2% Ointment 1 Application(s) Both Nostrils two times a day  omega-3-Acid Ethyl Esters 2 Gram(s) Oral daily  oxyCODONE    IR 5 milliGRAM(s) Oral every 8 hours PRN  oxyCODONE    IR 2.5 milliGRAM(s) Oral every 8 hours PRN  pantoprazole    Tablet 40 milliGRAM(s) Oral before breakfast  potassium chloride  10 mEq/100 mL IVPB 10 milliEquivalent(s) IV Intermittent every 1 hour  vancomycin  IVPB 1000 milliGRAM(s) IV Intermittent every 12 hours              Vital Signs Last 24 Hrs  T(C): 36.9 (29 Apr 2022 05:13), Max: 36.9 (29 Apr 2022 05:13)  T(F): 98.5 (29 Apr 2022 05:13), Max: 98.5 (29 Apr 2022 05:13)  HR: 65 (29 Apr 2022 05:13) (65 - 75)  BP: 133/77 (29 Apr 2022 05:13) (133/77 - 156/76)  BP(mean): --  RR: 18 (29 Apr 2022 05:13) (16 - 18)  SpO2: 95% (29 Apr 2022 05:13) (92% - 95%)    REVIEW OF SYSTEMS:  Limited secondary to the patient being disoriented.  Constitutional:  Denies fever, chills, or night sweats.  Head:  No headaches.  Eyes:  No double vision or blurry vision.  Ears:  No ringing in the ears.  Neck:  No neck pain.  Respiratory:  No shortness of breath.  Cardiovascular:  No chest pain.  Abdomen:  No nausea, vomiting, or abdominal pain.  Extremities/Neurological:  No numbness or tingling.  Musculoskeletal:  Occasional joint pain.    PHYSICAL EXAMINATION:   HEENT:  Head:  Normocephalic, atraumatic.  Eyes:  No scleral icterus.  Ears:  Hearing slightly hard of hearing.  NECK:  Supple.  RESPIRATORY:  Decreased breath sounds bilaterally.  CARDIOVASCULAR:  S1 and S2 heard.  ABDOMEN:  Soft, nontender.  EXTREMITIES:  No clubbing or cyanosis were noted.      NEUROLOGIC:  The patient is awake, alert.  Location was home, year and month were unknown.  The patient has poor vision out of both eyes.  Speech:  Would articulate a few words, no dysarthria.  Intact bilateral nasolabial folds.  Motor:  Bilateral upper was 3/5, bilateral lower extremities with stimulation, slight flexation at the hip and knee.              LABS:  CBC Full  -  ( 29 Apr 2022 08:09 )  WBC Count : 5.60 K/uL  RBC Count : 3.35 M/uL  Hemoglobin : 10.2 g/dL  Hematocrit : 31.8 %  Platelet Count - Automated : 401 K/uL  Mean Cell Volume : 94.9 fl  Mean Cell Hemoglobin : 30.4 pg  Mean Cell Hemoglobin Concentration : 32.1 gm/dL  Auto Neutrophil # : 3.65 K/uL  Auto Lymphocyte # : 0.97 K/uL  Auto Monocyte # : 0.54 K/uL  Auto Eosinophil # : 0.30 K/uL  Auto Basophil # : 0.04 K/uL  Auto Neutrophil % : 65.2 %  Auto Lymphocyte % : 17.3 %  Auto Monocyte % : 9.6 %  Auto Eosinophil % : 5.4 %  Auto Basophil % : 0.7 %      04-29    135  |  99  |  14  ----------------------------<  80  3.3<L>   |  30  |  0.86    Ca    9.0      29 Apr 2022 08:09    TPro  6.2  /  Alb  2.4<L>  /  TBili  0.4  /  DBili  x   /  AST  19  /  ALT  16  /  AlkPhos  90  04-29    Hemoglobin A1C:     LIVER FUNCTIONS - ( 29 Apr 2022 08:09 )  Alb: 2.4 g/dL / Pro: 6.2 g/dL / ALK PHOS: 90 U/L / ALT: 16 U/L DA / AST: 19 U/L / GGT: x           Vitamin B12         RADIOLOGY      ANALYSIS AND PLAN:  This is a 77-year-old with episode of altered mental status.  For episode of altered mental status, suspect most likely metabolic encephalopathy possibly underlying septic type process along with dementia becoming more prominent in the hospital setting.  Examination is limited of lower extremities, but I see no new clear sign to suggest a new central nervous system event has occurred.  For history of dementia, we do not have memantine 28 mg a day.  We would recommend change to 10 mg twice a day.  For history of hypothyroidism, continue the patient on Synthroid.  For hyperlipidemia, continue the patient on statin.  For history of hypertension, monitor systolic blood pressure as needed.    The patient's daughter's name is Opal Cedeno, telephone number is [phone].    Greater than 40 minutes of time was spent with the patient, plan of care, reviewing data, speaking to multidisciplinary healthcare team with greater than 50% of time in counseling and care coordination.    Thank you for the courtesy of this consultation.   Neurology follow up note    OPAL [id number]      Interval History:    Patient feels ok no new complaints.    Allergies    No Known Allergies    Intolerances        MEDICATIONS    acetaminophen     Tablet .. 1000 milliGRAM(s) Oral every 8 hours  ALBUTerol    90 MICROgram(s) HFA Inhaler 2 Puff(s) Inhalation every 6 hours PRN  amLODIPine   Tablet 5 milliGRAM(s) Oral daily  aspirin enteric coated 81 milliGRAM(s) Oral daily  atorvastatin 20 milliGRAM(s) Oral at bedtime  cefTRIAXone   IVPB 2000 milliGRAM(s) IV Intermittent every 24 hours  chlorhexidine 2% Cloths 1 Application(s) Topical <User Schedule>  cholecalciferol 1000 Unit(s) Oral two times a day  cyanocobalamin 500 MICROGram(s) Oral daily  folic acid 1 milliGRAM(s) Oral daily  heparin   Injectable 5000 Unit(s) SubCutaneous every 12 hours  levothyroxine 125 MICROGram(s) Oral daily  losartan 100 milliGRAM(s) Oral daily  melatonin 5 milliGRAM(s) Oral at bedtime  memantine 10 milliGRAM(s) Oral two times a day  mirtazapine 15 milliGRAM(s) Oral at bedtime  multivitamin 1 Tablet(s) Oral daily  mupirocin 2% Ointment 1 Application(s) Both Nostrils two times a day  omega-3-Acid Ethyl Esters 2 Gram(s) Oral daily  oxyCODONE    IR 5 milliGRAM(s) Oral every 8 hours PRN  oxyCODONE    IR 2.5 milliGRAM(s) Oral every 8 hours PRN  pantoprazole    Tablet 40 milliGRAM(s) Oral before breakfast  potassium chloride  10 mEq/100 mL IVPB 10 milliEquivalent(s) IV Intermittent every 1 hour  vancomycin  IVPB 1000 milliGRAM(s) IV Intermittent every 12 hours              Vital Signs Last 24 Hrs  T(C): 36.9 (29 Apr 2022 05:13), Max: 36.9 (29 Apr 2022 05:13)  T(F): 98.5 (29 Apr 2022 05:13), Max: 98.5 (29 Apr 2022 05:13)  HR: 65 (29 Apr 2022 05:13) (65 - 75)  BP: 133/77 (29 Apr 2022 05:13) (133/77 - 156/76)  BP(mean): --  RR: 18 (29 Apr 2022 05:13) (16 - 18)  SpO2: 95% (29 Apr 2022 05:13) (92% - 95%)    REVIEW OF SYSTEMS:  Limited secondary to the patient being disoriented.  Constitutional:  Denies fever, chills, or night sweats.  Head:  No headaches.  Eyes:  No double vision or blurry vision.  Ears:  No ringing in the ears.  Neck:  No neck pain.  Respiratory:  No shortness of breath.  Cardiovascular:  No chest pain.  Abdomen:  No nausea, vomiting, or abdominal pain.  Extremities/Neurological:  No numbness or tingling.  Musculoskeletal:  Occasional joint pain.    PHYSICAL EXAMINATION:   HEENT:  Head:  Normocephalic, atraumatic.  Eyes:  No scleral icterus.  Ears:  Hearing slightly hard of hearing.  NECK:  Supple.  RESPIRATORY:  Decreased breath sounds bilaterally.  CARDIOVASCULAR:  S1 and S2 heard.  ABDOMEN:  Soft, nontender.  EXTREMITIES:  No clubbing or cyanosis were noted.      NEUROLOGIC:  The patient is awake, alert.  Location was home, year and month were unknown.  The patient has poor vision out of both eyes.  Speech:  Would articulate a few words, no dysarthria.  Intact bilateral nasolabial folds.  Motor:  Bilateral upper was 3/5, bilateral lower extremities with stimulation, slight flexation at the hip and knee.              LABS:  CBC Full  -  ( 29 Apr 2022 08:09 )  WBC Count : 5.60 K/uL  RBC Count : 3.35 M/uL  Hemoglobin : 10.2 g/dL  Hematocrit : 31.8 %  Platelet Count - Automated : 401 K/uL  Mean Cell Volume : 94.9 fl  Mean Cell Hemoglobin : 30.4 pg  Mean Cell Hemoglobin Concentration : 32.1 gm/dL  Auto Neutrophil # : 3.65 K/uL  Auto Lymphocyte # : 0.97 K/uL  Auto Monocyte # : 0.54 K/uL  Auto Eosinophil # : 0.30 K/uL  Auto Basophil # : 0.04 K/uL  Auto Neutrophil % : 65.2 %  Auto Lymphocyte % : 17.3 %  Auto Monocyte % : 9.6 %  Auto Eosinophil % : 5.4 %  Auto Basophil % : 0.7 %      04-29    135  |  99  |  14  ----------------------------<  80  3.3<L>   |  30  |  0.86    Ca    9.0      29 Apr 2022 08:09    TPro  6.2  /  Alb  2.4<L>  /  TBili  0.4  /  DBili  x   /  AST  19  /  ALT  16  /  AlkPhos  90  04-29    Hemoglobin A1C:     LIVER FUNCTIONS - ( 29 Apr 2022 08:09 )  Alb: 2.4 g/dL / Pro: 6.2 g/dL / ALK PHOS: 90 U/L / ALT: 16 U/L DA / AST: 19 U/L / GGT: x           Vitamin B12         RADIOLOGY      ANALYSIS AND PLAN:  This is a 77-year-old with episode of altered mental status.  For episode of altered mental status, suspect most likely metabolic encephalopathy possibly underlying septic type process along with dementia becoming more prominent in the hospital setting.  Examination is limited of lower extremities, but I see no new clear sign to suggest a new central nervous system event has occurred.  For history of dementia, we do not have memantine 28 mg a day.  We would recommend change to 10 mg twice a day.  For history of hypothyroidism, continue the patient on Synthroid.  For hyperlipidemia, continue the patient on statin.  For history of hypertension, monitor systolic blood pressure as needed.  neurologic wise appears stable         Greater than 40 minutes of time was spent with the patient, plan of care, reviewing data, speaking to multidisciplinary healthcare team with greater than 50% of time in counseling and care coordination.    Thank you for the courtesy of this consultation.

## 2022-04-29 NOTE — PHYSICAL THERAPY INITIAL EVALUATION ADULT - ADDITIONAL COMMENTS
Pt from [facility], memory unit, required assist with all ADL's, however was ambulating with supervision prior to recent fall without AD. Pt is a poor historian, daughter provides social history.

## 2022-04-29 NOTE — PROGRESS NOTE ADULT - SUBJECTIVE AND OBJECTIVE BOX
OPAL ARBOLEDA is a 77yFemale , patient examined and chart reviewed.    INTERVAL HPI/ OVERNIGHT EVENTS:   Afebrile. NAD.   Awake. Confused.    PAST MEDICAL & SURGICAL HISTORY:  Dementia without behavioral disturbance, unspecified dementia type  Hypothyroid  Osteoarthritis  Carotid stenosis  Degenerative joint disease  Hypertension  Hyperlipidemia  History of open reduction and internal fixation (ORIF) procedure        For details regarding the patient's social history, family history, and other miscellaneous elements, please refer the initial infectious diseases consultation and/or the admitting history and physical examination for this admission.    ROS:  Unable to obtain due to : poor historian      Current inpatient medications :    ANTIBIOTICS/RELEVANT:  cefTRIAXone   IVPB 2000 milliGRAM(s) IV Intermittent every 24 hours  omega-3-Acid Ethyl Esters 2 Gram(s) Oral daily  vancomycin  IVPB 1000 milliGRAM(s) IV Intermittent every 12 hours      ALBUTerol    90 MICROgram(s) HFA Inhaler 2 Puff(s) Inhalation every 6 hours PRN  amLODIPine   Tablet 5 milliGRAM(s) Oral daily  aspirin enteric coated 81 milliGRAM(s) Oral daily  atorvastatin 20 milliGRAM(s) Oral at bedtime  cholecalciferol 1000 Unit(s) Oral two times a day  cyanocobalamin 500 MICROGram(s) Oral daily  folic acid 1 milliGRAM(s) Oral daily  levothyroxine 125 MICROGram(s) Oral daily  losartan 100 milliGRAM(s) Oral daily  melatonin 5 milliGRAM(s) Oral at bedtime  memantine 10 milliGRAM(s) Oral two times a day  mirtazapine 15 milliGRAM(s) Oral at bedtime  multivitamin 1 Tablet(s) Oral daily  pantoprazole    Tablet 40 milliGRAM(s) Oral before breakfast      Objective:  Vital Signs Last 24 Hrs  T(C): 36.5 (29 Apr 2022 10:35), Max: 36.9 (29 Apr 2022 05:13)  T(F): 97.7 (29 Apr 2022 10:35), Max: 98.5 (29 Apr 2022 05:13)  HR: 54 (29 Apr 2022 10:35) (54 - 75)  BP: 151/66 (29 Apr 2022 10:35) (133/77 - 156/76)  RR: 16 (29 Apr 2022 10:35) (16 - 18)  SpO2: 92% (29 Apr 2022 10:35) (92% - 95%)    Physical Exam:  General:  no acute distress  Neck: supple, trachea midline  Lungs: clear, no wheeze/rhonchi  Cardiovascular: regular rate and rhythm, S1 S2  Abdomen: soft, nontender,  bowel sounds normal  Neurological: awake confused  Skin: no rash  Extremities: Right hip decreased erythema induration drainage minimal  nontender      LABS:                        10.2   5.60  )-----------( 401      ( 29 Apr 2022 08:09 )             31.8   04-29    135  |  99  |  14  ----------------------------<  80  3.3<L>   |  30  |  0.86    Ca    9.0      29 Apr 2022 08:09    TPro  6.2  /  Alb  2.4<L>  /  TBili  0.4  /  DBili  x   /  AST  19  /  ALT  16  /  AlkPhos  90  04-29    MICROBIOLOGY:    Culture - Blood (collected 27 Apr 2022 00:38)  Source: .Blood Blood-Peripheral  Gram Stain (27 Apr 2022 16:42):    Growth in anaerobic bottle: Gram Positive Rods  Final Report (28 Apr 2022 16:57):    Growth in anaerobic bottle: Bacillus species not anthracis    "Susceptibilities not performed"    ***Blood Panel PCR results on this specimen are available    approximately 3 hours after the Gram stain result.***    Gram stain, PCR, and/or culture results may not always    correspond due to difference in methodologies.    ************************************************************    This PCR assay was performed by multiplex PCR. This    Assay tests for 66 bacterial and resistance gene targets.    Please refer to the [facility] test directory    at [email or web address] for details.  Organism: Blood Culture PCR (28 Apr 2022 16:57)  Organism: Blood Culture PCR (28 Apr 2022 16:57)      -  Bacillus cereus group: Detec      Method Type: PCR    Culture - Blood (collected 27 Apr 2022 00:38)  Source: .Blood Blood-Peripheral  Preliminary Report (28 Apr 2022 01:03):    No growth to date.    RADIOLOGY & ADDITIONAL STUDIES:    ACC: [id number] EXAM:  CT HIP ONLY RT                          PROCEDURE DATE:  04/26/2022          INTERPRETATION:  CT HIP RIGHT    HISTORY: Pain. Postop infection. Right hip intertrochanteric fracture   ORIF.    TECHNIQUE: Contiguous axial imaging was performed through the pelvis   without contrast.  Coronal and sagittal reformatting was utilized.   Patient motion artifact and blurring may cause some limitation.    COMPARISON: CT of the right hip dated 4/14/2022, intraoperative x-rays   dated 4/11/2022, and pelvis and right hip x-rays dated 4/9/2022.    FINDINGS:    OSSEOUS STRUCTURES    Acute/Chronic Fractures:  Comminuted intertrochanteric fracture of the   right hip is again seen with a gamma nail for fixation. Distal   interlocking screw is present. There is mild residual fracture   displacement.    PELVIC JOINTS    Symphysis Pubis:  Preserved.    Sacroiliac Joints:  Maintained.    RIGHT HIP JOINT    Avascular Necrosis:  None.    Joint Space:  Grossly maintained given patient motion.    Effusion/Synovitis:  None.    LEFT HIP JOINT    Avascular Necrosis:  None.    Joint Space:  Grossly maintained given patient motion.    Effusion/Synovitis:  None.    VISUALIZED SPINE  Lower lumbar degenerative disc disease is present.    SOFT TISSUES    Neurovascular:  Severe atherosclerotic calcifications are present.    Pelvis/Abdomen:  Mild presacral edema is present.    Musculature:  Fluid collection is present extending along the posterior   margin of the greater trochanter and within the right gluteus medias   muscle measuring approximately 14.2 cm craniocaudally and up to 6.9 x 4.2   cm axially along the greater trochanter.    Subcutaneous Tissues:  Moderate subcutaneous hematoma formation is   again seen along the right hip incision site with mild hematoma formation   extending along the level of the proximal femoral diaphysis. Mild to   moderate subcutaneous edema is also noted. Overlying skin staples are   noted.    IMPRESSION:  1. Right intertrochanteric fracture ORIF is again seen.  2. Fluid collection along the right greater trochanter and within the   right gluteus medius muscle likely reflects evolution of the prior   hematoma although infection cannot be absolutely excluded with the given   history. Consider aspiration as warranted.  3. Moderate overlying subcutaneous hematoma is again seen.    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  Stable    Plan :   Cont Vancomycin Rocephin  Check Vancomycin trough prior to tonight's dose  Repeat blood cultures  Fu wound cultures  Trend temps and cbc  Ortho following   Asp precautions    D/w Dr Stone    Continue with present regiment.  Appropriate use of antibiotics and adverse effects reviewed.    > 35 minutes were spent in direct patient care reviewing notes, medications ,labs data/ imaging , discussion with multidisciplinary team.    Thank you for allowing me to participate in care of your patient .    Radha Pryor MD  Infectious Disease  [phone]

## 2022-04-29 NOTE — PROGRESS NOTE ADULT - SUBJECTIVE AND OBJECTIVE BOX
Date/Time Patient Seen:  		  Referring MD:   Data Reviewed	       Patient is a 77y old  Female who presents with a chief complaint of Other injury of unspecified body region, initial encounter     (28 Apr 2022 13:30)      Subjective/HPI     PAST MEDICAL & SURGICAL HISTORY:  Hypertension, unspecified type    Hyperlipidemia, unspecified hyperlipidemia type    Dementia without behavioral disturbance, unspecified dementia type    Hypothyroidism, unspecified type    Hypothyroid    Osteoarthritis    Carotid stenosis    Degenerative joint disease    Hypertension    Hyperlipidemia    Dementia    No significant past surgical history    S/P ORIF (open reduction internal fixation) fracture    History of open reduction and internal fixation (ORIF) procedure          Medication list         MEDICATIONS  (STANDING):  amLODIPine   Tablet 5 milliGRAM(s) Oral daily  aspirin enteric coated 81 milliGRAM(s) Oral daily  atorvastatin 20 milliGRAM(s) Oral at bedtime  cefTRIAXone   IVPB 2000 milliGRAM(s) IV Intermittent every 24 hours  chlorhexidine 2% Cloths 1 Application(s) Topical <User Schedule>  cholecalciferol 1000 Unit(s) Oral two times a day  cyanocobalamin 500 MICROGram(s) Oral daily  folic acid 1 milliGRAM(s) Oral daily  levothyroxine 125 MICROGram(s) Oral daily  losartan 100 milliGRAM(s) Oral daily  melatonin 5 milliGRAM(s) Oral at bedtime  memantine 10 milliGRAM(s) Oral two times a day  mirtazapine 15 milliGRAM(s) Oral at bedtime  multivitamin 1 Tablet(s) Oral daily  mupirocin 2% Ointment 1 Application(s) Both Nostrils two times a day  omega-3-Acid Ethyl Esters 2 Gram(s) Oral daily  pantoprazole    Tablet 40 milliGRAM(s) Oral before breakfast  vancomycin  IVPB 1000 milliGRAM(s) IV Intermittent every 12 hours    MEDICATIONS  (PRN):  ALBUTerol    90 MICROgram(s) HFA Inhaler 2 Puff(s) Inhalation every 6 hours PRN Shortness of Breath and/or Wheezing         Vitals log        ICU Vital Signs Last 24 Hrs  T(C): 36.9 (29 Apr 2022 05:13), Max: 36.9 (29 Apr 2022 05:13)  T(F): 98.5 (29 Apr 2022 05:13), Max: 98.5 (29 Apr 2022 05:13)  HR: 65 (29 Apr 2022 05:13) (65 - 75)  BP: 133/77 (29 Apr 2022 05:13) (133/77 - 156/76)  BP(mean): --  ABP: --  ABP(mean): --  RR: 18 (29 Apr 2022 05:13) (16 - 18)  SpO2: 95% (29 Apr 2022 05:13) (92% - 95%)           Input and Output:  I&O's Detail      Lab Data                        9.0    6.35  )-----------( 353      ( 27 Apr 2022 06:06 )             29.1     04-27    142  |  108  |  22  ----------------------------<  83  3.7   |  28  |  0.78    Ca    8.6      27 Apr 2022 06:06              Review of Systems	      Objective     Physical Examination    heart s1s2  lung dc BS  abd soft      Pertinent Lab findings & Imaging      Dominga:  NO   Adequate UO     I&O's Detail           Discussed with:     Cultures:	        Radiology

## 2022-04-29 NOTE — PROGRESS NOTE ADULT - SUBJECTIVE AND OBJECTIVE BOX
Chief Complaint: Hip wound    Interval Events: No events overnight.    Review of Systems:  General: No fevers, chills, weight gain  Skin: No rashes, color changes  Cardiovascular: No chest pain, orthopnea  Respiratory: No shortness of breath, cough  Gastrointestinal: No nausea, abdominal pain  Genitourinary: No incontinence, pain with urination  Musculoskeletal: No pain, swelling, decreased range of motion  Neurological: No headache, weakness  Psychiatric: No depression, anxiety  Endocrine: No weight gain, increased thirst  All other systems are comprehensively negative.    Physical Exam:  Vitals:        Vital Signs Last 24 Hrs  T(C): 36.9 (29 Apr 2022 05:13), Max: 36.9 (29 Apr 2022 05:13)  T(F): 98.5 (29 Apr 2022 05:13), Max: 98.5 (29 Apr 2022 05:13)  HR: 65 (29 Apr 2022 05:13) (65 - 75)  BP: 133/77 (29 Apr 2022 05:13) (133/77 - 156/76)  BP(mean): --  RR: 18 (29 Apr 2022 05:13) (16 - 18)  SpO2: 95% (29 Apr 2022 05:13) (92% - 95%)  General: NAD  HEENT: MMM  Neck: No JVD, no carotid bruit  Lungs: CTAB  CV: RRR, nl S1/S2, no M/R/G  Abdomen: S/NT/ND, +BS  Extremities: No LE edema, no cyanosis  Neuro: AAOx1  Skin: No rash    Labs:                        10.2   5.60  )-----------( 401      ( 29 Apr 2022 08:09 )             31.8     04-29    135  |  99  |  14  ----------------------------<  80  3.3<L>   |  30  |  0.86    Ca    9.0      29 Apr 2022 08:09    TPro  6.2  /  Alb  2.4<L>  /  TBili  0.4  /  DBili  x   /  AST  19  /  ALT  16  /  AlkPhos  90  04-29

## 2022-04-29 NOTE — PHYSICAL THERAPY INITIAL EVALUATION ADULT - RANGE OF MOTION EXAMINATION, REHAB EVAL
grossly/bilateral upper extremity ROM was WFL (within functional limits)/bilateral lower extremity ROM was WFL (within functional limits)

## 2022-04-29 NOTE — PROGRESS NOTE ADULT - SUBJECTIVE AND OBJECTIVE BOX
Patient is a 77y old  Female who presents with a chief complaint of Sent for possibly infected surgical wound. (29 Apr 2022 06:02)      INTERVAL HPI/OVERNIGHT EVENTS: Patient seen and examined at bedside. No overnight events    MEDICATIONS  (STANDING):  amLODIPine   Tablet 5 milliGRAM(s) Oral daily  aspirin enteric coated 81 milliGRAM(s) Oral daily  atorvastatin 20 milliGRAM(s) Oral at bedtime  cefTRIAXone   IVPB 2000 milliGRAM(s) IV Intermittent every 24 hours  chlorhexidine 2% Cloths 1 Application(s) Topical <User Schedule>  cholecalciferol 1000 Unit(s) Oral two times a day  cyanocobalamin 500 MICROGram(s) Oral daily  folic acid 1 milliGRAM(s) Oral daily  heparin   Injectable 5000 Unit(s) SubCutaneous every 12 hours  levothyroxine 125 MICROGram(s) Oral daily  losartan 100 milliGRAM(s) Oral daily  melatonin 5 milliGRAM(s) Oral at bedtime  memantine 10 milliGRAM(s) Oral two times a day  mirtazapine 15 milliGRAM(s) Oral at bedtime  multivitamin 1 Tablet(s) Oral daily  mupirocin 2% Ointment 1 Application(s) Both Nostrils two times a day  omega-3-Acid Ethyl Esters 2 Gram(s) Oral daily  pantoprazole    Tablet 40 milliGRAM(s) Oral before breakfast  potassium chloride  10 mEq/100 mL IVPB 10 milliEquivalent(s) IV Intermittent every 1 hour  vancomycin  IVPB 1000 milliGRAM(s) IV Intermittent every 12 hours    MEDICATIONS  (PRN):  ALBUTerol    90 MICROgram(s) HFA Inhaler 2 Puff(s) Inhalation every 6 hours PRN Shortness of Breath and/or Wheezing      Allergies    No Known Allergies    Intolerances        REVIEW OF SYSTEMS:  Unable to obtain meaningful ROS due to mental status      Vital Signs Last 24 Hrs  T(C): 36.9 (29 Apr 2022 05:13), Max: 36.9 (29 Apr 2022 05:13)  T(F): 98.5 (29 Apr 2022 05:13), Max: 98.5 (29 Apr 2022 05:13)  HR: 65 (29 Apr 2022 05:13) (65 - 75)  BP: 133/77 (29 Apr 2022 05:13) (133/77 - 156/76)  BP(mean): --  RR: 18 (29 Apr 2022 05:13) (16 - 18)  SpO2: 95% (29 Apr 2022 05:13) (92% - 95%)      PHYSICAL EXAM  GENERAL: elderly appearing female in NAD  HEAD: atraumatic, normocephalic   EYES: EOMI, conjunctiva and sclera clear  ENMT: edentulous mouth  RESPIRATORY:  diminished bs at bases, poor inspiratory effort   CARDIOVASCULAR:  soft ii/vi holosystolic murmur LUSB  GASTROINTESTINAL:  soft, nontender, nondistended, bowel sounds present  MUSCULOSKELETAL: No cyanosis or edema, surgical wound well healing, non-tender to palpation  NERVOUS SYSTEM:  sensation intact   PSYCH:  awake and alert but confused  SKIN: Multiple ecchymotic areas on skin     LABS:                        10.2   5.60  )-----------( 401      ( 29 Apr 2022 08:09 )             31.8     CBC Full  -  ( 29 Apr 2022 08:09 )  WBC Count : 5.60 K/uL  Hemoglobin : 10.2 g/dL  Hematocrit : 31.8 %  Platelet Count - Automated : 401 K/uL  Mean Cell Volume : 94.9 fl  Mean Cell Hemoglobin : 30.4 pg  Mean Cell Hemoglobin Concentration : 32.1 gm/dL  Auto Neutrophil # : 3.65 K/uL  Auto Lymphocyte # : 0.97 K/uL  Auto Monocyte # : 0.54 K/uL  Auto Eosinophil # : 0.30 K/uL  Auto Basophil # : 0.04 K/uL  Auto Neutrophil % : 65.2 %  Auto Lymphocyte % : 17.3 %  Auto Monocyte % : 9.6 %  Auto Eosinophil % : 5.4 %  Auto Basophil % : 0.7 %    29 Apr 2022 08:09    135    |  99     |  14     ----------------------------<  80     3.3     |  30     |  0.86     Ca    9.0        29 Apr 2022 08:09    TPro  6.2    /  Alb  2.4    /  TBili  0.4    /  DBili  x      /  AST  19     /  ALT  16     /  AlkPhos  90     29 Apr 2022 08:09        CAPILLARY BLOOD GLUCOSE            Culture - Blood (collected 04-27-22 @ 00:38)  Source: .Blood Blood-Peripheral  Gram Stain (04-27-22 @ 16:42):    Growth in anaerobic bottle: Gram Positive Rods  Final Report (04-28-22 @ 16:57):    Growth in anaerobic bottle: Bacillus species not anthracis    "Susceptibilities not performed"    ***Blood Panel PCR results on this specimen are available    approximately 3 hours after the Gram stain result.***    Gram stain, PCR, and/or culture results may not always    correspond due to difference in methodologies.    ************************************************************    This PCR assay was performed by multiplex PCR. This    Assay tests for 66 bacterial and resistance gene targets.    Please refer to the [facility] Labs test directory    at [email or web address] for details.  Organism: Blood Culture PCR (04-28-22 @ 16:57)  Organism: Blood Culture PCR (04-28-22 @ 16:57)      -  Bacillus cereus group: Detec      Method Type: PCR    Culture - Blood (collected 04-27-22 @ 00:38)  Source: .Blood Blood-Peripheral  Preliminary Report (04-28-22 @ 01:03):    No growth to date.        RADIOLOGY & ADDITIONAL TESTS: < from: CT Hip No Cont, Right (04.26.22 @ 20:13) >    ACC: [id number] EXAM:  CT HIP ONLY RT                          PROCEDURE DATE:  04/26/2022          INTERPRETATION:  CT HIP RIGHT    HISTORY: Pain. Postop infection. Right hip intertrochanteric fracture   ORIF.    TECHNIQUE: Contiguous axial imaging was performed through the pelvis   without contrast.  Coronal and sagittal reformatting was utilized.   Patient motion artifact and blurring may cause some limitation.    COMPARISON: CT of the right hip dated 4/14/2022, intraoperative x-rays   dated 4/11/2022, and pelvis and right hip x-rays dated 4/9/2022.    FINDINGS:    OSSEOUS STRUCTURES    Acute/Chronic Fractures:  Comminuted intertrochanteric fracture of the   right hip is again seen with a gamma nail for fixation. Distal   interlocking screw is present. There is mild residual fracture   displacement.    PELVIC JOINTS    Symphysis Pubis:  Preserved.    Sacroiliac Joints:  Maintained.    RIGHT HIP JOINT    Avascular Necrosis:  None.    Joint Space:  Grossly maintained given patient motion.    Effusion/Synovitis:  None.    LEFT HIP JOINT    Avascular Necrosis:  None.    Joint Space:  Grossly maintained given patient motion.    Effusion/Synovitis:  None.    VISUALIZED SPINE  Lower lumbar degenerative disc disease is present.    SOFT TISSUES    Neurovascular:  Severe atherosclerotic calcifications are present.    Pelvis/Abdomen:  Mild presacral edema is present.    Musculature:  Fluid collection is present extending along the posterior   margin of the greater trochanter and within the right gluteus medias   muscle measuring approximately 14.2 cm craniocaudally and up to 6.9 x 4.2   cm axially along the greater trochanter.    Subcutaneous Tissues:  Moderate subcutaneous hematoma formation is   again seen along the right hip incision site with mild hematoma formation   extending along the level of the proximal femoral diaphysis. Mild to   moderate subcutaneous edema is also noted. Overlying skin staples are   noted.    IMPRESSION:  1. Right intertrochanteric fracture ORIF is again seen.  2. Fluid collection along the right greater trochanter and within the   right gluteus medius muscle likely reflects evolution of the prior   hematoma although infection cannot be absolutely excluded with the given   history. Consider aspiration as warranted.  3. Moderate overlying subcutaneous hematoma is again seen.    --- End of Report ---            MINI ACKERMAN MD; Attending Radiologist  This document has been electronically signed. Apr 26 2022  8:59PM    < end of copied text >      Consultant(s) Notes Reviewed:  [x] YES  [ ] NO    Care Discussed with [x] Consultants  [x] Patient  [ ] Family  [ ]      [ x] Other; RN  DVT ppx

## 2022-04-29 NOTE — PROGRESS NOTE ADULT - SUBJECTIVE AND OBJECTIVE BOX
pt comfortable  avss  wbc normal  r hip dressing changed  trace serous drainage, overall improved.   as pt improving, afebrile, normal wbc, possible contaminant blood cx as only seen in one anearobic bottle, would rec cont conservative care, daily dressing change, abx.

## 2022-04-30 LAB
ANION GAP SERPL CALC-SCNC: 7 MMOL/L — SIGNIFICANT CHANGE UP (ref 5–17)
BUN SERPL-MCNC: 19 MG/DL — SIGNIFICANT CHANGE UP (ref 7–23)
CALCIUM SERPL-MCNC: 9.1 MG/DL — SIGNIFICANT CHANGE UP (ref 8.4–10.5)
CHLORIDE SERPL-SCNC: 101 MMOL/L — SIGNIFICANT CHANGE UP (ref 96–108)
CO2 SERPL-SCNC: 28 MMOL/L — SIGNIFICANT CHANGE UP (ref 22–31)
CREAT SERPL-MCNC: 1.08 MG/DL — SIGNIFICANT CHANGE UP (ref 0.5–1.3)
EGFR: 52 ML/MIN/1.73M2 — LOW
GLUCOSE SERPL-MCNC: 143 MG/DL — HIGH (ref 70–99)
HCT VFR BLD CALC: 34.3 % — LOW (ref 34.5–45)
HGB BLD-MCNC: 10.8 G/DL — LOW (ref 11.5–15.5)
MCHC RBC-ENTMCNC: 30.3 PG — SIGNIFICANT CHANGE UP (ref 27–34)
MCHC RBC-ENTMCNC: 31.5 GM/DL — LOW (ref 32–36)
MCV RBC AUTO: 96.1 FL — SIGNIFICANT CHANGE UP (ref 80–100)
NRBC # BLD: 0 /100 WBCS — SIGNIFICANT CHANGE UP (ref 0–0)
PLATELET # BLD AUTO: 404 K/UL — HIGH (ref 150–400)
POTASSIUM SERPL-MCNC: 4.2 MMOL/L — SIGNIFICANT CHANGE UP (ref 3.5–5.3)
POTASSIUM SERPL-SCNC: 4.2 MMOL/L — SIGNIFICANT CHANGE UP (ref 3.5–5.3)
RBC # BLD: 3.57 M/UL — LOW (ref 3.8–5.2)
RBC # FLD: 14.6 % — HIGH (ref 10.3–14.5)
SODIUM SERPL-SCNC: 136 MMOL/L — SIGNIFICANT CHANGE UP (ref 135–145)
VANCOMYCIN TROUGH SERPL-MCNC: 27.5 UG/ML — CRITICAL HIGH (ref 10–20)
WBC # BLD: 5.44 K/UL — SIGNIFICANT CHANGE UP (ref 3.8–10.5)
WBC # FLD AUTO: 5.44 K/UL — SIGNIFICANT CHANGE UP (ref 3.8–10.5)

## 2022-04-30 PROCEDURE — 99232 SBSQ HOSP IP/OBS MODERATE 35: CPT

## 2022-04-30 RX ORDER — VANCOMYCIN HCL 1 G
750 VIAL (EA) INTRAVENOUS EVERY 12 HOURS
Refills: 0 | Status: DISCONTINUED | OUTPATIENT
Start: 2022-04-30 | End: 2022-04-30

## 2022-04-30 RX ADMIN — Medication 5 MILLIGRAM(S): at 21:58

## 2022-04-30 RX ADMIN — AMLODIPINE BESYLATE 5 MILLIGRAM(S): 2.5 TABLET ORAL at 05:47

## 2022-04-30 RX ADMIN — LOSARTAN POTASSIUM 100 MILLIGRAM(S): 100 TABLET, FILM COATED ORAL at 05:48

## 2022-04-30 RX ADMIN — Medication 1000 UNIT(S): at 05:47

## 2022-04-30 RX ADMIN — MUPIROCIN 1 APPLICATION(S): 20 OINTMENT TOPICAL at 05:48

## 2022-04-30 RX ADMIN — Medication 1000 MILLIGRAM(S): at 22:28

## 2022-04-30 RX ADMIN — Medication 1000 MILLIGRAM(S): at 21:58

## 2022-04-30 RX ADMIN — Medication 2 GRAM(S): at 11:52

## 2022-04-30 RX ADMIN — MUPIROCIN 1 APPLICATION(S): 20 OINTMENT TOPICAL at 19:06

## 2022-04-30 RX ADMIN — Medication 1000 MILLIGRAM(S): at 06:17

## 2022-04-30 RX ADMIN — Medication 125 MICROGRAM(S): at 05:47

## 2022-04-30 RX ADMIN — HEPARIN SODIUM 5000 UNIT(S): 5000 INJECTION INTRAVENOUS; SUBCUTANEOUS at 05:47

## 2022-04-30 RX ADMIN — PANTOPRAZOLE SODIUM 40 MILLIGRAM(S): 20 TABLET, DELAYED RELEASE ORAL at 05:47

## 2022-04-30 RX ADMIN — MIRTAZAPINE 15 MILLIGRAM(S): 45 TABLET, ORALLY DISINTEGRATING ORAL at 21:58

## 2022-04-30 RX ADMIN — Medication 1 TABLET(S): at 11:52

## 2022-04-30 RX ADMIN — Medication 1000 MILLIGRAM(S): at 13:12

## 2022-04-30 RX ADMIN — PREGABALIN 500 MICROGRAM(S): 225 CAPSULE ORAL at 11:51

## 2022-04-30 RX ADMIN — MEMANTINE HYDROCHLORIDE 10 MILLIGRAM(S): 10 TABLET ORAL at 05:47

## 2022-04-30 RX ADMIN — HEPARIN SODIUM 5000 UNIT(S): 5000 INJECTION INTRAVENOUS; SUBCUTANEOUS at 17:21

## 2022-04-30 RX ADMIN — Medication 1 MILLIGRAM(S): at 11:51

## 2022-04-30 RX ADMIN — Medication 81 MILLIGRAM(S): at 11:47

## 2022-04-30 RX ADMIN — MEMANTINE HYDROCHLORIDE 10 MILLIGRAM(S): 10 TABLET ORAL at 17:23

## 2022-04-30 RX ADMIN — Medication 250 MILLIGRAM(S): at 11:47

## 2022-04-30 RX ADMIN — Medication 1000 MILLIGRAM(S): at 05:47

## 2022-04-30 RX ADMIN — CEFTRIAXONE 100 MILLIGRAM(S): 500 INJECTION, POWDER, FOR SOLUTION INTRAMUSCULAR; INTRAVENOUS at 17:21

## 2022-04-30 RX ADMIN — Medication 1000 UNIT(S): at 17:22

## 2022-04-30 RX ADMIN — ATORVASTATIN CALCIUM 20 MILLIGRAM(S): 80 TABLET, FILM COATED ORAL at 21:58

## 2022-04-30 RX ADMIN — CHLORHEXIDINE GLUCONATE 1 APPLICATION(S): 213 SOLUTION TOPICAL at 05:47

## 2022-04-30 NOTE — PROGRESS NOTE ADULT - SUBJECTIVE AND OBJECTIVE BOX
Patient is a 77y old  Female who presents with a chief complaint of Sent for possibly infected surgical wound. (30 Apr 2022 07:41)      INTERVAL HPI/OVERNIGHT EVENTS: Patient seen and examined at bedside. No overnight events. More awake and alert today    MEDICATIONS  (STANDING):  acetaminophen     Tablet .. 1000 milliGRAM(s) Oral every 8 hours  amLODIPine   Tablet 5 milliGRAM(s) Oral daily  aspirin enteric coated 81 milliGRAM(s) Oral daily  atorvastatin 20 milliGRAM(s) Oral at bedtime  cefTRIAXone   IVPB 2000 milliGRAM(s) IV Intermittent every 24 hours  chlorhexidine 2% Cloths 1 Application(s) Topical <User Schedule>  cholecalciferol 1000 Unit(s) Oral two times a day  cyanocobalamin 500 MICROGram(s) Oral daily  folic acid 1 milliGRAM(s) Oral daily  heparin   Injectable 5000 Unit(s) SubCutaneous every 12 hours  levothyroxine 125 MICROGram(s) Oral daily  losartan 100 milliGRAM(s) Oral daily  melatonin 5 milliGRAM(s) Oral at bedtime  memantine 10 milliGRAM(s) Oral two times a day  mirtazapine 15 milliGRAM(s) Oral at bedtime  multivitamin 1 Tablet(s) Oral daily  mupirocin 2% Ointment 1 Application(s) Both Nostrils two times a day  omega-3-Acid Ethyl Esters 2 Gram(s) Oral daily  pantoprazole    Tablet 40 milliGRAM(s) Oral before breakfast  vancomycin  IVPB 1000 milliGRAM(s) IV Intermittent every 12 hours    MEDICATIONS  (PRN):  ALBUTerol    90 MICROgram(s) HFA Inhaler 2 Puff(s) Inhalation every 6 hours PRN Shortness of Breath and/or Wheezing  oxyCODONE    IR 5 milliGRAM(s) Oral every 8 hours PRN Severe Pain (7 - 10)  oxyCODONE    IR 2.5 milliGRAM(s) Oral every 8 hours PRN Moderate Pain (4 - 6)      Allergies    No Known Allergies    Intolerances        REVIEW OF SYSTEMS:  Unable to obtain meaningful ROS due to mental status      Vital Signs Last 24 Hrs  T(C): 36.7 (30 Apr 2022 05:35), Max: 36.7 (29 Apr 2022 23:26)  T(F): 98 (30 Apr 2022 05:35), Max: 98 (29 Apr 2022 23:26)  HR: 82 (30 Apr 2022 05:35) (54 - 82)  BP: 111/63 (30 Apr 2022 05:35) (103/64 - 151/66)  BP(mean): --  RR: 18 (30 Apr 2022 05:35) (16 - 18)  SpO2: 93% (30 Apr 2022 05:35) (92% - 94%)    PHYSICAL EXAM:  GENERAL: elderly appearing female in NAD  HEAD: atraumatic, normocephalic   EYES: EOMI, conjunctiva and sclera clear  ENMT: edentulous mouth  RESPIRATORY:  diminished bs at bases, poor inspiratory effort   CARDIOVASCULAR:  soft ii/vi holosystolic murmur LUSB  GASTROINTESTINAL:  soft, nontender, nondistended, bowel sounds present  MUSCULOSKELETAL: No cyanosis or edema, surgical wound well healing, non-tender to palpation  NERVOUS SYSTEM:  sensation intact   PSYCH:  awake and alert but confused  SKIN: Multiple ecchymotic areas on skin     LABS:    CBC Full  -  ( 29 Apr 2022 08:09 )  WBC Count : 5.60 K/uL  Hemoglobin : 10.2 g/dL  Hematocrit : 31.8 %  Platelet Count - Automated : 401 K/uL  Mean Cell Volume : 94.9 fl  Mean Cell Hemoglobin : 30.4 pg  Mean Cell Hemoglobin Concentration : 32.1 gm/dL  Auto Neutrophil # : 3.65 K/uL  Auto Lymphocyte # : 0.97 K/uL  Auto Monocyte # : 0.54 K/uL  Auto Eosinophil # : 0.30 K/uL  Auto Basophil # : 0.04 K/uL  Auto Neutrophil % : 65.2 %  Auto Lymphocyte % : 17.3 %  Auto Monocyte % : 9.6 %  Auto Eosinophil % : 5.4 %  Auto Basophil % : 0.7 %      Ca    9.0        29 Apr 2022 08:09          CAPILLARY BLOOD GLUCOSE            Culture - Other (collected 04-28-22 @ 13:37)  Source: .Other right hip wound  Preliminary Report (04-30-22 @ 08:13):    Few Staphylococcus aureus    Culture - Blood (collected 04-27-22 @ 00:38)  Source: .Blood Blood-Peripheral  Gram Stain (04-27-22 @ 16:42):    Growth in anaerobic bottle: Gram Positive Rods  Final Report (04-28-22 @ 16:57):    Growth in anaerobic bottle: Bacillus species not anthracis    "Susceptibilities not performed"    ***Blood Panel PCR results on this specimen are available    approximately 3 hours after the Gram stain result.***    Gram stain, PCR, and/or culture results may not always    correspond due to difference in methodologies.    ************************************************************    This PCR assay was performed by multiplex PCR. This    Assay tests for 66 bacterial and resistance gene targets.    Please refer to the [facility] Labs test directory    at [email or web address] for details.  Organism: Blood Culture PCR (04-28-22 @ 16:57)  Organism: Blood Culture PCR (04-28-22 @ 16:57)      -  Bacillus cereus group: Detec      Method Type: PCR    Culture - Blood (collected 04-27-22 @ 00:38)  Source: .Blood Blood-Peripheral  Preliminary Report (04-28-22 @ 01:03):    No growth to date.        RADIOLOGY & ADDITIONAL TESTS:     Consultant(s) Notes Reviewed:  [x] YES  [ ] NO    Care Discussed with [x] Consultants  [x] Patient  [ ] Family  [ ]      [ x] Other; RN  DVT ppx

## 2022-04-30 NOTE — PROGRESS NOTE ADULT - SUBJECTIVE AND OBJECTIVE BOX
OPAL ARBOLEDA is a 77yFemale , patient examined and chart reviewed.    INTERVAL HPI/ OVERNIGHT EVENTS:   Afebrile. NAD.   Awake. Confused.  No events.    PAST MEDICAL & SURGICAL HISTORY:  Dementia without behavioral disturbance, unspecified dementia type  Hypothyroid  Osteoarthritis  Carotid stenosis  Degenerative joint disease  Hypertension  Hyperlipidemia  History of open reduction and internal fixation (ORIF) procedure        For details regarding the patient's social history, family history, and other miscellaneous elements, please refer the initial infectious diseases consultation and/or the admitting history and physical examination for this admission.    ROS:  Unable to obtain due to : poor historian      Current inpatient medications :    ANTIBIOTICS/RELEVANT:  cefTRIAXone   IVPB 2000 milliGRAM(s) IV Intermittent every 24 hours  omega-3-Acid Ethyl Esters 2 Gram(s) Oral daily  vancomycin  IVPB 1000 milliGRAM(s) IV Intermittent every 12 hours    MEDICATIONS  (STANDING):  acetaminophen     Tablet .. 1000 milliGRAM(s) Oral every 8 hours  amLODIPine   Tablet 5 milliGRAM(s) Oral daily  aspirin enteric coated 81 milliGRAM(s) Oral daily  atorvastatin 20 milliGRAM(s) Oral at bedtime  chlorhexidine 2% Cloths 1 Application(s) Topical <User Schedule>  cholecalciferol 1000 Unit(s) Oral two times a day  cyanocobalamin 500 MICROGram(s) Oral daily  folic acid 1 milliGRAM(s) Oral daily  heparin   Injectable 5000 Unit(s) SubCutaneous every 12 hours  levothyroxine 125 MICROGram(s) Oral daily  losartan 100 milliGRAM(s) Oral daily  melatonin 5 milliGRAM(s) Oral at bedtime  memantine 10 milliGRAM(s) Oral two times a day  mirtazapine 15 milliGRAM(s) Oral at bedtime  multivitamin 1 Tablet(s) Oral daily  mupirocin 2% Ointment 1 Application(s) Both Nostrils two times a day  omega-3-Acid Ethyl Esters 2 Gram(s) Oral daily  pantoprazole    Tablet 40 milliGRAM(s) Oral before breakfast      MEDICATIONS  (PRN):  ALBUTerol    90 MICROgram(s) HFA Inhaler 2 Puff(s) Inhalation every 6 hours PRN Shortness of Breath and/or Wheezing  oxyCODONE    IR 5 milliGRAM(s) Oral every 8 hours PRN Severe Pain (7 - 10)  oxyCODONE    IR 2.5 milliGRAM(s) Oral every 8 hours PRN Moderate Pain (4 - 6)        Objective:  Vital Signs Last 24 Hrs  T(C): 36.3 (30 Apr 2022 09:36), Max: 36.7 (29 Apr 2022 23:26)  T(F): 97.3 (30 Apr 2022 09:36), Max: 98 (29 Apr 2022 23:26)  HR: 64 (30 Apr 2022 09:36) (57 - 82)  BP: 157/71 (30 Apr 2022 09:36) (103/64 - 157/71)  RR: 18 (30 Apr 2022 09:36) (18 - 18)  SpO2: 92% (30 Apr 2022 09:36) (92% - 94%)    Physical Exam:  General:  no acute distress  Neck: supple, trachea midline  Lungs: clear, no wheeze/rhonchi  Cardiovascular: regular rate and rhythm, S1 S2  Abdomen: soft, nontender,  bowel sounds normal  Neurological: awake confused  Skin: no rash  Extremities: Right hip decreased erythema induration drainage minimal  nontender      LABS:                        10.8   5.44  )-----------( 404      ( 30 Apr 2022 11:55 )             34.3   04-30    136  |  101  |  19  ----------------------------<  143<H>  4.2   |  28  |  1.08    Ca    9.1      30 Apr 2022 11:55    TPro  6.2  /  Alb  2.4<L>  /  TBili  0.4  /  DBili  x   /  AST  19  /  ALT  16  /  AlkPhos  90  04-29      MICROBIOLOGY:  Culture - Other (collected 28 Apr 2022 13:37)  Source: .Other right hip wound  Preliminary Report (30 Apr 2022 08:13):    Few Staphylococcus aureus    Culture - Blood (collected 27 Apr 2022 00:38)  Source: .Blood Blood-Peripheral  Gram Stain (27 Apr 2022 16:42):    Growth in anaerobic bottle: Gram Positive Rods  Final Report (28 Apr 2022 16:57):    Growth in anaerobic bottle: Bacillus species not anthracis    "Susceptibilities not performed"    ***Blood Panel PCR results on this specimen are available    approximately 3 hours after the Gram stain result.***    Gram stain, PCR, and/or culture results may not always    correspond due to difference in methodologies.    ************************************************************    This PCR assay was performed by multiplex PCR. This    Assay tests for 66 bacterial and resistance gene targets.    Please refer to the [facility] Labs test directory    at [email or web address] for details.  Organism: Blood Culture PCR (28 Apr 2022 16:57)  Organism: Blood Culture PCR (28 Apr 2022 16:57)      -  Bacillus cereus group: Detec      Method Type: PCR    Culture - Blood (collected 27 Apr 2022 00:38)  Source: .Blood Blood-Peripheral  Preliminary Report (28 Apr 2022 01:03):    No growth to date.    RADIOLOGY & ADDITIONAL STUDIES:    ACC: [id number] EXAM:  CT HIP ONLY RT                          PROCEDURE DATE:  04/26/2022          INTERPRETATION:  CT HIP RIGHT    HISTORY: Pain. Postop infection. Right hip intertrochanteric fracture   ORIF.    TECHNIQUE: Contiguous axial imaging was performed through the pelvis   without contrast.  Coronal and sagittal reformatting was utilized.   Patient motion artifact and blurring may cause some limitation.    COMPARISON: CT of the right hip dated 4/14/2022, intraoperative x-rays   dated 4/11/2022, and pelvis and right hip x-rays dated 4/9/2022.    FINDINGS:    OSSEOUS STRUCTURES    Acute/Chronic Fractures:  Comminuted intertrochanteric fracture of the   right hip is again seen with a gamma nail for fixation. Distal   interlocking screw is present. There is mild residual fracture   displacement.    PELVIC JOINTS    Symphysis Pubis:  Preserved.    Sacroiliac Joints:  Maintained.    RIGHT HIP JOINT    Avascular Necrosis:  None.    Joint Space:  Grossly maintained given patient motion.    Effusion/Synovitis:  None.    LEFT HIP JOINT    Avascular Necrosis:  None.    Joint Space:  Grossly maintained given patient motion.    Effusion/Synovitis:  None.    VISUALIZED SPINE  Lower lumbar degenerative disc disease is present.    SOFT TISSUES    Neurovascular:  Severe atherosclerotic calcifications are present.    Pelvis/Abdomen:  Mild presacral edema is present.    Musculature:  Fluid collection is present extending along the posterior   margin of the greater trochanter and within the right gluteus medias   muscle measuring approximately 14.2 cm craniocaudally and up to 6.9 x 4.2   cm axially along the greater trochanter.    Subcutaneous Tissues:  Moderate subcutaneous hematoma formation is   again seen along the right hip incision site with mild hematoma formation   extending along the level of the proximal femoral diaphysis. Mild to   moderate subcutaneous edema is also noted. Overlying skin staples are   noted.    IMPRESSION:  1. Right intertrochanteric fracture ORIF is again seen.  2. Fluid collection along the right greater trochanter and within the   right gluteus medius muscle likely reflects evolution of the prior   hematoma although infection cannot be absolutely excluded with the given   history. Consider aspiration as warranted.  3. Moderate overlying subcutaneous hematoma is again seen.    Assessment :   78YO F PMH of Dementia, HTN, Dyslipidemia, PAD, Hypothyroidism, Anemia resident of [facility] facility SP ORIF, Right hip, using trochanteric nail 10-Apr-2022 complicated by Acute blood loss sec surgical site hematoma admitted with infected surgical site wound with poss infected hematoma + seropurulent drainage Wound cultures with Staph Aureus sensi pending  Bacillus bacteremia likely contaminant  Stable    Plan :   Cont Vancomycin Rocephin  Repeat Vancomycin trough tonight at 2200 hours  Fu repeat blood cultures  Fu wound cultures  Trend temps and cbc  Ortho following   Asp precautions      Continue with present regiment.  Appropriate use of antibiotics and adverse effects reviewed.    > 35 minutes were spent in direct patient care reviewing notes, medications ,labs data/ imaging , discussion with multidisciplinary team.    Thank you for allowing me to participate in care of your patient .    Radha Pryor MD  Infectious Disease  [phone]

## 2022-04-30 NOTE — PROGRESS NOTE ADULT - SUBJECTIVE AND OBJECTIVE BOX
Neurology follow up note    OPAL [id number]      Interval History:    Patient feels ok no new complaints.    Allergies    No Known Allergies    Intolerances        MEDICATIONS    acetaminophen     Tablet .. 1000 milliGRAM(s) Oral every 8 hours  ALBUTerol    90 MICROgram(s) HFA Inhaler 2 Puff(s) Inhalation every 6 hours PRN  amLODIPine   Tablet 5 milliGRAM(s) Oral daily  aspirin enteric coated 81 milliGRAM(s) Oral daily  atorvastatin 20 milliGRAM(s) Oral at bedtime  cefTRIAXone   IVPB 2000 milliGRAM(s) IV Intermittent every 24 hours  chlorhexidine 2% Cloths 1 Application(s) Topical <User Schedule>  cholecalciferol 1000 Unit(s) Oral two times a day  cyanocobalamin 500 MICROGram(s) Oral daily  folic acid 1 milliGRAM(s) Oral daily  heparin   Injectable 5000 Unit(s) SubCutaneous every 12 hours  levothyroxine 125 MICROGram(s) Oral daily  losartan 100 milliGRAM(s) Oral daily  melatonin 5 milliGRAM(s) Oral at bedtime  memantine 10 milliGRAM(s) Oral two times a day  mirtazapine 15 milliGRAM(s) Oral at bedtime  multivitamin 1 Tablet(s) Oral daily  mupirocin 2% Ointment 1 Application(s) Both Nostrils two times a day  omega-3-Acid Ethyl Esters 2 Gram(s) Oral daily  oxyCODONE    IR 5 milliGRAM(s) Oral every 8 hours PRN  oxyCODONE    IR 2.5 milliGRAM(s) Oral every 8 hours PRN  pantoprazole    Tablet 40 milliGRAM(s) Oral before breakfast  vancomycin  IVPB 1000 milliGRAM(s) IV Intermittent every 12 hours              Vital Signs Last 24 Hrs  T(C): 36.7 (30 Apr 2022 05:35), Max: 36.7 (29 Apr 2022 23:26)  T(F): 98 (30 Apr 2022 05:35), Max: 98 (29 Apr 2022 23:26)  HR: 82 (30 Apr 2022 05:35) (54 - 82)  BP: 111/63 (30 Apr 2022 05:35) (103/64 - 151/66)  BP(mean): --  RR: 18 (30 Apr 2022 05:35) (16 - 18)  SpO2: 93% (30 Apr 2022 05:35) (92% - 94%)      REVIEW OF SYSTEMS:  Limited secondary to the patient being disoriented.  Constitutional:  Denies fever, chills, or night sweats.  Head:  No headaches.  Eyes:  No double vision or blurry vision.  Ears:  No ringing in the ears.  Neck:  No neck pain.  Respiratory:  No shortness of breath.  Cardiovascular:  No chest pain.  Abdomen:  No nausea, vomiting, or abdominal pain.  Extremities/Neurological:  No numbness or tingling.  Musculoskeletal:  Occasional joint pain.    PHYSICAL EXAMINATION:   HEENT:  Head:  Normocephalic, atraumatic.  Eyes:  No scleral icterus.  Ears:  Hearing slightly hard of hearing.  NECK:  Supple.  RESPIRATORY:  Decreased breath sounds bilaterally.  CARDIOVASCULAR:  S1 and S2 heard.  ABDOMEN:  Soft, nontender.  EXTREMITIES:  No clubbing or cyanosis were noted.      NEUROLOGIC:  The patient is awake, alert.  Location was home, year and month were unknown.  The patient has poor vision out of both eyes.  Speech:  Would articulate a few words, no dysarthria.  Intact bilateral nasolabial folds.  Motor:  Bilateral upper was 3/5, bilateral lower extremities with stimulation, slight flexation at the hip and knee.            LABS:  CBC Full  -  ( 29 Apr 2022 08:09 )  WBC Count : 5.60 K/uL  RBC Count : 3.35 M/uL  Hemoglobin : 10.2 g/dL  Hematocrit : 31.8 %  Platelet Count - Automated : 401 K/uL  Mean Cell Volume : 94.9 fl  Mean Cell Hemoglobin : 30.4 pg  Mean Cell Hemoglobin Concentration : 32.1 gm/dL  Auto Neutrophil # : 3.65 K/uL  Auto Lymphocyte # : 0.97 K/uL  Auto Monocyte # : 0.54 K/uL  Auto Eosinophil # : 0.30 K/uL  Auto Basophil # : 0.04 K/uL  Auto Neutrophil % : 65.2 %  Auto Lymphocyte % : 17.3 %  Auto Monocyte % : 9.6 %  Auto Eosinophil % : 5.4 %  Auto Basophil % : 0.7 %      04-29    135  |  99  |  14  ----------------------------<  80  3.3<L>   |  30  |  0.86    Ca    9.0      29 Apr 2022 08:09    TPro  6.2  /  Alb  2.4<L>  /  TBili  0.4  /  DBili  x   /  AST  19  /  ALT  16  /  AlkPhos  90  04-29    Hemoglobin A1C:     LIVER FUNCTIONS - ( 29 Apr 2022 08:09 )  Alb: 2.4 g/dL / Pro: 6.2 g/dL / ALK PHOS: 90 U/L / ALT: 16 U/L DA / AST: 19 U/L / GGT: x           Vitamin B12         RADIOLOGY      ANALYSIS AND PLAN:  This is a 77-year-old with episode of altered mental status.  For episode of altered mental status, suspect most likely metabolic encephalopathy possibly underlying septic type process along with dementia becoming more prominent in the hospital setting.  Examination is limited of lower extremities, but I see no new clear sign to suggest a new central nervous system event has occurred.  For history of dementia, we do not have memantine 28 mg a day.  We would recommend change to 10 mg twice a day.  For history of hypothyroidism, continue the patient on Synthroid.  For hyperlipidemia, continue the patient on statin.  For history of hypertension, monitor systolic blood pressure as needed.  neurologic wise appears stable         Greater than 40 minutes of time was spent with the patient, plan of care, reviewing data, speaking to multidisciplinary healthcare team with greater than 50% of time in counseling and care coordination.    Thank you for the courtesy of this consultation.   Neurology follow up note    OPAL [id number]      Interval History:    Patient feels ok no new complaints.    Allergies    No Known Allergies    Intolerances        MEDICATIONS    acetaminophen     Tablet .. 1000 milliGRAM(s) Oral every 8 hours  ALBUTerol    90 MICROgram(s) HFA Inhaler 2 Puff(s) Inhalation every 6 hours PRN  amLODIPine   Tablet 5 milliGRAM(s) Oral daily  aspirin enteric coated 81 milliGRAM(s) Oral daily  atorvastatin 20 milliGRAM(s) Oral at bedtime  cefTRIAXone   IVPB 2000 milliGRAM(s) IV Intermittent every 24 hours  chlorhexidine 2% Cloths 1 Application(s) Topical <User Schedule>  cholecalciferol 1000 Unit(s) Oral two times a day  cyanocobalamin 500 MICROGram(s) Oral daily  folic acid 1 milliGRAM(s) Oral daily  heparin   Injectable 5000 Unit(s) SubCutaneous every 12 hours  levothyroxine 125 MICROGram(s) Oral daily  losartan 100 milliGRAM(s) Oral daily  melatonin 5 milliGRAM(s) Oral at bedtime  memantine 10 milliGRAM(s) Oral two times a day  mirtazapine 15 milliGRAM(s) Oral at bedtime  multivitamin 1 Tablet(s) Oral daily  mupirocin 2% Ointment 1 Application(s) Both Nostrils two times a day  omega-3-Acid Ethyl Esters 2 Gram(s) Oral daily  oxyCODONE    IR 5 milliGRAM(s) Oral every 8 hours PRN  oxyCODONE    IR 2.5 milliGRAM(s) Oral every 8 hours PRN  pantoprazole    Tablet 40 milliGRAM(s) Oral before breakfast  vancomycin  IVPB 1000 milliGRAM(s) IV Intermittent every 12 hours              Vital Signs Last 24 Hrs  T(C): 36.7 (30 Apr 2022 05:35), Max: 36.7 (29 Apr 2022 23:26)  T(F): 98 (30 Apr 2022 05:35), Max: 98 (29 Apr 2022 23:26)  HR: 82 (30 Apr 2022 05:35) (54 - 82)  BP: 111/63 (30 Apr 2022 05:35) (103/64 - 151/66)  BP(mean): --  RR: 18 (30 Apr 2022 05:35) (16 - 18)  SpO2: 93% (30 Apr 2022 05:35) (92% - 94%)      REVIEW OF SYSTEMS:  Limited secondary to the patient being disoriented.  Constitutional:  Denies fever, chills, or night sweats.  Head:  No headaches.  Eyes:  No double vision or blurry vision.  Ears:  No ringing in the ears.  Neck:  No neck pain.  Respiratory:  No shortness of breath.  Cardiovascular:  No chest pain.  Abdomen:  No nausea, vomiting, or abdominal pain.  Extremities/Neurological:  No numbness or tingling.  Musculoskeletal:  Occasional joint pain.    PHYSICAL EXAMINATION:   HEENT:  Head:  Normocephalic, atraumatic.  Eyes:  No scleral icterus.  Ears:  Hearing slightly hard of hearing.  NECK:  Supple.  RESPIRATORY:  Decreased breath sounds bilaterally.  CARDIOVASCULAR:  S1 and S2 heard.  ABDOMEN:  Soft, nontender.  EXTREMITIES:  No clubbing or cyanosis were noted.      NEUROLOGIC:  The patient is awake, alert.  Location was home, year and month were unknown.  The patient has poor vision out of both eyes.  Speech:  Would articulate a few words, no dysarthria.  Intact bilateral nasolabial folds.  Motor:  Bilateral upper was 3/5, bilateral lower extremities with stimulation, slight flexation at the hip and knee.            LABS:  CBC Full  -  ( 29 Apr 2022 08:09 )  WBC Count : 5.60 K/uL  RBC Count : 3.35 M/uL  Hemoglobin : 10.2 g/dL  Hematocrit : 31.8 %  Platelet Count - Automated : 401 K/uL  Mean Cell Volume : 94.9 fl  Mean Cell Hemoglobin : 30.4 pg  Mean Cell Hemoglobin Concentration : 32.1 gm/dL  Auto Neutrophil # : 3.65 K/uL  Auto Lymphocyte # : 0.97 K/uL  Auto Monocyte # : 0.54 K/uL  Auto Eosinophil # : 0.30 K/uL  Auto Basophil # : 0.04 K/uL  Auto Neutrophil % : 65.2 %  Auto Lymphocyte % : 17.3 %  Auto Monocyte % : 9.6 %  Auto Eosinophil % : 5.4 %  Auto Basophil % : 0.7 %      04-29    135  |  99  |  14  ----------------------------<  80  3.3<L>   |  30  |  0.86    Ca    9.0      29 Apr 2022 08:09    TPro  6.2  /  Alb  2.4<L>  /  TBili  0.4  /  DBili  x   /  AST  19  /  ALT  16  /  AlkPhos  90  04-29    Hemoglobin A1C:     LIVER FUNCTIONS - ( 29 Apr 2022 08:09 )  Alb: 2.4 g/dL / Pro: 6.2 g/dL / ALK PHOS: 90 U/L / ALT: 16 U/L DA / AST: 19 U/L / GGT: x           Vitamin B12         RADIOLOGY      ANALYSIS AND PLAN:  This is a 77-year-old with episode of altered mental status.  For episode of altered mental status, suspect most likely metabolic encephalopathy possibly underlying septic type process along with dementia becoming more prominent in the hospital setting.  Examination is limited of lower extremities, but I see no new clear sign to suggest a new central nervous system event has occurred.  For history of dementia, we do not have memantine 28 mg a day.  We would recommend change to 10 mg twice a day.  For history of hypothyroidism, continue the patient on Synthroid.  For hyperlipidemia, continue the patient on statin.  For history of hypertension, monitor systolic blood pressure as needed.  neurologic wise appears stable     Greater than 40 minutes of time was spent with the patient, plan of care, reviewing data, speaking to multidisciplinary healthcare team with greater than 50% of time in counseling and care coordination.    Thank you for the courtesy of this consultation.

## 2022-04-30 NOTE — PROGRESS NOTE ADULT - SUBJECTIVE AND OBJECTIVE BOX
Date/Time Patient Seen:  		  Referring MD:   Data Reviewed	       Patient is a 77y old  Female who presents with a chief complaint of Sent for possibly infected surgical wound. (29 Apr 2022 16:50)      Subjective/HPI     PAST MEDICAL & SURGICAL HISTORY:  Hypertension, unspecified type    Hyperlipidemia, unspecified hyperlipidemia type    Dementia without behavioral disturbance, unspecified dementia type    Hypothyroidism, unspecified type    Hypothyroid    Osteoarthritis    Carotid stenosis    Degenerative joint disease    Hypertension    Hyperlipidemia    Dementia    No significant past surgical history    S/P ORIF (open reduction internal fixation) fracture    History of open reduction and internal fixation (ORIF) procedure          Medication list         MEDICATIONS  (STANDING):  acetaminophen     Tablet .. 1000 milliGRAM(s) Oral every 8 hours  amLODIPine   Tablet 5 milliGRAM(s) Oral daily  aspirin enteric coated 81 milliGRAM(s) Oral daily  atorvastatin 20 milliGRAM(s) Oral at bedtime  cefTRIAXone   IVPB 2000 milliGRAM(s) IV Intermittent every 24 hours  chlorhexidine 2% Cloths 1 Application(s) Topical <User Schedule>  cholecalciferol 1000 Unit(s) Oral two times a day  cyanocobalamin 500 MICROGram(s) Oral daily  folic acid 1 milliGRAM(s) Oral daily  heparin   Injectable 5000 Unit(s) SubCutaneous every 12 hours  levothyroxine 125 MICROGram(s) Oral daily  losartan 100 milliGRAM(s) Oral daily  melatonin 5 milliGRAM(s) Oral at bedtime  memantine 10 milliGRAM(s) Oral two times a day  mirtazapine 15 milliGRAM(s) Oral at bedtime  multivitamin 1 Tablet(s) Oral daily  mupirocin 2% Ointment 1 Application(s) Both Nostrils two times a day  omega-3-Acid Ethyl Esters 2 Gram(s) Oral daily  pantoprazole    Tablet 40 milliGRAM(s) Oral before breakfast  vancomycin  IVPB 1000 milliGRAM(s) IV Intermittent every 12 hours    MEDICATIONS  (PRN):  ALBUTerol    90 MICROgram(s) HFA Inhaler 2 Puff(s) Inhalation every 6 hours PRN Shortness of Breath and/or Wheezing  oxyCODONE    IR 5 milliGRAM(s) Oral every 8 hours PRN Severe Pain (7 - 10)  oxyCODONE    IR 2.5 milliGRAM(s) Oral every 8 hours PRN Moderate Pain (4 - 6)         Vitals log        ICU Vital Signs Last 24 Hrs  T(C): 36.7 (30 Apr 2022 05:35), Max: 36.7 (29 Apr 2022 23:26)  T(F): 98 (30 Apr 2022 05:35), Max: 98 (29 Apr 2022 23:26)  HR: 82 (30 Apr 2022 05:35) (54 - 82)  BP: 111/63 (30 Apr 2022 05:35) (103/64 - 151/66)  BP(mean): --  ABP: --  ABP(mean): --  RR: 18 (30 Apr 2022 05:35) (16 - 18)  SpO2: 93% (30 Apr 2022 05:35) (92% - 94%)           Input and Output:  I&O's Detail      Lab Data                        10.2   5.60  )-----------( 401      ( 29 Apr 2022 08:09 )             31.8     04-29    135  |  99  |  14  ----------------------------<  80  3.3<L>   |  30  |  0.86    Ca    9.0      29 Apr 2022 08:09    TPro  6.2  /  Alb  2.4<L>  /  TBili  0.4  /  DBili  x   /  AST  19  /  ALT  16  /  AlkPhos  90  04-29            Review of Systems	      Objective     Physical Examination    heart s1s2  lung dec BS  abd soft      Pertinent Lab findings & Imaging      Dominga:  NO   Adequate UO     I&O's Detail           Discussed with:     Cultures:	        Radiology

## 2022-04-30 NOTE — PROGRESS NOTE ADULT - SUBJECTIVE AND OBJECTIVE BOX
Chief Complaint: Hip wound    Interval Events: No events overnight.    Review of Systems:  General: No fevers, chills, weight gain  Skin: No rashes, color changes  Cardiovascular: No chest pain, orthopnea  Respiratory: No shortness of breath, cough  Gastrointestinal: No nausea, abdominal pain  Genitourinary: No incontinence, pain with urination  Musculoskeletal: No pain, swelling, decreased range of motion  Neurological: No headache, weakness  Psychiatric: No depression, anxiety  Endocrine: No weight gain, increased thirst  All other systems are comprehensively negative.    Physical Exam:  Vital Signs Last 24 Hrs  T(C): 36.3 (30 Apr 2022 09:36), Max: 36.7 (29 Apr 2022 23:26)  T(F): 97.3 (30 Apr 2022 09:36), Max: 98 (29 Apr 2022 23:26)  HR: 64 (30 Apr 2022 09:36) (54 - 82)  BP: 157/71 (30 Apr 2022 09:36) (103/64 - 157/71)  BP(mean): --  RR: 18 (30 Apr 2022 09:36) (16 - 18)  SpO2: 92% (30 Apr 2022 09:36) (92% - 94%)  General: NAD  HEENT: MMM  Neck: No JVD, no carotid bruit  Lungs: CTAB  CV: RRR, nl S1/S2, no M/R/G  Abdomen: S/NT/ND, +BS  Extremities: No LE edema, no cyanosis  Neuro: AAOx1  Skin: No rash    Labs:             04-29    135  |  99  |  14  ----------------------------<  80  3.3<L>   |  30  |  0.86    Ca    9.0      29 Apr 2022 08:09    TPro  6.2  /  Alb  2.4<L>  /  TBili  0.4  /  DBili  x   /  AST  19  /  ALT  16  /  AlkPhos  90  04-29                        10.2   5.60  )-----------( 401      ( 29 Apr 2022 08:09 )             31.8

## 2022-05-01 LAB
-  AMPICILLIN/SULBACTAM: SIGNIFICANT CHANGE UP
-  CEFAZOLIN: SIGNIFICANT CHANGE UP
-  CLINDAMYCIN: SIGNIFICANT CHANGE UP
-  DAPTOMYCIN: SIGNIFICANT CHANGE UP
-  ERYTHROMYCIN: SIGNIFICANT CHANGE UP
-  GENTAMICIN: SIGNIFICANT CHANGE UP
-  LINEZOLID: SIGNIFICANT CHANGE UP
-  OXACILLIN: SIGNIFICANT CHANGE UP
-  PENICILLIN: SIGNIFICANT CHANGE UP
-  RIFAMPIN: SIGNIFICANT CHANGE UP
-  TETRACYCLINE: SIGNIFICANT CHANGE UP
-  TRIMETHOPRIM/SULFAMETHOXAZOLE: SIGNIFICANT CHANGE UP
-  VANCOMYCIN: SIGNIFICANT CHANGE UP
ANION GAP SERPL CALC-SCNC: 6 MMOL/L — SIGNIFICANT CHANGE UP (ref 5–17)
BUN SERPL-MCNC: 16 MG/DL — SIGNIFICANT CHANGE UP (ref 7–23)
CALCIUM SERPL-MCNC: 9.4 MG/DL — SIGNIFICANT CHANGE UP (ref 8.4–10.5)
CHLORIDE SERPL-SCNC: 104 MMOL/L — SIGNIFICANT CHANGE UP (ref 96–108)
CO2 SERPL-SCNC: 30 MMOL/L — SIGNIFICANT CHANGE UP (ref 22–31)
CREAT SERPL-MCNC: 0.95 MG/DL — SIGNIFICANT CHANGE UP (ref 0.5–1.3)
CULTURE RESULTS: SIGNIFICANT CHANGE UP
EGFR: 61 ML/MIN/1.73M2 — SIGNIFICANT CHANGE UP
GLUCOSE SERPL-MCNC: 87 MG/DL — SIGNIFICANT CHANGE UP (ref 70–99)
HCT VFR BLD CALC: 34.5 % — SIGNIFICANT CHANGE UP (ref 34.5–45)
HGB BLD-MCNC: 10.7 G/DL — LOW (ref 11.5–15.5)
MCHC RBC-ENTMCNC: 30.1 PG — SIGNIFICANT CHANGE UP (ref 27–34)
MCHC RBC-ENTMCNC: 31 GM/DL — LOW (ref 32–36)
MCV RBC AUTO: 96.9 FL — SIGNIFICANT CHANGE UP (ref 80–100)
METHOD TYPE: SIGNIFICANT CHANGE UP
NRBC # BLD: 0 /100 WBCS — SIGNIFICANT CHANGE UP (ref 0–0)
ORGANISM # SPEC MICROSCOPIC CNT: SIGNIFICANT CHANGE UP
ORGANISM # SPEC MICROSCOPIC CNT: SIGNIFICANT CHANGE UP
PLATELET # BLD AUTO: 361 K/UL — SIGNIFICANT CHANGE UP (ref 150–400)
POTASSIUM SERPL-MCNC: 3.7 MMOL/L — SIGNIFICANT CHANGE UP (ref 3.5–5.3)
POTASSIUM SERPL-SCNC: 3.7 MMOL/L — SIGNIFICANT CHANGE UP (ref 3.5–5.3)
RBC # BLD: 3.56 M/UL — LOW (ref 3.8–5.2)
RBC # FLD: 14.4 % — SIGNIFICANT CHANGE UP (ref 10.3–14.5)
SODIUM SERPL-SCNC: 140 MMOL/L — SIGNIFICANT CHANGE UP (ref 135–145)
SPECIMEN SOURCE: SIGNIFICANT CHANGE UP
VANCOMYCIN TROUGH SERPL-MCNC: 19.4 UG/ML — SIGNIFICANT CHANGE UP (ref 10–20)
WBC # BLD: 6.01 K/UL — SIGNIFICANT CHANGE UP (ref 3.8–10.5)
WBC # FLD AUTO: 6.01 K/UL — SIGNIFICANT CHANGE UP (ref 3.8–10.5)

## 2022-05-01 PROCEDURE — 99232 SBSQ HOSP IP/OBS MODERATE 35: CPT

## 2022-05-01 RX ORDER — BACITRACIN ZINC 500 UNIT/G
1 OINTMENT IN PACKET (EA) TOPICAL ONCE
Refills: 0 | Status: COMPLETED | OUTPATIENT
Start: 2022-05-01 | End: 2022-05-01

## 2022-05-01 RX ORDER — VANCOMYCIN HCL 1 G
VIAL (EA) INTRAVENOUS
Refills: 0 | Status: DISCONTINUED | OUTPATIENT
Start: 2022-05-01 | End: 2022-05-01

## 2022-05-01 RX ORDER — VANCOMYCIN HCL 1 G
750 VIAL (EA) INTRAVENOUS ONCE
Refills: 0 | Status: COMPLETED | OUTPATIENT
Start: 2022-05-01 | End: 2022-05-01

## 2022-05-01 RX ORDER — VANCOMYCIN HCL 1 G
750 VIAL (EA) INTRAVENOUS ONCE
Refills: 0 | Status: DISCONTINUED | OUTPATIENT
Start: 2022-05-01 | End: 2022-05-01

## 2022-05-01 RX ORDER — VANCOMYCIN HCL 1 G
750 VIAL (EA) INTRAVENOUS EVERY 12 HOURS
Refills: 0 | Status: DISCONTINUED | OUTPATIENT
Start: 2022-05-01 | End: 2022-05-02

## 2022-05-01 RX ADMIN — Medication 1 TABLET(S): at 11:47

## 2022-05-01 RX ADMIN — Medication 1000 UNIT(S): at 17:43

## 2022-05-01 RX ADMIN — Medication 1000 MILLIGRAM(S): at 14:49

## 2022-05-01 RX ADMIN — Medication 1000 MILLIGRAM(S): at 13:49

## 2022-05-01 RX ADMIN — Medication 1000 MILLIGRAM(S): at 05:32

## 2022-05-01 RX ADMIN — Medication 1000 UNIT(S): at 05:33

## 2022-05-01 RX ADMIN — Medication 1000 MILLIGRAM(S): at 22:10

## 2022-05-01 RX ADMIN — Medication 250 MILLIGRAM(S): at 18:49

## 2022-05-01 RX ADMIN — Medication 1000 MILLIGRAM(S): at 07:00

## 2022-05-01 RX ADMIN — ATORVASTATIN CALCIUM 20 MILLIGRAM(S): 80 TABLET, FILM COATED ORAL at 22:10

## 2022-05-01 RX ADMIN — MUPIROCIN 1 APPLICATION(S): 20 OINTMENT TOPICAL at 17:43

## 2022-05-01 RX ADMIN — MUPIROCIN 1 APPLICATION(S): 20 OINTMENT TOPICAL at 05:33

## 2022-05-01 RX ADMIN — HEPARIN SODIUM 5000 UNIT(S): 5000 INJECTION INTRAVENOUS; SUBCUTANEOUS at 05:32

## 2022-05-01 RX ADMIN — MEMANTINE HYDROCHLORIDE 10 MILLIGRAM(S): 10 TABLET ORAL at 17:43

## 2022-05-01 RX ADMIN — MIRTAZAPINE 15 MILLIGRAM(S): 45 TABLET, ORALLY DISINTEGRATING ORAL at 22:10

## 2022-05-01 RX ADMIN — Medication 2 GRAM(S): at 11:47

## 2022-05-01 RX ADMIN — PREGABALIN 500 MICROGRAM(S): 225 CAPSULE ORAL at 11:47

## 2022-05-01 RX ADMIN — LOSARTAN POTASSIUM 100 MILLIGRAM(S): 100 TABLET, FILM COATED ORAL at 05:33

## 2022-05-01 RX ADMIN — AMLODIPINE BESYLATE 5 MILLIGRAM(S): 2.5 TABLET ORAL at 05:32

## 2022-05-01 RX ADMIN — Medication 81 MILLIGRAM(S): at 11:47

## 2022-05-01 RX ADMIN — HEPARIN SODIUM 5000 UNIT(S): 5000 INJECTION INTRAVENOUS; SUBCUTANEOUS at 17:43

## 2022-05-01 RX ADMIN — Medication 1 APPLICATION(S): at 11:47

## 2022-05-01 RX ADMIN — Medication 1000 MILLIGRAM(S): at 22:40

## 2022-05-01 RX ADMIN — Medication 1 MILLIGRAM(S): at 11:47

## 2022-05-01 RX ADMIN — MEMANTINE HYDROCHLORIDE 10 MILLIGRAM(S): 10 TABLET ORAL at 05:33

## 2022-05-01 RX ADMIN — Medication 5 MILLIGRAM(S): at 22:10

## 2022-05-01 RX ADMIN — CEFTRIAXONE 100 MILLIGRAM(S): 500 INJECTION, POWDER, FOR SOLUTION INTRAMUSCULAR; INTRAVENOUS at 17:42

## 2022-05-01 RX ADMIN — CHLORHEXIDINE GLUCONATE 1 APPLICATION(S): 213 SOLUTION TOPICAL at 05:32

## 2022-05-01 RX ADMIN — Medication 125 MICROGRAM(S): at 05:33

## 2022-05-01 RX ADMIN — PANTOPRAZOLE SODIUM 40 MILLIGRAM(S): 20 TABLET, DELAYED RELEASE ORAL at 05:33

## 2022-05-01 NOTE — PROGRESS NOTE ADULT - SUBJECTIVE AND OBJECTIVE BOX
Patient is a 77y old  Female who presents with a chief complaint of Sent for possibly infected surgical wound. (01 May 2022 08:46)      INTERVAL HPI/OVERNIGHT EVENTS: Patient seen and examined at bedside. No overnight events    MEDICATIONS  (STANDING):  acetaminophen     Tablet .. 1000 milliGRAM(s) Oral every 8 hours  amLODIPine   Tablet 5 milliGRAM(s) Oral daily  aspirin enteric coated 81 milliGRAM(s) Oral daily  atorvastatin 20 milliGRAM(s) Oral at bedtime  cefTRIAXone   IVPB 2000 milliGRAM(s) IV Intermittent every 24 hours  chlorhexidine 2% Cloths 1 Application(s) Topical <User Schedule>  cholecalciferol 1000 Unit(s) Oral two times a day  cyanocobalamin 500 MICROGram(s) Oral daily  folic acid 1 milliGRAM(s) Oral daily  heparin   Injectable 5000 Unit(s) SubCutaneous every 12 hours  levothyroxine 125 MICROGram(s) Oral daily  losartan 100 milliGRAM(s) Oral daily  melatonin 5 milliGRAM(s) Oral at bedtime  memantine 10 milliGRAM(s) Oral two times a day  mirtazapine 15 milliGRAM(s) Oral at bedtime  multivitamin 1 Tablet(s) Oral daily  mupirocin 2% Ointment 1 Application(s) Both Nostrils two times a day  omega-3-Acid Ethyl Esters 2 Gram(s) Oral daily  pantoprazole    Tablet 40 milliGRAM(s) Oral before breakfast    MEDICATIONS  (PRN):  ALBUTerol    90 MICROgram(s) HFA Inhaler 2 Puff(s) Inhalation every 6 hours PRN Shortness of Breath and/or Wheezing  oxyCODONE    IR 5 milliGRAM(s) Oral every 8 hours PRN Severe Pain (7 - 10)  oxyCODONE    IR 2.5 milliGRAM(s) Oral every 8 hours PRN Moderate Pain (4 - 6)      Allergies    No Known Allergies    Intolerances        REVIEW OF SYSTEMS:  Unable to obtain meaningful ROS due to mental status      Vital Signs Last 24 Hrs  T(C): 36.3 (01 May 2022 09:42), Max: 36.5 (30 Apr 2022 17:05)  T(F): 97.3 (01 May 2022 09:42), Max: 97.7 (30 Apr 2022 17:05)  HR: 67 (01 May 2022 09:42) (67 - 69)  BP: 126/76 (01 May 2022 09:42) (126/76 - 173/83)  BP(mean): --  RR: 19 (01 May 2022 09:42) (17 - 19)  SpO2: 93% (01 May 2022 09:42) (92% - 93%)    PHYSICAL EXAM:  GENERAL: elderly appearing female in NAD  HEAD: atraumatic, normocephalic   EYES: EOMI, conjunctiva and sclera clear  ENMT: edentulous mouth  RESPIRATORY:  diminished bs at bases, poor inspiratory effort   CARDIOVASCULAR:  soft ii/vi holosystolic murmur LUSB  GASTROINTESTINAL:  soft, nontender, nondistended, bowel sounds present  MUSCULOSKELETAL: No cyanosis or edema, surgical wound well healing, non-tender to palpation  NERVOUS SYSTEM:  sensation intact   PSYCH:  awake and alert but confused  SKIN: Multiple ecchymotic areas on skin    LABS:                        10.7   6.01  )-----------( 361      ( 01 May 2022 07:44 )             34.5     CBC Full  -  ( 01 May 2022 07:44 )  WBC Count : 6.01 K/uL  Hemoglobin : 10.7 g/dL  Hematocrit : 34.5 %  Platelet Count - Automated : 361 K/uL  Mean Cell Volume : 96.9 fl  Mean Cell Hemoglobin : 30.1 pg  Mean Cell Hemoglobin Concentration : 31.0 gm/dL  Auto Neutrophil # : x  Auto Lymphocyte # : x  Auto Monocyte # : x  Auto Eosinophil # : x  Auto Basophil # : x  Auto Neutrophil % : x  Auto Lymphocyte % : x  Auto Monocyte % : x  Auto Eosinophil % : x  Auto Basophil % : x    01 May 2022 07:44    140    |  104    |  16     ----------------------------<  87     3.7     |  30     |  0.95     Ca    9.4        01 May 2022 07:44          CAPILLARY BLOOD GLUCOSE            Culture - Blood (collected 04-29-22 @ 16:15)  Source: .Blood Blood  Preliminary Report (04-30-22 @ 17:01):    No growth to date.    Culture - Blood (collected 04-29-22 @ 16:15)  Source: .Blood Blood  Preliminary Report (04-30-22 @ 17:01):    No growth to date.    Culture - Other (collected 04-28-22 @ 13:37)  Source: .Other right hip wound  Final Report (05-01-22 @ 08:54):    Few Methicillin Resistant Staphylococcus aureus  Organism: Methicillin resistant Staphylococcus aureus (05-01-22 @ 08:54)  Organism: Methicillin resistant Staphylococcus aureus (05-01-22 @ 08:54)      -  Ampicillin/Sulbactam: R <=8/4      -  Cefazolin: R >16      -  Clindamycin: S <=0.25      -  Daptomycin: S 0.5      -  Erythromycin: R >4      -  Gentamicin: S <=1 Should not be used as monotherapy      -  Linezolid: S 4      -  Oxacillin: R >2      -  Penicillin: R >8      -  Rifampin: S <=1 Should not be used as monotherapy      -  Tetra/Doxy: S <=1      -  Trimethoprim/Sulfamethoxazole: R >2/38      -  Vancomycin: S 1      Method Type: MEGHA    Culture - Blood (collected 04-27-22 @ 00:38)  Source: .Blood Blood-Peripheral  Gram Stain (04-27-22 @ 16:42):    Growth in anaerobic bottle: Gram Positive Rods  Final Report (04-28-22 @ 16:57):    Growth in anaerobic bottle: Bacillus species not anthracis    "Susceptibilities not performed"    ***Blood Panel PCR results on this specimen are available    approximately 3 hours after the Gram stain result.***    Gram stain, PCR, and/or culture results may not always    correspond due to difference in methodologies.    ************************************************************    This PCR assay was performed by multiplex PCR. This    Assay tests for 66 bacterial and resistance gene targets.    Please refer to the [facility] Labs test directory    at [email or web address] for details.  Organism: Blood Culture PCR (04-28-22 @ 16:57)  Organism: Blood Culture PCR (04-28-22 @ 16:57)      -  Bacillus cereus group: Detec      Method Type: PCR    Culture - Blood (collected 04-27-22 @ 00:38)  Source: .Blood Blood-Peripheral  Preliminary Report (04-28-22 @ 01:03):    No growth to date.        RADIOLOGY & ADDITIONAL TESTS:    Consultant(s) Notes Reviewed:  [x] YES  [ ] NO    Care Discussed with [x] Consultants  [x] Patient  [ ] Family  [ ]      [ x] Other; RN  DVT ppx

## 2022-05-01 NOTE — PHARMACOTHERAPY INTERVENTION NOTE - COMMENTS
MD Aldrich contacted pharmacy due to pts vanco trough level = 27.  Recommending holding 11pm dose due to declining eGFR, crcl and rising Scr.  Switched dosing/timing for Vanco to 1gm eveyr 24 hours and trough level ordered for prior to 5/1 11 am dose.  MD aware to monitor for tabby and declining renal function

## 2022-05-01 NOTE — PROGRESS NOTE ADULT - SUBJECTIVE AND OBJECTIVE BOX
Date/Time Patient Seen:  		  Referring MD:   Data Reviewed	       Patient is a 77y old  Female who presents with a chief complaint of Sent for possibly infected surgical wound. (30 Apr 2022 12:32)      Subjective/HPI     PAST MEDICAL & SURGICAL HISTORY:  Hypertension, unspecified type    Hyperlipidemia, unspecified hyperlipidemia type    Dementia without behavioral disturbance, unspecified dementia type    Hypothyroidism, unspecified type    Hypothyroid    Osteoarthritis    Carotid stenosis    Degenerative joint disease    Hypertension    Hyperlipidemia    Dementia    No significant past surgical history    S/P ORIF (open reduction internal fixation) fracture    History of open reduction and internal fixation (ORIF) procedure          Medication list         MEDICATIONS  (STANDING):  acetaminophen     Tablet .. 1000 milliGRAM(s) Oral every 8 hours  amLODIPine   Tablet 5 milliGRAM(s) Oral daily  aspirin enteric coated 81 milliGRAM(s) Oral daily  atorvastatin 20 milliGRAM(s) Oral at bedtime  cefTRIAXone   IVPB 2000 milliGRAM(s) IV Intermittent every 24 hours  chlorhexidine 2% Cloths 1 Application(s) Topical <User Schedule>  cholecalciferol 1000 Unit(s) Oral two times a day  cyanocobalamin 500 MICROGram(s) Oral daily  folic acid 1 milliGRAM(s) Oral daily  heparin   Injectable 5000 Unit(s) SubCutaneous every 12 hours  levothyroxine 125 MICROGram(s) Oral daily  losartan 100 milliGRAM(s) Oral daily  melatonin 5 milliGRAM(s) Oral at bedtime  memantine 10 milliGRAM(s) Oral two times a day  mirtazapine 15 milliGRAM(s) Oral at bedtime  multivitamin 1 Tablet(s) Oral daily  mupirocin 2% Ointment 1 Application(s) Both Nostrils two times a day  omega-3-Acid Ethyl Esters 2 Gram(s) Oral daily  pantoprazole    Tablet 40 milliGRAM(s) Oral before breakfast    MEDICATIONS  (PRN):  ALBUTerol    90 MICROgram(s) HFA Inhaler 2 Puff(s) Inhalation every 6 hours PRN Shortness of Breath and/or Wheezing  oxyCODONE    IR 5 milliGRAM(s) Oral every 8 hours PRN Severe Pain (7 - 10)  oxyCODONE    IR 2.5 milliGRAM(s) Oral every 8 hours PRN Moderate Pain (4 - 6)         Vitals log        ICU Vital Signs Last 24 Hrs  T(C): 36.4 (01 May 2022 04:53), Max: 36.5 (30 Apr 2022 17:05)  T(F): 97.5 (01 May 2022 04:53), Max: 97.7 (30 Apr 2022 17:05)  HR: 68 (01 May 2022 04:53) (64 - 69)  BP: 134/63 (01 May 2022 04:53) (134/63 - 173/83)  BP(mean): --  ABP: --  ABP(mean): --  RR: 18 (01 May 2022 04:53) (17 - 18)  SpO2: 92% (01 May 2022 04:53) (92% - 92%)           Input and Output:  I&O's Detail    30 Apr 2022 07:01  -  01 May 2022 07:00  --------------------------------------------------------  IN:    IV PiggyBack: 50 mL    Oral Fluid: 100 mL  Total IN: 150 mL    OUT:    Voided (mL): 400 mL  Total OUT: 400 mL    Total NET: -250 mL          Lab Data                        10.7   6.01  )-----------( 361      ( 01 May 2022 07:44 )             34.5     05-01    140  |  104  |  16  ----------------------------<  87  3.7   |  30  |  0.95    Ca    9.4      01 May 2022 07:44              Review of Systems	      Objective     Physical Examination    heart s1s2  lung dc BS  abd soft      Pertinent Lab findings & Imaging      Dominga:  NO   Adequate UO     I&O's Detail    30 Apr 2022 07:01  -  01 May 2022 07:00  --------------------------------------------------------  IN:    IV PiggyBack: 50 mL    Oral Fluid: 100 mL  Total IN: 150 mL    OUT:    Voided (mL): 400 mL  Total OUT: 400 mL    Total NET: -250 mL               Discussed with:     Cultures:	        Radiology

## 2022-05-01 NOTE — PROGRESS NOTE ADULT - SUBJECTIVE AND OBJECTIVE BOX
OPAL ARBOLEDA is a 77yFemale , patient examined and chart reviewed.    INTERVAL HPI/ OVERNIGHT EVENTS:   Afebrile. NAD.     PAST MEDICAL & SURGICAL HISTORY:  Dementia without behavioral disturbance, unspecified dementia type  Hypothyroid  Osteoarthritis  Carotid stenosis  Degenerative joint disease  Hypertension  Hyperlipidemia  History of open reduction and internal fixation (ORIF) procedure        For details regarding the patient's social history, family history, and other miscellaneous elements, please refer the initial infectious diseases consultation and/or the admitting history and physical examination for this admission.    ROS:  Unable to obtain due to : poor historian      Current inpatient medications :    ANTIBIOTICS/RELEVANT:  cefTRIAXone   IVPB 2000 milliGRAM(s) IV Intermittent every 24 hours  vancomycin  IVPB 750 milliGRAM(s) IV Intermittent every 12 hours      MEDICATIONS  (STANDING):  acetaminophen     Tablet .. 1000 milliGRAM(s) Oral every 8 hours  amLODIPine   Tablet 5 milliGRAM(s) Oral daily  aspirin enteric coated 81 milliGRAM(s) Oral daily  atorvastatin 20 milliGRAM(s) Oral at bedtime  chlorhexidine 2% Cloths 1 Application(s) Topical <User Schedule>  cholecalciferol 1000 Unit(s) Oral two times a day  cyanocobalamin 500 MICROGram(s) Oral daily  folic acid 1 milliGRAM(s) Oral daily  heparin   Injectable 5000 Unit(s) SubCutaneous every 12 hours  levothyroxine 125 MICROGram(s) Oral daily  losartan 100 milliGRAM(s) Oral daily  melatonin 5 milliGRAM(s) Oral at bedtime  memantine 10 milliGRAM(s) Oral two times a day  mirtazapine 15 milliGRAM(s) Oral at bedtime  multivitamin 1 Tablet(s) Oral daily  mupirocin 2% Ointment 1 Application(s) Both Nostrils two times a day  omega-3-Acid Ethyl Esters 2 Gram(s) Oral daily  pantoprazole    Tablet 40 milliGRAM(s) Oral before breakfast    MEDICATIONS  (PRN):  ALBUTerol    90 MICROgram(s) HFA Inhaler 2 Puff(s) Inhalation every 6 hours PRN Shortness of Breath and/or Wheezing  oxyCODONE    IR 5 milliGRAM(s) Oral every 8 hours PRN Severe Pain (7 - 10)  oxyCODONE    IR 2.5 milliGRAM(s) Oral every 8 hours PRN Moderate Pain (4 - 6)      Objective:  Vital Signs Last 24 Hrs  T(C): 36.4 (01 May 2022 18:56), Max: 36.4 (01 May 2022 04:53)  T(F): 97.5 (01 May 2022 18:56), Max: 97.5 (01 May 2022 04:53)  HR: 81 (01 May 2022 18:56) (67 - 81)  BP: 155/79 (01 May 2022 18:56) (126/76 - 155/79)  RR: 16 (01 May 2022 18:56) (16 - 19)  SpO2: 94% (01 May 2022 18:56) (92% - 94%)    Physical Exam:  General:  no acute distress  Neck: supple, trachea midline  Lungs: clear, no wheeze/rhonchi  Cardiovascular: regular rate and rhythm, S1 S2  Abdomen: soft, nontender,  bowel sounds normal  Neurological: awake confused  Skin: no rash  Extremities: Right hip decreased erythema induration   nontender      LABS:                        10.7   6.01  )-----------( 361      ( 01 May 2022 07:44 )             34.5   05-01    140  |  104  |  16  ----------------------------<  87  3.7   |  30  |  0.95    Ca    9.4      01 May 2022 07:44    MICROBIOLOGY:  Culture - Other (04.28.22 @ 13:37)    -  Ampicillin/Sulbactam: R <=8/4    -  Cefazolin: R >16    -  Clindamycin: S <=0.25    -  Daptomycin: S 0.5    -  Erythromycin: R >4    -  Gentamicin: S <=1 Should not be used as monotherapy    -  Linezolid: S 4    -  Oxacillin: R >2    -  Penicillin: R >8    -  Rifampin: S <=1 Should not be used as monotherapy    -  Tetra/Doxy: S <=1    -  Trimethoprim/Sulfamethoxazole: R >2/38    -  Vancomycin: S 1    Specimen Source: .Other right hip wound    Culture Results:   Few Methicillin Resistant Staphylococcus aureus    Organism Identification: Methicillin resistant Staphylococcus aureus    Organism: Methicillin resistant Staphylococcus aureus    Method Type: MEGHA      Culture - Blood (collected 27 Apr 2022 00:38)  Source: .Blood Blood-Peripheral  Gram Stain (27 Apr 2022 16:42):    Growth in anaerobic bottle: Gram Positive Rods  Final Report (28 Apr 2022 16:57):    Growth in anaerobic bottle: Bacillus species not anthracis    "Susceptibilities not performed"    ***Blood Panel PCR results on this specimen are available    approximately 3 hours after the Gram stain result.***    Gram stain, PCR, and/or culture results may not always    correspond due to difference in methodologies.    ************************************************************    This PCR assay was performed by multiplex PCR. This    Assay tests for 66 bacterial and resistance gene targets.    Please refer to the [facility] test directory    at [email or web address] for details.  Organism: Blood Culture PCR (28 Apr 2022 16:57)  Organism: Blood Culture PCR (28 Apr 2022 16:57)      -  Bacillus cereus group: Detec      Method Type: PCR    Culture - Blood (collected 27 Apr 2022 00:38)  Source: .Blood Blood-Peripheral  Preliminary Report (28 Apr 2022 01:03):    No growth to date.    RADIOLOGY & ADDITIONAL STUDIES:    ACC: [id number] EXAM:  CT HIP ONLY RT                          PROCEDURE DATE:  04/26/2022          INTERPRETATION:  CT HIP RIGHT    HISTORY: Pain. Postop infection. Right hip intertrochanteric fracture   ORIF.    TECHNIQUE: Contiguous axial imaging was performed through the pelvis   without contrast.  Coronal and sagittal reformatting was utilized.   Patient motion artifact and blurring may cause some limitation.    COMPARISON: CT of the right hip dated 4/14/2022, intraoperative x-rays   dated 4/11/2022, and pelvis and right hip x-rays dated 4/9/2022.    FINDINGS:    OSSEOUS STRUCTURES    Acute/Chronic Fractures:  Comminuted intertrochanteric fracture of the   right hip is again seen with a gamma nail for fixation. Distal   interlocking screw is present. There is mild residual fracture   displacement.    PELVIC JOINTS    Symphysis Pubis:  Preserved.    Sacroiliac Joints:  Maintained.    RIGHT HIP JOINT    Avascular Necrosis:  None.    Joint Space:  Grossly maintained given patient motion.    Effusion/Synovitis:  None.    LEFT HIP JOINT    Avascular Necrosis:  None.    Joint Space:  Grossly maintained given patient motion.    Effusion/Synovitis:  None.    VISUALIZED SPINE  Lower lumbar degenerative disc disease is present.    SOFT TISSUES    Neurovascular:  Severe atherosclerotic calcifications are present.    Pelvis/Abdomen:  Mild presacral edema is present.    Musculature:  Fluid collection is present extending along the posterior   margin of the greater trochanter and within the right gluteus medias   muscle measuring approximately 14.2 cm craniocaudally and up to 6.9 x 4.2   cm axially along the greater trochanter.    Subcutaneous Tissues:  Moderate subcutaneous hematoma formation is   again seen along the right hip incision site with mild hematoma formation   extending along the level of the proximal femoral diaphysis. Mild to   moderate subcutaneous edema is also noted. Overlying skin staples are   noted.    IMPRESSION:  1. Right intertrochanteric fracture ORIF is again seen.  2. Fluid collection along the right greater trochanter and within the   right gluteus medius muscle likely reflects evolution of the prior   hematoma although infection cannot be absolutely excluded with the given   history. Consider aspiration as warranted.  3. Moderate overlying subcutaneous hematoma is again seen.    Assessment :   76YO F PMH of Dementia, HTN, Dyslipidemia, PAD, Hypothyroidism, Anemia resident of [facility] facility SP ORIF, Right hip, using trochanteric nail 10-Apr-2022 complicated by Acute blood loss sec surgical site hematoma admitted with infected surgical site wound with poss infected hematoma + seropurulent drainage   Wound cultures with MRSA  Bacillus bacteremia likely contaminant  Stable    Plan :   Cont Vancomycin  - decrease dosing to 750mg q12h  Trough prior to 3rd dose  Dc Rocephin  Trend temps and cbc  Ortho following   Asp precautions    D/w Dr Stone      Continue with present regiment.  Appropriate use of antibiotics and adverse effects reviewed.    > 35 minutes were spent in direct patient care reviewing notes, medications ,labs data/ imaging , discussion with multidisciplinary team.    Thank you for allowing me to participate in care of your patient .    Radha Pryor MD  Infectious Disease  [phone]

## 2022-05-01 NOTE — PROGRESS NOTE ADULT - SUBJECTIVE AND OBJECTIVE BOX
Chief Complaint: Hip wound    Interval Events: No events overnight.    Review of Systems:  General: No fevers, chills, weight gain  Skin: No rashes, color changes  Cardiovascular: No chest pain, orthopnea  Respiratory: No shortness of breath, cough  Gastrointestinal: No nausea, abdominal pain  Genitourinary: No incontinence, pain with urination  Musculoskeletal: No pain, swelling, decreased range of motion  Neurological: No headache, weakness  Psychiatric: No depression, anxiety  Endocrine: No weight gain, increased thirst  All other systems are comprehensively negative.    Physical Exam:  Vital Signs Last 24 Hrs  T(C): 36.4 (01 May 2022 04:53), Max: 36.5 (30 Apr 2022 17:05)  T(F): 97.5 (01 May 2022 04:53), Max: 97.7 (30 Apr 2022 17:05)  HR: 68 (01 May 2022 04:53) (64 - 69)  BP: 134/63 (01 May 2022 04:53) (134/63 - 173/83)  BP(mean): --  RR: 18 (01 May 2022 04:53) (17 - 18)  SpO2: 92% (01 May 2022 04:53) (92% - 92%)  General: NAD  HEENT: MMM  Neck: No JVD, no carotid bruit  Lungs: CTAB  CV: RRR, nl S1/S2, no M/R/G  Abdomen: S/NT/ND, +BS  Extremities: No LE edema, no cyanosis  Neuro: AAOx1  Skin: No rash    Labs:    04-30    136  |  101  |  19  ----------------------------<  143<H>  4.2   |  28  |  1.08    Ca    9.1      30 Apr 2022 11:55                          10.7   6.01  )-----------( 361      ( 01 May 2022 07:44 )             34.5

## 2022-05-02 LAB
ANION GAP SERPL CALC-SCNC: 4 MMOL/L — LOW (ref 5–17)
BUN SERPL-MCNC: 18 MG/DL — SIGNIFICANT CHANGE UP (ref 7–23)
CALCIUM SERPL-MCNC: 9.3 MG/DL — SIGNIFICANT CHANGE UP (ref 8.4–10.5)
CHLORIDE SERPL-SCNC: 101 MMOL/L — SIGNIFICANT CHANGE UP (ref 96–108)
CO2 SERPL-SCNC: 31 MMOL/L — SIGNIFICANT CHANGE UP (ref 22–31)
CREAT SERPL-MCNC: 1.02 MG/DL — SIGNIFICANT CHANGE UP (ref 0.5–1.3)
CULTURE RESULTS: SIGNIFICANT CHANGE UP
EGFR: 56 ML/MIN/1.73M2 — LOW
GLUCOSE SERPL-MCNC: 105 MG/DL — HIGH (ref 70–99)
HCT VFR BLD CALC: 32.8 % — LOW (ref 34.5–45)
HGB BLD-MCNC: 10.2 G/DL — LOW (ref 11.5–15.5)
MCHC RBC-ENTMCNC: 29.8 PG — SIGNIFICANT CHANGE UP (ref 27–34)
MCHC RBC-ENTMCNC: 31.1 GM/DL — LOW (ref 32–36)
MCV RBC AUTO: 95.9 FL — SIGNIFICANT CHANGE UP (ref 80–100)
NRBC # BLD: 0 /100 WBCS — SIGNIFICANT CHANGE UP (ref 0–0)
PLATELET # BLD AUTO: 376 K/UL — SIGNIFICANT CHANGE UP (ref 150–400)
POTASSIUM SERPL-MCNC: 3.6 MMOL/L — SIGNIFICANT CHANGE UP (ref 3.5–5.3)
POTASSIUM SERPL-SCNC: 3.6 MMOL/L — SIGNIFICANT CHANGE UP (ref 3.5–5.3)
RBC # BLD: 3.42 M/UL — LOW (ref 3.8–5.2)
RBC # FLD: 14.5 % — SIGNIFICANT CHANGE UP (ref 10.3–14.5)
SODIUM SERPL-SCNC: 136 MMOL/L — SIGNIFICANT CHANGE UP (ref 135–145)
SPECIMEN SOURCE: SIGNIFICANT CHANGE UP
VANCOMYCIN TROUGH SERPL-MCNC: 24 UG/ML — HIGH (ref 10–20)
WBC # BLD: 6.51 K/UL — SIGNIFICANT CHANGE UP (ref 3.8–10.5)
WBC # FLD AUTO: 6.51 K/UL — SIGNIFICANT CHANGE UP (ref 3.8–10.5)

## 2022-05-02 PROCEDURE — 99232 SBSQ HOSP IP/OBS MODERATE 35: CPT

## 2022-05-02 RX ADMIN — Medication 2 GRAM(S): at 11:05

## 2022-05-02 RX ADMIN — Medication 1 TABLET(S): at 11:04

## 2022-05-02 RX ADMIN — Medication 250 MILLIGRAM(S): at 05:47

## 2022-05-02 RX ADMIN — Medication 125 MICROGRAM(S): at 05:47

## 2022-05-02 RX ADMIN — PREGABALIN 500 MICROGRAM(S): 225 CAPSULE ORAL at 11:04

## 2022-05-02 RX ADMIN — ATORVASTATIN CALCIUM 20 MILLIGRAM(S): 80 TABLET, FILM COATED ORAL at 21:43

## 2022-05-02 RX ADMIN — AMLODIPINE BESYLATE 5 MILLIGRAM(S): 2.5 TABLET ORAL at 05:47

## 2022-05-02 RX ADMIN — PANTOPRAZOLE SODIUM 40 MILLIGRAM(S): 20 TABLET, DELAYED RELEASE ORAL at 07:37

## 2022-05-02 RX ADMIN — Medication 1000 UNIT(S): at 05:47

## 2022-05-02 RX ADMIN — MEMANTINE HYDROCHLORIDE 10 MILLIGRAM(S): 10 TABLET ORAL at 17:12

## 2022-05-02 RX ADMIN — Medication 1000 MILLIGRAM(S): at 05:47

## 2022-05-02 RX ADMIN — HEPARIN SODIUM 5000 UNIT(S): 5000 INJECTION INTRAVENOUS; SUBCUTANEOUS at 05:47

## 2022-05-02 RX ADMIN — MEMANTINE HYDROCHLORIDE 10 MILLIGRAM(S): 10 TABLET ORAL at 05:47

## 2022-05-02 RX ADMIN — MUPIROCIN 1 APPLICATION(S): 20 OINTMENT TOPICAL at 17:13

## 2022-05-02 RX ADMIN — Medication 250 MILLIGRAM(S): at 17:11

## 2022-05-02 RX ADMIN — MIRTAZAPINE 15 MILLIGRAM(S): 45 TABLET, ORALLY DISINTEGRATING ORAL at 21:43

## 2022-05-02 RX ADMIN — CHLORHEXIDINE GLUCONATE 1 APPLICATION(S): 213 SOLUTION TOPICAL at 05:48

## 2022-05-02 RX ADMIN — Medication 81 MILLIGRAM(S): at 11:04

## 2022-05-02 RX ADMIN — MUPIROCIN 1 APPLICATION(S): 20 OINTMENT TOPICAL at 05:47

## 2022-05-02 RX ADMIN — Medication 1000 MILLIGRAM(S): at 06:47

## 2022-05-02 RX ADMIN — Medication 1 MILLIGRAM(S): at 11:04

## 2022-05-02 RX ADMIN — LOSARTAN POTASSIUM 100 MILLIGRAM(S): 100 TABLET, FILM COATED ORAL at 05:47

## 2022-05-02 RX ADMIN — Medication 5 MILLIGRAM(S): at 21:43

## 2022-05-02 RX ADMIN — Medication 1000 UNIT(S): at 17:12

## 2022-05-02 RX ADMIN — HEPARIN SODIUM 5000 UNIT(S): 5000 INJECTION INTRAVENOUS; SUBCUTANEOUS at 17:12

## 2022-05-02 NOTE — PROGRESS NOTE ADULT - SUBJECTIVE AND OBJECTIVE BOX
Patient sitting up in chair. No complaints. Appears comfortable. Baseline confusion.     ICU Vital Signs Last 24 Hrs  T(C): 36.3 (02 May 2022 10:05), Max: 36.4 (01 May 2022 18:56)  T(F): 97.3 (02 May 2022 10:05), Max: 97.6 (02 May 2022 05:45)  HR: 60 (02 May 2022 10:05) (60 - 81)  BP: 159/75 (02 May 2022 10:05) (152/73 - 159/75)  RR: 18 (02 May 2022 10:05) (16 - 18)  SpO2: 94% (02 May 2022 10:05) (92% - 94%)                            10.2   6.51  )-----------( 376      ( 02 May 2022 07:13 )             32.8       05-02    136  |  101  |  18  ----------------------------<  105<H>  3.6   |  31  |  1.02    Ca    9.3      02 May 2022 07:13       Culture Results:   No growth to date. (04-29 @ 16:15)  Culture Results:   No growth to date. (04-29 @ 16:15)  Culture Results:   Few Methicillin Resistant Staphylococcus aureus (04-28 @ 13:37)  Culture Results:   No Growth Final (04-27 @ 00:38)  Culture Results:   Growth in anaerobic bottle: Bacillus species not anthracis  "Susceptibilities not performed"  ***Blood Panel PCR results on this specimen are available  approximately 3 hours after the Gram stain result.***  Gram stain, PCR, and/or culture results may not always  correspond due to difference in methodologies.  ************************************************************  This PCR assay was performed by multiplex PCR. This  Assay tests for 66 bacterial and resistance gene targets.  Please refer to the [facility] test directory  at [email or web address] for details. (04-27 @ 00:38)  Culture Results:   No Growth Final (04-10 @ 20:26)  Culture Results:   No Growth Final (04-10 @ 20:26)  Culture Results:   No growth (04-10 @ 20:25)    Right hip/thigh: incisions CDI.   calves supple and NT bilaterally  Moving LEs well.      A/P: Patient with MRSA. Getting PICC line. Silverlon dressing applied to proximal incision.

## 2022-05-02 NOTE — PROVIDER CONTACT NOTE (CRITICAL VALUE NOTIFICATION) - ACTION/TREATMENT ORDERED:
Per MD Elinor Patient medication regimen will be adjusted. Will follow up with MD order.
continue abx tx
As above.

## 2022-05-02 NOTE — PROGRESS NOTE ADULT - SUBJECTIVE AND OBJECTIVE BOX
Subjective: Patient seen and examined.  No overnight events. Pain controlled.     MEDICATIONS  (STANDING):  amLODIPine   Tablet 5 milliGRAM(s) Oral daily  aspirin enteric coated 81 milliGRAM(s) Oral daily  atorvastatin 20 milliGRAM(s) Oral at bedtime  cefTRIAXone   IVPB 2000 milliGRAM(s) IV Intermittent every 24 hours  chlorhexidine 2% Cloths 1 Application(s) Topical <User Schedule>  cholecalciferol 1000 Unit(s) Oral two times a day  cyanocobalamin 500 MICROGram(s) Oral daily  folic acid 1 milliGRAM(s) Oral daily  heparin   Injectable 5000 Unit(s) SubCutaneous every 12 hours  levothyroxine 125 MICROGram(s) Oral daily  losartan 100 milliGRAM(s) Oral daily  melatonin 5 milliGRAM(s) Oral at bedtime  memantine 10 milliGRAM(s) Oral two times a day  mirtazapine 15 milliGRAM(s) Oral at bedtime  multivitamin 1 Tablet(s) Oral daily  mupirocin 2% Ointment 1 Application(s) Both Nostrils two times a day  omega-3-Acid Ethyl Esters 2 Gram(s) Oral daily  pantoprazole    Tablet 40 milliGRAM(s) Oral before breakfast  vancomycin  IVPB 750 milliGRAM(s) IV Intermittent every 12 hours    MEDICATIONS  (PRN):  ALBUTerol    90 MICROgram(s) HFA Inhaler 2 Puff(s) Inhalation every 6 hours PRN Shortness of Breath and/or Wheezing  oxyCODONE    IR 5 milliGRAM(s) Oral every 8 hours PRN Severe Pain (7 - 10)  oxyCODONE    IR 2.5 milliGRAM(s) Oral every 8 hours PRN Moderate Pain (4 - 6)      Allergies    No Known Allergies    Intolerances        Vital Signs Last 24 Hrs  T(C): 36.3 (02 May 2022 10:05), Max: 36.4 (01 May 2022 18:56)  T(F): 97.3 (02 May 2022 10:05), Max: 97.6 (02 May 2022 05:45)  HR: 60 (02 May 2022 10:05) (60 - 81)  BP: 159/75 (02 May 2022 10:05) (152/73 - 159/75)  BP(mean): --  RR: 18 (02 May 2022 10:05) (16 - 18)  SpO2: 94% (02 May 2022 10:05) (92% - 94%)    PHYSICAL EXAM:  GENERAL: NAD, well-groomed, well-developed  HEAD:  Atraumatic, Normocephalic  ENMT: Moist mucous membranes,   NECK: Supple, No JVD, Normal thyroid  NERVOUS SYSTEM:  All 4 extremities mobile, no gross sensory deficits.   CHEST/LUNG: Clear to auscultation bilaterally; No rales, rhonchi, wheezing, or rubs  HEART: Regular rate and rhythm; No murmurs, rubs, or gallops  ABDOMEN: Soft, Nontender, Nondistended; Bowel sounds present  EXTREMITIES:  2+ Peripheral Pulses, No clubbing, cyanosis, or edema      LABS:                        10.2   6.51  )-----------( 376      ( 02 May 2022 07:13 )             32.8     02 May 2022 07:13    136    |  101    |  18     ----------------------------<  105    3.6     |  31     |  1.02     Ca    9.3        02 May 2022 07:13          CAPILLARY BLOOD GLUCOSE          RADIOLOGY & ADDITIONAL TESTS:    Imaging Personally Reviewed:  [ ] YES     Consultant(s) Notes Reviewed:      Care Discussed with Consultants/Other Providers:    Advanced Directives: [ ] DNR  [ ] No feeding tube  [ ] MOLST in chart  [ ] MOLST completed today  [ ] Unknown

## 2022-05-02 NOTE — PROVIDER CONTACT NOTE (CRITICAL VALUE NOTIFICATION) - ASSESSMENT
On 750 mg IV Vanco BID  BUN/creatinine WDL   No s/s acute distress  17:00 dose of vanco now infusing

## 2022-05-02 NOTE — PROGRESS NOTE ADULT - SUBJECTIVE AND OBJECTIVE BOX
Date/Time Patient Seen:  		  Referring MD:   Data Reviewed	       Patient is a 77y old  Female who presents with a chief complaint of Sent for possibly infected surgical wound. (01 May 2022 18:05)      Subjective/HPI     PAST MEDICAL & SURGICAL HISTORY:  Hypertension, unspecified type    Hyperlipidemia, unspecified hyperlipidemia type    Dementia without behavioral disturbance, unspecified dementia type    Hypothyroidism, unspecified type    Hypothyroid    Osteoarthritis    Carotid stenosis    Degenerative joint disease    Hypertension    Hyperlipidemia    Dementia    No significant past surgical history    S/P ORIF (open reduction internal fixation) fracture    History of open reduction and internal fixation (ORIF) procedure          Medication list         MEDICATIONS  (STANDING):  amLODIPine   Tablet 5 milliGRAM(s) Oral daily  aspirin enteric coated 81 milliGRAM(s) Oral daily  atorvastatin 20 milliGRAM(s) Oral at bedtime  cefTRIAXone   IVPB 2000 milliGRAM(s) IV Intermittent every 24 hours  chlorhexidine 2% Cloths 1 Application(s) Topical <User Schedule>  cholecalciferol 1000 Unit(s) Oral two times a day  cyanocobalamin 500 MICROGram(s) Oral daily  folic acid 1 milliGRAM(s) Oral daily  heparin   Injectable 5000 Unit(s) SubCutaneous every 12 hours  levothyroxine 125 MICROGram(s) Oral daily  losartan 100 milliGRAM(s) Oral daily  melatonin 5 milliGRAM(s) Oral at bedtime  memantine 10 milliGRAM(s) Oral two times a day  mirtazapine 15 milliGRAM(s) Oral at bedtime  multivitamin 1 Tablet(s) Oral daily  mupirocin 2% Ointment 1 Application(s) Both Nostrils two times a day  omega-3-Acid Ethyl Esters 2 Gram(s) Oral daily  pantoprazole    Tablet 40 milliGRAM(s) Oral before breakfast  vancomycin  IVPB 750 milliGRAM(s) IV Intermittent every 12 hours    MEDICATIONS  (PRN):  ALBUTerol    90 MICROgram(s) HFA Inhaler 2 Puff(s) Inhalation every 6 hours PRN Shortness of Breath and/or Wheezing  oxyCODONE    IR 5 milliGRAM(s) Oral every 8 hours PRN Severe Pain (7 - 10)  oxyCODONE    IR 2.5 milliGRAM(s) Oral every 8 hours PRN Moderate Pain (4 - 6)         Vitals log        ICU Vital Signs Last 24 Hrs  T(C): 36.4 (02 May 2022 05:45), Max: 36.4 (01 May 2022 18:56)  T(F): 97.6 (02 May 2022 05:45), Max: 97.6 (02 May 2022 05:45)  HR: 64 (02 May 2022 05:45) (64 - 81)  BP: 152/73 (02 May 2022 05:45) (126/76 - 155/79)  BP(mean): --  ABP: --  ABP(mean): --  RR: 18 (02 May 2022 05:45) (16 - 19)  SpO2: 92% (02 May 2022 05:45) (92% - 94%)           Input and Output:  I&O's Detail    30 Apr 2022 07:01  -  01 May 2022 07:00  --------------------------------------------------------  IN:    IV PiggyBack: 50 mL    Oral Fluid: 100 mL  Total IN: 150 mL    OUT:    Voided (mL): 400 mL  Total OUT: 400 mL    Total NET: -250 mL          Lab Data                        10.7   6.01  )-----------( 361      ( 01 May 2022 07:44 )             34.5     05-01    140  |  104  |  16  ----------------------------<  87  3.7   |  30  |  0.95    Ca    9.4      01 May 2022 07:44              Review of Systems	      Objective     Physical Examination    heart s1s2  lung dec BS  abd soft      Pertinent Lab findings & Imaging      Dominga:  NO   Adequate UO     I&O's Detail    30 Apr 2022 07:01  -  01 May 2022 07:00  --------------------------------------------------------  IN:    IV PiggyBack: 50 mL    Oral Fluid: 100 mL  Total IN: 150 mL    OUT:    Voided (mL): 400 mL  Total OUT: 400 mL    Total NET: -250 mL               Discussed with:     Cultures:	        Radiology

## 2022-05-02 NOTE — PROGRESS NOTE ADULT - SUBJECTIVE AND OBJECTIVE BOX
OPAL ARBOLEDA is a 77yFemale , patient examined and chart reviewed.    INTERVAL HPI/ OVERNIGHT EVENTS:   Afebrile. NAD.   Noevents.    PAST MEDICAL & SURGICAL HISTORY:  Dementia without behavioral disturbance, unspecified dementia type  Hypothyroid  Osteoarthritis  Carotid stenosis  Degenerative joint disease  Hypertension  Hyperlipidemia  History of open reduction and internal fixation (ORIF) procedure        For details regarding the patient's social history, family history, and other miscellaneous elements, please refer the initial infectious diseases consultation and/or the admitting history and physical examination for this admission.    ROS:  Unable to obtain due to : poor historian      Current inpatient medications :    ANTIBIOTICS/RELEVANT:  vancomycin  IVPB 750 milliGRAM(s) IV Intermittent every 12 hours    MEDICATIONS  (STANDING):  amLODIPine   Tablet 5 milliGRAM(s) Oral daily  aspirin enteric coated 81 milliGRAM(s) Oral daily  atorvastatin 20 milliGRAM(s) Oral at bedtime  chlorhexidine 2% Cloths 1 Application(s) Topical <User Schedule>  cholecalciferol 1000 Unit(s) Oral two times a day  cyanocobalamin 500 MICROGram(s) Oral daily  folic acid 1 milliGRAM(s) Oral daily  heparin   Injectable 5000 Unit(s) SubCutaneous every 12 hours  levothyroxine 125 MICROGram(s) Oral daily  losartan 100 milliGRAM(s) Oral daily  melatonin 5 milliGRAM(s) Oral at bedtime  memantine 10 milliGRAM(s) Oral two times a day  mirtazapine 15 milliGRAM(s) Oral at bedtime  multivitamin 1 Tablet(s) Oral daily  mupirocin 2% Ointment 1 Application(s) Both Nostrils two times a day  omega-3-Acid Ethyl Esters 2 Gram(s) Oral daily  pantoprazole    Tablet 40 milliGRAM(s) Oral before breakfast    MEDICATIONS  (PRN):  ALBUTerol    90 MICROgram(s) HFA Inhaler 2 Puff(s) Inhalation every 6 hours PRN Shortness of Breath and/or Wheezing  oxyCODONE    IR 5 milliGRAM(s) Oral every 8 hours PRN Severe Pain (7 - 10)  oxyCODONE    IR 2.5 milliGRAM(s) Oral every 8 hours PRN Moderate Pain (4 - 6)      Objective:  Vital Signs Last 24 Hrs  T(C): 36.3 (05-02-22 @ 10:05), Max: 36.4 (05-01-22 @ 18:56)  T(F): 97.3 (05-02-22 @ 10:05), Max: 97.6 (05-02-22 @ 05:45)  HR: 60 (05-02-22 @ 10:05) (60 - 81)  BP: 159/75 (05-02-22 @ 10:05) (152/73 - 159/75)  RR: 18 (05-02-22 @ 10:05) (16 - 18)  SpO2: 94% (05-02-22 @ 10:05) (92% - 94%)      Physical Exam:  General:  no acute distress  Neck: supple, trachea midline  Lungs: clear, no wheeze/rhonchi  Cardiovascular: regular rate and rhythm, S1 S2  Abdomen: soft, nontender,  bowel sounds normal  Neurological: awake confused  Skin: no rash  Extremities: Right hip decreased erythema induration   nontender      LABS:                        10.2   6.51  )-----------( 376      ( 02 May 2022 07:13 )             32.8   05-02    136  |  101  |  18  ----------------------------<  105<H>  3.6   |  31  |  1.02    Ca    9.3      02 May 2022 07:13    MICROBIOLOGY:  Culture - Other (04.28.22 @ 13:37)    -  Ampicillin/Sulbactam: R <=8/4    -  Cefazolin: R >16    -  Clindamycin: S <=0.25    -  Daptomycin: S 0.5    -  Erythromycin: R >4    -  Gentamicin: S <=1 Should not be used as monotherapy    -  Linezolid: S 4    -  Oxacillin: R >2    -  Penicillin: R >8    -  Rifampin: S <=1 Should not be used as monotherapy    -  Tetra/Doxy: S <=1    -  Trimethoprim/Sulfamethoxazole: R >2/38    -  Vancomycin: S 1    Specimen Source: .Other right hip wound    Culture Results:   Few Methicillin Resistant Staphylococcus aureus    Organism Identification: Methicillin resistant Staphylococcus aureus    Organism: Methicillin resistant Staphylococcus aureus    Method Type: MEGHA      Culture - Blood (collected 27 Apr 2022 00:38)  Source: .Blood Blood-Peripheral  Gram Stain (27 Apr 2022 16:42):    Growth in anaerobic bottle: Gram Positive Rods  Final Report (28 Apr 2022 16:57):    Growth in anaerobic bottle: Bacillus species not anthracis    "Susceptibilities not performed"    ***Blood Panel PCR results on this specimen are available    approximately 3 hours after the Gram stain result.***    Gram stain, PCR, and/or culture results may not always    correspond due to difference in methodologies.    ************************************************************    This PCR assay was performed by multiplex PCR. This    Assay tests for 66 bacterial and resistance gene targets.    Please refer to the [facility] test directory    at [email or web address] for details.  Organism: Blood Culture PCR (28 Apr 2022 16:57)  Organism: Blood Culture PCR (28 Apr 2022 16:57)      -  Bacillus cereus group: Detec      Method Type: PCR    Culture - Blood (collected 27 Apr 2022 00:38)  Source: .Blood Blood-Peripheral  Preliminary Report (28 Apr 2022 01:03):    No growth to date.    RADIOLOGY & ADDITIONAL STUDIES:    ACC: [id number] EXAM:  CT HIP ONLY RT                          PROCEDURE DATE:  04/26/2022          INTERPRETATION:  CT HIP RIGHT    HISTORY: Pain. Postop infection. Right hip intertrochanteric fracture   ORIF.    TECHNIQUE: Contiguous axial imaging was performed through the pelvis   without contrast.  Coronal and sagittal reformatting was utilized.   Patient motion artifact and blurring may cause some limitation.    COMPARISON: CT of the right hip dated 4/14/2022, intraoperative x-rays   dated 4/11/2022, and pelvis and right hip x-rays dated 4/9/2022.    FINDINGS:    OSSEOUS STRUCTURES    Acute/Chronic Fractures:  Comminuted intertrochanteric fracture of the   right hip is again seen with a gamma nail for fixation. Distal   interlocking screw is present. There is mild residual fracture   displacement.    PELVIC JOINTS    Symphysis Pubis:  Preserved.    Sacroiliac Joints:  Maintained.    RIGHT HIP JOINT    Avascular Necrosis:  None.    Joint Space:  Grossly maintained given patient motion.    Effusion/Synovitis:  None.    LEFT HIP JOINT    Avascular Necrosis:  None.    Joint Space:  Grossly maintained given patient motion.    Effusion/Synovitis:  None.    VISUALIZED SPINE  Lower lumbar degenerative disc disease is present.    SOFT TISSUES    Neurovascular:  Severe atherosclerotic calcifications are present.    Pelvis/Abdomen:  Mild presacral edema is present.    Musculature:  Fluid collection is present extending along the posterior   margin of the greater trochanter and within the right gluteus medias   muscle measuring approximately 14.2 cm craniocaudally and up to 6.9 x 4.2   cm axially along the greater trochanter.    Subcutaneous Tissues:  Moderate subcutaneous hematoma formation is   again seen along the right hip incision site with mild hematoma formation   extending along the level of the proximal femoral diaphysis. Mild to   moderate subcutaneous edema is also noted. Overlying skin staples are   noted.    IMPRESSION:  1. Right intertrochanteric fracture ORIF is again seen.  2. Fluid collection along the right greater trochanter and within the   right gluteus medius muscle likely reflects evolution of the prior   hematoma although infection cannot be absolutely excluded with the given   history. Consider aspiration as warranted.  3. Moderate overlying subcutaneous hematoma is again seen.    Assessment :   78YO F PMH of Dementia, HTN, Dyslipidemia, PAD, Hypothyroidism, Anemia resident of [facility] facility SP ORIF, Right hip, using trochanteric nail 10-Apr-2022 complicated by Acute blood loss sec surgical site hematoma admitted with infected surgical site wound with poss infected hematoma + seropurulent drainage   Wound cultures with MRSA  Bacillus bacteremia likely contaminant  Stable    Plan :   Cont Vancomycin  - decreased dosing to 750mg q12h  Trough prior to 3rd dose  Will need long term IV antibiotics x 6 weeks till 6/8/22  PICC line Wednesday  Trend temps and cbc  Ortho following   Asp precautions    D/w Dr Daniel      Continue with present regiment.  Appropriate use of antibiotics and adverse effects reviewed.    > 35 minutes were spent in direct patient care reviewing notes, medications ,labs data/ imaging , discussion with multidisciplinary team.    Thank you for allowing me to participate in care of your patient .    Radha Pryor MD  Infectious Disease  [phone]

## 2022-05-02 NOTE — PROGRESS NOTE ADULT - SUBJECTIVE AND OBJECTIVE BOX
Chief Complaint: Hip wound    Interval Events: No events overnight.    Review of Systems:  General: No fevers, chills, weight gain  Skin: No rashes, color changes  Cardiovascular: No chest pain, orthopnea  Respiratory: No shortness of breath, cough  Gastrointestinal: No nausea, abdominal pain  Genitourinary: No incontinence, pain with urination  Musculoskeletal: No pain, swelling, decreased range of motion  Neurological: No headache, weakness  Psychiatric: No depression, anxiety  Endocrine: No weight gain, increased thirst  All other systems are comprehensively negative.    Physical Exam:  Vital Signs Last 24 Hrs  T(C): 36.4 (02 May 2022 05:45), Max: 36.4 (01 May 2022 18:56)  T(F): 97.6 (02 May 2022 05:45), Max: 97.6 (02 May 2022 05:45)  HR: 64 (02 May 2022 05:45) (64 - 81)  BP: 152/73 (02 May 2022 05:45) (126/76 - 155/79)  BP(mean): --  RR: 18 (02 May 2022 05:45) (16 - 19)  SpO2: 92% (02 May 2022 05:45) (92% - 94%)  General: NAD  HEENT: MMM  Neck: No JVD, no carotid bruit  Lungs: CTAB  CV: RRR, nl S1/S2, no M/R/G  Abdomen: S/NT/ND, +BS  Extremities: No LE edema, no cyanosis  Neuro: AAOx1  Skin: No rash    Labs:    05-02    136  |  101  |  18  ----------------------------<  105<H>  3.6   |  31  |  1.02    Ca    9.3      02 May 2022 07:13                          10.2   6.51  )-----------( 376      ( 02 May 2022 07:13 )             32.8

## 2022-05-03 LAB
ALBUMIN SERPL ELPH-MCNC: 2.5 G/DL — LOW (ref 3.3–5)
ALP SERPL-CCNC: 98 U/L — SIGNIFICANT CHANGE UP (ref 30–120)
ALT FLD-CCNC: 12 U/L DA — SIGNIFICANT CHANGE UP (ref 10–60)
ANION GAP SERPL CALC-SCNC: 4 MMOL/L — LOW (ref 5–17)
AST SERPL-CCNC: 18 U/L — SIGNIFICANT CHANGE UP (ref 10–40)
BASOPHILS # BLD AUTO: 0.03 K/UL — SIGNIFICANT CHANGE UP (ref 0–0.2)
BASOPHILS NFR BLD AUTO: 0.5 % — SIGNIFICANT CHANGE UP (ref 0–2)
BILIRUB SERPL-MCNC: 0.4 MG/DL — SIGNIFICANT CHANGE UP (ref 0.2–1.2)
BUN SERPL-MCNC: 18 MG/DL — SIGNIFICANT CHANGE UP (ref 7–23)
CALCIUM SERPL-MCNC: 9.2 MG/DL — SIGNIFICANT CHANGE UP (ref 8.4–10.5)
CHLORIDE SERPL-SCNC: 101 MMOL/L — SIGNIFICANT CHANGE UP (ref 96–108)
CO2 SERPL-SCNC: 32 MMOL/L — HIGH (ref 22–31)
CREAT SERPL-MCNC: 1.02 MG/DL — SIGNIFICANT CHANGE UP (ref 0.5–1.3)
EGFR: 56 ML/MIN/1.73M2 — LOW
EOSINOPHIL # BLD AUTO: 0.22 K/UL — SIGNIFICANT CHANGE UP (ref 0–0.5)
EOSINOPHIL NFR BLD AUTO: 3.5 % — SIGNIFICANT CHANGE UP (ref 0–6)
GLUCOSE SERPL-MCNC: 86 MG/DL — SIGNIFICANT CHANGE UP (ref 70–99)
HCT VFR BLD CALC: 33.7 % — LOW (ref 34.5–45)
HGB BLD-MCNC: 10.3 G/DL — LOW (ref 11.5–15.5)
IMM GRANULOCYTES NFR BLD AUTO: 1.4 % — SIGNIFICANT CHANGE UP (ref 0–1.5)
LYMPHOCYTES # BLD AUTO: 1.09 K/UL — SIGNIFICANT CHANGE UP (ref 1–3.3)
LYMPHOCYTES # BLD AUTO: 17.2 % — SIGNIFICANT CHANGE UP (ref 13–44)
MCHC RBC-ENTMCNC: 29.9 PG — SIGNIFICANT CHANGE UP (ref 27–34)
MCHC RBC-ENTMCNC: 30.6 GM/DL — LOW (ref 32–36)
MCV RBC AUTO: 97.7 FL — SIGNIFICANT CHANGE UP (ref 80–100)
MONOCYTES # BLD AUTO: 0.61 K/UL — SIGNIFICANT CHANGE UP (ref 0–0.9)
MONOCYTES NFR BLD AUTO: 9.7 % — SIGNIFICANT CHANGE UP (ref 2–14)
NEUTROPHILS # BLD AUTO: 4.28 K/UL — SIGNIFICANT CHANGE UP (ref 1.8–7.4)
NEUTROPHILS NFR BLD AUTO: 67.7 % — SIGNIFICANT CHANGE UP (ref 43–77)
NRBC # BLD: 0 /100 WBCS — SIGNIFICANT CHANGE UP (ref 0–0)
PLATELET # BLD AUTO: 356 K/UL — SIGNIFICANT CHANGE UP (ref 150–400)
POTASSIUM SERPL-MCNC: 3.7 MMOL/L — SIGNIFICANT CHANGE UP (ref 3.5–5.3)
POTASSIUM SERPL-SCNC: 3.7 MMOL/L — SIGNIFICANT CHANGE UP (ref 3.5–5.3)
PROT SERPL-MCNC: 6.4 G/DL — SIGNIFICANT CHANGE UP (ref 6–8.3)
RBC # BLD: 3.45 M/UL — LOW (ref 3.8–5.2)
RBC # FLD: 14.6 % — HIGH (ref 10.3–14.5)
SARS-COV-2 RNA SPEC QL NAA+PROBE: SIGNIFICANT CHANGE UP
SODIUM SERPL-SCNC: 137 MMOL/L — SIGNIFICANT CHANGE UP (ref 135–145)
VANCOMYCIN TROUGH SERPL-MCNC: 18.9 UG/ML — SIGNIFICANT CHANGE UP (ref 10–20)
WBC # BLD: 6.32 K/UL — SIGNIFICANT CHANGE UP (ref 3.8–10.5)
WBC # FLD AUTO: 6.32 K/UL — SIGNIFICANT CHANGE UP (ref 3.8–10.5)

## 2022-05-03 PROCEDURE — 99232 SBSQ HOSP IP/OBS MODERATE 35: CPT

## 2022-05-03 RX ORDER — ACETAMINOPHEN 500 MG
1000 TABLET ORAL EVERY 8 HOURS
Refills: 0 | Status: DISCONTINUED | OUTPATIENT
Start: 2022-05-03 | End: 2022-05-07

## 2022-05-03 RX ORDER — VANCOMYCIN HCL 1 G
1000 VIAL (EA) INTRAVENOUS EVERY 24 HOURS
Refills: 0 | Status: DISCONTINUED | OUTPATIENT
Start: 2022-05-04 | End: 2022-05-07

## 2022-05-03 RX ADMIN — MUPIROCIN 1 APPLICATION(S): 20 OINTMENT TOPICAL at 05:49

## 2022-05-03 RX ADMIN — Medication 1 TABLET(S): at 11:30

## 2022-05-03 RX ADMIN — MEMANTINE HYDROCHLORIDE 10 MILLIGRAM(S): 10 TABLET ORAL at 05:49

## 2022-05-03 RX ADMIN — HEPARIN SODIUM 5000 UNIT(S): 5000 INJECTION INTRAVENOUS; SUBCUTANEOUS at 17:17

## 2022-05-03 RX ADMIN — PREGABALIN 500 MICROGRAM(S): 225 CAPSULE ORAL at 11:30

## 2022-05-03 RX ADMIN — Medication 1000 MILLIGRAM(S): at 13:30

## 2022-05-03 RX ADMIN — ATORVASTATIN CALCIUM 20 MILLIGRAM(S): 80 TABLET, FILM COATED ORAL at 23:22

## 2022-05-03 RX ADMIN — Medication 125 MICROGRAM(S): at 05:49

## 2022-05-03 RX ADMIN — Medication 1000 MILLIGRAM(S): at 23:23

## 2022-05-03 RX ADMIN — CHLORHEXIDINE GLUCONATE 1 APPLICATION(S): 213 SOLUTION TOPICAL at 05:49

## 2022-05-03 RX ADMIN — Medication 1000 UNIT(S): at 17:17

## 2022-05-03 RX ADMIN — HEPARIN SODIUM 5000 UNIT(S): 5000 INJECTION INTRAVENOUS; SUBCUTANEOUS at 05:49

## 2022-05-03 RX ADMIN — Medication 1000 UNIT(S): at 05:49

## 2022-05-03 RX ADMIN — MIRTAZAPINE 15 MILLIGRAM(S): 45 TABLET, ORALLY DISINTEGRATING ORAL at 23:23

## 2022-05-03 RX ADMIN — MEMANTINE HYDROCHLORIDE 10 MILLIGRAM(S): 10 TABLET ORAL at 17:17

## 2022-05-03 RX ADMIN — Medication 5 MILLIGRAM(S): at 23:23

## 2022-05-03 RX ADMIN — Medication 1 MILLIGRAM(S): at 11:30

## 2022-05-03 RX ADMIN — AMLODIPINE BESYLATE 5 MILLIGRAM(S): 2.5 TABLET ORAL at 05:49

## 2022-05-03 RX ADMIN — PANTOPRAZOLE SODIUM 40 MILLIGRAM(S): 20 TABLET, DELAYED RELEASE ORAL at 05:48

## 2022-05-03 RX ADMIN — Medication 1000 MILLIGRAM(S): at 23:53

## 2022-05-03 RX ADMIN — LOSARTAN POTASSIUM 100 MILLIGRAM(S): 100 TABLET, FILM COATED ORAL at 05:48

## 2022-05-03 RX ADMIN — Medication 81 MILLIGRAM(S): at 11:30

## 2022-05-03 RX ADMIN — Medication 2 GRAM(S): at 11:30

## 2022-05-03 NOTE — PROGRESS NOTE ADULT - SUBJECTIVE AND OBJECTIVE BOX
Subjective: Patient seen and examined.  Sitting in chair comfortably with no overnight events.     MEDICATIONS  (STANDING):  acetaminophen     Tablet .. 1000 milliGRAM(s) Oral every 8 hours  amLODIPine   Tablet 5 milliGRAM(s) Oral daily  aspirin enteric coated 81 milliGRAM(s) Oral daily  atorvastatin 20 milliGRAM(s) Oral at bedtime  chlorhexidine 2% Cloths 1 Application(s) Topical <User Schedule>  cholecalciferol 1000 Unit(s) Oral two times a day  cyanocobalamin 500 MICROGram(s) Oral daily  folic acid 1 milliGRAM(s) Oral daily  heparin   Injectable 5000 Unit(s) SubCutaneous every 12 hours  levothyroxine 125 MICROGram(s) Oral daily  losartan 100 milliGRAM(s) Oral daily  melatonin 5 milliGRAM(s) Oral at bedtime  memantine 10 milliGRAM(s) Oral two times a day  mirtazapine 15 milliGRAM(s) Oral at bedtime  multivitamin 1 Tablet(s) Oral daily  omega-3-Acid Ethyl Esters 2 Gram(s) Oral daily  pantoprazole    Tablet 40 milliGRAM(s) Oral before breakfast    MEDICATIONS  (PRN):  ALBUTerol    90 MICROgram(s) HFA Inhaler 2 Puff(s) Inhalation every 6 hours PRN Shortness of Breath and/or Wheezing  oxyCODONE    IR 5 milliGRAM(s) Oral every 8 hours PRN Severe Pain (7 - 10)  oxyCODONE    IR 2.5 milliGRAM(s) Oral every 8 hours PRN Moderate Pain (4 - 6)      Allergies    No Known Allergies    Intolerances        Vital Signs Last 24 Hrs  T(C): 36.5 (03 May 2022 05:40), Max: 36.6 (02 May 2022 17:53)  T(F): 97.7 (03 May 2022 05:40), Max: 97.9 (02 May 2022 17:53)  HR: 73 (03 May 2022 05:40) (73 - 74)  BP: 182/70 (03 May 2022 05:40) (134/64 - 182/70)  BP(mean): --  RR: 18 (03 May 2022 05:40) (18 - 18)  SpO2: 92% (03 May 2022 05:40) (92% - 94%)    PHYSICAL EXAM:  GENERAL: NAD, well-groomed, well-developed  HEAD:  Atraumatic, Normocephalic  ENMT: Moist mucous membranes,   NECK: Supple, No JVD, Normal thyroid  NERVOUS SYSTEM:  All 4 extremities mobile, no gross sensory deficits.   CHEST/LUNG: Clear to auscultation bilaterally; No rales, rhonchi, wheezing, or rubs  HEART: Regular rate and rhythm; No murmurs, rubs, or gallops  ABDOMEN: Soft, Nontender, Nondistended; Bowel sounds present  EXTREMITIES:  2+ Peripheral Pulses, No clubbing, cyanosis, or edema      LABS:                        10.3   6.32  )-----------( 356      ( 03 May 2022 07:32 )             33.7     03 May 2022 07:29    137    |  101    |  18     ----------------------------<  86     3.7     |  32     |  1.02     Ca    9.2        03 May 2022 07:29    TPro  6.4    /  Alb  2.5    /  TBili  0.4    /  DBili  x      /  AST  18     /  ALT  12     /  AlkPhos  98     03 May 2022 07:29        CAPILLARY BLOOD GLUCOSE          RADIOLOGY & ADDITIONAL TESTS:    Imaging Personally Reviewed:  [ ] YES     Consultant(s) Notes Reviewed:      Care Discussed with Consultants/Other Providers:    Advanced Directives: [ ] DNR  [ ] No feeding tube  [ ] MOLST in chart  [ ] MOLST completed today  [ ] Unknown

## 2022-05-03 NOTE — PROGRESS NOTE ADULT - SUBJECTIVE AND OBJECTIVE BOX
Date/Time Patient Seen:  		  Referring MD:   Data Reviewed	       Patient is a 77y old  Female who presents with a chief complaint of Sent for possibly infected surgical wound. (02 May 2022 13:07)      Subjective/HPI     PAST MEDICAL & SURGICAL HISTORY:  Hypertension, unspecified type    Hyperlipidemia, unspecified hyperlipidemia type    Dementia without behavioral disturbance, unspecified dementia type    Hypothyroidism, unspecified type    Hypothyroid    Osteoarthritis    Carotid stenosis    Degenerative joint disease    Hypertension    Hyperlipidemia    Dementia    No significant past surgical history    S/P ORIF (open reduction internal fixation) fracture    History of open reduction and internal fixation (ORIF) procedure          Medication list         MEDICATIONS  (STANDING):  amLODIPine   Tablet 5 milliGRAM(s) Oral daily  aspirin enteric coated 81 milliGRAM(s) Oral daily  atorvastatin 20 milliGRAM(s) Oral at bedtime  chlorhexidine 2% Cloths 1 Application(s) Topical <User Schedule>  cholecalciferol 1000 Unit(s) Oral two times a day  cyanocobalamin 500 MICROGram(s) Oral daily  folic acid 1 milliGRAM(s) Oral daily  heparin   Injectable 5000 Unit(s) SubCutaneous every 12 hours  levothyroxine 125 MICROGram(s) Oral daily  losartan 100 milliGRAM(s) Oral daily  melatonin 5 milliGRAM(s) Oral at bedtime  memantine 10 milliGRAM(s) Oral two times a day  mirtazapine 15 milliGRAM(s) Oral at bedtime  multivitamin 1 Tablet(s) Oral daily  omega-3-Acid Ethyl Esters 2 Gram(s) Oral daily  pantoprazole    Tablet 40 milliGRAM(s) Oral before breakfast    MEDICATIONS  (PRN):  ALBUTerol    90 MICROgram(s) HFA Inhaler 2 Puff(s) Inhalation every 6 hours PRN Shortness of Breath and/or Wheezing  oxyCODONE    IR 5 milliGRAM(s) Oral every 8 hours PRN Severe Pain (7 - 10)  oxyCODONE    IR 2.5 milliGRAM(s) Oral every 8 hours PRN Moderate Pain (4 - 6)         Vitals log        ICU Vital Signs Last 24 Hrs  T(C): 36.5 (03 May 2022 05:40), Max: 36.6 (02 May 2022 17:53)  T(F): 97.7 (03 May 2022 05:40), Max: 97.9 (02 May 2022 17:53)  HR: 73 (03 May 2022 05:40) (60 - 74)  BP: 182/70 (03 May 2022 05:40) (134/64 - 182/70)  BP(mean): --  ABP: --  ABP(mean): --  RR: 18 (03 May 2022 05:40) (18 - 18)  SpO2: 92% (03 May 2022 05:40) (92% - 94%)           Input and Output:  I&O's Detail    01 May 2022 07:01  -  02 May 2022 07:00  --------------------------------------------------------  IN:    IV PiggyBack: 250 mL  Total IN: 250 mL    OUT:  Total OUT: 0 mL    Total NET: 250 mL      02 May 2022 07:01  -  03 May 2022 06:20  --------------------------------------------------------  IN:    Oral Fluid: 249 mL  Total IN: 249 mL    OUT:  Total OUT: 0 mL    Total NET: 249 mL          Lab Data                        10.2   6.51  )-----------( 376      ( 02 May 2022 07:13 )             32.8     05-02    136  |  101  |  18  ----------------------------<  105<H>  3.6   |  31  |  1.02    Ca    9.3      02 May 2022 07:13              Review of Systems	      Objective     Physical Examination    heart s1s2  lung dec BS  abd soft      Pertinent Lab findings & Imaging      Dominga:  NO   Adequate UO     I&O's Detail    01 May 2022 07:01  -  02 May 2022 07:00  --------------------------------------------------------  IN:    IV PiggyBack: 250 mL  Total IN: 250 mL    OUT:  Total OUT: 0 mL    Total NET: 250 mL      02 May 2022 07:01  -  03 May 2022 06:20  --------------------------------------------------------  IN:    Oral Fluid: 249 mL  Total IN: 249 mL    OUT:  Total OUT: 0 mL    Total NET: 249 mL               Discussed with:     Cultures:	        Radiology

## 2022-05-03 NOTE — PROGRESS NOTE ADULT - SUBJECTIVE AND OBJECTIVE BOX
OPAL ARBOLEDA is a 77yFemale , patient examined and chart reviewed.    INTERVAL HPI/ OVERNIGHT EVENTS:   Afebrile. NAD.   No events.    PAST MEDICAL & SURGICAL HISTORY:  Dementia without behavioral disturbance, unspecified dementia type  Hypothyroid  Osteoarthritis  Carotid stenosis  Degenerative joint disease  Hypertension  Hyperlipidemia  History of open reduction and internal fixation (ORIF) procedure        For details regarding the patient's social history, family history, and other miscellaneous elements, please refer the initial infectious diseases consultation and/or the admitting history and physical examination for this admission.    ROS:  Unable to obtain due to : poor historian      Current inpatient medications :    ANTIBIOTICS/RELEVANT:    MEDICATIONS  (STANDING):  acetaminophen     Tablet .. 1000 milliGRAM(s) Oral every 8 hours  amLODIPine   Tablet 5 milliGRAM(s) Oral daily  aspirin enteric coated 81 milliGRAM(s) Oral daily  atorvastatin 20 milliGRAM(s) Oral at bedtime  chlorhexidine 2% Cloths 1 Application(s) Topical <User Schedule>  cholecalciferol 1000 Unit(s) Oral two times a day  cyanocobalamin 500 MICROGram(s) Oral daily  folic acid 1 milliGRAM(s) Oral daily  heparin   Injectable 5000 Unit(s) SubCutaneous every 12 hours  levothyroxine 125 MICROGram(s) Oral daily  losartan 100 milliGRAM(s) Oral daily  melatonin 5 milliGRAM(s) Oral at bedtime  memantine 10 milliGRAM(s) Oral two times a day  mirtazapine 15 milliGRAM(s) Oral at bedtime  multivitamin 1 Tablet(s) Oral daily  omega-3-Acid Ethyl Esters 2 Gram(s) Oral daily  pantoprazole    Tablet 40 milliGRAM(s) Oral before breakfast    MEDICATIONS  (PRN):  ALBUTerol    90 MICROgram(s) HFA Inhaler 2 Puff(s) Inhalation every 6 hours PRN Shortness of Breath and/or Wheezing  oxyCODONE    IR 5 milliGRAM(s) Oral every 8 hours PRN Severe Pain (7 - 10)  oxyCODONE    IR 2.5 milliGRAM(s) Oral every 8 hours PRN Moderate Pain (4 - 6)      Objective:  Vital Signs Last 24 Hrs  T(C): 36.5 (03 May 2022 05:40), Max: 36.6 (02 May 2022 17:53)  T(F): 97.7 (03 May 2022 05:40), Max: 97.9 (02 May 2022 17:53)  HR: 73 (03 May 2022 05:40) (73 - 74)  BP: 182/70 (03 May 2022 05:40) (134/64 - 182/70)  RR: 18 (03 May 2022 05:40) (18 - 18)  SpO2: 92% (03 May 2022 05:40) (92% - 94%)      Physical Exam:  General:  no acute distress  Neck: supple, trachea midline  Lungs: clear, no wheeze/rhonchi  Cardiovascular: regular rate and rhythm, S1 S2  Abdomen: soft, nontender,  bowel sounds normal  Neurological: awake confused  Skin: no rash  Extremities: Right hip decreased erythema induration   nontender      LABS:                        10.3   6.32  )-----------( 356      ( 03 May 2022 07:32 )             33.7   05-03    137  |  101  |  18  ----------------------------<  86  3.7   |  32<H>  |  1.02    Ca    9.2      03 May 2022 07:29    TPro  6.4  /  Alb  2.5<L>  /  TBili  0.4  /  DBili  x   /  AST  18  /  ALT  12  /  AlkPhos  98  05-03    Vancomycin Level, Trough (05.03.22 @ 07:29)    Vancomycin Level, Trough: 18.9    Vancomycin Level, Trough (05.02.22 @ 16:54)    Vancomycin Level, Trough: 24.0    MICROBIOLOGY:  Culture - Other (04.28.22 @ 13:37)    -  Ampicillin/Sulbactam: R <=8/4    -  Cefazolin: R >16    -  Clindamycin: S <=0.25    -  Daptomycin: S 0.5    -  Erythromycin: R >4    -  Gentamicin: S <=1 Should not be used as monotherapy    -  Linezolid: S 4    -  Oxacillin: R >2    -  Penicillin: R >8    -  Rifampin: S <=1 Should not be used as monotherapy    -  Tetra/Doxy: S <=1    -  Trimethoprim/Sulfamethoxazole: R >2/38    -  Vancomycin: S 1    Specimen Source: .Other right hip wound    Culture Results:   Few Methicillin Resistant Staphylococcus aureus    Organism Identification: Methicillin resistant Staphylococcus aureus    Organism: Methicillin resistant Staphylococcus aureus    Method Type: MEGHA      Culture - Blood (collected 27 Apr 2022 00:38)  Source: .Blood Blood-Peripheral  Gram Stain (27 Apr 2022 16:42):    Growth in anaerobic bottle: Gram Positive Rods  Final Report (28 Apr 2022 16:57):    Growth in anaerobic bottle: Bacillus species not anthracis    "Susceptibilities not performed"    ***Blood Panel PCR results on this specimen are available    approximately 3 hours after the Gram stain result.***    Gram stain, PCR, and/or culture results may not always    correspond due to difference in methodologies.    ************************************************************    This PCR assay was performed by multiplex PCR. This    Assay tests for 66 bacterial and resistance gene targets.    Please refer to the [facility] test directory    at [email or web address] for details.  Organism: Blood Culture PCR (28 Apr 2022 16:57)  Organism: Blood Culture PCR (28 Apr 2022 16:57)      -  Bacillus cereus group: Detec      Method Type: PCR    Culture - Blood (collected 27 Apr 2022 00:38)  Source: .Blood Blood-Peripheral  Preliminary Report (28 Apr 2022 01:03):    No growth to date.    RADIOLOGY & ADDITIONAL STUDIES:    ACC: [id number] EXAM:  CT HIP ONLY RT                          PROCEDURE DATE:  04/26/2022          INTERPRETATION:  CT HIP RIGHT    HISTORY: Pain. Postop infection. Right hip intertrochanteric fracture   ORIF.    TECHNIQUE: Contiguous axial imaging was performed through the pelvis   without contrast.  Coronal and sagittal reformatting was utilized.   Patient motion artifact and blurring may cause some limitation.    COMPARISON: CT of the right hip dated 4/14/2022, intraoperative x-rays   dated 4/11/2022, and pelvis and right hip x-rays dated 4/9/2022.    FINDINGS:    OSSEOUS STRUCTURES    Acute/Chronic Fractures:  Comminuted intertrochanteric fracture of the   right hip is again seen with a gamma nail for fixation. Distal   interlocking screw is present. There is mild residual fracture   displacement.    PELVIC JOINTS    Symphysis Pubis:  Preserved.    Sacroiliac Joints:  Maintained.    RIGHT HIP JOINT    Avascular Necrosis:  None.    Joint Space:  Grossly maintained given patient motion.    Effusion/Synovitis:  None.    LEFT HIP JOINT    Avascular Necrosis:  None.    Joint Space:  Grossly maintained given patient motion.    Effusion/Synovitis:  None.    VISUALIZED SPINE  Lower lumbar degenerative disc disease is present.    SOFT TISSUES    Neurovascular:  Severe atherosclerotic calcifications are present.    Pelvis/Abdomen:  Mild presacral edema is present.    Musculature:  Fluid collection is present extending along the posterior   margin of the greater trochanter and within the right gluteus medias   muscle measuring approximately 14.2 cm craniocaudally and up to 6.9 x 4.2   cm axially along the greater trochanter.    Subcutaneous Tissues:  Moderate subcutaneous hematoma formation is   again seen along the right hip incision site with mild hematoma formation   extending along the level of the proximal femoral diaphysis. Mild to   moderate subcutaneous edema is also noted. Overlying skin staples are   noted.    IMPRESSION:  1. Right intertrochanteric fracture ORIF is again seen.  2. Fluid collection along the right greater trochanter and within the   right gluteus medius muscle likely reflects evolution of the prior   hematoma although infection cannot be absolutely excluded with the given   history. Consider aspiration as warranted.  3. Moderate overlying subcutaneous hematoma is again seen.    Assessment :   76YO F PMH of Dementia, HTN, Dyslipidemia, PAD, Hypothyroidism, Anemia resident of [facility] facility SP ORIF, Right hip, using trochanteric nail 10-Apr-2022 complicated by Acute blood loss sec surgical site hematoma admitted with infected surgical site wound with poss infected hematoma + seropurulent drainage   Wound cultures with MRSA  Bacillus bacteremia likely contaminant  Vanc held due to supratherapeutic level   Vanc level 18 today    Plan :   Restart Vancomycin 1 gram IV daily dosing tmrw  Trough prior to 3rd dose  Will need long term IV antibiotics x 6 weeks till 6/8/22  PICC line Wednesday  Trend temps and cbc  Ortho following   Asp precautions    D/w Dr Daniel      Continue with present regiment.  Appropriate use of antibiotics and adverse effects reviewed.    > 35 minutes were spent in direct patient care reviewing notes, medications ,labs data/ imaging , discussion with multidisciplinary team.    Thank you for allowing me to participate in care of your patient .    Radha Pryor MD  Infectious Disease  [phone]

## 2022-05-03 NOTE — PROGRESS NOTE ADULT - SUBJECTIVE AND OBJECTIVE BOX
Chief Complaint: Hip wound    Interval Events: No events overnight.    Review of Systems:  General: No fevers, chills, weight gain  Skin: No rashes, color changes  Cardiovascular: No chest pain, orthopnea  Respiratory: No shortness of breath, cough  Gastrointestinal: No nausea, abdominal pain  Genitourinary: No incontinence, pain with urination  Musculoskeletal: No pain, swelling, decreased range of motion  Neurological: No headache, weakness  Psychiatric: No depression, anxiety  Endocrine: No weight gain, increased thirst  All other systems are comprehensively negative.    Physical Exam:  Vital Signs Last 24 Hrs  T(C): 36.5 (03 May 2022 05:40), Max: 36.6 (02 May 2022 17:53)  T(F): 97.7 (03 May 2022 05:40), Max: 97.9 (02 May 2022 17:53)  HR: 73 (03 May 2022 05:40) (60 - 74)  BP: 182/70 (03 May 2022 05:40) (134/64 - 182/70)  BP(mean): --  RR: 18 (03 May 2022 05:40) (18 - 18)  SpO2: 92% (03 May 2022 05:40) (92% - 94%)  General: NAD  HEENT: MMM  Neck: No JVD, no carotid bruit  Lungs: CTAB  CV: RRR, nl S1/S2, no M/R/G  Abdomen: S/NT/ND, +BS  Extremities: No LE edema, no cyanosis  Neuro: AAOx1  Skin: No rash    Labs:    05-03    137  |  101  |  18  ----------------------------<  86  3.7   |  32<H>  |  1.02    Ca    9.2      03 May 2022 07:29    TPro  6.4  /  Alb  2.5<L>  /  TBili  0.4  /  DBili  x   /  AST  18  /  ALT  12  /  AlkPhos  98  05-03                        10.3   6.32  )-----------( 356      ( 03 May 2022 07:32 )             33.7

## 2022-05-04 LAB
ALBUMIN SERPL ELPH-MCNC: 2.5 G/DL — LOW (ref 3.3–5)
ALP SERPL-CCNC: 94 U/L — SIGNIFICANT CHANGE UP (ref 30–120)
ALT FLD-CCNC: 20 U/L DA — SIGNIFICANT CHANGE UP (ref 10–60)
ANION GAP SERPL CALC-SCNC: 2 MMOL/L — LOW (ref 5–17)
AST SERPL-CCNC: 30 U/L — SIGNIFICANT CHANGE UP (ref 10–40)
BASOPHILS # BLD AUTO: 0.04 K/UL — SIGNIFICANT CHANGE UP (ref 0–0.2)
BASOPHILS NFR BLD AUTO: 0.8 % — SIGNIFICANT CHANGE UP (ref 0–2)
BILIRUB SERPL-MCNC: 0.2 MG/DL — SIGNIFICANT CHANGE UP (ref 0.2–1.2)
BUN SERPL-MCNC: 23 MG/DL — SIGNIFICANT CHANGE UP (ref 7–23)
CALCIUM SERPL-MCNC: 9.3 MG/DL — SIGNIFICANT CHANGE UP (ref 8.4–10.5)
CHLORIDE SERPL-SCNC: 101 MMOL/L — SIGNIFICANT CHANGE UP (ref 96–108)
CO2 SERPL-SCNC: 33 MMOL/L — HIGH (ref 22–31)
CREAT SERPL-MCNC: 1.11 MG/DL — SIGNIFICANT CHANGE UP (ref 0.5–1.3)
CULTURE RESULTS: SIGNIFICANT CHANGE UP
CULTURE RESULTS: SIGNIFICANT CHANGE UP
EGFR: 51 ML/MIN/1.73M2 — LOW
EOSINOPHIL # BLD AUTO: 0.28 K/UL — SIGNIFICANT CHANGE UP (ref 0–0.5)
EOSINOPHIL NFR BLD AUTO: 5.4 % — SIGNIFICANT CHANGE UP (ref 0–6)
GLUCOSE SERPL-MCNC: 81 MG/DL — SIGNIFICANT CHANGE UP (ref 70–99)
HCT VFR BLD CALC: 31.4 % — LOW (ref 34.5–45)
HGB BLD-MCNC: 9.8 G/DL — LOW (ref 11.5–15.5)
IMM GRANULOCYTES NFR BLD AUTO: 1.6 % — HIGH (ref 0–1.5)
LYMPHOCYTES # BLD AUTO: 1.37 K/UL — SIGNIFICANT CHANGE UP (ref 1–3.3)
LYMPHOCYTES # BLD AUTO: 26.6 % — SIGNIFICANT CHANGE UP (ref 13–44)
MCHC RBC-ENTMCNC: 30.4 PG — SIGNIFICANT CHANGE UP (ref 27–34)
MCHC RBC-ENTMCNC: 31.2 GM/DL — LOW (ref 32–36)
MCV RBC AUTO: 97.5 FL — SIGNIFICANT CHANGE UP (ref 80–100)
MONOCYTES # BLD AUTO: 0.59 K/UL — SIGNIFICANT CHANGE UP (ref 0–0.9)
MONOCYTES NFR BLD AUTO: 11.5 % — SIGNIFICANT CHANGE UP (ref 2–14)
NEUTROPHILS # BLD AUTO: 2.79 K/UL — SIGNIFICANT CHANGE UP (ref 1.8–7.4)
NEUTROPHILS NFR BLD AUTO: 54.1 % — SIGNIFICANT CHANGE UP (ref 43–77)
NRBC # BLD: 0 /100 WBCS — SIGNIFICANT CHANGE UP (ref 0–0)
PLATELET # BLD AUTO: 310 K/UL — SIGNIFICANT CHANGE UP (ref 150–400)
POTASSIUM SERPL-MCNC: 3.7 MMOL/L — SIGNIFICANT CHANGE UP (ref 3.5–5.3)
POTASSIUM SERPL-SCNC: 3.7 MMOL/L — SIGNIFICANT CHANGE UP (ref 3.5–5.3)
PROT SERPL-MCNC: 6.1 G/DL — SIGNIFICANT CHANGE UP (ref 6–8.3)
RBC # BLD: 3.22 M/UL — LOW (ref 3.8–5.2)
RBC # FLD: 14.6 % — HIGH (ref 10.3–14.5)
SODIUM SERPL-SCNC: 136 MMOL/L — SIGNIFICANT CHANGE UP (ref 135–145)
SPECIMEN SOURCE: SIGNIFICANT CHANGE UP
SPECIMEN SOURCE: SIGNIFICANT CHANGE UP
WBC # BLD: 5.15 K/UL — SIGNIFICANT CHANGE UP (ref 3.8–10.5)
WBC # FLD AUTO: 5.15 K/UL — SIGNIFICANT CHANGE UP (ref 3.8–10.5)

## 2022-05-04 PROCEDURE — 99232 SBSQ HOSP IP/OBS MODERATE 35: CPT

## 2022-05-04 PROCEDURE — 76937 US GUIDE VASCULAR ACCESS: CPT | Mod: 26

## 2022-05-04 PROCEDURE — 36573 INSJ PICC RS&I 5 YR+: CPT

## 2022-05-04 RX ORDER — SODIUM CHLORIDE 9 MG/ML
10 INJECTION INTRAMUSCULAR; INTRAVENOUS; SUBCUTANEOUS
Refills: 0 | Status: DISCONTINUED | OUTPATIENT
Start: 2022-05-04 | End: 2022-05-07

## 2022-05-04 RX ORDER — CHLORHEXIDINE GLUCONATE 213 G/1000ML
1 SOLUTION TOPICAL
Refills: 0 | Status: DISCONTINUED | OUTPATIENT
Start: 2022-05-04 | End: 2022-05-07

## 2022-05-04 RX ADMIN — Medication 1 TABLET(S): at 11:58

## 2022-05-04 RX ADMIN — Medication 2 GRAM(S): at 11:59

## 2022-05-04 RX ADMIN — HEPARIN SODIUM 5000 UNIT(S): 5000 INJECTION INTRAVENOUS; SUBCUTANEOUS at 05:26

## 2022-05-04 RX ADMIN — PREGABALIN 500 MICROGRAM(S): 225 CAPSULE ORAL at 11:59

## 2022-05-04 RX ADMIN — Medication 81 MILLIGRAM(S): at 11:58

## 2022-05-04 RX ADMIN — AMLODIPINE BESYLATE 5 MILLIGRAM(S): 2.5 TABLET ORAL at 05:26

## 2022-05-04 RX ADMIN — ATORVASTATIN CALCIUM 20 MILLIGRAM(S): 80 TABLET, FILM COATED ORAL at 21:59

## 2022-05-04 RX ADMIN — MEMANTINE HYDROCHLORIDE 10 MILLIGRAM(S): 10 TABLET ORAL at 05:26

## 2022-05-04 RX ADMIN — PANTOPRAZOLE SODIUM 40 MILLIGRAM(S): 20 TABLET, DELAYED RELEASE ORAL at 05:30

## 2022-05-04 RX ADMIN — MIRTAZAPINE 15 MILLIGRAM(S): 45 TABLET, ORALLY DISINTEGRATING ORAL at 21:59

## 2022-05-04 RX ADMIN — Medication 125 MICROGRAM(S): at 05:26

## 2022-05-04 RX ADMIN — Medication 1000 UNIT(S): at 17:48

## 2022-05-04 RX ADMIN — Medication 1000 MILLIGRAM(S): at 05:56

## 2022-05-04 RX ADMIN — Medication 1000 MILLIGRAM(S): at 22:28

## 2022-05-04 RX ADMIN — Medication 5 MILLIGRAM(S): at 21:59

## 2022-05-04 RX ADMIN — Medication 1 MILLIGRAM(S): at 11:59

## 2022-05-04 RX ADMIN — Medication 1000 UNIT(S): at 05:27

## 2022-05-04 RX ADMIN — LOSARTAN POTASSIUM 100 MILLIGRAM(S): 100 TABLET, FILM COATED ORAL at 05:27

## 2022-05-04 RX ADMIN — Medication 1000 MILLIGRAM(S): at 05:26

## 2022-05-04 RX ADMIN — CHLORHEXIDINE GLUCONATE 1 APPLICATION(S): 213 SOLUTION TOPICAL at 05:27

## 2022-05-04 RX ADMIN — Medication 125 MILLIGRAM(S): at 11:58

## 2022-05-04 RX ADMIN — Medication 1000 MILLIGRAM(S): at 15:20

## 2022-05-04 RX ADMIN — CHLORHEXIDINE GLUCONATE 1 APPLICATION(S): 213 SOLUTION TOPICAL at 12:02

## 2022-05-04 RX ADMIN — Medication 1000 MILLIGRAM(S): at 21:58

## 2022-05-04 RX ADMIN — HEPARIN SODIUM 5000 UNIT(S): 5000 INJECTION INTRAVENOUS; SUBCUTANEOUS at 17:49

## 2022-05-04 RX ADMIN — MEMANTINE HYDROCHLORIDE 10 MILLIGRAM(S): 10 TABLET ORAL at 17:49

## 2022-05-04 NOTE — PROGRESS NOTE ADULT - SUBJECTIVE AND OBJECTIVE BOX
Neurology follow up note    OPAL [id number]      Interval History:    Patient resting in bed     Allergies    No Known Allergies    Intolerances        MEDICATIONS    acetaminophen     Tablet .. 1000 milliGRAM(s) Oral every 8 hours  ALBUTerol    90 MICROgram(s) HFA Inhaler 2 Puff(s) Inhalation every 6 hours PRN  amLODIPine   Tablet 5 milliGRAM(s) Oral daily  aspirin enteric coated 81 milliGRAM(s) Oral daily  atorvastatin 20 milliGRAM(s) Oral at bedtime  chlorhexidine 2% Cloths 1 Application(s) Topical <User Schedule>  chlorhexidine 4% Liquid 1 Application(s) Topical <User Schedule>  cholecalciferol 1000 Unit(s) Oral two times a day  cyanocobalamin 500 MICROGram(s) Oral daily  folic acid 1 milliGRAM(s) Oral daily  heparin   Injectable 5000 Unit(s) SubCutaneous every 12 hours  levothyroxine 125 MICROGram(s) Oral daily  losartan 100 milliGRAM(s) Oral daily  melatonin 5 milliGRAM(s) Oral at bedtime  memantine 10 milliGRAM(s) Oral two times a day  mirtazapine 15 milliGRAM(s) Oral at bedtime  multivitamin 1 Tablet(s) Oral daily  omega-3-Acid Ethyl Esters 2 Gram(s) Oral daily  oxyCODONE    IR 5 milliGRAM(s) Oral every 8 hours PRN  oxyCODONE    IR 2.5 milliGRAM(s) Oral every 8 hours PRN  pantoprazole    Tablet 40 milliGRAM(s) Oral before breakfast  sodium chloride 0.9% lock flush 10 milliLiter(s) IV Push every 1 hour PRN  vancomycin  IVPB 1000 milliGRAM(s) IV Intermittent every 24 hours              Vital Signs Last 24 Hrs  T(C): 36.8 (04 May 2022 05:36), Max: 36.8 (03 May 2022 18:26)  T(F): 98.2 (04 May 2022 05:36), Max: 98.2 (03 May 2022 18:26)  HR: 59 (04 May 2022 05:36) (59 - 63)  BP: 137/71 (04 May 2022 05:36) (124/68 - 137/71)  BP(mean): --  RR: 18 (04 May 2022 05:36) (18 - 18)  SpO2: 93% (04 May 2022 05:36) (93% - 94%)                  LABS:  CBC Full  -  ( 04 May 2022 07:19 )  WBC Count : 5.15 K/uL  RBC Count : 3.22 M/uL  Hemoglobin : 9.8 g/dL  Hematocrit : 31.4 %  Platelet Count - Automated : 310 K/uL  Mean Cell Volume : 97.5 fl  Mean Cell Hemoglobin : 30.4 pg  Mean Cell Hemoglobin Concentration : 31.2 gm/dL  Auto Neutrophil # : 2.79 K/uL  Auto Lymphocyte # : 1.37 K/uL  Auto Monocyte # : 0.59 K/uL  Auto Eosinophil # : 0.28 K/uL  Auto Basophil # : 0.04 K/uL  Auto Neutrophil % : 54.1 %  Auto Lymphocyte % : 26.6 %  Auto Monocyte % : 11.5 %  Auto Eosinophil % : 5.4 %  Auto Basophil % : 0.8 %      05-04    136  |  101  |  23  ----------------------------<  81  3.7   |  33<H>  |  1.11    Ca    9.3      04 May 2022 07:19    TPro  6.1  /  Alb  2.5<L>  /  TBili  0.2  /  DBili  x   /  AST  30  /  ALT  20  /  AlkPhos  94  05-04    Hemoglobin A1C:     LIVER FUNCTIONS - ( 04 May 2022 07:19 )  Alb: 2.5 g/dL / Pro: 6.1 g/dL / ALK PHOS: 94 U/L / ALT: 20 U/L DA / AST: 30 U/L / GGT: x           Vitamin B12         RADIOLOGY  patient went for PICC line     Greater than 45 minutes of time was spent with the patient, plan of care, reviewing data, with greater than 50% of the visit was spent counseling and/or coordinating care with multidisciplinary healthcare team   Neurology follow up note    OPAL [id number]      Interval History:    Patient resting in bed     Allergies    No Known Allergies    Intolerances        MEDICATIONS    acetaminophen     Tablet .. 1000 milliGRAM(s) Oral every 8 hours  ALBUTerol    90 MICROgram(s) HFA Inhaler 2 Puff(s) Inhalation every 6 hours PRN  amLODIPine   Tablet 5 milliGRAM(s) Oral daily  aspirin enteric coated 81 milliGRAM(s) Oral daily  atorvastatin 20 milliGRAM(s) Oral at bedtime  chlorhexidine 2% Cloths 1 Application(s) Topical <User Schedule>  chlorhexidine 4% Liquid 1 Application(s) Topical <User Schedule>  cholecalciferol 1000 Unit(s) Oral two times a day  cyanocobalamin 500 MICROGram(s) Oral daily  folic acid 1 milliGRAM(s) Oral daily  heparin   Injectable 5000 Unit(s) SubCutaneous every 12 hours  levothyroxine 125 MICROGram(s) Oral daily  losartan 100 milliGRAM(s) Oral daily  melatonin 5 milliGRAM(s) Oral at bedtime  memantine 10 milliGRAM(s) Oral two times a day  mirtazapine 15 milliGRAM(s) Oral at bedtime  multivitamin 1 Tablet(s) Oral daily  omega-3-Acid Ethyl Esters 2 Gram(s) Oral daily  oxyCODONE    IR 5 milliGRAM(s) Oral every 8 hours PRN  oxyCODONE    IR 2.5 milliGRAM(s) Oral every 8 hours PRN  pantoprazole    Tablet 40 milliGRAM(s) Oral before breakfast  sodium chloride 0.9% lock flush 10 milliLiter(s) IV Push every 1 hour PRN  vancomycin  IVPB 1000 milliGRAM(s) IV Intermittent every 24 hours      REVIEW OF SYSTEMS:  Limited secondary to the patient being disoriented.  Constitutional:  Denies fever, chills, or night sweats.  Head:  No headaches.  Eyes:  No double vision or blurry vision.  Ears:  No ringing in the ears.  Neck:  No neck pain.  Respiratory:  No shortness of breath.  Cardiovascular:  No chest pain.  Abdomen:  No nausea, vomiting, or abdominal pain.  Extremities/Neurological:  No numbness or tingling.  Musculoskeletal:  Occasional joint pain.    PHYSICAL EXAMINATION:   HEENT:  Head:  Normocephalic, atraumatic.  Eyes:  No scleral icterus.  Ears:  Hearing slightly hard of hearing.  NECK:  Supple.  RESPIRATORY:  Decreased breath sounds bilaterally.  CARDIOVASCULAR:  S1 and S2 heard.  ABDOMEN:  Soft, nontender.  EXTREMITIES:  No clubbing or cyanosis were noted.      NEUROLOGIC:  The patient is awake, alert.  Location was home, year and month were unknown.  The patient has poor vision out of both eyes.  Speech:  Would articulate a few words, no dysarthria.  Intact bilateral nasolabial folds.  Motor:  Bilateral upper was 3/5, bilateral lower extremities with stimulation, slight flexation at the hip and knee.           Vital Signs Last 24 Hrs  T(C): 36.8 (04 May 2022 05:36), Max: 36.8 (03 May 2022 18:26)  T(F): 98.2 (04 May 2022 05:36), Max: 98.2 (03 May 2022 18:26)  HR: 59 (04 May 2022 05:36) (59 - 63)  BP: 137/71 (04 May 2022 05:36) (124/68 - 137/71)  BP(mean): --  RR: 18 (04 May 2022 05:36) (18 - 18)  SpO2: 93% (04 May 2022 05:36) (93% - 94%)                  LABS:  CBC Full  -  ( 04 May 2022 07:19 )  WBC Count : 5.15 K/uL  RBC Count : 3.22 M/uL  Hemoglobin : 9.8 g/dL  Hematocrit : 31.4 %  Platelet Count - Automated : 310 K/uL  Mean Cell Volume : 97.5 fl  Mean Cell Hemoglobin : 30.4 pg  Mean Cell Hemoglobin Concentration : 31.2 gm/dL  Auto Neutrophil # : 2.79 K/uL  Auto Lymphocyte # : 1.37 K/uL  Auto Monocyte # : 0.59 K/uL  Auto Eosinophil # : 0.28 K/uL  Auto Basophil # : 0.04 K/uL  Auto Neutrophil % : 54.1 %  Auto Lymphocyte % : 26.6 %  Auto Monocyte % : 11.5 %  Auto Eosinophil % : 5.4 %  Auto Basophil % : 0.8 %      05-04    136  |  101  |  23  ----------------------------<  81  3.7   |  33<H>  |  1.11    Ca    9.3      04 May 2022 07:19    TPro  6.1  /  Alb  2.5<L>  /  TBili  0.2  /  DBili  x   /  AST  30  /  ALT  20  /  AlkPhos  94  05-04    Hemoglobin A1C:     LIVER FUNCTIONS - ( 04 May 2022 07:19 )  Alb: 2.5 g/dL / Pro: 6.1 g/dL / ALK PHOS: 94 U/L / ALT: 20 U/L DA / AST: 30 U/L / GGT: x           Vitamin B12         RADIOLOGY    ANALYSIS AND PLAN:  This is a 77-year-old with episode of altered mental status.  For episode of altered mental status, suspect most likely metabolic encephalopathy possibly underlying septic type process along with dementia becoming more prominent in the hospital setting.  Examination is limited of lower extremities, but I see no new clear sign to suggest a new central nervous system event has occurred.  For history of dementia, we do not have memantine 28 mg a day.  We would recommend change to 10 mg twice a day.  For history of hypothyroidism, continue the patient on Synthroid.  For hyperlipidemia, continue the patient on statin.  For history of hypertension, monitor systolic blood pressure as needed.  neurologic wise appears stable    PICC line     Greater than 40 minutes of time was spent with the patient, plan of care, reviewing data, speaking to multidisciplinary healthcare team with greater than 50% of time in counseling and care coordination.

## 2022-05-04 NOTE — PROCEDURE NOTE - PROCEDURE FINDINGS AND DETAILS
44cm single lumen bard power picc inserted into patent right basilic vein under sterile conditions and image guidance.

## 2022-05-04 NOTE — PROGRESS NOTE ADULT - SUBJECTIVE AND OBJECTIVE BOX
Ibnfected right hip wound, s/p ORIF right hip fx 4/11/22     S: Pt without complaints. No SOB,CP, N/V. Tolerated Fluids / Diet well.   Pain comfortable on Interval Rx - Oxy 5 & 10mg + Tylenol + Celebrex. No BM yet [+BM], + flatus, No abdominal pain.  somewhat sleepy--had picc line placed earlier today  acetaminophen     Tablet .. 1000 milliGRAM(s) Oral every 8 hours  melatonin 5 milliGRAM(s) Oral at bedtime  memantine 10 milliGRAM(s) Oral two times a day  mirtazapine 15 milliGRAM(s) Oral at bedtime  oxyCODONE    IR 5 milliGRAM(s) Oral every 8 hours PRN  oxyCODONE    IR 2.5 milliGRAM(s) Oral every 8 hours PRN    O: General: Pt Alert and oriented, On exam NAD,   VS: Vital Signs Last 24 Hrs  T(C): 36.4 (04 May 2022 12:02), Max: 36.8 (03 May 2022 18:26)  T(F): 97.6 (04 May 2022 12:02), Max: 98.2 (03 May 2022 18:26)  HR: 65 (04 May 2022 12:02) (59 - 65)  BP: 179/79 (04 May 2022 12:02) (124/68 - 179/79)  BP(mean): --  RR: 17 (04 May 2022 12:02) (17 - 18)  SpO2: 91% (04 May 2022 12:02) (91% - 94%)  Heart: RRR no murmur  Lungs: BS clear bilat.  [Abdomen]: Soft; no distention, benign exam    Ext Right  hip silverlon dressing soaked w/ blood drainage. Slight dehiscence midwound ~ .5cm  Redressed w/ gauze and tegaderm.  No swelling noted, thigh soft, no erythema  Neurologic: Has sensation bilat. feet & toes ;  Full AROM bilat feet & toes. EHL / AT  = Bilat: 5/5   Vascular: Feet toes warm, pink. DP = 2+. Calves soft ; w/o tenderness bilat..  VTEP: On Bilat. Venodynes + aspirin enteric coated 81 milliGRAM(s) Oral daily  heparin   Injectable 5000 Unit(s) SubCutaneous every 12 hours                             9.8    5.15  )-----------( 310      ( 04 May 2022 07:19 )             31.4     05-04    136  |  101  |  23  ----------------------------<  81  3.7   |  33<H>  |  1.11    Ca    9.3      04 May 2022 07:19    TPro  6.1  /  Alb  2.5<L>  /  TBili  0.2  /  DBili  x   /  AST  30  /  ALT  20  /  AlkPhos  94  05-04      Hospitalist input noted    Primary Orthopedic Assessment:  • Stable from Orthopedic perspective--still w/ draining wound.  On vanco as pr ID  • Neuro motor exam stable  • Labs:       Plan:   • Continue:  PT/OT/Weightbearing as tolerated with assistance of a walker/Ice to hip/          Incentive spirometry encouraged /   • Continue DVT prophylaxis as prescribed, including use of compression devices and ankle pumps  • Continue Pain Rx  • Plans per Medicine / ID  • Discharge planning – anticipated discharge is:  Subacute rehabilitation   when medically stable & cleared by PT/OT     Negative/Unknown

## 2022-05-04 NOTE — PROGRESS NOTE ADULT - SUBJECTIVE AND OBJECTIVE BOX
OPAL ARBOLEDA is a 77yFemale , patient examined and chart reviewed.    INTERVAL HPI/ OVERNIGHT EVENTS:   Afebrile. NAD. Seen with ortho PA.  PICC line placed today  No events.    PAST MEDICAL & SURGICAL HISTORY:  Dementia without behavioral disturbance, unspecified dementia type  Hypothyroid  Osteoarthritis  Carotid stenosis  Degenerative joint disease  Hypertension  Hyperlipidemia  History of open reduction and internal fixation (ORIF) procedure        For details regarding the patient's social history, family history, and other miscellaneous elements, please refer the initial infectious diseases consultation and/or the admitting history and physical examination for this admission.    ROS:  Unable to obtain due to : poor historian      Current inpatient medications :    ANTIBIOTICS/RELEVANT:  vancomycin  IVPB 1000 milliGRAM(s) IV Intermittent every 24 hours    MEDICATIONS  (STANDING):  acetaminophen     Tablet .. 1000 milliGRAM(s) Oral every 8 hours  amLODIPine   Tablet 5 milliGRAM(s) Oral daily  aspirin enteric coated 81 milliGRAM(s) Oral daily  atorvastatin 20 milliGRAM(s) Oral at bedtime  chlorhexidine 2% Cloths 1 Application(s) Topical <User Schedule>  chlorhexidine 4% Liquid 1 Application(s) Topical <User Schedule>  cholecalciferol 1000 Unit(s) Oral two times a day  cyanocobalamin 500 MICROGram(s) Oral daily  folic acid 1 milliGRAM(s) Oral daily  heparin   Injectable 5000 Unit(s) SubCutaneous every 12 hours  levothyroxine 125 MICROGram(s) Oral daily  losartan 100 milliGRAM(s) Oral daily  melatonin 5 milliGRAM(s) Oral at bedtime  memantine 10 milliGRAM(s) Oral two times a day  mirtazapine 15 milliGRAM(s) Oral at bedtime  multivitamin 1 Tablet(s) Oral daily  omega-3-Acid Ethyl Esters 2 Gram(s) Oral daily  pantoprazole    Tablet 40 milliGRAM(s) Oral before breakfast      MEDICATIONS  (PRN):  ALBUTerol    90 MICROgram(s) HFA Inhaler 2 Puff(s) Inhalation every 6 hours PRN Shortness of Breath and/or Wheezing  oxyCODONE    IR 5 milliGRAM(s) Oral every 8 hours PRN Severe Pain (7 - 10)  oxyCODONE    IR 2.5 milliGRAM(s) Oral every 8 hours PRN Moderate Pain (4 - 6)  sodium chloride 0.9% lock flush 10 milliLiter(s) IV Push every 1 hour PRN Pre/post blood products, medications, blood draw, and to maintain line patency        Objective:  Vital Signs Last 24 Hrs  T(C): 36.4 (04 May 2022 12:02), Max: 36.8 (03 May 2022 18:26)  T(F): 97.6 (04 May 2022 12:02), Max: 98.2 (03 May 2022 18:26)  HR: 65 (04 May 2022 12:02) (59 - 65)  BP: 179/79 (04 May 2022 12:02) (124/68 - 179/79)  RR: 17 (04 May 2022 12:02) (17 - 18)  SpO2: 91% (04 May 2022 12:02) (91% - 94%)    Physical Exam:  General:  no acute distress  Neck: supple, trachea midline  Lungs: clear, no wheeze/rhonchi  Cardiovascular: regular rate and rhythm, S1 S2  Abdomen: soft, nontender,  bowel sounds normal  Neurological: awake confused  Skin: no rash  Extremities: Right hip decreased erythema induration   nontender still with minimal drainage      LABS:                        9.8    5.15  )-----------( 310      ( 04 May 2022 07:19 )             31.4   05-04    136  |  101  |  23  ----------------------------<  81  3.7   |  33<H>  |  1.11    Ca    9.3      04 May 2022 07:19    TPro  6.1  /  Alb  2.5<L>  /  TBili  0.2  /  DBili  x   /  AST  30  /  ALT  20  /  AlkPhos  94  05-04    Vancomycin Level, Trough (05.03.22 @ 07:29)    Vancomycin Level, Trough: 18.9    Vancomycin Level, Trough (05.02.22 @ 16:54)    Vancomycin Level, Trough: 24.0    MICROBIOLOGY:  Culture - Other (04.28.22 @ 13:37)    -  Ampicillin/Sulbactam: R <=8/4    -  Cefazolin: R >16    -  Clindamycin: S <=0.25    -  Daptomycin: S 0.5    -  Erythromycin: R >4    -  Gentamicin: S <=1 Should not be used as monotherapy    -  Linezolid: S 4    -  Oxacillin: R >2    -  Penicillin: R >8    -  Rifampin: S <=1 Should not be used as monotherapy    -  Tetra/Doxy: S <=1    -  Trimethoprim/Sulfamethoxazole: R >2/38    -  Vancomycin: S 1    Specimen Source: .Other right hip wound    Culture Results:   Few Methicillin Resistant Staphylococcus aureus    Organism Identification: Methicillin resistant Staphylococcus aureus    Organism: Methicillin resistant Staphylococcus aureus    Method Type: MEGHA      Culture - Blood (collected 27 Apr 2022 00:38)  Source: .Blood Blood-Peripheral  Gram Stain (27 Apr 2022 16:42):    Growth in anaerobic bottle: Gram Positive Rods  Final Report (28 Apr 2022 16:57):    Growth in anaerobic bottle: Bacillus species not anthracis    "Susceptibilities not performed"    ***Blood Panel PCR results on this specimen are available    approximately 3 hours after the Gram stain result.***    Gram stain, PCR, and/or culture results may not always    correspond due to difference in methodologies.    ************************************************************    This PCR assay was performed by multiplex PCR. This    Assay tests for 66 bacterial and resistance gene targets.    Please refer to the [facility] test directory    at [email or web address] for details.  Organism: Blood Culture PCR (28 Apr 2022 16:57)  Organism: Blood Culture PCR (28 Apr 2022 16:57)      -  Bacillus cereus group: Detec      Method Type: PCR    Culture - Blood (collected 27 Apr 2022 00:38)  Source: .Blood Blood-Peripheral  Preliminary Report (28 Apr 2022 01:03):    No growth to date.    RADIOLOGY & ADDITIONAL STUDIES:    ACC: [id number] EXAM:  CT HIP ONLY RT                          PROCEDURE DATE:  04/26/2022          INTERPRETATION:  CT HIP RIGHT    HISTORY: Pain. Postop infection. Right hip intertrochanteric fracture   ORIF.    TECHNIQUE: Contiguous axial imaging was performed through the pelvis   without contrast.  Coronal and sagittal reformatting was utilized.   Patient motion artifact and blurring may cause some limitation.    COMPARISON: CT of the right hip dated 4/14/2022, intraoperative x-rays   dated 4/11/2022, and pelvis and right hip x-rays dated 4/9/2022.    FINDINGS:    OSSEOUS STRUCTURES    Acute/Chronic Fractures:  Comminuted intertrochanteric fracture of the   right hip is again seen with a gamma nail for fixation. Distal   interlocking screw is present. There is mild residual fracture   displacement.    PELVIC JOINTS    Symphysis Pubis:  Preserved.    Sacroiliac Joints:  Maintained.    RIGHT HIP JOINT    Avascular Necrosis:  None.    Joint Space:  Grossly maintained given patient motion.    Effusion/Synovitis:  None.    LEFT HIP JOINT    Avascular Necrosis:  None.    Joint Space:  Grossly maintained given patient motion.    Effusion/Synovitis:  None.    VISUALIZED SPINE  Lower lumbar degenerative disc disease is present.    SOFT TISSUES    Neurovascular:  Severe atherosclerotic calcifications are present.    Pelvis/Abdomen:  Mild presacral edema is present.    Musculature:  Fluid collection is present extending along the posterior   margin of the greater trochanter and within the right gluteus medias   muscle measuring approximately 14.2 cm craniocaudally and up to 6.9 x 4.2   cm axially along the greater trochanter.    Subcutaneous Tissues:  Moderate subcutaneous hematoma formation is   again seen along the right hip incision site with mild hematoma formation   extending along the level of the proximal femoral diaphysis. Mild to   moderate subcutaneous edema is also noted. Overlying skin staples are   noted.    IMPRESSION:  1. Right intertrochanteric fracture ORIF is again seen.  2. Fluid collection along the right greater trochanter and within the   right gluteus medius muscle likely reflects evolution of the prior   hematoma although infection cannot be absolutely excluded with the given   history. Consider aspiration as warranted.  3. Moderate overlying subcutaneous hematoma is again seen.    Assessment :   78YO F PMH of Dementia, HTN, Dyslipidemia, PAD, Hypothyroidism, Anemia resident of [facility] facility SP ORIF, Right hip, using trochanteric nail 10-Apr-2022 complicated by Acute blood loss sec surgical site hematoma admitted with infected surgical site wound with poss infected hematoma + seropurulent drainage   Wound cultures with MRSA  Bacillus bacteremia likely contaminant  Vanc held due to supratherapeutic level   Vanc level 18 5/3/22  PICC line placed today    Plan :   Cont Vancomycin 1 gram IV daily  Trough prior to 3rd dose tmrw  Will need long term IV antibiotics x 6 weeks till 6/8/22  Trend temps and cbc  Ortho following   Asp precautions  Dc planning      Continue with present regiment.  Appropriate use of antibiotics and adverse effects reviewed.    > 35 minutes were spent in direct patient care reviewing notes, medications ,labs data/ imaging , discussion with multidisciplinary team.    Thank you for allowing me to participate in care of your patient .    Radha Pryor MD  Infectious Disease  [phone]

## 2022-05-04 NOTE — PROGRESS NOTE ADULT - SUBJECTIVE AND OBJECTIVE BOX
Subjective: Patient seen and examined. No overnight events.    MEDICATIONS  (STANDING):  acetaminophen     Tablet .. 1000 milliGRAM(s) Oral every 8 hours  amLODIPine   Tablet 5 milliGRAM(s) Oral daily  aspirin enteric coated 81 milliGRAM(s) Oral daily  atorvastatin 20 milliGRAM(s) Oral at bedtime  chlorhexidine 2% Cloths 1 Application(s) Topical <User Schedule>  chlorhexidine 4% Liquid 1 Application(s) Topical <User Schedule>  cholecalciferol 1000 Unit(s) Oral two times a day  cyanocobalamin 500 MICROGram(s) Oral daily  folic acid 1 milliGRAM(s) Oral daily  heparin   Injectable 5000 Unit(s) SubCutaneous every 12 hours  levothyroxine 125 MICROGram(s) Oral daily  losartan 100 milliGRAM(s) Oral daily  melatonin 5 milliGRAM(s) Oral at bedtime  memantine 10 milliGRAM(s) Oral two times a day  mirtazapine 15 milliGRAM(s) Oral at bedtime  multivitamin 1 Tablet(s) Oral daily  omega-3-Acid Ethyl Esters 2 Gram(s) Oral daily  pantoprazole    Tablet 40 milliGRAM(s) Oral before breakfast  vancomycin  IVPB 1000 milliGRAM(s) IV Intermittent every 24 hours    MEDICATIONS  (PRN):  ALBUTerol    90 MICROgram(s) HFA Inhaler 2 Puff(s) Inhalation every 6 hours PRN Shortness of Breath and/or Wheezing  oxyCODONE    IR 5 milliGRAM(s) Oral every 8 hours PRN Severe Pain (7 - 10)  oxyCODONE    IR 2.5 milliGRAM(s) Oral every 8 hours PRN Moderate Pain (4 - 6)  sodium chloride 0.9% lock flush 10 milliLiter(s) IV Push every 1 hour PRN Pre/post blood products, medications, blood draw, and to maintain line patency      Allergies    No Known Allergies    Intolerances        Vital Signs Last 24 Hrs  T(C): 36.4 (04 May 2022 12:02), Max: 36.8 (03 May 2022 18:26)  T(F): 97.6 (04 May 2022 12:02), Max: 98.2 (03 May 2022 18:26)  HR: 65 (04 May 2022 12:02) (59 - 65)  BP: 179/79 (04 May 2022 12:02) (124/68 - 179/79)  BP(mean): --  RR: 17 (04 May 2022 12:02) (17 - 18)  SpO2: 91% (04 May 2022 12:02) (91% - 94%)    PHYSICAL EXAM:  GENERAL: NAD, well-groomed, well-developed  HEAD:  Atraumatic, Normocephalic  ENMT: Moist mucous membranes,   NECK: Supple, No JVD, Normal thyroid  NERVOUS SYSTEM:  All 4 extremities mobile, no gross sensory deficits.   CHEST/LUNG: Clear to auscultation bilaterally; No rales, rhonchi, wheezing, or rubs  HEART: Regular rate and rhythm; No murmurs, rubs, or gallops  ABDOMEN: Soft, Nontender, Nondistended; Bowel sounds present  EXTREMITIES:  2+ Peripheral Pulses, No clubbing, cyanosis, or edema      LABS:                        9.8    5.15  )-----------( 310      ( 04 May 2022 07:19 )             31.4     04 May 2022 07:19    136    |  101    |  23     ----------------------------<  81     3.7     |  33     |  1.11     Ca    9.3        04 May 2022 07:19    TPro  6.1    /  Alb  2.5    /  TBili  0.2    /  DBili  x      /  AST  30     /  ALT  20     /  AlkPhos  94     04 May 2022 07:19        CAPILLARY BLOOD GLUCOSE          RADIOLOGY & ADDITIONAL TESTS:    Imaging Personally Reviewed:  [ ] YES     Consultant(s) Notes Reviewed:      Care Discussed with Consultants/Other Providers:    Advanced Directives: [ ] DNR  [ ] No feeding tube  [ ] MOLST in chart  [ ] MOLST completed today  [ ] Unknown

## 2022-05-04 NOTE — PROGRESS NOTE ADULT - SUBJECTIVE AND OBJECTIVE BOX
Chief Complaint: Hip wound    Interval Events: No events overnight.    Review of Systems:  General: No fevers, chills, weight gain  Skin: No rashes, color changes  Cardiovascular: No chest pain, orthopnea  Respiratory: No shortness of breath, cough  Gastrointestinal: No nausea, abdominal pain  Genitourinary: No incontinence, pain with urination  Musculoskeletal: No pain, swelling, decreased range of motion  Neurological: No headache, weakness  Psychiatric: No depression, anxiety  Endocrine: No weight gain, increased thirst  All other systems are comprehensively negative.    Physical Exam:  Vital Signs Last 24 Hrs  T(C): 36.8 (04 May 2022 05:36), Max: 36.8 (03 May 2022 18:26)  T(F): 98.2 (04 May 2022 05:36), Max: 98.2 (03 May 2022 18:26)  HR: 59 (04 May 2022 05:36) (59 - 63)  BP: 137/71 (04 May 2022 05:36) (124/68 - 137/71)  BP(mean): --  RR: 18 (04 May 2022 05:36) (18 - 18)  SpO2: 93% (04 May 2022 05:36) (93% - 94%)  General: NAD  HEENT: MMM  Neck: No JVD, no carotid bruit  Lungs: CTAB  CV: RRR, nl S1/S2, no M/R/G  Abdomen: S/NT/ND, +BS  Extremities: No LE edema, no cyanosis  Neuro: AAOx1  Skin: No rash    Labs:    05-04    136  |  101  |  23  ----------------------------<  81  3.7   |  33<H>  |  1.11    Ca    9.3      04 May 2022 07:19    TPro  6.1  /  Alb  2.5<L>  /  TBili  0.2  /  DBili  x   /  AST  30  /  ALT  20  /  AlkPhos  94  05-04                        9.8    5.15  )-----------( 310      ( 04 May 2022 07:19 )             31.4

## 2022-05-04 NOTE — PROGRESS NOTE ADULT - SUBJECTIVE AND OBJECTIVE BOX
Date/Time Patient Seen:  		  Referring MD:   Data Reviewed	       Patient is a 77y old  Female who presents with a chief complaint of Sent for possibly infected surgical wound. (03 May 2022 11:08)      Subjective/HPI     PAST MEDICAL & SURGICAL HISTORY:  Hypertension, unspecified type    Hyperlipidemia, unspecified hyperlipidemia type    Dementia without behavioral disturbance, unspecified dementia type    Hypothyroidism, unspecified type    Hypothyroid    Osteoarthritis    Carotid stenosis    Degenerative joint disease    Hypertension    Hyperlipidemia    Dementia    No significant past surgical history    S/P ORIF (open reduction internal fixation) fracture    History of open reduction and internal fixation (ORIF) procedure          Medication list         MEDICATIONS  (STANDING):  acetaminophen     Tablet .. 1000 milliGRAM(s) Oral every 8 hours  amLODIPine   Tablet 5 milliGRAM(s) Oral daily  aspirin enteric coated 81 milliGRAM(s) Oral daily  atorvastatin 20 milliGRAM(s) Oral at bedtime  chlorhexidine 2% Cloths 1 Application(s) Topical <User Schedule>  cholecalciferol 1000 Unit(s) Oral two times a day  cyanocobalamin 500 MICROGram(s) Oral daily  folic acid 1 milliGRAM(s) Oral daily  heparin   Injectable 5000 Unit(s) SubCutaneous every 12 hours  levothyroxine 125 MICROGram(s) Oral daily  losartan 100 milliGRAM(s) Oral daily  melatonin 5 milliGRAM(s) Oral at bedtime  memantine 10 milliGRAM(s) Oral two times a day  mirtazapine 15 milliGRAM(s) Oral at bedtime  multivitamin 1 Tablet(s) Oral daily  omega-3-Acid Ethyl Esters 2 Gram(s) Oral daily  pantoprazole    Tablet 40 milliGRAM(s) Oral before breakfast  vancomycin  IVPB 1000 milliGRAM(s) IV Intermittent every 24 hours    MEDICATIONS  (PRN):  ALBUTerol    90 MICROgram(s) HFA Inhaler 2 Puff(s) Inhalation every 6 hours PRN Shortness of Breath and/or Wheezing  oxyCODONE    IR 5 milliGRAM(s) Oral every 8 hours PRN Severe Pain (7 - 10)  oxyCODONE    IR 2.5 milliGRAM(s) Oral every 8 hours PRN Moderate Pain (4 - 6)         Vitals log        ICU Vital Signs Last 24 Hrs  T(C): 36.8 (04 May 2022 05:36), Max: 36.8 (03 May 2022 18:26)  T(F): 98.2 (04 May 2022 05:36), Max: 98.2 (03 May 2022 18:26)  HR: 59 (04 May 2022 05:36) (59 - 63)  BP: 137/71 (04 May 2022 05:36) (124/68 - 137/71)  BP(mean): --  ABP: --  ABP(mean): --  RR: 18 (04 May 2022 05:36) (18 - 18)  SpO2: 93% (04 May 2022 05:36) (93% - 94%)           Input and Output:  I&O's Detail    02 May 2022 07:01  -  03 May 2022 07:00  --------------------------------------------------------  IN:    Oral Fluid: 249 mL  Total IN: 249 mL    OUT:  Total OUT: 0 mL    Total NET: 249 mL          Lab Data                        10.3   6.32  )-----------( 356      ( 03 May 2022 07:32 )             33.7     05-03    137  |  101  |  18  ----------------------------<  86  3.7   |  32<H>  |  1.02    Ca    9.2      03 May 2022 07:29    TPro  6.4  /  Alb  2.5<L>  /  TBili  0.4  /  DBili  x   /  AST  18  /  ALT  12  /  AlkPhos  98  05-03            Review of Systems	      Objective     Physical Examination    heart s1s2  lung dec BS  abd soft      Pertinent Lab findings & Imaging      Dominga:  NO   Adequate UO     I&O's Detail    02 May 2022 07:01  -  03 May 2022 07:00  --------------------------------------------------------  IN:    Oral Fluid: 249 mL  Total IN: 249 mL    OUT:  Total OUT: 0 mL    Total NET: 249 mL               Discussed with:     Cultures:	        Radiology

## 2022-05-05 PROCEDURE — 99232 SBSQ HOSP IP/OBS MODERATE 35: CPT

## 2022-05-05 RX ADMIN — Medication 1000 UNIT(S): at 06:22

## 2022-05-05 RX ADMIN — Medication 1 TABLET(S): at 13:20

## 2022-05-05 RX ADMIN — Medication 1000 MILLIGRAM(S): at 22:19

## 2022-05-05 RX ADMIN — Medication 1000 MILLIGRAM(S): at 07:20

## 2022-05-05 RX ADMIN — Medication 125 MILLIGRAM(S): at 10:58

## 2022-05-05 RX ADMIN — MIRTAZAPINE 15 MILLIGRAM(S): 45 TABLET, ORALLY DISINTEGRATING ORAL at 21:49

## 2022-05-05 RX ADMIN — Medication 5 MILLIGRAM(S): at 21:48

## 2022-05-05 RX ADMIN — MEMANTINE HYDROCHLORIDE 10 MILLIGRAM(S): 10 TABLET ORAL at 06:22

## 2022-05-05 RX ADMIN — AMLODIPINE BESYLATE 5 MILLIGRAM(S): 2.5 TABLET ORAL at 06:22

## 2022-05-05 RX ADMIN — Medication 81 MILLIGRAM(S): at 13:20

## 2022-05-05 RX ADMIN — LOSARTAN POTASSIUM 100 MILLIGRAM(S): 100 TABLET, FILM COATED ORAL at 06:22

## 2022-05-05 RX ADMIN — HEPARIN SODIUM 5000 UNIT(S): 5000 INJECTION INTRAVENOUS; SUBCUTANEOUS at 06:25

## 2022-05-05 RX ADMIN — Medication 125 MICROGRAM(S): at 06:22

## 2022-05-05 RX ADMIN — Medication 1000 MILLIGRAM(S): at 21:49

## 2022-05-05 RX ADMIN — Medication 1000 UNIT(S): at 18:11

## 2022-05-05 RX ADMIN — HEPARIN SODIUM 5000 UNIT(S): 5000 INJECTION INTRAVENOUS; SUBCUTANEOUS at 18:11

## 2022-05-05 RX ADMIN — MEMANTINE HYDROCHLORIDE 10 MILLIGRAM(S): 10 TABLET ORAL at 18:10

## 2022-05-05 RX ADMIN — PREGABALIN 500 MICROGRAM(S): 225 CAPSULE ORAL at 13:19

## 2022-05-05 RX ADMIN — Medication 2 GRAM(S): at 13:20

## 2022-05-05 RX ADMIN — CHLORHEXIDINE GLUCONATE 1 APPLICATION(S): 213 SOLUTION TOPICAL at 06:22

## 2022-05-05 RX ADMIN — Medication 1 MILLIGRAM(S): at 13:20

## 2022-05-05 RX ADMIN — ATORVASTATIN CALCIUM 20 MILLIGRAM(S): 80 TABLET, FILM COATED ORAL at 21:48

## 2022-05-05 RX ADMIN — Medication 1000 MILLIGRAM(S): at 06:22

## 2022-05-05 RX ADMIN — PANTOPRAZOLE SODIUM 40 MILLIGRAM(S): 20 TABLET, DELAYED RELEASE ORAL at 06:24

## 2022-05-05 NOTE — CHART NOTE - NSCHARTNOTEFT_GEN_A_CORE
Assessment:     Pt seen for nutrition follow-up. Per H&P, pt is a "78 y/o F with PMH of HTN, Dyslipidemia, PAD, Hypothyroidism, Anemia s/p transfusion, OA, Depression, and Dementia who was sent from [facility] facility for possibly infected surgical wound. Patient was admitted to [facility] and with closed right femoral neck fracture s/p fall, and had an ORIF surgery 2 weeks ago, discharged to subacute rehab, sent back today for reported discharge from the surgical wound, no reported fever or chills. Patient with advanced dementia, with irrelevant speech, no further history could be obtained at this time."    Nutrition consult previously ordered for assessment. Minimal subjective hx obtained during initial visit- pt confused with advanced dementia. Pt with recent hospitalization 2 weeks ago s/p fall. During previous RD visit during last hospital stay, pt's dtr at bedside and provided subjective hx; reports pt on softer consistency diet PTA, eats fairly well at baseline, UBW ~150#/no significant weight changes PTA. Pt currently on minced and moist consistency diet - observed partial edentulism. During initial visit, pt reported fair appetite and consumes ensure supplement daily PTA. NKFA. CBW on admission 161#. 1+ BL arm edema noted. Skin: skin tear; L forearm, R shin, surgical incision; R hip. Spoke to pt's RN who reports pt eats good when dtr is at bedside. Pt at all of dinner last night with dtr. RN reports minimal po intake of breakfast meal this am, plan for dtr to visit this afternoon and will encourage po intake of lunch meal. PO intakes variable throughout admission, ranging from 0-100% per RN documentation. Will continue to provide ensure enlive nutritional supplement daily for addtl kcal/protein and to supplement suspected variable po intake (350kcal, 20g protein per 8 fl oz). Will provide magic cup fortified ice cream as well for addtl kcal/protein. Recommend continuing to encourage po intake of meals/fluids/supplements and assistance at meal times. RD will continue to follow-up and monitor pt's nutritional status.    Factors impacting intake: [ ] none [ ] nausea  [ ] vomiting [ ] diarrhea [ ] constipation  [ ]chewing problems [ ] swallowing issues  [ ] other:     Diet Prescription: Diet, Minced and Moist:   DASH/TLC {Sodium & Cholesterol Restricted}  Supplement Feeding Modality:  Oral  Ensure Enlive Cans or Servings Per Day:  1       Frequency:  Daily (04-28-22 @ 14:10) [Active]    Intake: variable, overall fair    Current Weight: Weight (kg): 73.1 (26 Apr 2022 18:00)  % Weight Change  Adm wt 160#    No new wt to assess, pt OOB to chair during visit    Pertinent Medications: MEDICATIONS  (STANDING):  acetaminophen     Tablet .. 1000 milliGRAM(s) Oral every 8 hours  amLODIPine   Tablet 5 milliGRAM(s) Oral daily  aspirin enteric coated 81 milliGRAM(s) Oral daily  atorvastatin 20 milliGRAM(s) Oral at bedtime  chlorhexidine 2% Cloths 1 Application(s) Topical <User Schedule>  chlorhexidine 4% Liquid 1 Application(s) Topical <User Schedule>  cholecalciferol 1000 Unit(s) Oral two times a day  cyanocobalamin 500 MICROGram(s) Oral daily  folic acid 1 milliGRAM(s) Oral daily  heparin   Injectable 5000 Unit(s) SubCutaneous every 12 hours  levothyroxine 125 MICROGram(s) Oral daily  losartan 100 milliGRAM(s) Oral daily  melatonin 5 milliGRAM(s) Oral at bedtime  memantine 10 milliGRAM(s) Oral two times a day  mirtazapine 15 milliGRAM(s) Oral at bedtime  multivitamin 1 Tablet(s) Oral daily  omega-3-Acid Ethyl Esters 2 Gram(s) Oral daily  pantoprazole    Tablet 40 milliGRAM(s) Oral before breakfast  vancomycin  IVPB 1000 milliGRAM(s) IV Intermittent every 24 hours    MEDICATIONS  (PRN):  ALBUTerol    90 MICROgram(s) HFA Inhaler 2 Puff(s) Inhalation every 6 hours PRN Shortness of Breath and/or Wheezing  oxyCODONE    IR 5 milliGRAM(s) Oral every 8 hours PRN Severe Pain (7 - 10)  oxyCODONE    IR 2.5 milliGRAM(s) Oral every 8 hours PRN Moderate Pain (4 - 6)  sodium chloride 0.9% lock flush 10 milliLiter(s) IV Push every 1 hour PRN Pre/post blood products, medications, blood draw, and to maintain line patency    Pertinent Labs: 05-04 Na136 mmol/L Glu 81 mg/dL K+ 3.7 mmol/L Cr  1.11 mg/dL BUN 23 mg/dL 05-04 Alb 2.5 g/dL<L>    CAPILLARY BLOOD GLUCOSE      Skin: surgical incision; R hip, skin tears    Estimated Needs:   [X] no change since previous assessment  [ ] recalculated:     Previous Nutrition Diagnosis:   [ ] Inadequate Energy Intake [X] Inadequate Oral Intake [ ] Excessive Energy Intake   [ ] Underweight [ ] Increased Nutrient Needs [ ] Overweight/Obesity   [ ] Altered GI Function [ ] Unintended Weight Loss [ ] Food & Nutrition Related Knowledge Deficit [ ] Malnutrition     Nutrition Diagnosis is [X] ongoing  [ ] resolved [ ] not applicable     New Nutrition Diagnosis: [ ] not applicable       Interventions: Continue nutrition care plan, DASH/TLC, minced+moist diet, ensure enlive daily, magic cup BID, assistance and encouragement at meal times  Recommend  [ ] Change Diet To:  [ ] Nutrition Supplement  [ ] Nutrition Support  [ ] Other:     Monitoring and Evaluation:   [X] PO intake [ x ] Tolerance to diet prescription [ x ] weights [ x ] labs[ x ] follow up per protocol  [ ] other:
Assessment:     Pt seen for nutrition follow-up. Per H&P, pt is a "78 y/o F with PMH of HTN, Dyslipidemia, PAD, Hypothyroidism, Anemia s/p transfusion, OA, Depression, and Dementia who was sent from [facility] facility for possibly infected surgical wound. Patient was admitted to [facility] and with closed right femoral neck fracture s/p fall, and had an ORIF surgery 2 weeks ago, discharged to subacute rehab, sent back today for reported discharge from the surgical wound, no reported fever or chills. Patient with advanced dementia, with irrelevant speech, no further history could be obtained at this time."    Nutrition consult previously ordered for assessment. Minimal subjective hx obtained during initial visit- pt confused with advanced dementia. Pt with recent hospitalization 2 weeks ago s/p fall. During previous RD visit during last hospital stay, pt's dtr at bedside and provided subjective hx; reports pt on softer consistency diet PTA, eats fairly well at baseline, UBW ~150#/no significant weight changes PTA. Pt currently on minced and moist consistency diet - observed partial edentulism. Pt reports fair appetite and reports consumes ensure supplement daily. NKFA. CBW on admission 161#. Nonpitting BL leg edema noted. Skin: skin tear; L forearm, surgical incision; R hip. Visited pt this afternoon, minimal intake of lunch meal, observed pt sipping on ensure. Spoke to pt's RN who reports pt ate well for breakfast am (~75%). PO intakes % per RN documentation. Will continue to provide ensure enlive nutritional supplement daily for addtl kcal/protein and to supplement suspected variable po intake (350kcal, 20g protein per 8 fl oz). Continue to encourage po intake of meals/fluids/supplements. RD to follow-up and will continue to monitor pt's nutritional status.    Factors impacting intake: [ ] none [ ] nausea  [ ] vomiting [ ] diarrhea [ ] constipation  [ ]chewing problems [ ] swallowing issues  [ ] other:     Diet Prescription: DASH/TLC, minced/moist diet, ensure daily    Intake: fair-good po intake    Current Weight: Weight (kg): 73.1 (04-26 @ 18:00)  % Weight Change    Pertinent Medications: MEDICATIONS  (STANDING):  acetaminophen     Tablet .. 1000 milliGRAM(s) Oral every 8 hours  amLODIPine   Tablet 5 milliGRAM(s) Oral daily  aspirin enteric coated 81 milliGRAM(s) Oral daily  atorvastatin 20 milliGRAM(s) Oral at bedtime  cefTRIAXone   IVPB 2000 milliGRAM(s) IV Intermittent every 24 hours  chlorhexidine 2% Cloths 1 Application(s) Topical <User Schedule>  cholecalciferol 1000 Unit(s) Oral two times a day  cyanocobalamin 500 MICROGram(s) Oral daily  folic acid 1 milliGRAM(s) Oral daily  heparin   Injectable 5000 Unit(s) SubCutaneous every 12 hours  levothyroxine 125 MICROGram(s) Oral daily  losartan 100 milliGRAM(s) Oral daily  melatonin 5 milliGRAM(s) Oral at bedtime  memantine 10 milliGRAM(s) Oral two times a day  mirtazapine 15 milliGRAM(s) Oral at bedtime  multivitamin 1 Tablet(s) Oral daily  mupirocin 2% Ointment 1 Application(s) Both Nostrils two times a day  omega-3-Acid Ethyl Esters 2 Gram(s) Oral daily  pantoprazole    Tablet 40 milliGRAM(s) Oral before breakfast    MEDICATIONS  (PRN):  ALBUTerol    90 MICROgram(s) HFA Inhaler 2 Puff(s) Inhalation every 6 hours PRN Shortness of Breath and/or Wheezing  oxyCODONE    IR 5 milliGRAM(s) Oral every 8 hours PRN Severe Pain (7 - 10)  oxyCODONE    IR 2.5 milliGRAM(s) Oral every 8 hours PRN Moderate Pain (4 - 6)    Pertinent Labs: 05-01 Na140 mmol/L Glu 87 mg/dL K+ 3.7 mmol/L Cr  0.95 mg/dL BUN 16 mg/dL 04-29 Alb 2.4 g/dL<L>    CAPILLARY BLOOD GLUCOSE      Skin: no pressure ulcers, surgical incision; R hip    Estimated Needs:   [X] no change since previous assessment  [ ] recalculated:     Previous Nutrition Diagnosis:   [ ] Inadequate Energy Intake [X] Inadequate Oral Intake [ ] Excessive Energy Intake   [ ] Underweight [ ] Increased Nutrient Needs [ ] Overweight/Obesity   [ ] Altered GI Function [ ] Unintended Weight Loss [ ] Food & Nutrition Related Knowledge Deficit [ ] Malnutrition     Nutrition Diagnosis is [X] ongoing (improving)  [ ] resolved [ ] not applicable     New Nutrition Diagnosis: [ ] not applicable       Interventions: Continue nutrition care plan, DASH/TLC, minced+moist diet, ensure enlive daily, assistance and encouragement at meal times  Recommend  [ ] Change Diet To:  [ ] Nutrition Supplement  [ ] Nutrition Support  [ ] Other:     Monitoring and Evaluation:   [X] PO intake [ x ] Tolerance to diet prescription [ x ] weights [ x ] labs[ x ] follow up per protocol  [ ] other:
Vanco trough 19, discussed with pharmacy and ID, will restart Vanco at 750mg q 12 with next trough pre-3rd dose

## 2022-05-05 NOTE — PROGRESS NOTE ADULT - SUBJECTIVE AND OBJECTIVE BOX
Date/Time Patient Seen:  		  Referring MD:   Data Reviewed	       Patient is a 77y old  Female who presents with a chief complaint of Sent for possibly infected surgical wound. (04 May 2022 13:11)      Subjective/HPI     PAST MEDICAL & SURGICAL HISTORY:  Hypertension, unspecified type    Hyperlipidemia, unspecified hyperlipidemia type    Dementia without behavioral disturbance, unspecified dementia type    Hypothyroidism, unspecified type    Hypothyroid    Osteoarthritis    Carotid stenosis    Degenerative joint disease    Hypertension    Hyperlipidemia    Dementia    No significant past surgical history    S/P ORIF (open reduction internal fixation) fracture    History of open reduction and internal fixation (ORIF) procedure          Medication list         MEDICATIONS  (STANDING):  acetaminophen     Tablet .. 1000 milliGRAM(s) Oral every 8 hours  amLODIPine   Tablet 5 milliGRAM(s) Oral daily  aspirin enteric coated 81 milliGRAM(s) Oral daily  atorvastatin 20 milliGRAM(s) Oral at bedtime  chlorhexidine 2% Cloths 1 Application(s) Topical <User Schedule>  chlorhexidine 4% Liquid 1 Application(s) Topical <User Schedule>  cholecalciferol 1000 Unit(s) Oral two times a day  cyanocobalamin 500 MICROGram(s) Oral daily  folic acid 1 milliGRAM(s) Oral daily  heparin   Injectable 5000 Unit(s) SubCutaneous every 12 hours  levothyroxine 125 MICROGram(s) Oral daily  losartan 100 milliGRAM(s) Oral daily  melatonin 5 milliGRAM(s) Oral at bedtime  memantine 10 milliGRAM(s) Oral two times a day  mirtazapine 15 milliGRAM(s) Oral at bedtime  multivitamin 1 Tablet(s) Oral daily  omega-3-Acid Ethyl Esters 2 Gram(s) Oral daily  pantoprazole    Tablet 40 milliGRAM(s) Oral before breakfast  vancomycin  IVPB 1000 milliGRAM(s) IV Intermittent every 24 hours    MEDICATIONS  (PRN):  ALBUTerol    90 MICROgram(s) HFA Inhaler 2 Puff(s) Inhalation every 6 hours PRN Shortness of Breath and/or Wheezing  oxyCODONE    IR 5 milliGRAM(s) Oral every 8 hours PRN Severe Pain (7 - 10)  oxyCODONE    IR 2.5 milliGRAM(s) Oral every 8 hours PRN Moderate Pain (4 - 6)  sodium chloride 0.9% lock flush 10 milliLiter(s) IV Push every 1 hour PRN Pre/post blood products, medications, blood draw, and to maintain line patency         Vitals log        ICU Vital Signs Last 24 Hrs  T(C): 36.6 (04 May 2022 22:03), Max: 36.6 (04 May 2022 18:21)  T(F): 97.9 (04 May 2022 22:03), Max: 97.9 (04 May 2022 18:21)  HR: 77 (04 May 2022 22:03) (65 - 77)  BP: 172/79 (04 May 2022 22:03) (122/72 - 179/79)  BP(mean): --  ABP: --  ABP(mean): --  RR: 18 (04 May 2022 22:03) (17 - 18)  SpO2: 90% (04 May 2022 22:03) (90% - 98%)           Input and Output:  I&O's Detail    04 May 2022 07:01  -  05 May 2022 06:39  --------------------------------------------------------  IN:    Oral Fluid: 480 mL  Total IN: 480 mL    OUT:  Total OUT: 0 mL    Total NET: 480 mL          Lab Data                        9.8    5.15  )-----------( 310      ( 04 May 2022 07:19 )             31.4     05-04    136  |  101  |  23  ----------------------------<  81  3.7   |  33<H>  |  1.11    Ca    9.3      04 May 2022 07:19    TPro  6.1  /  Alb  2.5<L>  /  TBili  0.2  /  DBili  x   /  AST  30  /  ALT  20  /  AlkPhos  94  05-04            Review of Systems	      Objective     Physical Examination    heart s1s2  lung dec BS  abd soft      Pertinent Lab findings & Imaging      Dominga:  NO   Adequate UO     I&O's Detail    04 May 2022 07:01  -  05 May 2022 06:39  --------------------------------------------------------  IN:    Oral Fluid: 480 mL  Total IN: 480 mL    OUT:  Total OUT: 0 mL    Total NET: 480 mL               Discussed with:     Cultures:	        Radiology

## 2022-05-05 NOTE — PROGRESS NOTE ADULT - SUBJECTIVE AND OBJECTIVE BOX
Chief Complaint: Hip wound    Interval Events: No events overnight.    Review of Systems:  General: No fevers, chills, weight gain  Skin: No rashes, color changes  Cardiovascular: No chest pain, orthopnea  Respiratory: No shortness of breath, cough  Gastrointestinal: No nausea, abdominal pain  Genitourinary: No incontinence, pain with urination  Musculoskeletal: No pain, swelling, decreased range of motion  Neurological: No headache, weakness  Psychiatric: No depression, anxiety  Endocrine: No weight gain, increased thirst  All other systems are comprehensively negative.    Physical Exam:  Vital Signs Last 24 Hrs  T(C): 36.4 (05 May 2022 05:10), Max: 36.6 (04 May 2022 18:21)  T(F): 97.5 (05 May 2022 05:10), Max: 97.9 (04 May 2022 18:21)  HR: 64 (05 May 2022 05:10) (64 - 77)  BP: 149/71 (05 May 2022 05:10) (122/72 - 179/79)  BP(mean): --  RR: 18 (05 May 2022 05:10) (17 - 18)  SpO2: 91% (05 May 2022 05:10) (90% - 98%)  General: NAD  HEENT: MMM  Neck: No JVD, no carotid bruit  Lungs: CTAB  CV: RRR, nl S1/S2, no M/R/G  Abdomen: S/NT/ND, +BS  Extremities: No LE edema, no cyanosis  Neuro: AAOx1  Skin: No rash    Labs:    05-04    136  |  101  |  23  ----------------------------<  81  3.7   |  33<H>  |  1.11    Ca    9.3      04 May 2022 07:19    TPro  6.1  /  Alb  2.5<L>  /  TBili  0.2  /  DBili  x   /  AST  30  /  ALT  20  /  AlkPhos  94  05-04                        9.8    5.15  )-----------( 310      ( 04 May 2022 07:19 )             31.4

## 2022-05-05 NOTE — PROGRESS NOTE ADULT - SUBJECTIVE AND OBJECTIVE BOX
ORIF Right  hip, poss infected wound      S: Pt without complaints, sleeping but arousable.. No SOB,CP, N/V. Tolerated Fluids / Diet well.   Pain comfortable Interval Rx - Oxy 5 & 10mg + Tylenol + Celebrex. No BM yet [+BM], + flatus, No abdominal pain.  Using only tylenol for pain.  acetaminophen     Tablet .. 1000 milliGRAM(s) Oral every 8 hours  melatonin 5 milliGRAM(s) Oral at bedtime  memantine 10 milliGRAM(s) Oral two times a day  mirtazapine 15 milliGRAM(s) Oral at bedtime  oxyCODONE    IR 5 milliGRAM(s) Oral every 8 hours PRN  oxyCODONE    IR 2.5 milliGRAM(s) Oral every 8 hours PRN    O: General: Pt Alert and oriented, On exam NAD,   VS: Vital Signs Last 24 Hrs  T(C): 36.6 (05 May 2022 08:30), Max: 36.6 (04 May 2022 18:21)  T(F): 97.8 (05 May 2022 08:30), Max: 97.9 (04 May 2022 18:21)  HR: 60 (05 May 2022 08:30) (60 - 77)  BP: 157/73 (05 May 2022 08:30) (122/72 - 179/79)  BP(mean): --  RR: 18 (05 May 2022 08:30) (17 - 18)  SpO2: 90% (05 May 2022 08:30) (90% - 98%)    Right hip dressing w/ serosang drainage.    Neurologic: Has sensation bilat. feet & toes ;  Full AROM bilat feet & toes. EHL / AT  = Bilat: 5/5   Vascular: Feet toes warm, pink. DP = 2+. Calves soft ; w/o tenderness bilat..  VTEP: On Bilat. Venodynes + aspirin enteric coated 81 milliGRAM(s) Oral daily  heparin   Injectable 5000 Unit(s) SubCutaneous every 12 hours     Unable to participate w/ PT                          9.8    5.15  )-----------( 310      ( 04 May 2022 07:19 )             31.4     05-04    136  |  101  |  23  ----------------------------<  81  3.7   |  33<H>  |  1.11    Ca    9.3      04 May 2022 07:19    TPro  6.1  /  Alb  2.5<L>  /  TBili  0.2  /  DBili  x   /  AST  30  /  ALT  20  /  AlkPhos  94  05-04      Hospitalist input noted    Primary Orthopedic Assessment:  • Stable from Orthopedic perspective  • Neuro motor exam stable  • Labs: stable      Plan:   • Continue:  PT/OT/Weightbearing as tolerated with assistance of a walker/Ice to hip/       • Continue DVT prophylaxis as prescribed, including use of compression devices and ankle pumps  • Continue Pain Rx  • Plans per Medicine/ID  • Discharge planning – anticipated discharge is:  Subacute rehabilitation  when medically stable       ORIF Right  hip, poss infected wound      S: Pt without complaints, sleeping but arousable.. No SOB,CP, N/V. Tolerated Fluids / Diet well.   Pain comfortable Interval Rx - Oxy 5 & 10mg + Tylenol + Celebrex. No BM yet [+BM], + flatus, No abdominal pain.  Using only tylenol for pain.  acetaminophen     Tablet .. 1000 milliGRAM(s) Oral every 8 hours  melatonin 5 milliGRAM(s) Oral at bedtime  memantine 10 milliGRAM(s) Oral two times a day  mirtazapine 15 milliGRAM(s) Oral at bedtime  oxyCODONE    IR 5 milliGRAM(s) Oral every 8 hours PRN  oxyCODONE    IR 2.5 milliGRAM(s) Oral every 8 hours PRN    O: General: Pt Alert and oriented, On exam NAD,   VS: Vital Signs Last 24 Hrs  T(C): 36.6 (05 May 2022 08:30), Max: 36.6 (04 May 2022 18:21)  T(F): 97.8 (05 May 2022 08:30), Max: 97.9 (04 May 2022 18:21)  HR: 60 (05 May 2022 08:30) (60 - 77)  BP: 157/73 (05 May 2022 08:30) (122/72 - 179/79)  BP(mean): --  RR: 18 (05 May 2022 08:30) (17 - 18)  SpO2: 90% (05 May 2022 08:30) (90% - 98%)    Right hip dressing w/ serosang drainage.  Wound w/ .5cm mid wound draining area.  Unable to express any meaningful drainage.  No erythema or swelling.  Neurologic: Has sensation bilat. feet & toes ;  Full AROM bilat feet & toes. EHL / AT  = Bilat: 5/5   Vascular: Feet toes warm, pink. DP = 2+. Calves soft ; w/o tenderness bilat..  VTEP: On Bilat. Venodynes + aspirin enteric coated 81 milliGRAM(s) Oral daily  heparin   Injectable 5000 Unit(s) SubCutaneous every 12 hours     Unable to participate w/ PT                          9.8    5.15  )-----------( 310      ( 04 May 2022 07:19 )             31.4     05-04    136  |  101  |  23  ----------------------------<  81  3.7   |  33<H>  |  1.11    Ca    9.3      04 May 2022 07:19    TPro  6.1  /  Alb  2.5<L>  /  TBili  0.2  /  DBili  x   /  AST  30  /  ALT  20  /  AlkPhos  94  05-04      Hospitalist input noted    Primary Orthopedic Assessment:  • Stable from Orthopedic perspective  • Neuro motor exam stable  • Labs: stable      Plan:   • Continue:  PT/OT/Weightbearing as tolerated with assistance of a walker/Ice to hip/       • Continue DVT prophylaxis as prescribed, including use of compression devices and ankle pumps  • Continue Pain Rx  • Plans per Medicine/ID  • Discharge planning – anticipated discharge is:  Subacute rehabilitation  when medically stable

## 2022-05-05 NOTE — PROGRESS NOTE ADULT - SUBJECTIVE AND OBJECTIVE BOX
Patient is a 77y old  Female who presents with a chief complaint of Sent for possibly infected surgical wound. (05 May 2022 11:03)      INTERVAL HPI/OVERNIGHT EVENTS: Patient seen and examined at bedside. No overnight events    MEDICATIONS  (STANDING):  acetaminophen     Tablet .. 1000 milliGRAM(s) Oral every 8 hours  amLODIPine   Tablet 5 milliGRAM(s) Oral daily  aspirin enteric coated 81 milliGRAM(s) Oral daily  atorvastatin 20 milliGRAM(s) Oral at bedtime  chlorhexidine 2% Cloths 1 Application(s) Topical <User Schedule>  chlorhexidine 4% Liquid 1 Application(s) Topical <User Schedule>  cholecalciferol 1000 Unit(s) Oral two times a day  cyanocobalamin 500 MICROGram(s) Oral daily  folic acid 1 milliGRAM(s) Oral daily  heparin   Injectable 5000 Unit(s) SubCutaneous every 12 hours  levothyroxine 125 MICROGram(s) Oral daily  losartan 100 milliGRAM(s) Oral daily  melatonin 5 milliGRAM(s) Oral at bedtime  memantine 10 milliGRAM(s) Oral two times a day  mirtazapine 15 milliGRAM(s) Oral at bedtime  multivitamin 1 Tablet(s) Oral daily  omega-3-Acid Ethyl Esters 2 Gram(s) Oral daily  pantoprazole    Tablet 40 milliGRAM(s) Oral before breakfast  vancomycin  IVPB 1000 milliGRAM(s) IV Intermittent every 24 hours    MEDICATIONS  (PRN):  ALBUTerol    90 MICROgram(s) HFA Inhaler 2 Puff(s) Inhalation every 6 hours PRN Shortness of Breath and/or Wheezing  oxyCODONE    IR 5 milliGRAM(s) Oral every 8 hours PRN Severe Pain (7 - 10)  oxyCODONE    IR 2.5 milliGRAM(s) Oral every 8 hours PRN Moderate Pain (4 - 6)  sodium chloride 0.9% lock flush 10 milliLiter(s) IV Push every 1 hour PRN Pre/post blood products, medications, blood draw, and to maintain line patency      Allergies    No Known Allergies    Intolerances        REVIEW OF SYSTEMS:  Unable to obtain meaningful ROS due to mental status      Vital Signs Last 24 Hrs  T(C): 36.6 (05 May 2022 08:30), Max: 36.6 (04 May 2022 18:21)  T(F): 97.8 (05 May 2022 08:30), Max: 97.9 (04 May 2022 18:21)  HR: 60 (05 May 2022 08:30) (60 - 77)  BP: 157/73 (05 May 2022 08:30) (122/72 - 172/79)  BP(mean): --  RR: 18 (05 May 2022 08:30) (18 - 18)  SpO2: 90% (05 May 2022 08:30) (90% - 98%)    PHYSICAL EXAM:  GENERAL: elderly appearing female in NAD  HEAD: atraumatic, normocephalic   EYES: EOMI, conjunctiva and sclera clear  ENMT: edentulous mouth  RESPIRATORY:  grossly cta bilateral, breathing comfortably on room air  CARDIOVASCULAR:  soft ii/vi holosystolic murmur LUSB  GASTROINTESTINAL:  soft, nontender, nondistended, bowel sounds present  MUSCULOSKELETAL: No cyanosis or edema, surgical wound well healing, non-tender to palpation  NERVOUS SYSTEM:  awake, alert, sensation intact   PSYCH:  awake and alert but confused  SKIN: Multiple ecchymotic areas on skin    LABS:    CBC Full  -  ( 04 May 2022 07:19 )  WBC Count : 5.15 K/uL  Hemoglobin : 9.8 g/dL  Hematocrit : 31.4 %  Platelet Count - Automated : 310 K/uL  Mean Cell Volume : 97.5 fl  Mean Cell Hemoglobin : 30.4 pg  Mean Cell Hemoglobin Concentration : 31.2 gm/dL  Auto Neutrophil # : 2.79 K/uL  Auto Lymphocyte # : 1.37 K/uL  Auto Monocyte # : 0.59 K/uL  Auto Eosinophil # : 0.28 K/uL  Auto Basophil # : 0.04 K/uL  Auto Neutrophil % : 54.1 %  Auto Lymphocyte % : 26.6 %  Auto Monocyte % : 11.5 %  Auto Eosinophil % : 5.4 %  Auto Basophil % : 0.8 %      Ca    9.3        04 May 2022 07:19          CAPILLARY BLOOD GLUCOSE            Culture - Blood (collected 04-29-22 @ 16:15)  Source: .Blood Blood  Final Report (05-04-22 @ 17:00):    No Growth Final    Culture - Blood (collected 04-29-22 @ 16:15)  Source: .Blood Blood  Final Report (05-04-22 @ 17:00):    No Growth Final    Culture - Other (collected 04-28-22 @ 13:37)  Source: .Other right hip wound  Final Report (05-01-22 @ 08:54):    Few Methicillin Resistant Staphylococcus aureus  Organism: Methicillin resistant Staphylococcus aureus (05-01-22 @ 08:54)  Organism: Methicillin resistant Staphylococcus aureus (05-01-22 @ 08:54)      -  Ampicillin/Sulbactam: R <=8/4      -  Cefazolin: R >16      -  Clindamycin: S <=0.25      -  Daptomycin: S 0.5      -  Erythromycin: R >4      -  Gentamicin: S <=1 Should not be used as monotherapy      -  Linezolid: S 4      -  Oxacillin: R >2      -  Penicillin: R >8      -  Rifampin: S <=1 Should not be used as monotherapy      -  Tetra/Doxy: S <=1      -  Trimethoprim/Sulfamethoxazole: R >2/38      -  Vancomycin: S 1      Method Type: MEGHA        RADIOLOGY & ADDITIONAL TESTS:     Consultant(s) Notes Reviewed:  [x] YES  [ ] NO    Care Discussed with [x] Consultants  [x] Patient  [ ] Family  [ ]      [ x] Other; RN  DVT ppx

## 2022-05-05 NOTE — PROGRESS NOTE ADULT - TIME BILLING
The total amount of time listed was spent reviewing the hospital notes, laboratory values, imaging findings, assessing/counseling the patient, discussing with consultant physicians, case management, social work, and nursing staff.

## 2022-05-05 NOTE — PROGRESS NOTE ADULT - SUBJECTIVE AND OBJECTIVE BOX
OPAL ARBOLEDA is a 77yFemale , patient examined and chart reviewed.    INTERVAL HPI/ OVERNIGHT EVENTS:   Afebrile. NAD.  No events.    PAST MEDICAL & SURGICAL HISTORY:  Dementia without behavioral disturbance, unspecified dementia type  Hypothyroid  Osteoarthritis  Carotid stenosis  Degenerative joint disease  Hypertension  Hyperlipidemia  History of open reduction and internal fixation (ORIF) procedure        For details regarding the patient's social history, family history, and other miscellaneous elements, please refer the initial infectious diseases consultation and/or the admitting history and physical examination for this admission.    ROS:  Unable to obtain due to : poor historian      Current inpatient medications :    ANTIBIOTICS/RELEVANT:  vancomycin  IVPB 1000 milliGRAM(s) IV Intermittent every 24 hours    MEDICATIONS  (STANDING):  acetaminophen     Tablet .. 1000 milliGRAM(s) Oral every 8 hours  amLODIPine   Tablet 5 milliGRAM(s) Oral daily  aspirin enteric coated 81 milliGRAM(s) Oral daily  atorvastatin 20 milliGRAM(s) Oral at bedtime  chlorhexidine 2% Cloths 1 Application(s) Topical <User Schedule>  chlorhexidine 4% Liquid 1 Application(s) Topical <User Schedule>  cholecalciferol 1000 Unit(s) Oral two times a day  cyanocobalamin 500 MICROGram(s) Oral daily  folic acid 1 milliGRAM(s) Oral daily  heparin   Injectable 5000 Unit(s) SubCutaneous every 12 hours  levothyroxine 125 MICROGram(s) Oral daily  losartan 100 milliGRAM(s) Oral daily  melatonin 5 milliGRAM(s) Oral at bedtime  memantine 10 milliGRAM(s) Oral two times a day  mirtazapine 15 milliGRAM(s) Oral at bedtime  multivitamin 1 Tablet(s) Oral daily  omega-3-Acid Ethyl Esters 2 Gram(s) Oral daily  pantoprazole    Tablet 40 milliGRAM(s) Oral before breakfast    MEDICATIONS  (PRN):  ALBUTerol    90 MICROgram(s) HFA Inhaler 2 Puff(s) Inhalation every 6 hours PRN Shortness of Breath and/or Wheezing  oxyCODONE    IR 5 milliGRAM(s) Oral every 8 hours PRN Severe Pain (7 - 10)  oxyCODONE    IR 2.5 milliGRAM(s) Oral every 8 hours PRN Moderate Pain (4 - 6)  sodium chloride 0.9% lock flush 10 milliLiter(s) IV Push every 1 hour PRN Pre/post blood products, medications, blood draw, and to maintain line patency        Objective:  Vital Signs Last 24 Hrs  T(C): 36.6 (05 May 2022 08:30), Max: 36.6 (04 May 2022 18:21)  T(F): 97.8 (05 May 2022 08:30), Max: 97.9 (04 May 2022 18:21)  HR: 60 (05 May 2022 08:30) (60 - 77)  BP: 157/73 (05 May 2022 08:30) (122/72 - 172/79)  RR: 18 (05 May 2022 08:30) (18 - 18)  SpO2: 90% (05 May 2022 08:30) (90% - 98%)    Physical Exam:  General:  no acute distress  Neck: supple, trachea midline  Lungs: clear, no wheeze/rhonchi  Cardiovascular: regular rate and rhythm, S1 S2  Abdomen: soft, nontender,  bowel sounds normal  Neurological: awake confused  Skin: no rash  Extremities: Right hip decreased erythema induration   nontender still with minimal drainage      LABS:                        9.8    5.15  )-----------( 310      ( 04 May 2022 07:19 )             31.4   05-04    136  |  101  |  23  ----------------------------<  81  3.7   |  33<H>  |  1.11    Ca    9.3      04 May 2022 07:19    TPro  6.1  /  Alb  2.5<L>  /  TBili  0.2  /  DBili  x   /  AST  30  /  ALT  20  /  AlkPhos  94  05-04    Vancomycin Level, Trough (05.03.22 @ 07:29)    Vancomycin Level, Trough: 18.9    Vancomycin Level, Trough (05.02.22 @ 16:54)    Vancomycin Level, Trough: 24.0    MICROBIOLOGY:  Culture - Other (04.28.22 @ 13:37)    -  Ampicillin/Sulbactam: R <=8/4    -  Cefazolin: R >16    -  Clindamycin: S <=0.25    -  Daptomycin: S 0.5    -  Erythromycin: R >4    -  Gentamicin: S <=1 Should not be used as monotherapy    -  Linezolid: S 4    -  Oxacillin: R >2    -  Penicillin: R >8    -  Rifampin: S <=1 Should not be used as monotherapy    -  Tetra/Doxy: S <=1    -  Trimethoprim/Sulfamethoxazole: R >2/38    -  Vancomycin: S 1    Specimen Source: .Other right hip wound    Culture Results:   Few Methicillin Resistant Staphylococcus aureus    Organism Identification: Methicillin resistant Staphylococcus aureus    Organism: Methicillin resistant Staphylococcus aureus    Method Type: MEGHA      Culture - Blood (collected 27 Apr 2022 00:38)  Source: .Blood Blood-Peripheral  Gram Stain (27 Apr 2022 16:42):    Growth in anaerobic bottle: Gram Positive Rods  Final Report (28 Apr 2022 16:57):    Growth in anaerobic bottle: Bacillus species not anthracis    "Susceptibilities not performed"    ***Blood Panel PCR results on this specimen are available    approximately 3 hours after the Gram stain result.***    Gram stain, PCR, and/or culture results may not always    correspond due to difference in methodologies.    ************************************************************    This PCR assay was performed by multiplex PCR. This    Assay tests for 66 bacterial and resistance gene targets.    Please refer to the [facility] test directory    at [email or web address] for details.  Organism: Blood Culture PCR (28 Apr 2022 16:57)  Organism: Blood Culture PCR (28 Apr 2022 16:57)      -  Bacillus cereus group: Detec      Method Type: PCR    Culture - Blood (collected 27 Apr 2022 00:38)  Source: .Blood Blood-Peripheral  Preliminary Report (28 Apr 2022 01:03):    No growth to date.    RADIOLOGY & ADDITIONAL STUDIES:    ACC: [id number] EXAM:  CT HIP ONLY RT                          PROCEDURE DATE:  04/26/2022          INTERPRETATION:  CT HIP RIGHT    HISTORY: Pain. Postop infection. Right hip intertrochanteric fracture   ORIF.    TECHNIQUE: Contiguous axial imaging was performed through the pelvis   without contrast.  Coronal and sagittal reformatting was utilized.   Patient motion artifact and blurring may cause some limitation.    COMPARISON: CT of the right hip dated 4/14/2022, intraoperative x-rays   dated 4/11/2022, and pelvis and right hip x-rays dated 4/9/2022.    FINDINGS:    OSSEOUS STRUCTURES    Acute/Chronic Fractures:  Comminuted intertrochanteric fracture of the   right hip is again seen with a gamma nail for fixation. Distal   interlocking screw is present. There is mild residual fracture   displacement.    PELVIC JOINTS    Symphysis Pubis:  Preserved.    Sacroiliac Joints:  Maintained.    RIGHT HIP JOINT    Avascular Necrosis:  None.    Joint Space:  Grossly maintained given patient motion.    Effusion/Synovitis:  None.    LEFT HIP JOINT    Avascular Necrosis:  None.    Joint Space:  Grossly maintained given patient motion.    Effusion/Synovitis:  None.    VISUALIZED SPINE  Lower lumbar degenerative disc disease is present.    SOFT TISSUES    Neurovascular:  Severe atherosclerotic calcifications are present.    Pelvis/Abdomen:  Mild presacral edema is present.    Musculature:  Fluid collection is present extending along the posterior   margin of the greater trochanter and within the right gluteus medias   muscle measuring approximately 14.2 cm craniocaudally and up to 6.9 x 4.2   cm axially along the greater trochanter.    Subcutaneous Tissues:  Moderate subcutaneous hematoma formation is   again seen along the right hip incision site with mild hematoma formation   extending along the level of the proximal femoral diaphysis. Mild to   moderate subcutaneous edema is also noted. Overlying skin staples are   noted.    IMPRESSION:  1. Right intertrochanteric fracture ORIF is again seen.  2. Fluid collection along the right greater trochanter and within the   right gluteus medius muscle likely reflects evolution of the prior   hematoma although infection cannot be absolutely excluded with the given   history. Consider aspiration as warranted.  3. Moderate overlying subcutaneous hematoma is again seen.    Assessment :   78YO F PMH of Dementia, HTN, Dyslipidemia, PAD, Hypothyroidism, Anemia resident of [facility] facility SP ORIF, Right hip, using trochanteric nail 10-Apr-2022 complicated by Acute blood loss sec surgical site hematoma admitted with infected surgical site wound with poss infected hematoma + seropurulent drainage   Wound cultures with MRSA  Bacillus bacteremia likely contaminant  Vanc held due to supratherapeutic level   Vanc level 18 5/3/22  PICC line placed 5/4/22    Plan :   Cont Vancomycin 1 gram IV daily  Trough prior to 3rd dose tmrw  Will need long term IV antibiotics x 6 weeks till 6/8/22  Trend temps and cbc  Ortho following   Asp precautions  Dc planning      Continue with present regiment.  Appropriate use of antibiotics and adverse effects reviewed.    > 35 minutes were spent in direct patient care reviewing notes, medications ,labs data/ imaging , discussion with multidisciplinary team.    Thank you for allowing me to participate in care of your patient .    Radha Pryor MD  Infectious Disease  [phone]

## 2022-05-05 NOTE — PROGRESS NOTE ADULT - SUBJECTIVE AND OBJECTIVE BOX
Neurology follow up note    OPAL [id number]      Interval History:    Patient feels ok no new complaints.    Allergies    No Known Allergies    Intolerances        MEDICATIONS    acetaminophen     Tablet .. 1000 milliGRAM(s) Oral every 8 hours  ALBUTerol    90 MICROgram(s) HFA Inhaler 2 Puff(s) Inhalation every 6 hours PRN  amLODIPine   Tablet 5 milliGRAM(s) Oral daily  aspirin enteric coated 81 milliGRAM(s) Oral daily  atorvastatin 20 milliGRAM(s) Oral at bedtime  chlorhexidine 2% Cloths 1 Application(s) Topical <User Schedule>  chlorhexidine 4% Liquid 1 Application(s) Topical <User Schedule>  cholecalciferol 1000 Unit(s) Oral two times a day  cyanocobalamin 500 MICROGram(s) Oral daily  folic acid 1 milliGRAM(s) Oral daily  heparin   Injectable 5000 Unit(s) SubCutaneous every 12 hours  levothyroxine 125 MICROGram(s) Oral daily  losartan 100 milliGRAM(s) Oral daily  melatonin 5 milliGRAM(s) Oral at bedtime  memantine 10 milliGRAM(s) Oral two times a day  mirtazapine 15 milliGRAM(s) Oral at bedtime  multivitamin 1 Tablet(s) Oral daily  omega-3-Acid Ethyl Esters 2 Gram(s) Oral daily  oxyCODONE    IR 5 milliGRAM(s) Oral every 8 hours PRN  oxyCODONE    IR 2.5 milliGRAM(s) Oral every 8 hours PRN  pantoprazole    Tablet 40 milliGRAM(s) Oral before breakfast  sodium chloride 0.9% lock flush 10 milliLiter(s) IV Push every 1 hour PRN  vancomycin  IVPB 1000 milliGRAM(s) IV Intermittent every 24 hours              Vital Signs Last 24 Hrs  T(C): 36.4 (05 May 2022 05:10), Max: 36.6 (04 May 2022 18:21)  T(F): 97.5 (05 May 2022 05:10), Max: 97.9 (04 May 2022 18:21)  HR: 64 (05 May 2022 05:10) (64 - 77)  BP: 149/71 (05 May 2022 05:10) (122/72 - 179/79)  BP(mean): --  RR: 18 (05 May 2022 05:10) (17 - 18)  SpO2: 91% (05 May 2022 05:10) (90% - 98%)    REVIEW OF SYSTEMS:  Limited secondary to the patient being disoriented.  Constitutional:  Denies fever, chills, or night sweats.  Head:  No headaches.  Eyes:  No double vision or blurry vision.  Ears:  No ringing in the ears.  Neck:  No neck pain.  Respiratory:  No shortness of breath.  Cardiovascular:  No chest pain.  Abdomen:  No nausea, vomiting, or abdominal pain.  Extremities/Neurological:  No numbness or tingling.  Musculoskeletal:  Occasional joint pain.    PHYSICAL EXAMINATION:   HEENT:  Head:  Normocephalic, atraumatic.  Eyes:  No scleral icterus.  Ears:  Hearing slightly hard of hearing.  NECK:  Supple.  RESPIRATORY:  Decreased breath sounds bilaterally.  CARDIOVASCULAR:  S1 and S2 heard.  ABDOMEN:  Soft, nontender.  EXTREMITIES:  No clubbing or cyanosis were noted.      NEUROLOGIC:  The patient is awake, alert.  Location was home, year and month were unknown.  The patient has poor vision out of both eyes.  Speech:  Would articulate a few words, no dysarthria.  Intact bilateral nasolabial folds.  Motor:  Bilateral upper was 3/5, bilateral lower extremities with stimulation, slight flexation at the hip and knee.                   LABS:  CBC Full  -  ( 04 May 2022 07:19 )  WBC Count : 5.15 K/uL  RBC Count : 3.22 M/uL  Hemoglobin : 9.8 g/dL  Hematocrit : 31.4 %  Platelet Count - Automated : 310 K/uL  Mean Cell Volume : 97.5 fl  Mean Cell Hemoglobin : 30.4 pg  Mean Cell Hemoglobin Concentration : 31.2 gm/dL  Auto Neutrophil # : 2.79 K/uL  Auto Lymphocyte # : 1.37 K/uL  Auto Monocyte # : 0.59 K/uL  Auto Eosinophil # : 0.28 K/uL  Auto Basophil # : 0.04 K/uL  Auto Neutrophil % : 54.1 %  Auto Lymphocyte % : 26.6 %  Auto Monocyte % : 11.5 %  Auto Eosinophil % : 5.4 %  Auto Basophil % : 0.8 %      05-04    136  |  101  |  23  ----------------------------<  81  3.7   |  33<H>  |  1.11    Ca    9.3      04 May 2022 07:19    TPro  6.1  /  Alb  2.5<L>  /  TBili  0.2  /  DBili  x   /  AST  30  /  ALT  20  /  AlkPhos  94  05-04    Hemoglobin A1C:     LIVER FUNCTIONS - ( 04 May 2022 07:19 )  Alb: 2.5 g/dL / Pro: 6.1 g/dL / ALK PHOS: 94 U/L / ALT: 20 U/L DA / AST: 30 U/L / GGT: x           Vitamin B12         RADIOLOGY      ANALYSIS AND PLAN:  This is a 77-year-old with episode of altered mental status.  For episode of altered mental status, suspect most likely metabolic encephalopathy possibly underlying septic type process along with dementia becoming more prominent in the hospital setting.  Examination is limited of lower extremities, but I see no new clear sign to suggest a new central nervous system event has occurred.  For history of dementia, we do not have memantine 28 mg a day.  We would recommend change to 10 mg twice a day.  For history of hypothyroidism, continue the patient on Synthroid.  For hyperlipidemia, continue the patient on statin.  For history of hypertension, monitor systolic blood pressure as needed.  neurologic wise appears stable    PICC line     Greater than 40 minutes of time was spent with the patient, plan of care, reviewing data, speaking to multidisciplinary healthcare team with greater than 50% of time in counseling and care coordination.

## 2022-05-06 ENCOUNTER — TRANSCRIPTION ENCOUNTER (OUTPATIENT)
Age: 77
End: 2022-05-06

## 2022-05-06 LAB
ANION GAP SERPL CALC-SCNC: 5 MMOL/L — SIGNIFICANT CHANGE UP (ref 5–17)
BUN SERPL-MCNC: 18 MG/DL — SIGNIFICANT CHANGE UP (ref 7–23)
CALCIUM SERPL-MCNC: 9.6 MG/DL — SIGNIFICANT CHANGE UP (ref 8.4–10.5)
CHLORIDE SERPL-SCNC: 100 MMOL/L — SIGNIFICANT CHANGE UP (ref 96–108)
CO2 SERPL-SCNC: 33 MMOL/L — HIGH (ref 22–31)
CREAT SERPL-MCNC: 0.86 MG/DL — SIGNIFICANT CHANGE UP (ref 0.5–1.3)
EGFR: 69 ML/MIN/1.73M2 — SIGNIFICANT CHANGE UP
GLUCOSE SERPL-MCNC: 84 MG/DL — SIGNIFICANT CHANGE UP (ref 70–99)
HCT VFR BLD CALC: 36.2 % — SIGNIFICANT CHANGE UP (ref 34.5–45)
HGB BLD-MCNC: 11.4 G/DL — LOW (ref 11.5–15.5)
MCHC RBC-ENTMCNC: 30.2 PG — SIGNIFICANT CHANGE UP (ref 27–34)
MCHC RBC-ENTMCNC: 31.5 GM/DL — LOW (ref 32–36)
MCV RBC AUTO: 95.8 FL — SIGNIFICANT CHANGE UP (ref 80–100)
NRBC # BLD: 0 /100 WBCS — SIGNIFICANT CHANGE UP (ref 0–0)
PLATELET # BLD AUTO: 314 K/UL — SIGNIFICANT CHANGE UP (ref 150–400)
POTASSIUM SERPL-MCNC: 3.6 MMOL/L — SIGNIFICANT CHANGE UP (ref 3.5–5.3)
POTASSIUM SERPL-SCNC: 3.6 MMOL/L — SIGNIFICANT CHANGE UP (ref 3.5–5.3)
RBC # BLD: 3.78 M/UL — LOW (ref 3.8–5.2)
RBC # FLD: 14.3 % — SIGNIFICANT CHANGE UP (ref 10.3–14.5)
SODIUM SERPL-SCNC: 138 MMOL/L — SIGNIFICANT CHANGE UP (ref 135–145)
VANCOMYCIN TROUGH SERPL-MCNC: 16.5 UG/ML — SIGNIFICANT CHANGE UP (ref 10–20)
WBC # BLD: 6.8 K/UL — SIGNIFICANT CHANGE UP (ref 3.8–10.5)
WBC # FLD AUTO: 6.8 K/UL — SIGNIFICANT CHANGE UP (ref 3.8–10.5)

## 2022-05-06 PROCEDURE — 99232 SBSQ HOSP IP/OBS MODERATE 35: CPT

## 2022-05-06 RX ORDER — OXYCODONE HYDROCHLORIDE 5 MG/1
1 TABLET ORAL
Qty: 0 | Refills: 0 | DISCHARGE
Start: 2022-05-06

## 2022-05-06 RX ORDER — VANCOMYCIN HCL 1 G
1 VIAL (EA) INTRAVENOUS
Qty: 0 | Refills: 0 | DISCHARGE
Start: 2022-05-06 | End: 2022-06-08

## 2022-05-06 RX ORDER — PANTOPRAZOLE SODIUM 20 MG/1
1 TABLET, DELAYED RELEASE ORAL
Qty: 0 | Refills: 0 | DISCHARGE
Start: 2022-05-06

## 2022-05-06 RX ADMIN — Medication 125 MILLIGRAM(S): at 12:35

## 2022-05-06 RX ADMIN — MIRTAZAPINE 15 MILLIGRAM(S): 45 TABLET, ORALLY DISINTEGRATING ORAL at 21:38

## 2022-05-06 RX ADMIN — HEPARIN SODIUM 5000 UNIT(S): 5000 INJECTION INTRAVENOUS; SUBCUTANEOUS at 18:00

## 2022-05-06 RX ADMIN — Medication 1 TABLET(S): at 12:03

## 2022-05-06 RX ADMIN — Medication 1 MILLIGRAM(S): at 12:35

## 2022-05-06 RX ADMIN — LOSARTAN POTASSIUM 100 MILLIGRAM(S): 100 TABLET, FILM COATED ORAL at 05:33

## 2022-05-06 RX ADMIN — Medication 125 MICROGRAM(S): at 05:33

## 2022-05-06 RX ADMIN — HEPARIN SODIUM 5000 UNIT(S): 5000 INJECTION INTRAVENOUS; SUBCUTANEOUS at 05:34

## 2022-05-06 RX ADMIN — Medication 1000 MILLIGRAM(S): at 15:12

## 2022-05-06 RX ADMIN — MEMANTINE HYDROCHLORIDE 10 MILLIGRAM(S): 10 TABLET ORAL at 05:33

## 2022-05-06 RX ADMIN — Medication 1000 MILLIGRAM(S): at 15:10

## 2022-05-06 RX ADMIN — Medication 1000 MILLIGRAM(S): at 21:38

## 2022-05-06 RX ADMIN — PANTOPRAZOLE SODIUM 40 MILLIGRAM(S): 20 TABLET, DELAYED RELEASE ORAL at 05:33

## 2022-05-06 RX ADMIN — MEMANTINE HYDROCHLORIDE 10 MILLIGRAM(S): 10 TABLET ORAL at 18:00

## 2022-05-06 RX ADMIN — PREGABALIN 500 MICROGRAM(S): 225 CAPSULE ORAL at 12:36

## 2022-05-06 RX ADMIN — Medication 2 GRAM(S): at 12:37

## 2022-05-06 RX ADMIN — Medication 1000 MILLIGRAM(S): at 05:34

## 2022-05-06 RX ADMIN — Medication 5 MILLIGRAM(S): at 21:38

## 2022-05-06 RX ADMIN — Medication 1000 MILLIGRAM(S): at 06:04

## 2022-05-06 RX ADMIN — Medication 1000 UNIT(S): at 05:33

## 2022-05-06 RX ADMIN — Medication 1000 MILLIGRAM(S): at 22:59

## 2022-05-06 RX ADMIN — CHLORHEXIDINE GLUCONATE 1 APPLICATION(S): 213 SOLUTION TOPICAL at 05:34

## 2022-05-06 RX ADMIN — ATORVASTATIN CALCIUM 20 MILLIGRAM(S): 80 TABLET, FILM COATED ORAL at 21:39

## 2022-05-06 RX ADMIN — AMLODIPINE BESYLATE 5 MILLIGRAM(S): 2.5 TABLET ORAL at 05:34

## 2022-05-06 RX ADMIN — Medication 81 MILLIGRAM(S): at 13:28

## 2022-05-06 NOTE — DISCHARGE NOTE PROVIDER - HOSPITAL COURSE
77F HTN, HLD, PAD, Hypothyroidism, Depression, Dementia and OA who recently had Right Hip Replacement on 4/10/2022 was readmitted 4/26/2022 with infected surgical wound with MRSA. Infectious disease along with Orthopedic evaluation and was determined it would be best to manage with medical management.  PICC line was inserted and patient will be given IV antibiotics : Vancomycin IV 1Gram Daily x 6 weeks until 6/8/2022.  Patient will be discharged back to [facility] for further care and management.

## 2022-05-06 NOTE — DISCHARGE NOTE NURSING/CASE MANAGEMENT/SOCIAL WORK - PATIENT PORTAL LINK FT
You can access the FollowMyHealth Patient Portal offered by [facility] by registering at the following website: [email or web address]. By joining [facility]’s FollowMyHealth portal, you will also be able to view your health information using other applications (apps) compatible with our system.

## 2022-05-06 NOTE — PROGRESS NOTE ADULT - SUBJECTIVE AND OBJECTIVE BOX
Chief Complaint: Hip wound    Interval Events: No events overnight.    Review of Systems:  General: No fevers, chills, weight gain  Skin: No rashes, color changes  Cardiovascular: No chest pain, orthopnea  Respiratory: No shortness of breath, cough  Gastrointestinal: No nausea, abdominal pain  Genitourinary: No incontinence, pain with urination  Musculoskeletal: No pain, swelling, decreased range of motion  Neurological: No headache, weakness  Psychiatric: No depression, anxiety  Endocrine: No weight gain, increased thirst  All other systems are comprehensively negative.    Physical Exam:  Vital Signs Last 24 Hrs  T(C): 36.3 (06 May 2022 04:55), Max: 36.6 (05 May 2022 08:30)  T(F): 97.4 (06 May 2022 04:55), Max: 97.8 (05 May 2022 08:30)  HR: 66 (06 May 2022 04:55) (60 - 75)  BP: 130/82 (06 May 2022 04:55) (130/82 - 181/75)  BP(mean): --  RR: 18 (06 May 2022 04:55) (18 - 20)  SpO2: 92% (06 May 2022 04:55) (90% - 92%)  General: NAD  HEENT: MMM  Neck: No JVD, no carotid bruit  Lungs: CTAB  CV: RRR, nl S1/S2, no M/R/G  Abdomen: S/NT/ND, +BS  Extremities: No LE edema, no cyanosis  Neuro: AAOx1  Skin: No rash    Labs:    05-06    138  |  100  |  18  ----------------------------<  84  3.6   |  33<H>  |  0.86    Ca    9.6      06 May 2022 06:00                          11.4   6.80  )-----------( 314      ( 06 May 2022 06:00 )             36.2

## 2022-05-06 NOTE — DISCHARGE NOTE PROVIDER - NSDCCPCAREPLAN_GEN_ALL_CORE_FT
PRINCIPAL DISCHARGE DIAGNOSIS  Diagnosis: Wound infection  Assessment and Plan of Treatment: Antibioitcs for 6 weeks.

## 2022-05-06 NOTE — DISCHARGE NOTE PROVIDER - NSDCFUADDINST_GEN_ALL_CORE_FT
You were diagnosed with an infected hip joint from recent surgical procedure. Evaluation shows MRSA and after ID and orthopedic consultation you will be treated with IV Antibiotics for 6 weeks.  A special IV Line (PICC) has been inserted and will receive antibiotics until 6/8/2022.  You will complete your course of antibiotics along with further rehabilitation of your joint at [facility].  Please follow up with Orthopedics for further management.

## 2022-05-06 NOTE — DISCHARGE NOTE PROVIDER - ATTENDING DISCHARGE PHYSICAL EXAMINATION:
PHYSICAL EXAM:  Vital Signs Last 24 Hrs  T(C): 36.3 (06 May 2022 09:16), Max: 36.3 (05 May 2022 18:00)  T(F): 97.4 (06 May 2022 09:16), Max: 97.4 (05 May 2022 18:00)  HR: 78 (06 May 2022 09:16) (66 - 78)  BP: 151/78 (06 May 2022 09:16) (130/82 - 181/75)  BP(mean): --  RR: 18 (06 May 2022 09:16) (18 - 20)  SpO2: 93% (06 May 2022 09:16) (92% - 93%)    GENERAL: NAD, well-groomed, well-developed  HEAD:  Atraumatic, Normocephalic  EYES: EOMI, PERRLA, conjunctiva and sclera clear  ENMT: No tonsillar erythema, exudates, or enlargement; Moist mucous membranes, Good dentition, No lesions  NECK: Supple, No JVD, Normal thyroid  NERVOUS SYSTEM:  Alert & Oriented X3, Good concentration; Motor Strength 5/5 B/L upper and lower extremities; CHEST/LUNG: Clear to auscultation bilaterally; No rales, rhonchi, wheezing, or rubs  HEART: Regular rate and rhythm; No murmurs, rubs, or gallops  ABDOMEN: Soft, Nontender, Nondistended; Bowel sounds present  EXTREMITIES:  2+ Peripheral Pulses, No clubbing, cyanosis, or edema  LYMPH: No lymphadenopathy noted  SKIN: No rashes or lesions

## 2022-05-06 NOTE — DISCHARGE NOTE NURSING/CASE MANAGEMENT/SOCIAL WORK - NSDCPEFALRISK_GEN_ALL_CORE
For information on Fall & Injury Prevention, visit: [email or web address] OR  [email or web address] OR  https://www.cdc.gov/steadi/patient.html

## 2022-05-06 NOTE — PROGRESS NOTE ADULT - SUBJECTIVE AND OBJECTIVE BOX
OPAL ARBOLEDA is a 77yFemale , patient examined and chart reviewed.    INTERVAL HPI/ OVERNIGHT EVENTS:   Afebrile. NAD. Awake.  No events.    PAST MEDICAL & SURGICAL HISTORY:  Dementia without behavioral disturbance, unspecified dementia type  Hypothyroid  Osteoarthritis  Carotid stenosis  Degenerative joint disease  Hypertension  Hyperlipidemia  History of open reduction and internal fixation (ORIF) procedure        For details regarding the patient's social history, family history, and other miscellaneous elements, please refer the initial infectious diseases consultation and/or the admitting history and physical examination for this admission.    ROS:  Unable to obtain due to : poor historian      Current inpatient medications :    ANTIBIOTICS/RELEVANT:  vancomycin  IVPB 1000 milliGRAM(s) IV Intermittent every 24 hours    MEDICATIONS  (STANDING):  acetaminophen     Tablet .. 1000 milliGRAM(s) Oral every 8 hours  amLODIPine   Tablet 5 milliGRAM(s) Oral daily  aspirin enteric coated 81 milliGRAM(s) Oral daily  atorvastatin 20 milliGRAM(s) Oral at bedtime  chlorhexidine 2% Cloths 1 Application(s) Topical <User Schedule>  chlorhexidine 4% Liquid 1 Application(s) Topical <User Schedule>  cholecalciferol 1000 Unit(s) Oral two times a day  cyanocobalamin 500 MICROGram(s) Oral daily  folic acid 1 milliGRAM(s) Oral daily  heparin   Injectable 5000 Unit(s) SubCutaneous every 12 hours  levothyroxine 125 MICROGram(s) Oral daily  losartan 100 milliGRAM(s) Oral daily  melatonin 5 milliGRAM(s) Oral at bedtime  memantine 10 milliGRAM(s) Oral two times a day  mirtazapine 15 milliGRAM(s) Oral at bedtime  multivitamin 1 Tablet(s) Oral daily  omega-3-Acid Ethyl Esters 2 Gram(s) Oral daily  pantoprazole    Tablet 40 milliGRAM(s) Oral before breakfast    MEDICATIONS  (PRN):  ALBUTerol    90 MICROgram(s) HFA Inhaler 2 Puff(s) Inhalation every 6 hours PRN Shortness of Breath and/or Wheezing  oxyCODONE    IR 5 milliGRAM(s) Oral every 8 hours PRN Severe Pain (7 - 10)  oxyCODONE    IR 2.5 milliGRAM(s) Oral every 8 hours PRN Moderate Pain (4 - 6)  sodium chloride 0.9% lock flush 10 milliLiter(s) IV Push every 1 hour PRN Pre/post blood products, medications, blood draw, and to maintain line patency      Objective:  Vital Signs Last 24 Hrs  T(C): 36.3 (06 May 2022 09:16), Max: 36.3 (05 May 2022 18:00)  T(F): 97.4 (06 May 2022 09:16), Max: 97.4 (05 May 2022 18:00)  HR: 78 (06 May 2022 09:16) (66 - 78)  BP: 151/78 (06 May 2022 09:16) (130/82 - 181/75)  RR: 18 (06 May 2022 09:16) (18 - 20)  SpO2: 93% (06 May 2022 09:16) (92% - 93%)      Physical Exam:  General:  no acute distress  Neck: supple, trachea midline  Lungs: clear, no wheeze/rhonchi  Cardiovascular: regular rate and rhythm, S1 S2  Abdomen: soft, nontender,  bowel sounds normal  Neurological: awake confused  Skin: no rash  Extremities: Right hip decreased erythema induration   nontender still with minimal drainage      LABS:                                 11.4   6.80  )-----------( 314      ( 06 May 2022 06:00 )             36.2   05-06    138  |  100  |  18  ----------------------------<  84  3.6   |  33<H>  |  0.86    Ca    9.6      06 May 2022 06:00    Vancomycin Level, Trough -Pre 3rd Dose, order if dosed q24/36/48h (05.06.22 @ 10:24)    Vancomycin Level, Trough: 16.5    MICROBIOLOGY:  Culture - Other (04.28.22 @ 13:37)    -  Ampicillin/Sulbactam: R <=8/4    -  Cefazolin: R >16    -  Clindamycin: S <=0.25    -  Daptomycin: S 0.5    -  Erythromycin: R >4    -  Gentamicin: S <=1 Should not be used as monotherapy    -  Linezolid: S 4    -  Oxacillin: R >2    -  Penicillin: R >8    -  Rifampin: S <=1 Should not be used as monotherapy    -  Tetra/Doxy: S <=1    -  Trimethoprim/Sulfamethoxazole: R >2/38    -  Vancomycin: S 1    Specimen Source: .Other right hip wound    Culture Results:   Few Methicillin Resistant Staphylococcus aureus    Organism Identification: Methicillin resistant Staphylococcus aureus    Organism: Methicillin resistant Staphylococcus aureus    Method Type: MEGHA      Culture - Blood (collected 27 Apr 2022 00:38)  Source: .Blood Blood-Peripheral  Gram Stain (27 Apr 2022 16:42):    Growth in anaerobic bottle: Gram Positive Rods  Final Report (28 Apr 2022 16:57):    Growth in anaerobic bottle: Bacillus species not anthracis    "Susceptibilities not performed"    ***Blood Panel PCR results on this specimen are available    approximately 3 hours after the Gram stain result.***    Gram stain, PCR, and/or culture results may not always    correspond due to difference in methodologies.    ************************************************************    This PCR assay was performed by multiplex PCR. This    Assay tests for 66 bacterial and resistance gene targets.    Please refer to the [facility] test directory    at [email or web address] for details.  Organism: Blood Culture PCR (28 Apr 2022 16:57)  Organism: Blood Culture PCR (28 Apr 2022 16:57)      -  Bacillus cereus group: Detec      Method Type: PCR    Culture - Blood (collected 27 Apr 2022 00:38)  Source: .Blood Blood-Peripheral  Preliminary Report (28 Apr 2022 01:03):    No growth to date.    RADIOLOGY & ADDITIONAL STUDIES:    ACC: [id number] EXAM:  CT HIP ONLY RT                          PROCEDURE DATE:  04/26/2022          INTERPRETATION:  CT HIP RIGHT    HISTORY: Pain. Postop infection. Right hip intertrochanteric fracture   ORIF.    TECHNIQUE: Contiguous axial imaging was performed through the pelvis   without contrast.  Coronal and sagittal reformatting was utilized.   Patient motion artifact and blurring may cause some limitation.    COMPARISON: CT of the right hip dated 4/14/2022, intraoperative x-rays   dated 4/11/2022, and pelvis and right hip x-rays dated 4/9/2022.    FINDINGS:    OSSEOUS STRUCTURES    Acute/Chronic Fractures:  Comminuted intertrochanteric fracture of the   right hip is again seen with a gamma nail for fixation. Distal   interlocking screw is present. There is mild residual fracture   displacement.    PELVIC JOINTS    Symphysis Pubis:  Preserved.    Sacroiliac Joints:  Maintained.    RIGHT HIP JOINT    Avascular Necrosis:  None.    Joint Space:  Grossly maintained given patient motion.    Effusion/Synovitis:  None.    LEFT HIP JOINT    Avascular Necrosis:  None.    Joint Space:  Grossly maintained given patient motion.    Effusion/Synovitis:  None.    VISUALIZED SPINE  Lower lumbar degenerative disc disease is present.    SOFT TISSUES    Neurovascular:  Severe atherosclerotic calcifications are present.    Pelvis/Abdomen:  Mild presacral edema is present.    Musculature:  Fluid collection is present extending along the posterior   margin of the greater trochanter and within the right gluteus medias   muscle measuring approximately 14.2 cm craniocaudally and up to 6.9 x 4.2   cm axially along the greater trochanter.    Subcutaneous Tissues:  Moderate subcutaneous hematoma formation is   again seen along the right hip incision site with mild hematoma formation   extending along the level of the proximal femoral diaphysis. Mild to   moderate subcutaneous edema is also noted. Overlying skin staples are   noted.    IMPRESSION:  1. Right intertrochanteric fracture ORIF is again seen.  2. Fluid collection along the right greater trochanter and within the   right gluteus medius muscle likely reflects evolution of the prior   hematoma although infection cannot be absolutely excluded with the given   history. Consider aspiration as warranted.  3. Moderate overlying subcutaneous hematoma is again seen.    Assessment :   78YO F PMH of Dementia, HTN, Dyslipidemia, PAD, Hypothyroidism, Anemia resident of [facility] facility SP ORIF, Right hip, using trochanteric nail 10-Apr-2022 complicated by Acute blood loss sec surgical site hematoma admitted with infected surgical site wound with poss infected hematoma + seropurulent drainage   Wound cultures with MRSA  Bacillus bacteremia likely contaminant  PICC line placed 5/4/22  No surgical intervention per ortho- conservative management.    Plan :   Cont Vancomycin 1 gram IV daily x 6 weeks till 6/8/22  Trend temps and cbc  Ortho following   Asp precautions  Dc planning to rehab  Stable from ID standpoint      Continue with present regiment.  Appropriate use of antibiotics and adverse effects reviewed.    > 35 minutes were spent in direct patient care reviewing notes, medications ,labs data/ imaging , discussion with multidisciplinary team.    Thank you for allowing me to participate in care of your patient .    Radha Pryor MD  Infectious Disease  [phone]

## 2022-05-06 NOTE — DISCHARGE NOTE PROVIDER - NSDCMRMEDTOKEN_GEN_ALL_CORE_FT
Adult Aspirin Regimen 81 mg oral delayed release tablet: 1 tab(s) orally once a day  amLODIPine 5 mg oral tablet: tab(s) orally once a day  atorvastatin 20 mg oral tablet: 1 tab(s) orally once a day  folic acid 1 mg oral tablet: 1 tab(s) orally once a day  heparin: 5000 unit(s) subcutaneous 2 times a day  levothyroxine 125 mcg (0.125 mg) oral tablet: 1 tab(s) orally once a day  losartan 100 mg oral tablet: 1 tab(s) orally once a day  Melatonin 3 mg oral tablet: orally once a day (at bedtime) 2 TABS  memantine 28 mg oral capsule, extended release: 1 cap(s) orally once a day  mirtazapine 15 mg oral tablet: 1 tab(s) orally once a day (at bedtime)  Multiple Vitamins oral tablet: 1 tab(s) orally once a day  Omega-3 1000 mg oral capsule: 2 cap(s) orally once a day  oxyCODONE 5 mg oral tablet: 1 tab(s) orally every 8 hours, As needed, Severe Pain (7 - 10)  pantoprazole 40 mg oral delayed release tablet: 1 tab(s) orally once a day (before a meal)  vancomycin 1 g intravenous injection: 1 gram(s) intravenous every 24 hours  Last Day 6/8/2022  Vitamin B-12: 500mcg  Vitamin D3 1000 intl units (25 mcg) oral tablet: orally 2 times a day

## 2022-05-06 NOTE — PROGRESS NOTE ADULT - SUBJECTIVE AND OBJECTIVE BOX
ORIF Right Hip Fx 4/26/22      S: Pt sleeping. somewhat arousable.No SOB.  Pain comfortable during dressing change.  Tylenol 1000 q 8 for pain management.  No oxy used.  acetaminophen     Tablet .. 1000 milliGRAM(s) Oral every 8 hours  melatonin 5 milliGRAM(s) Oral at bedtime  memantine 10 milliGRAM(s) Oral two times a day  mirtazapine 15 milliGRAM(s) Oral at bedtime  oxyCODONE    IR 5 milliGRAM(s) Oral every 8 hours PRN  oxyCODONE    IR 2.5 milliGRAM(s) Oral every 8 hours PRN    O: General: Pt Alert and oriented, On exam NAD,   VS: Vital Signs Last 24 Hrs  T(C): 36.3 (06 May 2022 09:16), Max: 36.3 (05 May 2022 18:00)  T(F): 97.4 (06 May 2022 09:16), Max: 97.4 (05 May 2022 18:00)  HR: 78 (06 May 2022 09:16) (66 - 78)  BP: 151/78 (06 May 2022 09:16) (130/82 - 181/75)  BP(mean): --  RR: 18 (06 May 2022 09:16) (18 - 20)  SpO2: 93% (06 May 2022 09:16) (92% - 93%)        right hip dressing w/ serosang drainage.  No odor.  only able to express 1 drop of blood, no pus noted.  No erythema or swelling.  thigh soft.  Vascular: Feet toes warm, pink. DP = 2+. Calves soft ; w/o tenderness bilat..    VTEP: On Bilat. Venodynes + aspirin enteric coated 81 milliGRAM(s) Oral daily  heparin   Injectable 5000 Unit(s) SubCutaneous every 12 hours     Activity in PT yesterday : Unable to participate.  Sat on edge of bed, but pushes back. no ambulation.                          11.4   6.80  )-----------( 314      ( 06 May 2022 06:00 )             36.2     05-06    138  |  100  |  18  ----------------------------<  84  3.6   |  33<H>  |  0.86    Ca    9.6      06 May 2022 06:00        Hospitalist input noted    Primary Orthopedic Assessment:  • Stable from Orthopedic perspective  • Neuro motor exam stable  • Labs: stable      Plan:   • Continue:  PT/OT/Weightbearing as tolerated with assistance of a walker. as able.  • Continue DVT prophylaxis as prescribed, including use of compression devices and ankle pumps  • Continue Pain Rx  • Plans per Medicine /ID  • Discharge planning – anticipated discharge is:  Subacute rehabilitation when medically stable & cleared by PT/OT  - daily dressing changes to monitor wound and drainage

## 2022-05-06 NOTE — PROGRESS NOTE ADULT - SUBJECTIVE AND OBJECTIVE BOX
Date/Time Patient Seen:  		  Referring MD:   Data Reviewed	       Patient is a 77y old  Female who presents with a chief complaint of Sent for possibly infected surgical wound. (05 May 2022 12:31)      Subjective/HPI     PAST MEDICAL & SURGICAL HISTORY:  Hypertension, unspecified type    Hyperlipidemia, unspecified hyperlipidemia type    Dementia without behavioral disturbance, unspecified dementia type    Hypothyroidism, unspecified type    Hypothyroid    Osteoarthritis    Carotid stenosis    Degenerative joint disease    Hypertension    Hyperlipidemia    Dementia    No significant past surgical history    S/P ORIF (open reduction internal fixation) fracture    History of open reduction and internal fixation (ORIF) procedure          Medication list         MEDICATIONS  (STANDING):  acetaminophen     Tablet .. 1000 milliGRAM(s) Oral every 8 hours  amLODIPine   Tablet 5 milliGRAM(s) Oral daily  aspirin enteric coated 81 milliGRAM(s) Oral daily  atorvastatin 20 milliGRAM(s) Oral at bedtime  chlorhexidine 2% Cloths 1 Application(s) Topical <User Schedule>  chlorhexidine 4% Liquid 1 Application(s) Topical <User Schedule>  cholecalciferol 1000 Unit(s) Oral two times a day  cyanocobalamin 500 MICROGram(s) Oral daily  folic acid 1 milliGRAM(s) Oral daily  heparin   Injectable 5000 Unit(s) SubCutaneous every 12 hours  levothyroxine 125 MICROGram(s) Oral daily  losartan 100 milliGRAM(s) Oral daily  melatonin 5 milliGRAM(s) Oral at bedtime  memantine 10 milliGRAM(s) Oral two times a day  mirtazapine 15 milliGRAM(s) Oral at bedtime  multivitamin 1 Tablet(s) Oral daily  omega-3-Acid Ethyl Esters 2 Gram(s) Oral daily  pantoprazole    Tablet 40 milliGRAM(s) Oral before breakfast  vancomycin  IVPB 1000 milliGRAM(s) IV Intermittent every 24 hours    MEDICATIONS  (PRN):  ALBUTerol    90 MICROgram(s) HFA Inhaler 2 Puff(s) Inhalation every 6 hours PRN Shortness of Breath and/or Wheezing  oxyCODONE    IR 5 milliGRAM(s) Oral every 8 hours PRN Severe Pain (7 - 10)  oxyCODONE    IR 2.5 milliGRAM(s) Oral every 8 hours PRN Moderate Pain (4 - 6)  sodium chloride 0.9% lock flush 10 milliLiter(s) IV Push every 1 hour PRN Pre/post blood products, medications, blood draw, and to maintain line patency         Vitals log        ICU Vital Signs Last 24 Hrs  T(C): 36.3 (06 May 2022 04:55), Max: 36.6 (05 May 2022 08:30)  T(F): 97.4 (06 May 2022 04:55), Max: 97.8 (05 May 2022 08:30)  HR: 66 (06 May 2022 04:55) (60 - 75)  BP: 130/82 (06 May 2022 04:55) (130/82 - 181/75)  BP(mean): --  ABP: --  ABP(mean): --  RR: 18 (06 May 2022 04:55) (18 - 20)  SpO2: 92% (06 May 2022 04:55) (90% - 92%)           Input and Output:  I&O's Detail      Lab Data                        11.4   6.80  )-----------( 314      ( 06 May 2022 06:00 )             36.2     05-04    136  |  101  |  23  ----------------------------<  81  3.7   |  33<H>  |  1.11    Ca    9.3      04 May 2022 07:19    TPro  6.1  /  Alb  2.5<L>  /  TBili  0.2  /  DBili  x   /  AST  30  /  ALT  20  /  AlkPhos  94  05-04            Review of Systems	      Objective     Physical Examination    heart s1s2  lung dec BS  abd soft      Pertinent Lab findings & Imaging      Dominga:  NO   Adequate UO     I&O's Detail           Discussed with:     Cultures:	        Radiology

## 2022-05-06 NOTE — PROGRESS NOTE ADULT - SUBJECTIVE AND OBJECTIVE BOX
Neurology follow up note    OPAL [id number]      Interval History:    Patient feels ok no new complaints.    Allergies    No Known Allergies    Intolerances        MEDICATIONS    acetaminophen     Tablet .. 1000 milliGRAM(s) Oral every 8 hours  ALBUTerol    90 MICROgram(s) HFA Inhaler 2 Puff(s) Inhalation every 6 hours PRN  amLODIPine   Tablet 5 milliGRAM(s) Oral daily  aspirin enteric coated 81 milliGRAM(s) Oral daily  atorvastatin 20 milliGRAM(s) Oral at bedtime  chlorhexidine 2% Cloths 1 Application(s) Topical <User Schedule>  chlorhexidine 4% Liquid 1 Application(s) Topical <User Schedule>  cholecalciferol 1000 Unit(s) Oral two times a day  cyanocobalamin 500 MICROGram(s) Oral daily  folic acid 1 milliGRAM(s) Oral daily  heparin   Injectable 5000 Unit(s) SubCutaneous every 12 hours  levothyroxine 125 MICROGram(s) Oral daily  losartan 100 milliGRAM(s) Oral daily  melatonin 5 milliGRAM(s) Oral at bedtime  memantine 10 milliGRAM(s) Oral two times a day  mirtazapine 15 milliGRAM(s) Oral at bedtime  multivitamin 1 Tablet(s) Oral daily  omega-3-Acid Ethyl Esters 2 Gram(s) Oral daily  oxyCODONE    IR 5 milliGRAM(s) Oral every 8 hours PRN  oxyCODONE    IR 2.5 milliGRAM(s) Oral every 8 hours PRN  pantoprazole    Tablet 40 milliGRAM(s) Oral before breakfast  sodium chloride 0.9% lock flush 10 milliLiter(s) IV Push every 1 hour PRN  vancomycin  IVPB 1000 milliGRAM(s) IV Intermittent every 24 hours              Vital Signs Last 24 Hrs  T(C): 36.3 (06 May 2022 09:16), Max: 36.3 (05 May 2022 18:00)  T(F): 97.4 (06 May 2022 09:16), Max: 97.4 (05 May 2022 18:00)  HR: 78 (06 May 2022 09:16) (66 - 78)  BP: 151/78 (06 May 2022 09:16) (130/82 - 181/75)  BP(mean): --  RR: 18 (06 May 2022 09:16) (18 - 20)  SpO2: 93% (06 May 2022 09:16) (92% - 93%)    REVIEW OF SYSTEMS:  Limited secondary to the patient being disoriented.  Constitutional:  Denies fever, chills, or night sweats.  Head:  No headaches.  Eyes:  No double vision or blurry vision.  Ears:  No ringing in the ears.  Neck:  No neck pain.  Respiratory:  No shortness of breath.  Cardiovascular:  No chest pain.  Abdomen:  No nausea, vomiting, or abdominal pain.  Extremities/Neurological:  No numbness or tingling.  Musculoskeletal:  Occasional joint pain.    PHYSICAL EXAMINATION:   HEENT:  Head:  Normocephalic, atraumatic.  Eyes:  No scleral icterus.  Ears:  Hearing slightly hard of hearing.  NECK:  Supple.  RESPIRATORY:  Decreased breath sounds bilaterally.  CARDIOVASCULAR:  S1 and S2 heard.  ABDOMEN:  Soft, nontender.  EXTREMITIES:  No clubbing or cyanosis were noted.      NEUROLOGIC:  The patient is awake, alert.  Location was home, year and month were unknown.  The patient has poor vision out of both eyes.  Speech:  Would articulate a few words, no dysarthria.  Intact bilateral nasolabial folds.  Motor:  Bilateral upper was 3/5, bilateral lower extremities with stimulation, slight flexation at the hip and knee.                 LABS:  CBC Full  -  ( 06 May 2022 06:00 )  WBC Count : 6.80 K/uL  RBC Count : 3.78 M/uL  Hemoglobin : 11.4 g/dL  Hematocrit : 36.2 %  Platelet Count - Automated : 314 K/uL  Mean Cell Volume : 95.8 fl  Mean Cell Hemoglobin : 30.2 pg  Mean Cell Hemoglobin Concentration : 31.5 gm/dL  Auto Neutrophil # : x  Auto Lymphocyte # : x  Auto Monocyte # : x  Auto Eosinophil # : x  Auto Basophil # : x  Auto Neutrophil % : x  Auto Lymphocyte % : x  Auto Monocyte % : x  Auto Eosinophil % : x  Auto Basophil % : x      05-06    138  |  100  |  18  ----------------------------<  84  3.6   |  33<H>  |  0.86    Ca    9.6      06 May 2022 06:00      Hemoglobin A1C:       Vitamin B12         RADIOLOGY    ANALYSIS AND PLAN:  This is a 77-year-old with episode of altered mental status.  For episode of altered mental status, suspect most likely metabolic encephalopathy possibly underlying septic type process along with dementia becoming more prominent in the hospital setting.  Examination is limited of lower extremities, but I see no new clear sign to suggest a new central nervous system event has occurred.  For history of dementia, we do not have memantine 28 mg a day.  We would recommend change to 10 mg twice a day.  For history of hypothyroidism, continue the patient on Synthroid.  For hyperlipidemia, continue the patient on statin.  For history of hypertension, monitor systolic blood pressure as needed.  neurologic wise appears stable    PICC line     Greater than 34 minutes of time was spent with the patient, plan of care, reviewing data, speaking to multidisciplinary healthcare team with greater than 50% of time in counseling and care coordination.

## 2022-05-06 NOTE — DISCHARGE NOTE PROVIDER - CARE PROVIDER_API CALL
Alejandro Barajas)  Orthopaedic Surgery  [address] [place] [address]  Phone: [phone]  Fax: [phone]  Follow Up Time:

## 2022-05-07 VITALS
RESPIRATION RATE: 18 BRPM | TEMPERATURE: 98 F | OXYGEN SATURATION: 94 % | SYSTOLIC BLOOD PRESSURE: 148 MMHG | DIASTOLIC BLOOD PRESSURE: 60 MMHG | HEART RATE: 60 BPM

## 2022-05-07 PROCEDURE — 87186 SC STD MICRODIL/AGAR DIL: CPT

## 2022-05-07 PROCEDURE — 84145 PROCALCITONIN (PCT): CPT

## 2022-05-07 PROCEDURE — 87635 SARS-COV-2 COVID-19 AMP PRB: CPT

## 2022-05-07 PROCEDURE — 93005 ELECTROCARDIOGRAM TRACING: CPT

## 2022-05-07 PROCEDURE — 80053 COMPREHEN METABOLIC PANEL: CPT

## 2022-05-07 PROCEDURE — 97110 THERAPEUTIC EXERCISES: CPT

## 2022-05-07 PROCEDURE — 87640 STAPH A DNA AMP PROBE: CPT

## 2022-05-07 PROCEDURE — 85027 COMPLETE CBC AUTOMATED: CPT

## 2022-05-07 PROCEDURE — 76000 FLUOROSCOPY <1 HR PHYS/QHP: CPT

## 2022-05-07 PROCEDURE — 97530 THERAPEUTIC ACTIVITIES: CPT

## 2022-05-07 PROCEDURE — 80202 ASSAY OF VANCOMYCIN: CPT

## 2022-05-07 PROCEDURE — 87040 BLOOD CULTURE FOR BACTERIA: CPT

## 2022-05-07 PROCEDURE — 84443 ASSAY THYROID STIM HORMONE: CPT

## 2022-05-07 PROCEDURE — 87070 CULTURE OTHR SPECIMN AEROBIC: CPT

## 2022-05-07 PROCEDURE — 73700 CT LOWER EXTREMITY W/O DYE: CPT | Mod: MA

## 2022-05-07 PROCEDURE — 85025 COMPLETE CBC W/AUTO DIFF WBC: CPT

## 2022-05-07 PROCEDURE — 85730 THROMBOPLASTIN TIME PARTIAL: CPT

## 2022-05-07 PROCEDURE — 36573 INSJ PICC RS&I 5 YR+: CPT

## 2022-05-07 PROCEDURE — 87641 MR-STAPH DNA AMP PROBE: CPT

## 2022-05-07 PROCEDURE — 99239 HOSP IP/OBS DSCHRG MGMT >30: CPT

## 2022-05-07 PROCEDURE — 80048 BASIC METABOLIC PNL TOTAL CA: CPT

## 2022-05-07 PROCEDURE — 85610 PROTHROMBIN TIME: CPT

## 2022-05-07 PROCEDURE — C1751: CPT

## 2022-05-07 PROCEDURE — 97161 PT EVAL LOW COMPLEX 20 MIN: CPT

## 2022-05-07 PROCEDURE — 93306 TTE W/DOPPLER COMPLETE: CPT

## 2022-05-07 PROCEDURE — 76937 US GUIDE VASCULAR ACCESS: CPT

## 2022-05-07 PROCEDURE — 99285 EMERGENCY DEPT VISIT HI MDM: CPT

## 2022-05-07 PROCEDURE — 36415 COLL VENOUS BLD VENIPUNCTURE: CPT

## 2022-05-07 PROCEDURE — 87150 DNA/RNA AMPLIFIED PROBE: CPT

## 2022-05-07 PROCEDURE — 83605 ASSAY OF LACTIC ACID: CPT

## 2022-05-07 PROCEDURE — 71045 X-RAY EXAM CHEST 1 VIEW: CPT

## 2022-05-07 RX ADMIN — Medication 1000 MILLIGRAM(S): at 16:06

## 2022-05-07 RX ADMIN — Medication 1000 MILLIGRAM(S): at 15:36

## 2022-05-07 RX ADMIN — Medication 1000 MILLIGRAM(S): at 05:54

## 2022-05-07 RX ADMIN — HEPARIN SODIUM 5000 UNIT(S): 5000 INJECTION INTRAVENOUS; SUBCUTANEOUS at 05:55

## 2022-05-07 RX ADMIN — Medication 125 MILLIGRAM(S): at 12:32

## 2022-05-07 RX ADMIN — Medication 1000 UNIT(S): at 05:54

## 2022-05-07 RX ADMIN — Medication 2 GRAM(S): at 12:35

## 2022-05-07 RX ADMIN — Medication 81 MILLIGRAM(S): at 12:35

## 2022-05-07 RX ADMIN — AMLODIPINE BESYLATE 5 MILLIGRAM(S): 2.5 TABLET ORAL at 05:56

## 2022-05-07 RX ADMIN — Medication 1 MILLIGRAM(S): at 12:35

## 2022-05-07 RX ADMIN — PREGABALIN 500 MICROGRAM(S): 225 CAPSULE ORAL at 12:35

## 2022-05-07 RX ADMIN — Medication 1 TABLET(S): at 12:34

## 2022-05-07 RX ADMIN — MEMANTINE HYDROCHLORIDE 10 MILLIGRAM(S): 10 TABLET ORAL at 05:54

## 2022-05-07 RX ADMIN — Medication 125 MICROGRAM(S): at 05:54

## 2022-05-07 RX ADMIN — LOSARTAN POTASSIUM 100 MILLIGRAM(S): 100 TABLET, FILM COATED ORAL at 05:54

## 2022-05-07 RX ADMIN — CHLORHEXIDINE GLUCONATE 1 APPLICATION(S): 213 SOLUTION TOPICAL at 05:54

## 2022-05-07 RX ADMIN — PANTOPRAZOLE SODIUM 40 MILLIGRAM(S): 20 TABLET, DELAYED RELEASE ORAL at 05:57

## 2022-05-07 NOTE — PROGRESS NOTE ADULT - PROBLEM SELECTOR PLAN 8
High risk for VTE, had to hold UFH for now given her large hematoma shown in hip CT, started her on B/L intermittent pneumatic compression device for mechanical DVT prophylaxis, continue daily low dose Aspirin.
controlled, continue Losartan & Amlodipine with hold parameters.

## 2022-05-07 NOTE — PROGRESS NOTE ADULT - SUBJECTIVE AND OBJECTIVE BOX
Orthopedics     s/p ORIF Right Hip 4/11/22  Pain is controlled. Pt resting in bed. Is confused (baseline). Does not respond to questions or commands. Not participating with PT.    Vital Signs Last 24 Hrs  T(C): 36.4 (05-07-22 @ 09:50), Max: 36.4 (05-06-22 @ 19:23)  T(F): 97.6 (05-07-22 @ 09:50), Max: 97.6 (05-07-22 @ 09:50)  HR: 53 (05-07-22 @ 09:50) (53 - 107)  BP: 145/56 (05-07-22 @ 09:50) (133/62 - 145/56)  RR: 17 (05-07-22 @ 09:50) (16 - 18)  SpO2: 93% (05-07-22 @ 09:50) (92% - 93%)                        11.4   6.80  )-----------( 314      ( 06 May 2022 06:00 )             36.2     06 May 2022 06:00    138    |  100    |  18     ----------------------------<  84     3.6     |  33     |  0.86     Ca    9.6        06 May 2022 06:00      Exam:  NAD AAOx3         Right Hip: Dressing with serosanguinous fluid, decreased from yesterday  Bilateral LE's:  +DP pulse  Calf Soft, NT    A/P: patient with MRSA s/p ORIF Right Hip 4/11/22  - Vancomycin 1000mg daily via PICC  -Pain control  -DVT PE ppx: heparin 5000u SQ, q 12h, Aspirin 81mg q 12 h 81mg daily  -OOB with PT/OT if able  -WBAT  - discharge to rehab today

## 2022-05-07 NOTE — PROGRESS NOTE ADULT - PROBLEM SELECTOR PROBLEM 8
HTN (hypertension)
Need for prophylactic measure
HTN (hypertension)
HTN (hypertension)

## 2022-05-07 NOTE — PROGRESS NOTE ADULT - SUBJECTIVE AND OBJECTIVE BOX
Subjective: Patient seen and examined. No overnight events.     MEDICATIONS  (STANDING):  acetaminophen     Tablet .. 1000 milliGRAM(s) Oral every 8 hours  amLODIPine   Tablet 5 milliGRAM(s) Oral daily  aspirin enteric coated 81 milliGRAM(s) Oral daily  atorvastatin 20 milliGRAM(s) Oral at bedtime  chlorhexidine 2% Cloths 1 Application(s) Topical <User Schedule>  chlorhexidine 4% Liquid 1 Application(s) Topical <User Schedule>  cholecalciferol 1000 Unit(s) Oral two times a day  cyanocobalamin 500 MICROGram(s) Oral daily  folic acid 1 milliGRAM(s) Oral daily  heparin   Injectable 5000 Unit(s) SubCutaneous every 12 hours  levothyroxine 125 MICROGram(s) Oral daily  losartan 100 milliGRAM(s) Oral daily  melatonin 5 milliGRAM(s) Oral at bedtime  memantine 10 milliGRAM(s) Oral two times a day  mirtazapine 15 milliGRAM(s) Oral at bedtime  multivitamin 1 Tablet(s) Oral daily  omega-3-Acid Ethyl Esters 2 Gram(s) Oral daily  pantoprazole    Tablet 40 milliGRAM(s) Oral before breakfast  vancomycin  IVPB 1000 milliGRAM(s) IV Intermittent every 24 hours    MEDICATIONS  (PRN):  ALBUTerol    90 MICROgram(s) HFA Inhaler 2 Puff(s) Inhalation every 6 hours PRN Shortness of Breath and/or Wheezing  sodium chloride 0.9% lock flush 10 milliLiter(s) IV Push every 1 hour PRN Pre/post blood products, medications, blood draw, and to maintain line patency      Allergies    No Known Allergies    Intolerances        Vital Signs Last 24 Hrs  T(C): 36.4 (07 May 2022 09:50), Max: 36.4 (06 May 2022 19:23)  T(F): 97.6 (07 May 2022 09:50), Max: 97.6 (07 May 2022 09:50)  HR: 53 (07 May 2022 09:50) (53 - 107)  BP: 145/56 (07 May 2022 09:50) (133/62 - 145/56)  BP(mean): --  RR: 17 (07 May 2022 09:50) (16 - 18)  SpO2: 93% (07 May 2022 09:50) (92% - 93%)    PHYSICAL EXAM:  GENERAL: NAD, well-groomed, well-developed  HEAD:  Atraumatic, Normocephalic  ENMT: Moist mucous membranes,   NECK: Supple, No JVD, Normal thyroid  NERVOUS SYSTEM:  All 4 extremities mobile, no gross sensory deficits.   CHEST/LUNG: Clear to auscultation bilaterally; No rales, rhonchi, wheezing, or rubs  HEART: Regular rate and rhythm; No murmurs, rubs, or gallops  ABDOMEN: Soft, Nontender, Nondistended; Bowel sounds present  EXTREMITIES:  2+ Peripheral Pulses, No clubbing, cyanosis, or edema      LABS:      Ca    9.6        06 May 2022 06:00          CAPILLARY BLOOD GLUCOSE          RADIOLOGY & ADDITIONAL TESTS:    Imaging Personally Reviewed:  [ ] YES     Consultant(s) Notes Reviewed:      Care Discussed with Consultants/Other Providers:    Advanced Directives: [ ] DNR  [ ] No feeding tube  [ ] MOLST in chart  [ ] MOLST completed today  [ ] Unknown

## 2022-05-07 NOTE — PROGRESS NOTE ADULT - PROBLEM SELECTOR PLAN 1
No fever or leukocytosis, no evidence suggestive of sepsis, CT showed a collection possibly a hematoma,   continue on Vancomycin 1 gm IVPB Q 12h, monitor Vancomycin trough level,   Rocephin added   Wound culture pending  Blood culture B Cereus 1/2, will repeat  Orthopedics input appreciated, ID recommending I&D, will discuss with ortho
Wound cultures showing MRSA; Continue IV Vancomycin and will need for 6 weeks until 6/8/22  Other organisms most likely contamination   PICC Line inserted   Hold off on I&D for now per ID as surgery prefers to treat this medically with antibiotics first  Discussed with ID, plan for dc tomorrow pending vanco trough
No fever or leukocytosis, no evidence suggestive of sepsis, CT showed a collection possibly a hematoma,   On Vanco and Rocephin per ID; PICC line tomorrow and IV Abx x 6 weeks  Wound culture MRSA  Blood culture B Cereus 1/2, repeat NGTD  Hold off on I&D for now per ID and surgery as wound appears clean
No fever or leukocytosis, no evidence suggestive of sepsis, CT showed a collection possibly a hematoma,   continue on Vancomycin 1 gm IVPB Q 12h, monitor Vancomycin trough level,   Rocephin added  Wound culture S. Aureus   Blood culture B Cereus 1/2, repeat pending   Hold off on I&D for now
No fever or leukocytosis, no evidence suggestive of sepsis, CT showed a collection possibly a hematoma,   On Vanco and Rocephin per ID  Wound culture MRSA  Vanco trough elevated, check trough before restarting    Blood culture B Cereus 1/2, repeat NGTD  Hold off on I&D for now per ID and surgery as wound appears clean
Wound cultures showing MRSA; Continue IV Vancomycin and will need for 6 weeks until 6/8/22  Other organisms most likely contamination   PICC Line inserted   Hold off on I&D for now per ID as surgery prefers to treat this medically with antibiotics first
Wound cultures showing MRSA; Continue IV Vancomycin and will need for 6 weeks until 6/8/22  Other organisms most likely contamination   PICC Line inserted   Hold off on I&D for now per ID as surgery prefers to treat this medically with antibiotics first  Discussed with ID, plan for dc tomorrow pending vanco trough
No fever or leukocytosis, no evidence suggestive of sepsis, CT showed a collection possibly a hematoma,   continue on Vancomycin 1 gm IVPB Q 12h, monitor Vancomycin trough level,   f/u culture results in addition to aspirate results  Orthopedics input appreciated  ID consulted
No fever or leukocytosis, no evidence suggestive of sepsis, CT showed a collection possibly a hematoma,   continue on Vancomycin 1 gm IVPB Q 12h, monitor Vancomycin trough level,   Rocephin added  Wound culture pending  Blood culture B Cereus 1/2, will repeat  Orthopedics input appreciated, ID recommending I&D, will discuss with ortho
No fever or leukocytosis, no evidence suggestive of sepsis, CT showed a collection possibly a hematoma,   On Vanco and Rocephin per ID  Wound culture MRSA  Vanco trough elevated, check trough before restarting    Blood culture B Cereus 1/2, repeat NGTD  Hold off on I&D for now per ID and surgery as wound appears clean

## 2022-05-07 NOTE — PROGRESS NOTE ADULT - SUBJECTIVE AND OBJECTIVE BOX
Date/Time Patient Seen:  		  Referring MD:   Data Reviewed	       Patient is a 77y old  Female who presents with a chief complaint of Sent for possibly infected surgical wound. (06 May 2022 14:43)      Subjective/HPI     PAST MEDICAL & SURGICAL HISTORY:  Hypertension, unspecified type    Hyperlipidemia, unspecified hyperlipidemia type    Dementia without behavioral disturbance, unspecified dementia type    Hypothyroidism, unspecified type    Hypothyroid    Osteoarthritis    Carotid stenosis    Degenerative joint disease    Hypertension    Hyperlipidemia    Dementia    No significant past surgical history    S/P ORIF (open reduction internal fixation) fracture    History of open reduction and internal fixation (ORIF) procedure          Medication list         MEDICATIONS  (STANDING):  acetaminophen     Tablet .. 1000 milliGRAM(s) Oral every 8 hours  amLODIPine   Tablet 5 milliGRAM(s) Oral daily  aspirin enteric coated 81 milliGRAM(s) Oral daily  atorvastatin 20 milliGRAM(s) Oral at bedtime  chlorhexidine 2% Cloths 1 Application(s) Topical <User Schedule>  chlorhexidine 4% Liquid 1 Application(s) Topical <User Schedule>  cholecalciferol 1000 Unit(s) Oral two times a day  cyanocobalamin 500 MICROGram(s) Oral daily  folic acid 1 milliGRAM(s) Oral daily  heparin   Injectable 5000 Unit(s) SubCutaneous every 12 hours  levothyroxine 125 MICROGram(s) Oral daily  losartan 100 milliGRAM(s) Oral daily  melatonin 5 milliGRAM(s) Oral at bedtime  memantine 10 milliGRAM(s) Oral two times a day  mirtazapine 15 milliGRAM(s) Oral at bedtime  multivitamin 1 Tablet(s) Oral daily  omega-3-Acid Ethyl Esters 2 Gram(s) Oral daily  pantoprazole    Tablet 40 milliGRAM(s) Oral before breakfast  vancomycin  IVPB 1000 milliGRAM(s) IV Intermittent every 24 hours    MEDICATIONS  (PRN):  ALBUTerol    90 MICROgram(s) HFA Inhaler 2 Puff(s) Inhalation every 6 hours PRN Shortness of Breath and/or Wheezing  sodium chloride 0.9% lock flush 10 milliLiter(s) IV Push every 1 hour PRN Pre/post blood products, medications, blood draw, and to maintain line patency         Vitals log        ICU Vital Signs Last 24 Hrs  T(C): 36.3 (07 May 2022 05:02), Max: 36.4 (06 May 2022 19:23)  T(F): 97.3 (07 May 2022 05:02), Max: 97.5 (06 May 2022 19:23)  HR: 107 (07 May 2022 05:02) (72 - 107)  BP: 133/62 (07 May 2022 05:02) (133/62 - 151/78)  BP(mean): --  ABP: --  ABP(mean): --  RR: 18 (07 May 2022 05:02) (16 - 18)  SpO2: 92% (07 May 2022 05:02) (92% - 93%)           Input and Output:  I&O's Detail    06 May 2022 07:01  -  07 May 2022 06:51  --------------------------------------------------------  IN:    Oral Fluid: 120 mL  Total IN: 120 mL    OUT:  Total OUT: 0 mL    Total NET: 120 mL          Lab Data                        11.4   6.80  )-----------( 314      ( 06 May 2022 06:00 )             36.2     05-06    138  |  100  |  18  ----------------------------<  84  3.6   |  33<H>  |  0.86    Ca    9.6      06 May 2022 06:00              Review of Systems	      Objective     Physical Examination    heart s1s2  lung dec BS  abd soft      Pertinent Lab findings & Imaging      Dominga:  NO   Adequate UO     I&O's Detail    06 May 2022 07:01  -  07 May 2022 06:51  --------------------------------------------------------  IN:    Oral Fluid: 120 mL  Total IN: 120 mL    OUT:  Total OUT: 0 mL    Total NET: 120 mL               Discussed with:     Cultures:	        Radiology

## 2022-05-07 NOTE — PROGRESS NOTE ADULT - PROBLEM SELECTOR PLAN 4
poor po intake  nutrition consult
Improved, ML reactive, already on low dose Aspirin, continue
Improved, ML reactive, already on low dose Aspirin, continue, hold UFH for now given the large hematoma shown up in CT.
Improved, ML reactive, already on low dose Aspirin, continue

## 2022-05-07 NOTE — PROGRESS NOTE ADULT - PROBLEM SELECTOR PLAN 5
poor po intake  nutrition consult
poor po intake  nutrition consult appreciated
poor po intake  nutrition consult
poor po intake  nutrition consult appreciated
poor po intake  nutrition consult
of blood loss started following the surgery as per hospital records, received 3 units of PRBCs transfused during her last hospital stay last was 9 days ago, CT shows a large hematoma, monitor.

## 2022-05-07 NOTE — PROGRESS NOTE ADULT - PROBLEM SELECTOR PLAN 6
of blood loss started following the surgery as per hospital records, received 3 units of PRBCs transfused during her last hospital stay, CT shows a large hematoma, monitor.
of blood loss started following the surgery as per hospital records, received 3 units of PRBCs transfused during her last hospital stay, CT shows a hematoma, monitor.  HGB stable, d/w Dr. Barajas, DVT ppx Heparin bid  Trend HGB
seen by endocrinology during her last hospital stay for profound hypothyroidism, continue on her current Levothyroxine home dose, follow TSH
of blood loss started following the surgery as per hospital records, received 3 units of PRBCs transfused during her last hospital stay, CT shows a hematoma, monitor.  HGB stable, d/w Dr. Barajas, DVT ppx Heparin bid  Trend HGB
of blood loss started following the surgery as per hospital records, received 3 units of PRBCs transfused during her last hospital stay, CT shows a hematoma, monitor.  HGB stable, d/w Dr. Barajas, DVT ppx Heparin bid  Trend HGB
of blood loss started following the surgery as per hospital records, received 3 units of PRBCs transfused during her last hospital stay, CT shows a hematoma, monitor.  HGB stable, d/w Dr. Barajas, will start DVT ppx Heparin bid
of blood loss started following the surgery as per hospital records, received 3 units of PRBCs transfused during her last hospital stay, CT shows a hematoma, monitor.  HGB stable, d/w Dr. Barajas, DVT ppx Heparin bid  Trend HGB

## 2022-05-07 NOTE — PROGRESS NOTE ADULT - PROBLEM SELECTOR PROBLEM 5
Hypoalbuminemia
Normocytic anemia
Hypoalbuminemia

## 2022-05-07 NOTE — PROGRESS NOTE ADULT - SUBJECTIVE AND OBJECTIVE BOX
Patient is a 77y Female with a known history of :  Infected surgical wound [T81.49XA]    Dehydration [E86.0]    Thrombocythemia [D75.839]    Hypoalbuminemia [E88.09]    Normocytic anemia [D64.9]    Hypothyroidism [E03.9]    HTN (hypertension) [I10]    Need for prophylactic measure [Z29.9]    MRSA (methicillin resistant Staphylococcus aureus) [A49.02]      HPI:  This is a 76 y/o F with PMH of HTN, Dyslipidemia, PAD, Hypothyroidism, Anemia s/p transfusion, OA, Depression, and Dementia who was sent from [facility] facility for possibly infected surgical wound. Patient was admitted to [facility] and with closed right femoral neck fracture s/p fall, and had an ORIF surgery 2 weeks ago, discharged to subacute rehab, sent back today for reported discharge from the surgical wound, no reported fever or chills. Patient with advanced dementia, with irrelevant speech, no further history could be obtained at this time.. (26 Apr 2022 21:03)      REVIEW OF SYSTEMS:    CONSTITUTIONAL: No fever, weight loss, or fatigue  EYES: No eye pain, visual disturbances, or discharge  ENMT:  No difficulty hearing, tinnitus, vertigo; No sinus or throat pain  NECK: No pain or stiffness  BREASTS: No pain, masses, or nipple discharge  RESPIRATORY: No cough, wheezing, chills or hemoptysis; No shortness of breath  CARDIOVASCULAR: No chest pain, palpitations, dizziness, or leg swelling  GASTROINTESTINAL: No abdominal or epigastric pain. No nausea, vomiting, or hematemesis; No diarrhea or constipation. No melena or hematochezia.  GENITOURINARY: No dysuria, frequency, hematuria, or incontinence  NEUROLOGICAL: No headaches, memory loss, loss of strength, numbness, or tremors  SKIN: No itching, burning, rashes, or lesions   LYMPH NODES: No enlarged glands  ENDOCRINE: No heat or cold intolerance; No hair loss  MUSCULOSKELETAL: No joint pain or swelling; No muscle, back, or extremity pain  PSYCHIATRIC: No depression, anxiety, mood swings, or difficulty sleeping  HEME/LYMPH: No easy bruising, or bleeding gums  ALLERGY AND IMMUNOLOGIC: No hives or eczema    MEDICATIONS  (STANDING):  acetaminophen     Tablet .. 1000 milliGRAM(s) Oral every 8 hours  amLODIPine   Tablet 5 milliGRAM(s) Oral daily  aspirin enteric coated 81 milliGRAM(s) Oral daily  atorvastatin 20 milliGRAM(s) Oral at bedtime  chlorhexidine 2% Cloths 1 Application(s) Topical <User Schedule>  chlorhexidine 4% Liquid 1 Application(s) Topical <User Schedule>  cholecalciferol 1000 Unit(s) Oral two times a day  cyanocobalamin 500 MICROGram(s) Oral daily  folic acid 1 milliGRAM(s) Oral daily  heparin   Injectable 5000 Unit(s) SubCutaneous every 12 hours  levothyroxine 125 MICROGram(s) Oral daily  losartan 100 milliGRAM(s) Oral daily  melatonin 5 milliGRAM(s) Oral at bedtime  memantine 10 milliGRAM(s) Oral two times a day  mirtazapine 15 milliGRAM(s) Oral at bedtime  multivitamin 1 Tablet(s) Oral daily  omega-3-Acid Ethyl Esters 2 Gram(s) Oral daily  pantoprazole    Tablet 40 milliGRAM(s) Oral before breakfast  vancomycin  IVPB 1000 milliGRAM(s) IV Intermittent every 24 hours    MEDICATIONS  (PRN):  ALBUTerol    90 MICROgram(s) HFA Inhaler 2 Puff(s) Inhalation every 6 hours PRN Shortness of Breath and/or Wheezing  sodium chloride 0.9% lock flush 10 milliLiter(s) IV Push every 1 hour PRN Pre/post blood products, medications, blood draw, and to maintain line patency      ALLERGIES: No Known Allergies      FAMILY HISTORY:  No pertinent family history in first degree relatives        Social history:  Alochol:   Smoking:   Drug Use:   Marital Status:     PHYSICAL EXAMINATION:  -----------------------------  T(C): 36.4 (05-07-22 @ 09:50), Max: 36.4 (05-06-22 @ 19:23)  HR: 53 (05-07-22 @ 09:50) (53 - 107)  BP: 145/56 (05-07-22 @ 09:50) (133/62 - 145/56)  RR: 17 (05-07-22 @ 09:50) (16 - 18)  SpO2: 93% (05-07-22 @ 09:50) (92% - 93%)  Wt(kg): --    05-06 @ 07:01  -  05-07 @ 07:00  --------------------------------------------------------  IN:    Oral Fluid: 120 mL  Total IN: 120 mL    OUT:  Total OUT: 0 mL    Total NET: 120 mL            Constitutional: well developed, normal appearance, well groomed, well nourished, no deformities and no acute distress.   Eyes: the conjunctiva exhibited no abnormalities and the eyelids demonstrated no xanthelasmas.   HEENT: normal oral mucosa, no oral pallor and no oral cyanosis.   Neck: normal jugular venous A waves present, normal jugular venous V waves present and no jugular venous garcia A waves.   Pulmonary: no respiratory distress, normal respiratory rhythm and effort, no accessory muscle use and lungs were clear to auscultation bilaterally. Anteriorly  Cardiovascular: heart rate and rhythm were normal, normal S1 and S2 and no murmur, gallop, rub, heave or thrill are present.   Musculoskeletal: the gait could not be assessed.   Extremities: no clubbing of the fingernails, no localized cyanosis, no petechial hemorrhages and no ischemic changes.   Skin: normal skin color and pigmentation, no rash, no venous stasis, no skin lesions, no skin ulcer and no xanthoma was observed.       LABS:   --------  05-06    138  |  100  |  18  ----------------------------<  84  3.6   |  33<H>  |  0.86    Ca    9.6      06 May 2022 06:00                           11.4   6.80  )-----------( 314      ( 06 May 2022 06:00 )             36.2                   Radiology:

## 2022-05-07 NOTE — PROGRESS NOTE ADULT - PROVIDER SPECIALTY LIST ADULT
Infectious Disease
Neurology
Orthopedics
Orthopedics
Pulmonology
Cardiology
Infectious Disease
Neurology
Neurology
Orthopedics
Pulmonology
Cardiology
Infectious Disease
Infectious Disease
Neurology
Neurology
Pulmonology
Cardiology
Pulmonology
Hospitalist

## 2022-05-07 NOTE — PROGRESS NOTE ADULT - PROBLEM SELECTOR PROBLEM 7
Hypothyroidism
HTN (hypertension)
Hypothyroidism

## 2022-05-07 NOTE — PROGRESS NOTE ADULT - ASSESSMENT
76 y/o F with PMH of HTN, Dyslipidemia, PAD, Hypothyroidism, Anemia s/p transfusion, OA, Depression, and Dementia sent for possibly infected surgical wound.
76 y/o F with PMH of HTN, Dyslipidemia, PAD, Hypothyroidism, Anemia s/p transfusion, OA, Depression, and Dementia sent for possibly infected surgical wound.
76 y/o F with PMH of HTN, Dyslipidemia, PAD, Hypothyroidism, Anemia s/p transfusion, OA, Depression, and Dementia who was sent from [facility] facility for possibly infected surgical wound    surgical wound eval  s/p PNA  htn  OP  OA  Ataxic gait  HLD  PAD  Thyroid Disease  Anemia  Depression   Dementia  Hiatal hernia  Emphysema - ex smoker - known COPD -     proventil prn for sob and or wheezing  incentive larissa if able to cooperate  on Rocephin - VANCO - wound infection - surgical infection - eval in progress  monitor VS and HD and Sat  assist with needs - ADL  PT   supportive med rx regimen  cvs rx regimen  PPI for Hiatal hernia hx  keep HOB elev - asp prec - oral hygiene  infiltrates - atelectasis improvement noted on most recent CXR
76 y/o F with PMH of HTN, Dyslipidemia, PAD, Hypothyroidism, Anemia s/p transfusion, OA, Depression, and Dementia who was sent from [facility] facility for possibly infected surgical wound    surgical wound eval  s/p PNA  htn  OP  OA  Ataxic gait  HLD  PAD  Thyroid Disease  Anemia  Depression   Dementia  Hiatal hernia  Emphysema - ex smoker - known COPD -     TTE reviewed - Cardio note reviewed -   MRSA screen Positive  ID f/u  caution with Opioids    proventil prn for sob and or wheezing  incentive larissa if able to cooperate  on Rocephin - VANCO on hold (see trough level) - wound infection - surgical infection - eval in progress  monitor VS and HD and Sat  assist with needs - ADL  PT   supportive med rx regimen  cvs rx regimen  PPI for Hiatal hernia hx  keep HOB elev - asp prec - oral hygiene  infiltrates - atelectasis improvement noted on most recent CXR
78 y/o F with PMH of HTN, Dyslipidemia, PAD, Hypothyroidism, Anemia s/p transfusion, OA, Depression, and Dementia sent for possibly infected surgical wound.
78 y/o F with PMH of HTN, Dyslipidemia, PAD, Hypothyroidism, Anemia s/p transfusion, OA, Depression, and Dementia who was sent from [facility] facility for possibly infected surgical wound    surgical wound eval  s/p PNA  htn  OP  OA  Ataxic gait  HLD  PAD  Thyroid Disease  Anemia  Depression   Dementia  Hiatal hernia  Emphysema - ex smoker - known COPD -     TTE reviewed - Cardio note reviewed -   MRSA screen Positive  ID f/u    proventil prn for sob and or wheezing  incentive larissa if able to cooperate  on Rocephin - VANCO - wound infection - surgical infection - eval in progress  monitor VS and HD and Sat  assist with needs - ADL  PT   supportive med rx regimen  cvs rx regimen  PPI for Hiatal hernia hx  keep HOB elev - asp prec - oral hygiene  infiltrates - atelectasis improvement noted on most recent CXR
The patient is a 77 year old female with a history of HTN, HL, hypothyroidism, carotid stenosis, dementia, recent hip fracture who presents with possible infected surgical wound.    Plan:  - ECG with no evidence of ischemia or infarction  - Echo last admission with normal LV systolic function, no significant valve issues  - Repeat echo with no obvious evidence of endocarditis although limited by presence of calcific disease  - Blood cultures positive for bacillus cereus  - Hip wound cultures positive for MRSA  - ID follow-up  - Continue amlodipine 5 mg daily  - Continue losartan 100 mg daily  - On IV vancomycin
The patient is a 77 year old female with a history of HTN, HL, hypothyroidism, carotid stenosis, dementia, recent hip fracture who presents with possible infected surgical wound.    Plan:  - ECG with no evidence of ischemia or infarction  - Echo last admission with normal LV systolic function, no significant valve issues  - Repeat echo with no obvious evidence of endocarditis although limited by presence of calcific disease  - Blood cultures positive for bacillus cereus  - ID follow-up  - Continue amlodipine 5 mg daily  - Continue losartan 100 mg daily
The patient is a 77 year old female with a history of HTN, HL, hypothyroidism, carotid stenosis, dementia, recent hip fracture who presents with possible infected surgical wound.    Plan:  - ECG with no evidence of ischemia or infarction  - Echo last admission with normal LV systolic function, no significant valve issues  - Repeat echo with no obvious evidence of endocarditis although limited by presence of calcific disease  - Blood cultures positive for bacillus cereus  - ID follow-up  - Continue amlodipine 5 mg daily  - Continue losartan 100 mg daily  - On IV vancomycin
76 y/o F with PMH of HTN, Dyslipidemia, PAD, Hypothyroidism, Anemia s/p transfusion, OA, Depression, and Dementia who was sent from [facility] facility for possibly infected surgical wound    surgical wound eval  s/p PNA  htn  OP  OA  Ataxic gait  HLD  PAD  Thyroid Disease  Anemia  Depression   Dementia  Hiatal hernia  Emphysema - ex smoker - known COPD -     TTE reviewed - Cardio note reviewed -   MRSA screen Positive, on VANCO -   ID f/u  caution with Opioids  PICC in for ABX - long term use    proventil prn for sob and or wheezing  incentive larissa if able to cooperate  s/p Rocephin - on VANCO - wound infection - surgical infection - eval in progress  monitor VS and HD and Sat  assist with needs - ADL  PT   supportive med rx regimen  cvs rx regimen  PPI for Hiatal hernia hx  keep HOB elev - asp prec - oral hygiene  infiltrates - atelectasis improvement noted on most recent CXR
76 y/o F with PMH of HTN, Dyslipidemia, PAD, Hypothyroidism, Anemia s/p transfusion, OA, Depression, and Dementia who was sent from [facility] facility for possibly infected surgical wound    surgical wound eval  s/p PNA  htn  OP  OA  Ataxic gait  HLD  PAD  Thyroid Disease  Anemia  Depression   Dementia  Hiatal hernia  Emphysema - ex smoker - known COPD -     TTE reviewed - Cardio note reviewed -   MRSA screen Positive, on VANCO -   ID f/u  caution with Opioids  PICC in for ABX - long term use    proventil prn for sob and or wheezing  incentive larissa if able to cooperate  s/p Rocephin - on VANCO - wound infection - surgical infection - eval in progress  monitor VS and HD and Sat  assist with needs - ADL  PT   supportive med rx regimen  cvs rx regimen  PPI for Hiatal hernia hx  keep HOB elev - asp prec - oral hygiene  infiltrates - atelectasis improvement noted on most recent CXR
The patient is a 77 year old female with a history of HTN, HL, hypothyroidism, carotid stenosis, dementia, recent hip fracture who presents with possible infected surgical wound.    5/7/22  Seen at [facility]  Lying flat comfortably   Awake and alert    Plan:  - ECG with no evidence of ischemia or infarction  - Echo last admission with normal LV systolic function, no significant valve issues  - Repeat echo with no obvious evidence of endocarditis although limited by presence of calcific disease  - Blood cultures positive for bacillus cereus  - Hip wound cultures positive for MRSA  - ID follow-up  - Continue amlodipine 5 mg daily  - Continue losartan 100 mg daily  - On IV vancomycin  - Being followed by ID, Neurology, Pulmonary  - Discharge planning
The patient is a 77 year old female with a history of HTN, HL, hypothyroidism, carotid stenosis, dementia, recent hip fracture who presents with possible infected surgical wound.    Plan:  - ECG with no evidence of ischemia or infarction  - Echo last admission with normal LV systolic function, no significant valve issues  - Repeat echo with no obvious evidence of endocarditis although limited by presence of calcific disease  - Blood cultures positive for bacillus cereus  - ID follow-up  - Continue amlodipine 5 mg daily  - Continue losartan 100 mg daily  - On IV vancomycin
78 y/o F with PMH of HTN, Dyslipidemia, PAD, Hypothyroidism, Anemia s/p transfusion, OA, Depression, and Dementia sent for possibly infected surgical wound.
78 y/o F with PMH of HTN, Dyslipidemia, PAD, Hypothyroidism, Anemia s/p transfusion, OA, Depression, and Dementia who was sent from [facility] facility for possibly infected surgical wound    surgical wound eval  s/p PNA  htn  OP  OA  Ataxic gait  HLD  PAD  Thyroid Disease  Anemia  Depression   Dementia  Hiatal hernia  Emphysema - ex smoker - known COPD -     TTE reviewed - Cardio note reviewed -   MRSA screen Positive  ID f/u  caution with Opioids    proventil prn for sob and or wheezing  incentive larissa if able to cooperate  on Rocephin - VANCO - wound infection - surgical infection - eval in progress  monitor VS and HD and Sat  assist with needs - ADL  PT   supportive med rx regimen  cvs rx regimen  PPI for Hiatal hernia hx  keep HOB elev - asp prec - oral hygiene  infiltrates - atelectasis improvement noted on most recent CXR
78 y/o F with PMH of HTN, Dyslipidemia, PAD, Hypothyroidism, Anemia s/p transfusion, OA, Depression, and Dementia who was sent from [facility] facility for possibly infected surgical wound    surgical wound eval  s/p PNA  htn  OP  OA  Ataxic gait  HLD  PAD  Thyroid Disease  Anemia  Depression   Dementia  Hiatal hernia  Emphysema - ex smoker - known COPD -     TTE reviewed - Cardio note reviewed -   MRSA screen Positive  ID f/u      proventil prn for sob and or wheezing  incentive larissa if able to cooperate  on Rocephin - VANCO - wound infection - surgical infection - eval in progress  monitor VS and HD and Sat  assist with needs - ADL  PT   supportive med rx regimen  cvs rx regimen  PPI for Hiatal hernia hx  keep HOB elev - asp prec - oral hygiene  infiltrates - atelectasis improvement noted on most recent CXR  
78 y/o F with PMH of HTN, Dyslipidemia, PAD, Hypothyroidism, Anemia s/p transfusion, OA, Depression, and Dementia who was sent from [facility] facility for possibly infected surgical wound    surgical wound eval  s/p PNA  htn  OP  OA  Ataxic gait  HLD  PAD  Thyroid Disease  Anemia  Depression   Dementia  Hiatal hernia  Emphysema - ex smoker - known COPD -     TTE reviewed - Cardio note reviewed -   MRSA screen Positive, on VANCO -   ID f/u  caution with Opioids  PICC in for ABX - long term use    proventil prn for sob and or wheezing  incentive larissa if able to cooperate  s/p Rocephin - on VANCO - wound infection - surgical infection - eval in progress  monitor VS and HD and Sat  assist with needs - ADL  PT   supportive med rx regimen  cvs rx regimen  PPI for Hiatal hernia hx  keep HOB elev - asp prec - oral hygiene  infiltrates - atelectasis improvement noted on most recent CXR    
The patient is a 77 year old female with a history of HTN, HL, hypothyroidism, carotid stenosis, dementia, recent hip fracture who presents with possible infected surgical wound.    Plan:  - ECG with no evidence of ischemia or infarction  - Echo last admission with normal LV systolic function, no significant valve issues  - Repeat echo with no obvious evidence of endocarditis although limited by presence of calcific disease  - Blood cultures positive for bacillus cereus  - ID follow-up  - Continue amlodipine 5 mg daily  - Continue losartan 100 mg daily  - On IV vancomycin
76 y/o F with PMH of HTN, Dyslipidemia, PAD, Hypothyroidism, Anemia s/p transfusion, OA, Depression, and Dementia who was sent from [facility] facility for possibly infected surgical wound    surgical wound eval  s/p PNA  htn  OP  OA  Ataxic gait  HLD  PAD  Thyroid Disease  Anemia  Depression   Dementia  Hiatal hernia  Emphysema - ex smoker - known COPD -     TTE reviewed - Cardio note reviewed -   MRSA screen Positive, on VANCO -   ID f/u  caution with Opioids    proventil prn for sob and or wheezing  incentive larissa if able to cooperate  s/p Rocephin - on VANCO - wound infection - surgical infection - eval in progress  monitor VS and HD and Sat  assist with needs - ADL  PT   supportive med rx regimen  cvs rx regimen  PPI for Hiatal hernia hx  keep HOB elev - asp prec - oral hygiene  infiltrates - atelectasis improvement noted on most recent CXR
78 y/o F with PMH of HTN, Dyslipidemia, PAD, Hypothyroidism, Anemia s/p transfusion, OA, Depression, and Dementia who was sent from [facility] facility for possibly infected surgical wound    surgical wound eval  s/p PNA  htn  OP  OA  Ataxic gait  HLD  PAD  Thyroid Disease  Anemia  Depression   Dementia  Hiatal hernia  Emphysema - ex smoker - known COPD -     TTE reviewed - Cardio note reviewed -   MRSA screen Positive  ID f/u  caution with Opioids    proventil prn for sob and or wheezing  incentive larissa if able to cooperate  s/p Rocephin - VANCO - wound infection - surgical infection - eval in progress  monitor VS and HD and Sat  assist with needs - ADL  PT   supportive med rx regimen  cvs rx regimen  PPI for Hiatal hernia hx  keep HOB elev - asp prec - oral hygiene  infiltrates - atelectasis improvement noted on most recent CXR
76 y/o F with PMH of HTN, Dyslipidemia, PAD, Hypothyroidism, Anemia s/p transfusion, OA, Depression, and Dementia sent for possibly infected surgical wound.
78 y/o F with PMH of HTN, Dyslipidemia, PAD, Hypothyroidism, Anemia s/p transfusion, OA, Depression, and Dementia sent for possibly infected surgical wound.
76 y/o F with PMH of HTN, Dyslipidemia, PAD, Hypothyroidism, Anemia s/p transfusion, OA, Depression, and Dementia sent for possibly infected surgical wound.
78 y/o F with PMH of HTN, Dyslipidemia, PAD, Hypothyroidism, Anemia s/p transfusion, OA, Depression, and Dementia sent for possibly infected surgical wound.
76 y/o F with PMH of HTN, Dyslipidemia, PAD, Hypothyroidism, Anemia s/p transfusion, OA, Depression, and Dementia sent for possibly infected surgical wound.
76 y/o F with PMH of HTN, Dyslipidemia, PAD, Hypothyroidism, Anemia s/p transfusion, OA, Depression, and Dementia sent for possibly infected surgical wound.

## 2022-05-07 NOTE — PROGRESS NOTE ADULT - PROBLEM SELECTOR PROBLEM 2
Dehydration

## 2022-05-07 NOTE — PROGRESS NOTE ADULT - PROBLEM SELECTOR PROBLEM 1
Infected surgical wound

## 2022-05-07 NOTE — PROGRESS NOTE ADULT - PROBLEM SELECTOR PROBLEM 6
Normocytic anemia
Hypothyroidism
Normocytic anemia

## 2022-05-07 NOTE — PROGRESS NOTE ADULT - PROBLEM SELECTOR PROBLEM 4
Thrombocythemia
Hypoalbuminemia
Thrombocythemia

## 2022-05-07 NOTE — PROGRESS NOTE ADULT - PROBLEM SELECTOR PLAN 2
Trend renal function

## 2022-05-07 NOTE — PROGRESS NOTE ADULT - PROBLEM SELECTOR PLAN 7
seen by endocrinology during her last hospital stay for profound hypothyroidism, continue on her current Levothyroxine home dose  TSH improved to 18  Outpatient follow up for repeat TSH within 4-6 weeks
controlled, continue Losartan & Amlodipine with hold parameters.
seen by endocrinology during her last hospital stay for profound hypothyroidism, continue on her current Levothyroxine home dose  TSH improved to 18
seen by endocrinology during her last hospital stay for profound hypothyroidism, continue on her current Levothyroxine home dose  TSH improved to 18
seen by endocrinology during her last hospital stay for profound hypothyroidism, continue on her current Levothyroxine home dose  TSH improved to 18  Outpatient follow up for repeat TSH within 4-6 weeks
seen by endocrinology during her last hospital stay for profound hypothyroidism, continue on her current Levothyroxine home dose  TSH improved to 18

## 2022-05-07 NOTE — PROGRESS NOTE ADULT - PROBLEM SELECTOR PLAN 9
Heparin BID for DVT ppx
Heparin BID for DVT ppx
High risk for VTE, had to hold UFH for now given her large hematoma shown in hip CT, started her on B/L intermittent pneumatic compression device for mechanical DVT prophylaxis, continue daily low dose Aspirin.  Discussed with ortho, if HGB stable, will restart DVT ppx Heparin
Heparin BID for DVT ppx
Started Heparin BID for DVT ppx

## 2022-05-07 NOTE — PROGRESS NOTE ADULT - SUBJECTIVE AND OBJECTIVE BOX
OPAL ARBOLEDA is a 77yFemale , patient examined and chart reviewed.    INTERVAL HPI/ OVERNIGHT EVENTS:   Afebrile. NAD.   No events.    PAST MEDICAL & SURGICAL HISTORY:  Dementia without behavioral disturbance, unspecified dementia type  Hypothyroid  Osteoarthritis  Carotid stenosis  Degenerative joint disease  Hypertension  Hyperlipidemia  History of open reduction and internal fixation (ORIF) procedure        For details regarding the patient's social history, family history, and other miscellaneous elements, please refer the initial infectious diseases consultation and/or the admitting history and physical examination for this admission.    ROS:  Unable to obtain due to : poor historian      Current inpatient medications :    ANTIBIOTICS/RELEVANT:  vancomycin  IVPB 1000 milliGRAM(s) IV Intermittent every 24 hours    MEDICATIONS  (STANDING):  acetaminophen     Tablet .. 1000 milliGRAM(s) Oral every 8 hours  amLODIPine   Tablet 5 milliGRAM(s) Oral daily  aspirin enteric coated 81 milliGRAM(s) Oral daily  atorvastatin 20 milliGRAM(s) Oral at bedtime  chlorhexidine 2% Cloths 1 Application(s) Topical <User Schedule>  chlorhexidine 4% Liquid 1 Application(s) Topical <User Schedule>  cholecalciferol 1000 Unit(s) Oral two times a day  cyanocobalamin 500 MICROGram(s) Oral daily  folic acid 1 milliGRAM(s) Oral daily  heparin   Injectable 5000 Unit(s) SubCutaneous every 12 hours  levothyroxine 125 MICROGram(s) Oral daily  losartan 100 milliGRAM(s) Oral daily  melatonin 5 milliGRAM(s) Oral at bedtime  memantine 10 milliGRAM(s) Oral two times a day  mirtazapine 15 milliGRAM(s) Oral at bedtime  multivitamin 1 Tablet(s) Oral daily  omega-3-Acid Ethyl Esters 2 Gram(s) Oral daily  pantoprazole    Tablet 40 milliGRAM(s) Oral before breakfast    MEDICATIONS  (PRN):  ALBUTerol    90 MICROgram(s) HFA Inhaler 2 Puff(s) Inhalation every 6 hours PRN Shortness of Breath and/or Wheezing  sodium chloride 0.9% lock flush 10 milliLiter(s) IV Push every 1 hour PRN Pre/post blood products, medications, blood draw, and to maintain line patency      Objective:  Vital Signs Last 24 Hrs  T(C): 36.7 (07 May 2022 16:25), Max: 36.7 (07 May 2022 16:25)  T(F): 98 (07 May 2022 16:25), Max: 98 (07 May 2022 16:25)  HR: 60 (07 May 2022 16:25) (53 - 107)  BP: 148/60 (07 May 2022 16:25) (133/62 - 148/60)  RR: 18 (07 May 2022 16:25) (16 - 18)  SpO2: 94% (07 May 2022 16:25) (92% - 94%)      Physical Exam:  General:  no acute distress  Neck: supple, trachea midline  Lungs: clear, no wheeze/rhonchi  Cardiovascular: regular rate and rhythm, S1 S2  Abdomen: soft, nontender,  bowel sounds normal  Neurological: awake confused  Skin: no rash  Extremities: Right hip drsg c/d/i    LABS:                        11.4   6.80  )-----------( 314      ( 06 May 2022 06:00 )             36.2   05-06    138  |  100  |  18  ----------------------------<  84  3.6   |  33<H>  |  0.86    Ca    9.6      06 May 2022 06:00    Vancomycin Level, Trough -Pre 3rd Dose, order if dosed q24/36/48h (05.06.22 @ 10:24)    Vancomycin Level, Trough: 16.5    MICROBIOLOGY:  Culture - Other (04.28.22 @ 13:37)    -  Ampicillin/Sulbactam: R <=8/4    -  Cefazolin: R >16    -  Clindamycin: S <=0.25    -  Daptomycin: S 0.5    -  Erythromycin: R >4    -  Gentamicin: S <=1 Should not be used as monotherapy    -  Linezolid: S 4    -  Oxacillin: R >2    -  Penicillin: R >8    -  Rifampin: S <=1 Should not be used as monotherapy    -  Tetra/Doxy: S <=1    -  Trimethoprim/Sulfamethoxazole: R >2/38    -  Vancomycin: S 1    Specimen Source: .Other right hip wound    Culture Results:   Few Methicillin Resistant Staphylococcus aureus    Organism Identification: Methicillin resistant Staphylococcus aureus    Organism: Methicillin resistant Staphylococcus aureus    Method Type: MEGHA      Culture - Blood (collected 27 Apr 2022 00:38)  Source: .Blood Blood-Peripheral  Gram Stain (27 Apr 2022 16:42):    Growth in anaerobic bottle: Gram Positive Rods  Final Report (28 Apr 2022 16:57):    Growth in anaerobic bottle: Bacillus species not anthracis    "Susceptibilities not performed"    ***Blood Panel PCR results on this specimen are available    approximately 3 hours after the Gram stain result.***    Gram stain, PCR, and/or culture results may not always    correspond due to difference in methodologies.    ************************************************************    This PCR assay was performed by multiplex PCR. This    Assay tests for 66 bacterial and resistance gene targets.    Please refer to the [facility] test directory    at [email or web address] for details.  Organism: Blood Culture PCR (28 Apr 2022 16:57)  Organism: Blood Culture PCR (28 Apr 2022 16:57)      -  Bacillus cereus group: Detec      Method Type: PCR    Culture - Blood (collected 27 Apr 2022 00:38)  Source: .Blood Blood-Peripheral  Preliminary Report (28 Apr 2022 01:03):    No growth to date.    RADIOLOGY & ADDITIONAL STUDIES:    ACC: [id number] EXAM:  CT HIP ONLY RT                          PROCEDURE DATE:  04/26/2022          INTERPRETATION:  CT HIP RIGHT    HISTORY: Pain. Postop infection. Right hip intertrochanteric fracture   ORIF.    TECHNIQUE: Contiguous axial imaging was performed through the pelvis   without contrast.  Coronal and sagittal reformatting was utilized.   Patient motion artifact and blurring may cause some limitation.    COMPARISON: CT of the right hip dated 4/14/2022, intraoperative x-rays   dated 4/11/2022, and pelvis and right hip x-rays dated 4/9/2022.    FINDINGS:    OSSEOUS STRUCTURES    Acute/Chronic Fractures:  Comminuted intertrochanteric fracture of the   right hip is again seen with a gamma nail for fixation. Distal   interlocking screw is present. There is mild residual fracture   displacement.    PELVIC JOINTS    Symphysis Pubis:  Preserved.    Sacroiliac Joints:  Maintained.    RIGHT HIP JOINT    Avascular Necrosis:  None.    Joint Space:  Grossly maintained given patient motion.    Effusion/Synovitis:  None.    LEFT HIP JOINT    Avascular Necrosis:  None.    Joint Space:  Grossly maintained given patient motion.    Effusion/Synovitis:  None.    VISUALIZED SPINE  Lower lumbar degenerative disc disease is present.    SOFT TISSUES    Neurovascular:  Severe atherosclerotic calcifications are present.    Pelvis/Abdomen:  Mild presacral edema is present.    Musculature:  Fluid collection is present extending along the posterior   margin of the greater trochanter and within the right gluteus medias   muscle measuring approximately 14.2 cm craniocaudally and up to 6.9 x 4.2   cm axially along the greater trochanter.    Subcutaneous Tissues:  Moderate subcutaneous hematoma formation is   again seen along the right hip incision site with mild hematoma formation   extending along the level of the proximal femoral diaphysis. Mild to   moderate subcutaneous edema is also noted. Overlying skin staples are   noted.    IMPRESSION:  1. Right intertrochanteric fracture ORIF is again seen.  2. Fluid collection along the right greater trochanter and within the   right gluteus medius muscle likely reflects evolution of the prior   hematoma although infection cannot be absolutely excluded with the given   history. Consider aspiration as warranted.  3. Moderate overlying subcutaneous hematoma is again seen.    Assessment :   76YO F PMH of Dementia, HTN, Dyslipidemia, PAD, Hypothyroidism, Anemia resident of [facility] facility SP ORIF, Right hip, using trochanteric nail 10-Apr-2022 complicated by Acute blood loss sec surgical site hematoma admitted with infected surgical site wound with poss infected hematoma + seropurulent drainage   Wound cultures with MRSA  Bacillus bacteremia likely contaminant  PICC line placed 5/4/22  No surgical intervention per ortho- conservative management.  Stable    Plan :   Cont Vancomycin 1 gram IV daily x 6 weeks till 6/8/22  Trend temps and cbc  Ortho following   Asp precautions  Dc planning to rehab  Stable from ID standpoint      Continue with present regiment.  Appropriate use of antibiotics and adverse effects reviewed.    > 35 minutes were spent in direct patient care reviewing notes, medications ,labs data/ imaging , discussion with multidisciplinary team.    Thank you for allowing me to participate in care of your patient .    Radha Pryor MD  Infectious Disease  [phone]

## 2022-05-07 NOTE — PROGRESS NOTE ADULT - PROBLEM SELECTOR PROBLEM 3
MRSA (methicillin resistant Staphylococcus aureus)
Thrombocythemia
MRSA (methicillin resistant Staphylococcus aureus)

## 2022-05-07 NOTE — PROGRESS NOTE ADULT - PROBLEM SELECTOR PLAN 3
MRSA swab positive but blood cultures positive for B Cereus  Will repeat cultures  TTE pending w concern for endocarditis
MRSA swab positive but blood cultures positive for B Cereus  Will repeat cultures  TTE:  1. Normal left ventricular systolic function.  2. Increased left ventricular wall thickness.  3. No obvious evidence of endocarditis on this transthoracic study,   although evaluation limited by presence of calcific disease.
ML reactive, already on low dose Aspirin, continue, hold UFH for now given the large hematoma shown up in CT.
MRSA swab positive but blood cultures positive for B Cereus  Will repeat cultures  TTE:  1. Normal left ventricular systolic function.  2. Increased left ventricular wall thickness.  3. No obvious evidence of endocarditis on this transthoracic study,   although evaluation limited by presence of calcific disease.

## 2022-05-07 NOTE — PROGRESS NOTE ADULT - REASON FOR ADMISSION
Sent for possibly infected surgical wound.

## 2022-05-28 NOTE — ED ADULT TRIAGE NOTE - CHIEF COMPLAINT QUOTE
RX PROGRESS NOTE: Vancomycin Therapeutic Drug Monitoring      Indication for therapy: intra-abdominal infx    ALLERGIES:  No Known Allergies    Most recent height and weight information:  Weight: 81.6 kg (05/24/22 1747)  Height: 4' 7\" (139.7 cm) (05/24/22 1747)    The Following are the calculated  Current Weights for Mily Solis     Adjusted Ideal    56.4 kg 39.5 kg             Labs:  Serum Creatinine and Creatinine Clearance:  Serum creatinine: 0.61 mg/dL 05/28/22 0641  Estimated creatinine clearance: 69.7 mL/min    Maximum Temperature (last 24 hours)     Value Max    Temp 98.4 °F (36.9 °C)        WBC (K/mcL)   Date/Time Value   05/28/2022 0641 12.8 (H)   05/27/2022 0625 19.0 (H)   05/26/2022 0608 26.6 (H)     Microbiology Results  (Last 10 results in the past 7 days)    Specimen   Gram Smear   Culture Result   Status       05/24/22  1002         No polymorphonuclear cells seen.  [P]            No epithelial cells seen.  [P]            Very Rare Gram positive cocci.  [P]                 [P] - Preliminary Result               Assessment/Plan:  Briefly, this is a 74 year old female started on vancomycin for intra-abdominal infx, with a target serum trough concentration of 10-15 mcg/mL.  Initial dosing regimen will be 1750 mg loading dose once, followed by a maintenance dose of 750 mg every 12 hours..    Pharmacy will monitor levels as appropriate.    Pharmacy will continue to monitor patient (renal function, microbiology data, risk factors for adverse events, appropriate duration of therapy), will order/monitor serum levels as appropriate, and will adjust dose if/when necessary.      Thank you,    Cori Caballero, PHARMD  5/28/2022 9:55 AM     76y female presents to ED with prolapsed uterus from [facility]. Pt denies any hx of uterine cancer. Pt denies pain. As per staff, prolapse was noticed today.

## 2022-08-22 ENCOUNTER — EMERGENCY (EMERGENCY)
Facility: HOSPITAL | Age: 77
LOS: 0 days | Discharge: HOME | End: 2022-08-23
Attending: STUDENT IN AN ORGANIZED HEALTH CARE EDUCATION/TRAINING PROGRAM
Payer: MEDICARE

## 2022-08-22 VITALS
RESPIRATION RATE: 16 BRPM | DIASTOLIC BLOOD PRESSURE: 68 MMHG | OXYGEN SATURATION: 95 % | WEIGHT: 139.99 LBS | HEART RATE: 67 BPM | SYSTOLIC BLOOD PRESSURE: 116 MMHG | HEIGHT: 67 IN | TEMPERATURE: 98 F

## 2022-08-22 DIAGNOSIS — R33.9 RETENTION OF URINE, UNSPECIFIED: ICD-10-CM

## 2022-08-22 DIAGNOSIS — F03.90 UNSPECIFIED DEMENTIA WITHOUT BEHAVIORAL DISTURBANCE: ICD-10-CM

## 2022-08-22 DIAGNOSIS — M19.90 UNSPECIFIED OSTEOARTHRITIS, UNSPECIFIED SITE: ICD-10-CM

## 2022-08-22 DIAGNOSIS — E03.9 HYPOTHYROIDISM, UNSPECIFIED: ICD-10-CM

## 2022-08-22 DIAGNOSIS — E78.5 HYPERLIPIDEMIA, UNSPECIFIED: ICD-10-CM

## 2022-08-22 DIAGNOSIS — N39.0 URINARY TRACT INFECTION, SITE NOT SPECIFIED: ICD-10-CM

## 2022-08-22 DIAGNOSIS — Z98.890 OTHER SPECIFIED POSTPROCEDURAL STATES: Chronic | ICD-10-CM

## 2022-08-22 DIAGNOSIS — I65.29 OCCLUSION AND STENOSIS OF UNSPECIFIED CAROTID ARTERY: ICD-10-CM

## 2022-08-22 DIAGNOSIS — I10 ESSENTIAL (PRIMARY) HYPERTENSION: ICD-10-CM

## 2022-08-22 LAB
ALBUMIN SERPL ELPH-MCNC: 2.9 G/DL — LOW (ref 3.3–5)
ALP SERPL-CCNC: 106 U/L — SIGNIFICANT CHANGE UP (ref 40–120)
ALT FLD-CCNC: 17 U/L — SIGNIFICANT CHANGE UP (ref 12–78)
ANION GAP SERPL CALC-SCNC: 4 MMOL/L — LOW (ref 5–17)
APPEARANCE UR: CLEAR — SIGNIFICANT CHANGE UP
AST SERPL-CCNC: 14 U/L — LOW (ref 15–37)
BASOPHILS # BLD AUTO: 0.03 K/UL — SIGNIFICANT CHANGE UP (ref 0–0.2)
BASOPHILS NFR BLD AUTO: 0.5 % — SIGNIFICANT CHANGE UP (ref 0–2)
BILIRUB SERPL-MCNC: 0.2 MG/DL — SIGNIFICANT CHANGE UP (ref 0.2–1.2)
BILIRUB UR-MCNC: NEGATIVE — SIGNIFICANT CHANGE UP
BUN SERPL-MCNC: 37 MG/DL — HIGH (ref 7–23)
CALCIUM SERPL-MCNC: 9 MG/DL — SIGNIFICANT CHANGE UP (ref 8.5–10.1)
CHLORIDE SERPL-SCNC: 103 MMOL/L — SIGNIFICANT CHANGE UP (ref 96–108)
CO2 SERPL-SCNC: 32 MMOL/L — HIGH (ref 22–31)
COLOR SPEC: YELLOW — SIGNIFICANT CHANGE UP
CREAT SERPL-MCNC: 1.26 MG/DL — SIGNIFICANT CHANGE UP (ref 0.5–1.3)
DIFF PNL FLD: ABNORMAL
EGFR: 43 ML/MIN/1.73M2 — LOW
EOSINOPHIL # BLD AUTO: 0.14 K/UL — SIGNIFICANT CHANGE UP (ref 0–0.5)
EOSINOPHIL NFR BLD AUTO: 2.2 % — SIGNIFICANT CHANGE UP (ref 0–6)
GLUCOSE SERPL-MCNC: 115 MG/DL — HIGH (ref 70–99)
GLUCOSE UR QL: NEGATIVE — SIGNIFICANT CHANGE UP
HCT VFR BLD CALC: 36.5 % — SIGNIFICANT CHANGE UP (ref 34.5–45)
HGB BLD-MCNC: 11.8 G/DL — SIGNIFICANT CHANGE UP (ref 11.5–15.5)
IMM GRANULOCYTES NFR BLD AUTO: 0.3 % — SIGNIFICANT CHANGE UP (ref 0–1.5)
KETONES UR-MCNC: NEGATIVE — SIGNIFICANT CHANGE UP
LEUKOCYTE ESTERASE UR-ACNC: ABNORMAL
LYMPHOCYTES # BLD AUTO: 0.98 K/UL — LOW (ref 1–3.3)
LYMPHOCYTES # BLD AUTO: 15.1 % — SIGNIFICANT CHANGE UP (ref 13–44)
MCHC RBC-ENTMCNC: 29.8 PG — SIGNIFICANT CHANGE UP (ref 27–34)
MCHC RBC-ENTMCNC: 32.3 GM/DL — SIGNIFICANT CHANGE UP (ref 32–36)
MCV RBC AUTO: 92.2 FL — SIGNIFICANT CHANGE UP (ref 80–100)
MONOCYTES # BLD AUTO: 0.48 K/UL — SIGNIFICANT CHANGE UP (ref 0–0.9)
MONOCYTES NFR BLD AUTO: 7.4 % — SIGNIFICANT CHANGE UP (ref 2–14)
NEUTROPHILS # BLD AUTO: 4.82 K/UL — SIGNIFICANT CHANGE UP (ref 1.8–7.4)
NEUTROPHILS NFR BLD AUTO: 74.5 % — SIGNIFICANT CHANGE UP (ref 43–77)
NITRITE UR-MCNC: NEGATIVE — SIGNIFICANT CHANGE UP
PH UR: 6 — SIGNIFICANT CHANGE UP (ref 5–8)
PLATELET # BLD AUTO: 243 K/UL — SIGNIFICANT CHANGE UP (ref 150–400)
POTASSIUM SERPL-MCNC: 4.2 MMOL/L — SIGNIFICANT CHANGE UP (ref 3.5–5.3)
POTASSIUM SERPL-SCNC: 4.2 MMOL/L — SIGNIFICANT CHANGE UP (ref 3.5–5.3)
PROT SERPL-MCNC: 6.6 GM/DL — SIGNIFICANT CHANGE UP (ref 6–8.3)
PROT UR-MCNC: NEGATIVE — SIGNIFICANT CHANGE UP
RBC # BLD: 3.96 M/UL — SIGNIFICANT CHANGE UP (ref 3.8–5.2)
RBC # FLD: 13.3 % — SIGNIFICANT CHANGE UP (ref 10.3–14.5)
SODIUM SERPL-SCNC: 139 MMOL/L — SIGNIFICANT CHANGE UP (ref 135–145)
SP GR SPEC: 1.01 — SIGNIFICANT CHANGE UP (ref 1.01–1.02)
UROBILINOGEN FLD QL: NEGATIVE — SIGNIFICANT CHANGE UP
WBC # BLD: 6.47 K/UL — SIGNIFICANT CHANGE UP (ref 3.8–10.5)
WBC # FLD AUTO: 6.47 K/UL — SIGNIFICANT CHANGE UP (ref 3.8–10.5)

## 2022-08-22 PROCEDURE — 85025 COMPLETE CBC W/AUTO DIFF WBC: CPT

## 2022-08-22 PROCEDURE — 80053 COMPREHEN METABOLIC PANEL: CPT

## 2022-08-22 PROCEDURE — 87086 URINE CULTURE/COLONY COUNT: CPT

## 2022-08-22 PROCEDURE — 36415 COLL VENOUS BLD VENIPUNCTURE: CPT

## 2022-08-22 PROCEDURE — 99284 EMERGENCY DEPT VISIT MOD MDM: CPT | Mod: 25

## 2022-08-22 PROCEDURE — 51702 INSERT TEMP BLADDER CATH: CPT

## 2022-08-22 PROCEDURE — 87186 SC STD MICRODIL/AGAR DIL: CPT

## 2022-08-22 PROCEDURE — 81001 URINALYSIS AUTO W/SCOPE: CPT

## 2022-08-22 PROCEDURE — 99284 EMERGENCY DEPT VISIT MOD MDM: CPT

## 2022-08-22 RX ORDER — LEVOTHYROXINE SODIUM 125 MCG
1 TABLET ORAL
Qty: 0 | Refills: 0 | DISCHARGE

## 2022-08-22 RX ORDER — PREGABALIN 225 MG/1
1 CAPSULE ORAL
Qty: 0 | Refills: 0 | DISCHARGE

## 2022-08-22 RX ORDER — MIRTAZAPINE 45 MG/1
1 TABLET, ORALLY DISINTEGRATING ORAL
Qty: 0 | Refills: 0 | DISCHARGE

## 2022-08-22 RX ORDER — ACETAMINOPHEN 500 MG
2 TABLET ORAL
Qty: 0 | Refills: 0 | DISCHARGE

## 2022-08-22 RX ORDER — LANOLIN ALCOHOL/MO/W.PET/CERES
2 CREAM (GRAM) TOPICAL
Qty: 0 | Refills: 0 | DISCHARGE

## 2022-08-22 RX ORDER — LANOLIN ALCOHOL/MO/W.PET/CERES
0 CREAM (GRAM) TOPICAL
Qty: 0 | Refills: 0 | DISCHARGE

## 2022-08-22 RX ORDER — MEMANTINE HYDROCHLORIDE 10 MG/1
1 TABLET ORAL
Qty: 0 | Refills: 0 | DISCHARGE

## 2022-08-22 RX ORDER — ATORVASTATIN CALCIUM 80 MG/1
1 TABLET, FILM COATED ORAL
Qty: 0 | Refills: 0 | DISCHARGE

## 2022-08-22 RX ORDER — OMEGA-3 ACID ETHYL ESTERS 1 G
2 CAPSULE ORAL
Qty: 0 | Refills: 0 | DISCHARGE

## 2022-08-22 RX ORDER — FOLIC ACID 0.8 MG
1 TABLET ORAL
Qty: 0 | Refills: 0 | DISCHARGE

## 2022-08-22 RX ORDER — CEPHALEXIN 500 MG
1 CAPSULE ORAL
Qty: 14 | Refills: 0
Start: 2022-08-22 | End: 2022-08-28

## 2022-08-22 RX ORDER — CHOLECALCIFEROL (VITAMIN D3) 125 MCG
1 CAPSULE ORAL
Qty: 0 | Refills: 0 | DISCHARGE

## 2022-08-22 RX ORDER — PANTOPRAZOLE SODIUM 20 MG/1
1 TABLET, DELAYED RELEASE ORAL
Qty: 0 | Refills: 0 | DISCHARGE

## 2022-08-22 RX ORDER — AMLODIPINE BESYLATE 2.5 MG/1
0 TABLET ORAL
Qty: 0 | Refills: 0 | DISCHARGE

## 2022-08-22 RX ORDER — CHOLECALCIFEROL (VITAMIN D3) 125 MCG
0 CAPSULE ORAL
Qty: 0 | Refills: 0 | DISCHARGE

## 2022-08-22 RX ORDER — PREGABALIN 225 MG/1
0 CAPSULE ORAL
Qty: 0 | Refills: 0 | DISCHARGE

## 2022-08-22 RX ORDER — AMLODIPINE BESYLATE 2.5 MG/1
1 TABLET ORAL
Qty: 0 | Refills: 0 | DISCHARGE

## 2022-08-22 RX ORDER — CEPHALEXIN 500 MG
500 CAPSULE ORAL ONCE
Refills: 0 | Status: COMPLETED | OUTPATIENT
Start: 2022-08-22 | End: 2022-08-22

## 2022-08-22 NOTE — ED PROVIDER NOTE - PATIENT PORTAL LINK FT
You can access the FollowMyHealth Patient Portal offered by [facility] by registering at the following website: [email or web address]. By joining [facility]’s FollowMyHealth portal, you will also be able to view your health information using other applications (apps) compatible with our system.

## 2022-08-22 NOTE — PHARMACOTHERAPY INTERVENTION NOTE - COMMENTS
Medication reconciliation completed.  Reviewed Medication list and confirmed med allergies with list from Care Facility "[facility]"; confirmed with Dr. First Medreagan.

## 2022-08-22 NOTE — ED PROVIDER NOTE - NSICDXPASTMEDICALHX_GEN_ALL_CORE_FT
PAST MEDICAL HISTORY:  Carotid stenosis     Degenerative joint disease     Dementia without behavioral disturbance, unspecified dementia type     Hyperlipidemia     Hypertension     Hypothyroid     Osteoarthritis        PAST MEDICAL HISTORY:  Carotid stenosis     Degenerative joint disease     Dementia without behavioral disturbance, unspecified dementia type     Hyperlipidemia     Hypertension     Hypothyroid     Osteoarthritis

## 2022-08-22 NOTE — ED PROVIDER NOTE - NSFOLLOWUPINSTRUCTIONS_ED_ALL_ED_FT
OPAL HAS A URINARY TRACT INFECTION.  KEFLEX 500MG TWICE A DAY FOR 7 DAYS WAS SENT TO [facility].  A HERR CATHETER WAS LEFT IN PLACE AS SHE WAS SENT IN FROM YOUR FACILITY WITH CONCERN FOR URINARY RETENTION.  PLEASE HAVE HER FOLLOW UP FOR REMOVAL AND VOID TRIAL.    Urinary Tract Infection, Adult  ImageA urinary tract infection (UTI) is an infection of any part of the urinary tract, which includes the kidneys, ureters, bladder, and urethra. These organs make, store, and get rid of urine in the body. UTI can be a bladder infection (cystitis) or kidney infection (pyelonephritis).    What are the causes?  This infection may be caused by fungi, viruses, or bacteria. Bacteria are the most common cause of UTIs. This condition can also be caused by repeated incomplete emptying of the bladder during urination.    What increases the risk?  This condition is more likely to develop if:    You ignore your need to urinate or hold urine for long periods of time.  You do not empty your bladder completely during urination.  You wipe back to front after urinating or having a bowel movement, if you are female.  You are uncircumcised, if you are male.  You are constipated.  You have a urinary catheter that stays in place (indwelling).  You have a weak defense (immune) system.  You have a medical condition that affects your bowels, kidneys, or bladder.  You have diabetes.  You take antibiotic medicines frequently or for long periods of time, and the antibiotics no longer work well against certain types of infections (antibiotic resistance).  You take medicines that irritate your urinary tract.  You are exposed to chemicals that irritate your urinary tract.  You are female.    What are the signs or symptoms?  Symptoms of this condition include:    Fever.  Frequent urination or passing small amounts of urine frequently.  Needing to urinate urgently.  Pain or burning with urination.  Urine that smells bad or unusual.  Cloudy urine.  Pain in the lower abdomen or back.  Trouble urinating.  Blood in the urine.  Vomiting or being less hungry than normal.  Diarrhea or abdominal pain.  Vaginal discharge, if you are female.    How is this diagnosed?  This condition is diagnosed with a medical history and physical exam. You will also need to provide a urine sample to test your urine. Other tests may be done, including:    Blood tests.  Sexually transmitted disease (STD) testing.    If you have had more than one UTI, a cystoscopy or imaging studies may be done to determine the cause of the infections.    How is this treated?  Treatment for this condition often includes a combination of two or more of the following:    Antibiotic medicine.  Other medicines to treat less common causes of UTI.  Over-the-counter medicines to treat pain.  Drinking enough water to stay hydrated.    Follow these instructions at home:  Take over-the-counter and prescription medicines only as told by your health care provider.  If you were prescribed an antibiotic, take it as told by your health care provider. Do not stop taking the antibiotic even if you start to feel better.  Avoid alcohol, caffeine, tea, and carbonated beverages. They can irritate your bladder.  Drink enough fluid to keep your urine clear or pale yellow.  Keep all follow-up visits as told by your health care provider. This is important.  ImageMake sure to:    Empty your bladder often and completely. Do not hold urine for long periods of time.  Empty your bladder before and after sex.  Wipe from front to back after a bowel movement if you are female. Use each tissue one time when you wipe.    Contact a health care provider if:  You have back pain.  You have a fever.  You feel nauseous or vomit.  Your symptoms do not get better after 3 days.  Your symptoms go away and then return.  Get help right away if:  You have severe back pain or lower abdominal pain.  You are vomiting and cannot keep down any medicines or water.  This information is not intended to replace advice given to you by your health care provider. Make sure you discuss any questions you have with your health care provider.    Urinary Retention in Men    WHAT YOU NEED TO KNOW:    What is urinary retention? Urinary retention is a condition that develops when your bladder does not empty completely when you urinate.         What causes urinary retention?     An enlarged prostate      Blockages, such as a stone, growth, or narrowing of your urethra      A weak bladder muscle      Nerve damage from diabetes, stroke, or spinal cord injury      Bladder diverticula, which are pockets of urine that form in your bladder and do not empty      Certain medicines, such as narcotics, antihistamines, or antidepressants    What are the signs and symptoms of urinary retention?     Frequent urination, or the urge to urinate right after you finish      An urge to urinate, but your urine does not come out or dribbles out slowly and weakly      Frequent urine leaks that happen during the day or while you sleep      Pain or pressure when you urinate      Pain or stiffness in your abdomen, lower back, hips, or upper thighs      Blood in your urine    How is urinary retention diagnosed? Your healthcare provider will ask about your health history and the medicines you take. He will press or tap on your lower abdomen. You may need any of the following tests:     A digital rectal exam is when healthcare providers carefully feel the size of your prostate.      A post void residual test will show how much urine is left in your bladder after you urinate. You will be asked to urinate and then healthcare providers will use a small ultrasound machine to check how much urine is left in your bladder.      Blood or urine tests may show infection or prostate specific antigen (PSA) levels. PSA may be elevated in prostate cancer.      An ultrasound uses sound waves to show pictures on a monitor. An ultrasound may be done to show bladder stones, infection, or other problems.      A CT scan, or CAT scan, is a type of x-ray that is taken of your prostate, kidneys, and bladder. The pictures may show what is causing your urinary retention. You may be given a dye before the pictures are taken to help healthcare providers see the pictures better. Tell the healthcare provider if you have ever had an allergic reaction to contrast dye.    How is urinary retention treated?     A Herr catheter is a tube put into your bladder to drain urine into a bag. Keep the bag below your waist. This will prevent urine from flowing back into your bladder and causing an infection or other problems. Also, keep the tube free of kinks so the urine will drain properly. Do not pull on the catheter. This can cause pain and bleeding, and may cause the catheter to come out.       Medicines can help decrease the size of your prostate, fight infection, and help you urinate more easily.      Surgery may be needed to treat the condition that is causing your urinary retention.     When should I contact my healthcare provider?     You have a fever.      You have pain when you urinate.      You have blood in your urine.      You have problems with your catheter.      You have questions or concerns about your condition or care.    When should I seek immediate care or call 911?     You have severe abdominal pain.      You are breathing faster than usual.      Your heartbeat is faster than usual.      Your face, hands, feet, or ankles are swollen.     CARE AGREEMENT:    You have the right to help plan your care. Learn about your health condition and how it may be treated. Discuss treatment options with your healthcare providers to decide what care you want to receive. You always have the right to refuse treatment.

## 2022-08-22 NOTE — ED PROVIDER NOTE - CLINICAL SUMMARY MEDICAL DECISION MAKING FREE TEXT BOX
Elderly female presented with urinary retention and suprapubic pain. She currently pain free after straight cath relieved approximately 400cc of urine. Will check kidney function, evaluate for uti and will d/c back to nursing home if all negative.

## 2022-08-22 NOTE — ED PROVIDER NOTE - OBJECTIVE STATEMENT
78 y/o female w/ PMHx of dementia, hypothyroid, HTN, HLD presents to the ED from assisted living facility for concerns of urinary retention since yesterday. Pt is a poor historian, demented at baseline, unable to obtain history.

## 2022-08-22 NOTE — ED PROVIDER NOTE - NS ED ATTENDING STATEMENT MOD
Attending Only This was a shared visit with the MIESHA. I reviewed and verified the documentation and independently performed the documented:

## 2022-08-22 NOTE — ED PROVIDER NOTE - ATTENDING APP SHARED VISIT CONTRIBUTION OF CARE
I, Edison Lyles, DO personally saw the patient with MIESHA.  I have personally performed a face to face diagnostic evaluation on this patient.  I have reviewed the MIESHA note and agree with the history, exam, and plan of care, except as noted.  I personally saw the patient and performed a substantive portion of the visit including all aspects of the medical decision making.

## 2022-08-22 NOTE — ED PROVIDER NOTE - NSICDXPASTSURGICALHX_GEN_ALL_CORE_FT
PAST SURGICAL HISTORY:  History of open reduction and internal fixation (ORIF) procedure        PAST SURGICAL HISTORY:  History of open reduction and internal fixation (ORIF) procedure

## 2022-08-22 NOTE — ED ADULT TRIAGE NOTE - CHIEF COMPLAINT QUOTE
Patient comes in with urinary retention since yesterday. Patient currently in no pain. Denies additional complaints.

## 2022-08-22 NOTE — ED PROVIDER NOTE - PROGRESS NOTE DETAILS
patient with +UTI, question of urinary retention at nursing home, will d/c with [facility] and PO [facility] -Manoj Schultz PA-C

## 2022-08-23 VITALS
RESPIRATION RATE: 18 BRPM | OXYGEN SATURATION: 98 % | SYSTOLIC BLOOD PRESSURE: 124 MMHG | HEART RATE: 62 BPM | TEMPERATURE: 98 F | DIASTOLIC BLOOD PRESSURE: 78 MMHG

## 2022-08-23 RX ADMIN — Medication 500 MILLIGRAM(S): at 00:20

## 2022-08-23 NOTE — ED ADULT NURSE NOTE - NSIMPLEMENTINTERV_GEN_ALL_ED
Implemented All Fall with Harm Risk Interventions:  Kelly to call system. Call bell, personal items and telephone within reach. Instruct patient to call for assistance. Room bathroom lighting operational. Non-slip footwear when patient is off stretcher. Physically safe environment: no spills, clutter or unnecessary equipment. Stretcher in lowest position, wheels locked, appropriate side rails in place. Provide visual cue, wrist band, yellow gown, etc. Monitor gait and stability. Monitor for mental status changes and reorient to person, place, and time. Review medications for side effects contributing to fall risk. Reinforce activity limits and safety measures with patient and family. Provide visual clues: red socks.

## 2022-08-24 NOTE — ED ADULT NURSE NOTE - CAS ELECT INFOMATION PROVIDED
[facility] - ED

                                       

                                       Test Date:    2021

Pat Name:     TAURUS JUNE             Department:   

Patient ID:   [id number]                 Room:         -

Gender:       Female                   Technician:   LR

:          1941               Requested By: Michele BOOKER

Order Number: [id number]     Reading MD:   Jarred Shrestha

                                 Measurements

Intervals                              Axis          

Rate:         87                       P:            37

MN:           138                      QRS:          -10

QRSD:         114                      T:            18

QT:           380                                    

QTc:          457                                    

                           Interpretive Statements

Normal sinus rhythm

Moderate voltage criteria for LVH, may be normal variant 

Inferior infarct , age undetermined

Baseline artifact

Comparison tracing not on file

Electronically Signed on 2021 10:11:03 EST by Jarred Shrestha
(4) walks frequently
DC instructions

## 2022-08-25 LAB
-  AMIKACIN: SIGNIFICANT CHANGE UP
-  AMOXICILLIN/CLAVULANIC ACID: SIGNIFICANT CHANGE UP
-  AMPICILLIN/SULBACTAM: SIGNIFICANT CHANGE UP
-  AMPICILLIN: SIGNIFICANT CHANGE UP
-  AZTREONAM: SIGNIFICANT CHANGE UP
-  CEFAZOLIN: SIGNIFICANT CHANGE UP
-  CEFEPIME: SIGNIFICANT CHANGE UP
-  CEFTRIAXONE: SIGNIFICANT CHANGE UP
-  CIPROFLOXACIN: SIGNIFICANT CHANGE UP
-  ERTAPENEM: SIGNIFICANT CHANGE UP
-  GENTAMICIN: SIGNIFICANT CHANGE UP
-  IMIPENEM: SIGNIFICANT CHANGE UP
-  LEVOFLOXACIN: SIGNIFICANT CHANGE UP
-  MEROPENEM: SIGNIFICANT CHANGE UP
-  NITROFURANTOIN: SIGNIFICANT CHANGE UP
-  PIPERACILLIN/TAZOBACTAM: SIGNIFICANT CHANGE UP
-  TIGECYCLINE: SIGNIFICANT CHANGE UP
-  TOBRAMYCIN: SIGNIFICANT CHANGE UP
-  TRIMETHOPRIM/SULFAMETHOXAZOLE: SIGNIFICANT CHANGE UP
CULTURE RESULTS: SIGNIFICANT CHANGE UP
METHOD TYPE: SIGNIFICANT CHANGE UP
ORGANISM # SPEC MICROSCOPIC CNT: SIGNIFICANT CHANGE UP
ORGANISM # SPEC MICROSCOPIC CNT: SIGNIFICANT CHANGE UP
SPECIMEN SOURCE: SIGNIFICANT CHANGE UP

## 2022-08-26 NOTE — ED POST DISCHARGE NOTE - DETAILS
Spoke with manager Letha Penaloza of [place] where pt resides and discussed results. informed her of the results and that ESBL infection usually require IV abx but also that it showed 10-49K bacteria. She will speak with the doctor and see whether he would like to repeat the UC or send her back to the ER. -Darren Mccarthy PA-C

## 2022-11-09 NOTE — PHYSICAL THERAPY INITIAL EVALUATION ADULT - DIAGNOSIS, PT EVAL
-- DO NOT REPLY / DO NOT REPLY ALL --  -- Message is from Engagement Center Operations ([facility]) --    General Patient Message Patient would like to come in today and pickup blood work orders please call patient back thank you     Caller Information       Type Contact Phone/Fax    11/09/2022 04:14 PM CST Phone (Incoming) Kelsey Patel (Self) [phone] (M)        Alternative phone number: na    Can a detailed message be left? Yes    Message Turnaround:     Is it Working Hours? No - After Hours/Weekend/Holiday     Did the caller agree that this message can wait until the office reopens in the morning? YES - The Message Can Wait - Send a message to the provider's clinical support pool     [place]:    Please give this turnaround time to the caller:   \"This message will be sent to [Carin Kearney]. The clinical team will fulfill your request as soon as they review your message.\"                 Gait disturbance

## 2023-02-09 NOTE — ED ADULT NURSE NOTE - HIV OFFER
Opt out Mucosal Advancement Flap Text: Given the location of the defect, shape of the defect and the proximity to free margins a mucosal advancement flap was deemed most appropriate. Incisions were made with a 15 blade scalpel in the appropriate fashion along the cutaneous vermilion border and the mucosal lip. The remaining actinically damaged mucosal tissue was excised.  The mucosal advancement flap was then elevated to the gingival sulcus with care taken to preserve the neurovascular structures and advanced into the primary defect. Care was taken to ensure that precise realignment of the vermilion border was achieved.

## 2023-12-09 NOTE — DISCHARGE NOTE PROVIDER - EXTENDED VTE YES NO FOR MLM ENOXAPARIN
56-year-old female with past medical history of gastritis presents emergency department complaining of 1 week of cough.  Per patient and son patient has had a dry cough associated with congestion and myalgias over the past week.  He reports that over the past few days she has developed pain in the upper left aspect of her back.  Patient reports that she feels this pain when she coughs and also notes that she feels pain in the anterior chest with coughing as well.  She notes that she was in her room last month returning on 11/7 and while on her trip had similar pain for which she was treated with one of the, while guaifesesin and dextromethorphan with relief.  She denies fevers, sick contacts, hemoptysis, abdominal pain nausea or vomiting.  Patient has mentioned any medications for symptoms thus far ,

## 2024-03-09 NOTE — ED PROVIDER NOTE - NS_ATTENDINGSCRIBE_ED_ALL_ED
Oral temp 100.6
/56
I personally performed the service described in the documentation recorded by the scribe in my presence, and it accurately and completely records my words and actions.

## 2024-11-06 NOTE — ED ADULT NURSE NOTE - NSFALLRSKASSESASSIST_ED_ALL_ED
CC:  Henok Moran is here today for Office Visit (4 months f/u/)      Medications: medications verified and updated  Added preferred pharmacy  Refills needed today?  NO    denies Latex allergy or sensitivity       Health Maintenance       Diabetes Foot Exam (Yearly)  Overdue since 4/21/2024    Colorectal Cancer Screen (Cologuard - Every 3 Years)  Overdue since 6/9/2024    Influenza Vaccine (1)  Overdue since 9/1/2024    COVID-19 Vaccine (5 - 2024-25 season)  Overdue since 9/1/2024    Diabetes Eye Exam (Yearly)  Overdue since 9/29/2024           Following review of the above:  Patient is not proceeding with: COVID-19    Note: Refer to final orders and clinician documentation.           Recent PHQ 2/9 Score    PHQ 2:  PHQ 2 Score Adult PHQ 2 Score Adult PHQ 2 Interpretation Little interest or pleasure in activity?   11/6/2024   4:21 PM 0 No further screening needed 0       PHQ 9:     PHQ-2/9 Depression Screening  Little interest or pleasure in activity?: Not at all  Feeling down, depressed or hopeless?: Not at all  Initial depression screening score:: 0  PHQ2 Interpretation: No further screening needed     Patient would like communication of their results via:    Anonymess    Cell Phone:   Telephone Information:   Mobile [phone]     Okay to leave a message containing results? Yes    yes

## 2025-03-10 NOTE — H&P ADULT - NSHPPOAURINARYCATHETER_GEN_ALL_CORE
3/10/2025      Radu Marroquin       [address] [place] [address]        Dear Radu,     We have been reviewing your records and noted that you will be due for a Complete Physical Exam 4/16/2025 or after.  Please call my office at your earliest convenience to schedule this. When making the appointment please let them know that it is for a Complete Physical Exam.    Thank you,        Trevor Bradley, [facility]  [address] [place] [address] [phone]      
no
                                   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  - Patient needed a new neurologist and is now under the care of Dr Martino. Had a routine EEG done 2 weeks ago: result unknown.  - Patient states she is admitted to the hospital to evaluate for night time/sleep  seizure activity and possible medication adjustment.  - Seizures since 39 y/o, on meds since then.  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

## 2025-04-27 NOTE — ED ADULT NURSE NOTE - NS ED NURSE DC INFO COMPLEXITY
PAST SURGICAL HISTORY:  History of hysterectomy     S/P hernia repair     Status post placement of implantable loop recorder 01/2021    
Simple: Patient demonstrates quick and easy understanding

## 2025-07-23 NOTE — PROGRESS NOTE ADULT - TIME BILLING
Refill Routing Note   Medication(s) are not appropriate for processing by [facility] for the following reason(s):        No active prescription written by provider  Required labs outdated    [facility] action(s):  Defer               Appointments  past 12m or future 3m with PCP    Date Provider   Last Visit   8/6/2024 Lexie Tesfaye MD   Next Visit   Visit date not found Lexie Tesfaye MD   ED visits in past 90 days: 0        Note composed:9:23 AM 07/23/2025           
The total amount of time listed was spent reviewing the hospital notes, laboratory values, imaging findings, assessing/counseling the patient, discussing with consultant physicians, case management, social work, and nursing staff.

## (undated) DEVICE — SUT POLYSORB 2-0 30" GS-11 UNDYED

## (undated) DEVICE — DRSG COBAN 6"

## (undated) DEVICE — DRSG WEBRIL 6"

## (undated) DEVICE — PACK HIP FRACTURE

## (undated) DEVICE — SOL IRR POUR NS 0.9% 1000ML

## (undated) DEVICE — WRAP COMPRESSION CALF MED

## (undated) DEVICE — STAPLER SKIN VISI-STAT 35 WIDE

## (undated) DEVICE — GLV 7.5 PROTEXIS

## (undated) DEVICE — SOL IRR POUR H2O 1000ML

## (undated) DEVICE — SUT POLYSORB 1 36" GS-11 UNDYED

## (undated) DEVICE — DRSG 4X4

## (undated) DEVICE — SYM-STRYKER SYSTEM 7: Type: DURABLE MEDICAL EQUIPMENT

## (undated) DEVICE — BLANKET WARMER UPPER ADULT

## (undated) DEVICE — DRILL BIT STRYKER ORTHO 4.2 X 340MM

## (undated) DEVICE — GLV 8 PROTEXIS

## (undated) DEVICE — DRSG COMBINE 5X9"